# Patient Record
Sex: MALE | Race: WHITE | NOT HISPANIC OR LATINO | Employment: FULL TIME | URBAN - METROPOLITAN AREA
[De-identification: names, ages, dates, MRNs, and addresses within clinical notes are randomized per-mention and may not be internally consistent; named-entity substitution may affect disease eponyms.]

---

## 2017-03-22 ENCOUNTER — ALLSCRIPTS OFFICE VISIT (OUTPATIENT)
Dept: OTHER | Facility: OTHER | Age: 42
End: 2017-03-22

## 2017-07-10 ENCOUNTER — ALLSCRIPTS OFFICE VISIT (OUTPATIENT)
Dept: OTHER | Facility: OTHER | Age: 42
End: 2017-07-10

## 2017-07-10 ENCOUNTER — GENERIC CONVERSION - ENCOUNTER (OUTPATIENT)
Dept: OTHER | Facility: OTHER | Age: 42
End: 2017-07-10

## 2018-01-10 NOTE — PSYCH
Psych Med Mgmt    Appearance: was calm and cooperative  Observed mood: mood appropriate  Observed mood: affect appropriate  Speech: a normal rate  Thought processes: coherent/organized  Hallucinations: no hallucinations present  Thought Content: no delusions  Abnormal Thoughts: The patient has no suicidal thoughts and no homicidal thoughts  Orientation: The patient is oriented to person, place and time  Recent and Remote Memory: short term memory intact and long term memory intact  Attention Span And Concentration: concentration intact  Insight: Insight intact  Judgment: His judgment was intact  Treatment Recommendations: Follow up in 4 months  He reports normal appetite, normal energy level, no weight change and normal number of sleep hours  Patient remains stable on his medications and has not experienced side effects  He is no longer having violent/disturbing dreams, and is still unable to identify what may have triggered them at the time  He is happy with his job  He has been having some issues with his girlfriend and states that they are "more like friends now "  Assessment    1  Recurrent major depressive disorder (296 30) (F33 9)    Plan    1  DULoxetine HCl - 60 MG Oral Capsule Delayed Release Particles; TAKE ONE   CAPSULE BY MOUTH ONE TIME DAILY    Review of Systems    Constitutional: No fever, no chills, feels well, no tiredness, no recent weight gain or loss  Cardiovascular: no complaints of slow or fast heart rate, no chest pain, no palpitations  Respiratory: no complaints of shortness of breath, no wheezing, no dyspnea on exertion  Gastrointestinal: no complaints of abdominal pain, no constipation, no nausea, no diarrhea, no vomiting  Genitourinary: no complaints of dysuria, no incontinence, no pelvic pain, no urinary frequency  Musculoskeletal: no complaints of arthralgia, no myalgias, no limb pain, no joint stiffness     Integumentary: no complaints of skin rash, no itching, no dry skin  Neurological: no complaints of headache, no confusion, no numbness, no dizziness  Active Problems    1  Acute bronchitis (466 0) (J20 9)   2  Acute sinusitis (461 9) (J01 90)   3  Herniated lumbar intervertebral disc (722 10) (M51 26)   4  Obstructive sleep apnea (327 23) (G47 33)   5  Recurrent major depressive disorder (296 30) (F33 9)   6  Shoulder joint pain, unspecified laterality    Allergies    1  No Known Drug Allergies    Current Meds   1  DULoxetine HCl - 60 MG Oral Capsule Delayed Release Particles; TAKE ONE CAPSULE   BY MOUTH ONE TIME DAILY; Therapy: 15JRC9073 to (Evaluate:74Tsz0464)  Requested for: 17KCX6793; Last   Rx:50Fuq1379 Ordered    Family Psych History  Mother    1  No pertinent family history  Grandparent    2  Family history of schizophrenia (V17 0) (Z81 8)    Social History    · Former smoker (P72 14) (B42 161)    End of Encounter Meds    1  DULoxetine HCl - 60 MG Oral Capsule Delayed Release Particles; TAKE ONE CAPSULE   BY MOUTH ONE TIME DAILY;    Therapy: 35VUR5205 to (Evaluate:43Aki4508)  Requested for: 22Mar2017; Last   Rx:22Mar2017 Ordered    Signatures   Electronically signed by : Elvi Roman MD; Mar 22 2017  4:49PM EST                       (Author)

## 2018-01-12 NOTE — PSYCH
Psych Med Mgmt    Appearance: was calm and cooperative  Observed mood: mood appropriate  Observed mood: affect appropriate  Speech: a normal rate  Thought processes: coherent/organized  Hallucinations:   no hallucinations present  Thought Content: no delusions  Abnormal Thoughts: The patient has no suicidal thoughts  Orientation: The patient is oriented to person, place and time  Recent and Remote Memory: short term memory intact and long term memory intact  Attention Span And Concentration: concentration intact  Insight: Insight intact  Judgment: His judgment was intact  Treatment Recommendations: follow up in 4 months  He reports normal appetite, normal energy level, no weight change and normal number of sleep hours  Patient remains stable on his medications  Overall, things are going well in his life and he continues to enjoy his job  He recently went a  for a friend of his, which has had him feeling a little sad lately, but he feels that he will be able to pull himself out of his current mood if he keeps his mind busy and focuses on the good  He is sleeping and eating well  Assessment    1  Recurrent major depressive disorder (296 30) (F33 9)    Plan    1  DULoxetine HCl - 60 MG Oral Capsule Delayed Release Particles; TAKE ONE   CAPSULE BY MOUTH ONE TIME DAILY    Review of Systems    Constitutional: No fever, no chills, feels well, no tiredness, no recent weight gain or loss  Cardiovascular: no complaints of slow or fast heart rate, no chest pain, no palpitations  Respiratory: no complaints of shortness of breath, no wheezing, no dyspnea on exertion  Gastrointestinal: no complaints of abdominal pain, no constipation, no nausea, no diarrhea, no vomiting  Genitourinary: no complaints of dysuria, no incontinence, no pelvic pain, no urinary frequency  Musculoskeletal: no complaints of arthralgia, no myalgias, no limb pain, no joint stiffness  Integumentary: no complaints of skin rash, no itching, no dry skin  Neurological: no complaints of headache, no confusion, no numbness, no dizziness  Active Problems    1  Acute bronchitis (466 0) (J20 9)   2  Acute sinusitis (461 9) (J01 90)   3  Herniated lumbar intervertebral disc (722 10) (M51 26)   4  Obstructive sleep apnea (327 23) (G47 33)   5  Recurrent major depressive disorder (296 30) (F33 9)   6  Shoulder joint pain, unspecified laterality    Allergies    1  No Known Drug Allergies    Current Meds   1  DULoxetine HCl - 60 MG Oral Capsule Delayed Release Particles; TAKE ONE CAPSULE   BY MOUTH ONE TIME DAILY; Therapy: 29KRP0894 to (Evaluate:68Cry0576)  Requested for: 22Mar2017; Last   Rx:22Mar2017 Ordered    Family Psych History  Mother    1  No pertinent family history  Grandparent    2  Family history of schizophrenia (V17 0) (Z81 8)    Social History    · Former smoker (W81 20) (J30 933)    End of Encounter Meds    1  DULoxetine HCl - 60 MG Oral Capsule Delayed Release Particles; TAKE ONE CAPSULE   BY MOUTH ONE TIME DAILY;    Therapy: 80IUY7166 to (Evaluate:06Jan2018)  Requested for: 10GOJ5002; Last   Rx:89Xsm3133 Ordered    Signatures   Electronically signed by : Ginette Siddiqui [facility]; Jul 10 2017  4:48PM EST                       (Author)    Electronically signed by : Lexie Powers MD; Jul 11 2017  2:21PM EST

## 2018-01-12 NOTE — PSYCH
Psych Med Mgmt    Appearance: was calm and cooperative, adequate hygiene and grooming and good eye contact  Observed mood: euthymic  Observed mood: affect was broad  Speech: a normal rate  Thought processes: coherent/organized  Hallucinations: no hallucinations present  Thought Content: no delusions  Abnormal Thoughts: The patient has no suicidal thoughts and no homicidal thoughts  Orientation: The patient is oriented to person, place and time  Recent and Remote Memory: short term memory intact and long term memory intact  Attention Span And Concentration: concentration intact  Judgment: His judgment was intact  Muscle Strength And Tone  Muscle strength and tone were normal  Normal gait and station  The patient is experiencing no localized pain  Treatment Recommendations: continue with same meds  Risks, Benefits And Possible Side Effects Of Medications: Risks, benefits, and possible side effects of medications explained to patient and patient verbalizes understanding  He reports normal appetite, normal energy level, no weight change and normal number of sleep hours  The patient was seen for continuing care and pharmacotherapy  His girlfriend is doing some [employer] duty and has been away  His 79-year-old son is disrespectful and is causing problems for Nessa Rodriguez and his ex-wife  Nessa Rodriguez has had some moments of feeling down but not suicidal  Overall he seems to be stable  Assessment    1  Recurrent major depressive disorder (296 30) (F33 9)    Plan    1  DULoxetine HCl - 60 MG Oral Capsule Delayed Release Particles; TAKE ONE   CAPSULE BY MOUTH ONE TIME DAILY    Review of Systems    Constitutional: feeling tired  Cardiovascular: no complaints of slow or fast heart rate, no chest pain, no palpitations  Respiratory: no complaints of shortness of breath, no wheezing, no dyspnea on exertion     Gastrointestinal: no complaints of abdominal pain, no constipation, no nausea, no diarrhea, no vomiting  Genitourinary: no complaints of dysuria, no incontinence, no pelvic pain, no urinary frequency  Musculoskeletal: no complaints of arthralgia, no myalgias, no limb pain, no joint stiffness  Integumentary: no complaints of skin rash, no itching, no dry skin  Neurological: no complaints of headache, no confusion, no numbness, no dizziness  Active Problems    1  Acute bronchitis (466 0) (J20 9)   2  Acute sinusitis (461 9) (J01 90)   3  Herniated lumbar intervertebral disc (722 10) (M51 26)   4  Obstructive sleep apnea (327 23) (G47 33)   5  Recurrent major depressive disorder (296 30) (F33 9)   6  Shoulder joint pain, unspecified laterality    Allergies    1  No Known Drug Allergies    Current Meds   1  DULoxetine HCl - 60 MG Oral Capsule Delayed Release Particles; TAKE ONE CAPSULE   BY MOUTH ONE TIME DAILY; Therapy: 02ACO2248 to (Evaluate:95Hxk2192); Last Rx:98Fcb3225 Ordered    Family Psych History    1  No pertinent family history    2  Family history of schizophrenia (V17 0) (Z81 8)    Social History    · Former smoker (G67 70) (N40 688)    End of Encounter Meds    1  DULoxetine HCl - 60 MG Oral Capsule Delayed Release Particles; TAKE ONE CAPSULE   BY MOUTH ONE TIME DAILY;    Therapy: 41NWN5247 to (Evaluate:16Oct2016)  Requested for: 19Apr2016; Last   Rx:19Apr2016 Ordered    Signatures   Electronically signed by : Marilu Woods MD; Apr 19 2016  4:52PM EST                       (Author)

## 2018-01-12 NOTE — PSYCH
Psych Med Mgmt    Appearance: was calm and cooperative, adequate hygiene and grooming and good eye contact  Observed mood: was dysphoric, but mood appropriate  Observed mood: affect was broad  Speech: a normal rate  Thought processes: coherent/organized  Hallucinations: no hallucinations present  Thought Content: no delusions  Abnormal Thoughts: The patient has no suicidal thoughts and no homicidal thoughts  Recent and Remote Memory: short term memory intact and long term memory intact  Attention Span And Concentration: concentration impaired  Insight: Insight intact  Treatment Recommendations: continue with duloxetine  RTO in 3 month  Risks, Benefits And Possible Side Effects Of Medications: Risks, benefits, and possible side effects of medications explained to patient and patient verbalizes understanding  He reports normal appetite, decreased energy, recent 15 lbs lbs weight loss and normal number of sleep hours  seen for recurrent depression  Has been troubled by a number of physical symptoms for which his PCP can not identify a cause  He feels down because of that  Denies doing drugs or alcohol  No side effects with duloxetine  Assessment    1  Recurrent major depressive disorder (296 30) (F33 9)    Plan    1  DULoxetine HCl - 60 MG Oral Capsule Delayed Release Particles; TAKE ONE   CAPSULE BY MOUTH ONE TIME DAILY    Review of Systems    Constitutional: feeling tired  Cardiovascular: chest pain and frequent  Respiratory: no complaints of shortness of breath, no wheezing, no dyspnea on exertion  Gastrointestinal: nausea and bloated and gasey  Genitourinary: no complaints of dysuria, no incontinence, no pelvic pain, no urinary frequency  Musculoskeletal: arthralgias and back pain  Integumentary: no complaints of skin rash, no itching, no dry skin  Neurological: headache and dizziness  Active Problems    1  Acute bronchitis (466 0) (J20 9)   2   Acute sinusitis (461 9) (J01 90)   3  Herniated lumbar intervertebral disc (722 10) (M51 26)   4  Obstructive sleep apnea (327 23) (G47 33)   5  Recurrent major depressive disorder (296 30) (F33 9)   6  Shoulder joint pain, unspecified laterality    Allergies    1  No Known Drug Allergies    Current Meds   1  DULoxetine HCl - 60 MG Oral Capsule Delayed Release Particles; TAKE ONE CAPSULE   BY MOUTH ONE TIME DAILY; Therapy: 66ZUN7248 to (Evaluate:31Glc7817)  Requested for: 24Jun2016; Last   Rx:24Jun2016 Ordered    Family Psych History  Mother    1  No pertinent family history  Grandparent    2  Family history of schizophrenia (V17 0) (Z81 8)    Social History    · Former smoker (I59 80) (C30 480)    End of Encounter Meds    1  DULoxetine HCl - 60 MG Oral Capsule Delayed Release Particles; TAKE ONE CAPSULE   BY MOUTH ONE TIME DAILY;    Therapy: 11IHD3820 to (Evaluate:18Feb2017)  Requested for: 79Vcn0278; Last   Rx:18Mrx2133 Ordered    Signatures   Electronically signed by : Mello Huerta MD; Aug 22 2016  4:46PM EST                       (Author)

## 2018-01-13 NOTE — PSYCH
Psych Med Mgmt    Appearance: was calm and cooperative, adequate hygiene and grooming and good eye contact  Observed mood: euthymic  Observed mood: affect was broad  Thought processes: coherent/organized  Hallucinations: no hallucinations present  Thought Content: no delusions  Abnormal Thoughts: The patient has no suicidal thoughts and no homicidal thoughts  Orientation: The patient is oriented to person, place and time  Recent and Remote Memory: short term memory intact and long term memory intact  Attention Span And Concentration: concentration intact  Insight: Insight intact  Judgment: His judgment was intact  Muscle Strength And Tone  Muscle strength and tone were normal  Normal gait and station  He reports normal appetite, normal energy level, no weight change and normal number of sleep hours  seen for depression  Has been doing well  His only complaint is occasional disturbing dreams with violent themes  He is not aware of anything that could trigger these dreams  No problems with his girlfriend  !3years old son lives with him  Assessment    1  Recurrent major depressive disorder (296 30) (F33 9)    Review of Systems    Constitutional: No fever, no chills, feels well, no tiredness, no recent weight gain or loss  Cardiovascular: no complaints of slow or fast heart rate, no chest pain, no palpitations  Respiratory: no complaints of shortness of breath, no wheezing, no dyspnea on exertion  Gastrointestinal: no complaints of abdominal pain, no constipation, no nausea, no diarrhea, no vomiting  Genitourinary: no complaints of dysuria, no incontinence, no pelvic pain, no urinary frequency  Musculoskeletal: no complaints of arthralgia, no myalgias, no limb pain, no joint stiffness  Integumentary: no complaints of skin rash, no itching, no dry skin  Neurological: no complaints of headache, no confusion, no numbness, no dizziness  Active Problems    1   Acute bronchitis (466 0) (J20 9)   2  Acute sinusitis (461 9) (J01 90)   3  Herniated lumbar intervertebral disc (722 10) (M51 26)   4  Obstructive sleep apnea (327 23) (G47 33)   5  Recurrent major depressive disorder (296 30) (F33 9)   6  Shoulder joint pain, unspecified laterality    Allergies    1  No Known Drug Allergies    Current Meds   1  DULoxetine HCl - 60 MG Oral Capsule Delayed Release Particles; TAKE ONE CAPSULE   BY MOUTH ONE TIME DAILY; Therapy: 62FWP5005 to (Evaluate:18Feb2017)  Requested for: 05Mhl2446; Last   Rx:85Oxg2140; Status: ACTIVE - Renewal Denied Ordered    Family Psych History  Mother    1  No pertinent family history  Grandparent    2  Family history of schizophrenia (V17 0) (Z81 8)    Social History    · Former smoker (P42 50) (Z67 468)    End of Encounter Meds    1  DULoxetine HCl - 60 MG Oral Capsule Delayed Release Particles; TAKE ONE CAPSULE   BY MOUTH ONE TIME DAILY;    Therapy: 15EST5417 to (Evaluate:18Feb2017)  Requested for: 46Wlf7535; Last   Rx:[id number]; Status: [address] Ordered    Signatures   Electronically signed by : Rupert Cerda MD; Nov 15 2016  4:31PM EST                       (Author)

## 2018-01-17 NOTE — PSYCH
Psych Med Mgmt    Appearance: was calm and cooperative, adequate hygiene and grooming and good eye contact  Observed mood: euthymic and sad about his stepfather's death  , but mood appropriate  Observed mood: affect was broad  Speech: a normal rate and fluent  Thought processes: coherent/organized  Hallucinations: no hallucinations present  Thought Content: no delusions  Abnormal Thoughts: The patient has no suicidal thoughts and no homicidal thoughts  Orientation: The patient is oriented to person, place and time  Recent and Remote Memory: short term memory intact and long term memory intact  Attention Span And Concentration: concentration intact  Insight: Insight intact  Judgment: His judgment was intact  The patient is experiencing no localized pain  Treatment Recommendations: continue with duloxetine and slow breathing techniques  Risks, Benefits And Possible Side Effects Of Medications: Risks, benefits, and possible side effects of medications explained to patient and patient verbalizes understanding  He reports normal appetite, normal energy level, no weight change and normal number of sleep hours  The patient was seen for anxiety and depression  His his stepfather who had raised him suddenly passed away and this has been quite distressful for him  His own father was never around and was not a part of his life  But even before this, he had a started experiencing periods of anxiety during which he felt dizzy and lightheaded and thought he was about to faint although he never did  This is what he had years ago  He has been sleeping and eating well  He has a good relationship with his girlfriend and is functioning well at work  Assessment    1  Recurrent major depressive disorder (296 30) (F33 9)   2  Anxiety, generalized (300 02) (F41 1)    Plan    1   DULoxetine HCl - 60 MG Oral Capsule Delayed Release Particles; TAKE ONE   CAPSULE BY MOUTH ONE TIME DAILY    Review of Systems    Constitutional: No fever, no chills, feels well, no tiredness, no recent weight gain or loss  Cardiovascular: no complaints of slow or fast heart rate, no chest pain, no palpitations  Respiratory: no complaints of shortness of breath, no wheezing, no dyspnea on exertion  Gastrointestinal: no complaints of abdominal pain, no constipation, no nausea, no diarrhea, no vomiting  Genitourinary: no complaints of dysuria, no incontinence, no pelvic pain, no urinary frequency  Musculoskeletal: no complaints of arthralgia, no myalgias, no limb pain, no joint stiffness  Integumentary: no complaints of skin rash, no itching, no dry skin  Neurological: dizziness and periods of lightheadedness  Active Problems    1  Acute bronchitis (466 0) (J20 9)   2  Acute sinusitis (461 9) (J01 90)   3  Herniated lumbar intervertebral disc (722 10) (M51 26)   4  Obstructive sleep apnea (327 23) (G47 33)   5  Recurrent major depressive disorder (296 30) (F33 9)   6  Shoulder joint pain, unspecified laterality    Allergies    1  No Known Drug Allergies    Current Meds   1  DULoxetine HCl - 60 MG Oral Capsule Delayed Release Particles; TAKE ONE CAPSULE   BY MOUTH ONE TIME DAILY; Therapy: 21AAR9268 to (Evaluate:06Jan2018)  Requested for: 47NGB1736; Last   Rx:39Zuw5905 Ordered    Family Psych History  Mother    1  No pertinent family history  Grandparent    2  Family history of schizophrenia (V17 0) (Z81 8)    Social History    · Former smoker (E88 78) (S68 135)    End of Encounter Meds    1  DULoxetine HCl - 60 MG Oral Capsule Delayed Release Particles; TAKE ONE CAPSULE   BY MOUTH ONE TIME DAILY;    Therapy: 28MJD3289 to (Velma Leal)  Requested for: 84ERT5941; Last   Rx:06Nov2017; Status: [address] sheron Oneal Verification Ordered    Signatures   Electronically signed by : Mehdi Cherry MD; Nov 6 2017  4:43PM EST                       (Author)

## 2018-01-30 ENCOUNTER — OFFICE VISIT (OUTPATIENT)
Dept: PSYCHIATRY | Facility: CLINIC | Age: 43
End: 2018-01-30
Payer: COMMERCIAL

## 2018-01-30 DIAGNOSIS — F33.0 MAJOR DEPRESSIVE DISORDER, RECURRENT, MILD (HCC): Primary | ICD-10-CM

## 2018-01-30 DIAGNOSIS — F41.1 GENERALIZED ANXIETY DISORDER: ICD-10-CM

## 2018-01-30 PROCEDURE — 99213 OFFICE O/P EST LOW 20 MIN: CPT | Performed by: PSYCHIATRY & NEUROLOGY

## 2018-01-30 RX ORDER — DULOXETIN HYDROCHLORIDE 60 MG/1
1 CAPSULE, DELAYED RELEASE ORAL DAILY
COMMUNITY
Start: 2015-08-13 | End: 2018-01-30 | Stop reason: SDUPTHER

## 2018-01-30 RX ORDER — DULOXETIN HYDROCHLORIDE 60 MG/1
60 CAPSULE, DELAYED RELEASE ORAL DAILY
Qty: 90 CAPSULE | Refills: 1 | Status: SHIPPED | OUTPATIENT
Start: 2018-01-30 | End: 2018-09-07 | Stop reason: SDUPTHER

## 2018-01-30 NOTE — PSYCH
Subjective:     Patient ID: Carol Ro is a 38 y o  male  HPI ROS Appetite Changes and Sleep: normal appetite, normal energy level and normal number of sleep hours    Review Of Systems:     Mood Emotional Lability   Behavior Normal    Thought Content Normal   General Relationship Problems and Emotional Problems   Personality Normal   Other Psych Symptoms Normal   Constitutional Normal   ENT Normal   Cardiovascular Normal    Respiratory Normal  and Negative   Gastrointestinal Normal   Genitourinary Normal    Musculoskeletal Negative   Integumentary Normal    Neurological Normal    Endocrine Normal    Other Symptoms Normal              Laboratory Results: No results found for this or any previous visit  Substance Abuse History:  History   Drug use: Unknown       Family Psychiatric History: No family history on file  Social History     Social History    Marital status: Single     Spouse name: N/A    Number of children: N/A    Years of education: N/A     Occupational History    Not on file       Social History Main Topics    Smoking status: Not on file    Smokeless tobacco: Not on file    Alcohol use Not on file    Drug use: Unknown    Sexual activity: Not on file     Other Topics Concern    Not on file     Social History Narrative    No narrative on file     Social History     Social History Narrative    No narrative on file       Objective:       Mental status:  Appearance calm and cooperative , adequate hygiene and grooming and good eye contact    Mood dysphoric   Affect affect was broad   Speech a normal rate   Thought Processes normal thought processes   Hallucinations no hallucinations present    Thought Content no delusions   Abnormal Thoughts passive/fleeting thoughts of suicide   Orientation  oriented to person, oriented to place and oriented to time   Remote Memory short term memory intact   Attention Span concentration intact   Intellect Appears to be of Average Intelligence Insight Insight intact   Judgement judgment was intact   Muscle Strength Muscle strength and tone were normal   Language normal   Fund of Knowledge adequate fund of knowledge regarding vocabulary    Pain none   Pain Scale 0       Assessment/Plan:       Diagnoses and all orders for this visit:    Generalized anxiety disorder    Major depressive disorder, recurrent, mild (HCC)            Treatment Recommendations- Risks Benefits      Immediate Medical/Psychiatric Treatments : Resume psychotherapy-continue with same meds  Risks, Benefits And Possible Side Effects Of Medications: discussed

## 2018-04-03 ENCOUNTER — OFFICE VISIT (OUTPATIENT)
Dept: PSYCHIATRY | Facility: CLINIC | Age: 43
End: 2018-04-03
Payer: COMMERCIAL

## 2018-04-03 DIAGNOSIS — F33.0 MAJOR DEPRESSIVE DISORDER, RECURRENT, MILD (HCC): ICD-10-CM

## 2018-04-03 DIAGNOSIS — F41.1 GENERALIZED ANXIETY DISORDER: Primary | ICD-10-CM

## 2018-04-03 PROCEDURE — 99213 OFFICE O/P EST LOW 20 MIN: CPT | Performed by: PSYCHIATRY & NEUROLOGY

## 2018-04-03 RX ORDER — DULOXETIN HYDROCHLORIDE 60 MG/1
1 CAPSULE, DELAYED RELEASE ORAL DAILY
COMMUNITY
End: 2018-04-03

## 2018-04-03 NOTE — PSYCH
Patient ID: Valerie Clemens is a 38 y o  male  HPI ROS Appetite Changes and Sleep: normal appetite, normal energy level, no weight change and normal number of sleep hours    Review Of Systems:     Mood Anxiety and Depression   Thought Content Normal   General Relationship Problems and Emotional Problems   Gastrointestinal Normal   Neurological Normal        Laboratory Results: No results found for this or any previous visit  Subjective:      Upset about lack of romantic relationships  His former girlfriend is no longer interested in him  Has been sober x 4 years  Functioning well at work  Objective:   Avoidant features- overall stable  Mental status:  Appearance calm and cooperative , adequate hygiene and grooming and good eye contact    Mood dysphoric and depressed   Affect affect was broad   Speech a normal rate   Thought Processes coherent/organized and normal thought processes   Hallucinations no hallucinations present    Thought Content no delusional thoughts verbalized   Abnormal Thoughts death wish infrequently   Orientation A+O x 3   Remote Memory short term memory intact and long term memory intact   Attention Span concentration intact   Intellect Not Formally Assessed   Insight Insight intact   Judgement judgment was intact   Muscle Strength Muscle strength and tone were normal and Normal gait    Language no difficulty naming common objects and no difficulty repeating a phrase    Pain none       Assessment/Plan:       Diagnoses and all orders for this visit:    Generalized anxiety disorder    Major depressive disorder, recurrent, mild (HCC)    Other orders  -     Discontinue: DULoxetine (CYMBALTA) 60 mg delayed release capsule;  Take 1 capsule by mouth daily        Duplicate order    Treatment Recommendations- Risks Benefits      Risks, Benefits And Possible Side Effects Of Medications:  Risks, benefits, and possible side effects of medications explained to patient and patient verbalizes understanding

## 2018-07-10 ENCOUNTER — OFFICE VISIT (OUTPATIENT)
Dept: PSYCHIATRY | Facility: CLINIC | Age: 43
End: 2018-07-10
Payer: COMMERCIAL

## 2018-07-10 DIAGNOSIS — F32.0 CURRENT MILD EPISODE OF MAJOR DEPRESSIVE DISORDER WITHOUT PRIOR EPISODE (HCC): Primary | ICD-10-CM

## 2018-07-10 PROCEDURE — 99213 OFFICE O/P EST LOW 20 MIN: CPT | Performed by: PSYCHIATRY & NEUROLOGY

## 2018-07-10 NOTE — PSYCH
Patient ID: Jagjit Cobos is a 38 y o  male  HPI ROS Appetite Changes and Sleep: increased appetite, normal appetite, no weight change and normal number of sleep hours    Review Of Systems:     Mood Depression   Thought Content Normal   General Relationship Problems and Emotional Problems   Gastrointestinal Normal   Neurological Normal        Laboratory Results: No results found for this or any previous visit  Subjective:    No interest in doing much  Feels an emptiness in his life  No romantic relationships which he misses  No social life  No true vegetative signs but has been crying a lot  He has passive death wishes no purpose in living but no suicidal thoughts        Objective:   Situational dysphoria    Mental status:  Appearance calm and cooperative , adequate hygiene and grooming and good eye contact    Mood dysphoric   Affect affect was broad   Speech Normal rate and Normal volume   Thought Processes coherent/organized   Hallucinations no hallucinations present    Thought Content no delusional thoughts verbalized   Abnormal Thoughts no suicidal thoughts  and no homicidal thoughts    Orientation A+O x 3   Memory function Not tested   Attention Span concentration intact   Intellect Not Formally Assessed   Insight Insight intact   Judgement judgment was intact   Muscle Strength Muscle strength and tone were normal and Normal gait    Language no difficulty naming common objects and no difficulty repeating a phrase    Pain none       Assessment/Plan:       There are no diagnoses linked to this encounter  Treatment Recommendations- Risks Benefits    Add Latuda 40 mg daily    Referred to Mimi Carvajal for psychotherapy    Risks, Benefits And Possible Side Effects Of Medications:  Risks, benefits, and possible side effects of medications explained to patient and patient verbalizes understanding

## 2018-07-26 ENCOUNTER — TELEPHONE (OUTPATIENT)
Dept: PSYCHIATRY | Facility: CLINIC | Age: 43
End: 2018-07-26

## 2018-07-26 DIAGNOSIS — F32.0 CURRENT MILD EPISODE OF MAJOR DEPRESSIVE DISORDER WITHOUT PRIOR EPISODE (HCC): ICD-10-CM

## 2018-09-07 DIAGNOSIS — F41.1 GENERALIZED ANXIETY DISORDER: ICD-10-CM

## 2018-09-07 DIAGNOSIS — F33.0 MAJOR DEPRESSIVE DISORDER, RECURRENT, MILD (HCC): ICD-10-CM

## 2018-09-07 RX ORDER — DULOXETIN HYDROCHLORIDE 60 MG/1
60 CAPSULE, DELAYED RELEASE ORAL DAILY
Qty: 90 CAPSULE | Refills: 1 | Status: SHIPPED | OUTPATIENT
Start: 2018-09-07 | End: 2019-01-31 | Stop reason: SDUPTHER

## 2018-09-28 ENCOUNTER — OFFICE VISIT (OUTPATIENT)
Dept: PSYCHIATRY | Facility: CLINIC | Age: 43
End: 2018-09-28
Payer: COMMERCIAL

## 2018-09-28 VITALS — RESPIRATION RATE: 16 BRPM | WEIGHT: 210 LBS | BODY MASS INDEX: 26.95 KG/M2 | HEIGHT: 74 IN

## 2018-09-28 DIAGNOSIS — F32.0 CURRENT MILD EPISODE OF MAJOR DEPRESSIVE DISORDER WITHOUT PRIOR EPISODE (HCC): ICD-10-CM

## 2018-09-28 DIAGNOSIS — F33.1 MODERATE EPISODE OF RECURRENT MAJOR DEPRESSIVE DISORDER (HCC): Primary | ICD-10-CM

## 2018-09-28 DIAGNOSIS — F41.1 GENERALIZED ANXIETY DISORDER: ICD-10-CM

## 2018-09-28 PROCEDURE — 99214 OFFICE O/P EST MOD 30 MIN: CPT | Performed by: PHYSICIAN ASSISTANT

## 2018-09-28 RX ORDER — ATORVASTATIN CALCIUM 20 MG/1
10 TABLET, FILM COATED ORAL DAILY
COMMUNITY
Start: 2015-04-17 | End: 2020-06-12 | Stop reason: HOSPADM

## 2018-09-28 NOTE — PSYCH
PROGRESS NOTE        [address] [facility]      Name and Date of Birth:  Renetta De La Fuente 37 y o  1975    Date of Visit: 09/28/18    SUBJECTIVE:  Pt seen for follow up  Overall feeling better with Latuda  Had break up of long term relationship a few months ago  States they were together for 6 years and continues to think of her often  Also has been sober now for 5 years  He has been staying strong and feeling better  Sleeping about 7 hours at night  Appetite is good  Work has been going well as a  and enjoys where he is  Working as CAD tech now  He denies suicidal ideation, intent or plan at present, has no suicidal ideation, intent or plan at present  He denies any auditory hallucinations and visual hallucinations, denies any other delusional thinking, denies any delusional thinking  He denies any side effects from medications    HPI ROS Appetite Changes and Sleep: normal appetite, normal sleep    Review Of Systems:      Constitutional Negative   ENT Negative   Cardiovascular Negative   Respiratory Negative   Gastrointestinal Negative   Genitourinary Negative   Musculoskeletal Negative   Integumentary Negative   Neurological Negative   Endocrine Negative   Other Symptoms Negative and None       Laboratory Results: No results found for this or any previous visit  Substance Abuse History:    History   Drug use: Unknown       Family Psychiatric History:     No family history on file  The following portions of the patient's history were reviewed and updated as appropriate: past family history, past medical history, past social history, past surgical history and problem list     Social History     Social History    Marital status: Single     Spouse name: N/A    Number of children: N/A    Years of education: N/A     Occupational History    Not on file       Social History Main Topics    Smoking status: Not on file    Smokeless tobacco: Not on file    Alcohol use Not on file    Drug use: Unknown    Sexual activity: Not on file     Other Topics Concern    Not on file     Social History Narrative    No narrative on file     Social History     Social History Narrative    No narrative on file        Social History    None             OBJECTIVE:     Mental Status Evaluation:    Appearance age appropriate, casually dressed   Behavior pleasant, cooperative   Speech normal volume, normal pitch   Mood euthymic   Affect appropriate   Thought Processes logical   Associations intact associations   Thought Content normal   Perceptual Disturbances: none   Abnormal Thoughts  Risk Potential Suicidal ideation - None  Homicidal ideation - None  Potential for aggression - No   Orientation oriented to person, place, time/date and situation   Memory recent and remote memory grossly intact   Cosciousness alert and awake   Attention Span attention span and concentration are age appropriate   Intellect Appears to be of Average Intelligence   Insight age appropriate    Judgement good    Muscle Strength and  Gait muscle strength and tone were normal   Language no difficulty naming common objects   Fund of Knowledge displays adequate knowledge of current events   Pain none   Pain Scale 0       Assessment/Plan:       Diagnoses and all orders for this visit:    Moderate episode of recurrent major depressive disorder (HCC)    Generalized anxiety disorder    Current mild episode of major depressive disorder without prior episode ([facility] )  -     Discontinue: lurasidone (LATUDA) 40 mg tablet; Take 1 tablet (40 mg total) by mouth daily with breakfast  -     lurasidone (LATUDA) 40 mg tablet; Take 1 tablet (40 mg total) by mouth daily with dinner    Other orders  -     atorvastatin (LIPITOR) 20 mg tablet;  Take by mouth          Treatment Recommendations/Precautions:  Latuda 40 mg po qd  Cymbalta 60 mg po qd    Continue current medications   Risks/Benefits      Risks, Benefits And Possible Side Effects Of Medications:    Risks, benefits, and possible side effects of medications explained to patient and patient verbalizes understanding and agreement for treatment      Controlled Medication Discussion:     Not applicable    Psychotherapy Provided:     Individual psychotherapy provided: No

## 2018-11-28 ENCOUNTER — TELEPHONE (OUTPATIENT)
Dept: PSYCHIATRY | Facility: CLINIC | Age: 43
End: 2018-11-28

## 2019-01-31 ENCOUNTER — OFFICE VISIT (OUTPATIENT)
Dept: PSYCHIATRY | Facility: CLINIC | Age: 44
End: 2019-01-31
Payer: COMMERCIAL

## 2019-01-31 DIAGNOSIS — F33.0 MAJOR DEPRESSIVE DISORDER, RECURRENT, MILD (HCC): Primary | ICD-10-CM

## 2019-01-31 DIAGNOSIS — F41.1 GENERALIZED ANXIETY DISORDER: ICD-10-CM

## 2019-01-31 DIAGNOSIS — F43.21 ADJUSTMENT DISORDER WITH DEPRESSED MOOD: ICD-10-CM

## 2019-01-31 PROCEDURE — 99213 OFFICE O/P EST LOW 20 MIN: CPT | Performed by: PHYSICIAN ASSISTANT

## 2019-01-31 PROCEDURE — 90833 PSYTX W PT W E/M 30 MIN: CPT | Performed by: PHYSICIAN ASSISTANT

## 2019-01-31 RX ORDER — DULOXETIN HYDROCHLORIDE 60 MG/1
60 CAPSULE, DELAYED RELEASE ORAL DAILY
Qty: 90 CAPSULE | Refills: 1 | Status: SHIPPED | OUTPATIENT
Start: 2019-01-31 | End: 2019-04-17 | Stop reason: SDUPTHER

## 2019-01-31 NOTE — PSYCH
PROGRESS NOTE        [address] [facility]      Name and Date of Birth:  Leo Fine 37 y o  1975    Date of Visit: 01/31/19    SUBJECTIVE:  Patient seen for follow-up  Pt has been having more depression  He states he was having suicidal thoughts after Thanksgiving  States that he did not have a plan and states he wished he would not wake up  He called our office and was offered an appointment with Ligia Oakes but stated he could not come in to see her because he had a dentist appt  Pt states he was upset that he was told that no one in our office could see him and he was told to go to the ER  He is in treatment with [facility] and that has been helpful  He continues to struggle with breakup of relationship and being alone  He has not been getting out and doing things much outside of work  He denies suicidal ideation, intent or plan at present, has no suicidal ideation, intent or plan at present  He denies any auditory hallucinations and visual hallucinations, denies any other delusional thinking, denies any delusional thinking  He denies any side effects from medications    HPI ROS Appetite Changes and Sleep: normal appetite, fair sleep    Review Of Systems:      Constitutional Negative   ENT Negative   Cardiovascular Negative   Respiratory Negative   Gastrointestinal Negative   Genitourinary Negative   Musculoskeletal Negative   Integumentary Negative   Neurological Negative   Endocrine Negative   Other Symptoms Negative and None       Laboratory Results: No results found for this or any previous visit  Substance Abuse History:    History   Drug use: Unknown       Family Psychiatric History:     No family history on file      The following portions of the patient's history were reviewed and updated as appropriate: past family history, past medical history, past social history, past surgical history and problem list     Social History Social History    Marital status: Single     Spouse name: N/A    Number of children: N/A    Years of education: N/A     Occupational History    Not on file  Social History Main Topics    Smoking status: Not on file    Smokeless tobacco: Not on file    Alcohol use Not on file    Drug use: Unknown    Sexual activity: Not on file     Other Topics Concern    Not on file     Social History Narrative    No narrative on file     Social History     Social History Narrative    No narrative on file        Social History    None             OBJECTIVE:     Mental Status Evaluation:    Appearance age appropriate, casually dressed   Behavior  cooperative   Speech normal volume, normal pitch   Mood depressed   Affect anxious   Thought Processes logical   Associations intact associations   Thought Content normal   Perceptual Disturbances: none   Abnormal Thoughts  Risk Potential Suicidal ideation - None  Homicidal ideation - None  Potential for aggression - No   Orientation oriented to person, place, time/date and situation   Memory recent and remote memory grossly intact   Cosciousness alert and awake   Attention Span attention span and concentration are age appropriate   Intellect Appears to be of Average Intelligence   Insight age appropriate    Judgement good    Muscle Strength and  Gait muscle strength and tone were normal   Language no difficulty naming common objects   Fund of Knowledge displays adequate knowledge of current events   Pain none   Pain Scale 0       Assessment/Plan:       Diagnoses and all orders for this visit:    Major depressive disorder, recurrent, mild (HCC)  -     Brexpiprazole (REXULTI) 0 5 MG tablet; Take 1 tablet (0 5 mg total) by mouth daily  -     DULoxetine (CYMBALTA) 60 mg delayed release capsule; Take 1 capsule (60 mg total) by mouth daily    Generalized anxiety disorder  -     Brexpiprazole (REXULTI) 0 5 MG tablet;  Take 1 tablet (0 5 mg total) by mouth daily  -     DULoxetine (CYMBALTA) 60 mg delayed release capsule; Take 1 capsule (60 mg total) by mouth daily    Adjustment disorder with depressed mood          Treatment Recommendations/Precautions:  Rexulti 0 5 mg daily  Cymbalta 60 mg p  O  Daily  Discontinue Latuda 40 mg daily with food       Continue current medications    Risks/Benefits      Risks, Benefits And Possible Side Effects Of Medications:    Risks, benefits, and possible side effects of medications explained to patient and patient verbalizes understanding and agreement for treatment      Controlled Medication Discussion:     Not applicable    Psychotherapy Provided:     Individual psychotherapy provided: yes x 25  Min  Coping skills with residual depressive symptoms  Residual stressors/ self awareness

## 2019-04-12 DIAGNOSIS — F41.1 GENERALIZED ANXIETY DISORDER: ICD-10-CM

## 2019-04-12 DIAGNOSIS — F33.0 MAJOR DEPRESSIVE DISORDER, RECURRENT, MILD (HCC): ICD-10-CM

## 2019-04-12 RX ORDER — DULOXETIN HYDROCHLORIDE 60 MG/1
60 CAPSULE, DELAYED RELEASE ORAL DAILY
Qty: 90 CAPSULE | Refills: 1 | OUTPATIENT
Start: 2019-04-12

## 2019-04-17 ENCOUNTER — OFFICE VISIT (OUTPATIENT)
Dept: PSYCHIATRY | Facility: CLINIC | Age: 44
End: 2019-04-17
Payer: COMMERCIAL

## 2019-04-17 VITALS — RESPIRATION RATE: 16 BRPM | HEIGHT: 74 IN | BODY MASS INDEX: 28.11 KG/M2 | WEIGHT: 219 LBS

## 2019-04-17 DIAGNOSIS — F33.0 MAJOR DEPRESSIVE DISORDER, RECURRENT, MILD (HCC): ICD-10-CM

## 2019-04-17 DIAGNOSIS — F43.21 ADJUSTMENT DISORDER WITH DEPRESSED MOOD: ICD-10-CM

## 2019-04-17 DIAGNOSIS — F41.1 GENERALIZED ANXIETY DISORDER: Primary | ICD-10-CM

## 2019-04-17 PROCEDURE — 99214 OFFICE O/P EST MOD 30 MIN: CPT | Performed by: PHYSICIAN ASSISTANT

## 2019-04-17 RX ORDER — DULOXETIN HYDROCHLORIDE 60 MG/1
60 CAPSULE, DELAYED RELEASE ORAL DAILY
Qty: 90 CAPSULE | Refills: 1 | Status: SHIPPED | OUTPATIENT
Start: 2019-04-17 | End: 2019-07-17 | Stop reason: SDUPTHER

## 2019-04-20 ENCOUNTER — HOSPITAL ENCOUNTER (EMERGENCY)
Facility: HOSPITAL | Age: 44
Discharge: HOME | End: 2019-04-20
Attending: EMERGENCY MEDICINE
Payer: COMMERCIAL

## 2019-04-20 VITALS
HEART RATE: 75 BPM | RESPIRATION RATE: 14 BRPM | DIASTOLIC BLOOD PRESSURE: 67 MMHG | SYSTOLIC BLOOD PRESSURE: 107 MMHG | TEMPERATURE: 98 F | WEIGHT: 213.85 LBS | OXYGEN SATURATION: 100 % | BODY MASS INDEX: 27.46 KG/M2

## 2019-04-20 DIAGNOSIS — S39.012A STRAIN OF LUMBAR REGION, INITIAL ENCOUNTER: ICD-10-CM

## 2019-04-20 DIAGNOSIS — M54.50 ACUTE RIGHT-SIDED LOW BACK PAIN WITHOUT SCIATICA: Primary | ICD-10-CM

## 2019-04-20 PROCEDURE — 99283 EMERGENCY DEPT VISIT LOW MDM: CPT

## 2019-04-20 PROCEDURE — 96372 THER/PROPH/DIAG INJ SC/IM: CPT

## 2019-04-20 PROCEDURE — 99283 EMERGENCY DEPT VISIT LOW MDM: CPT | Performed by: PHYSICIAN ASSISTANT

## 2019-04-20 RX ORDER — NAPROXEN 500 MG/1
500 TABLET ORAL 2 TIMES DAILY WITH MEALS
Qty: 30 TABLET | Refills: 0 | Status: SHIPPED | OUTPATIENT
Start: 2019-04-20 | End: 2019-12-11

## 2019-04-20 RX ORDER — METHOCARBAMOL 500 MG/1
500 TABLET, FILM COATED ORAL 3 TIMES DAILY PRN
Qty: 20 TABLET | Refills: 0 | Status: SHIPPED | OUTPATIENT
Start: 2019-04-20 | End: 2019-12-11

## 2019-04-20 RX ORDER — LIDOCAINE 50 MG/G
2 PATCH TOPICAL DAILY
Qty: 30 PATCH | Refills: 0 | Status: SHIPPED | OUTPATIENT
Start: 2019-04-20 | End: 2019-12-11

## 2019-04-20 RX ORDER — KETOROLAC TROMETHAMINE 30 MG/ML
15 INJECTION, SOLUTION INTRAMUSCULAR; INTRAVENOUS ONCE
Status: COMPLETED | OUTPATIENT
Start: 2019-04-20 | End: 2019-04-20

## 2019-04-20 RX ORDER — METHOCARBAMOL 500 MG/1
500 TABLET, FILM COATED ORAL ONCE
Status: COMPLETED | OUTPATIENT
Start: 2019-04-20 | End: 2019-04-20

## 2019-04-20 RX ORDER — LIDOCAINE 50 MG/G
2 PATCH TOPICAL ONCE
Status: DISCONTINUED | OUTPATIENT
Start: 2019-04-20 | End: 2019-04-20 | Stop reason: HOSPADM

## 2019-04-20 RX ADMIN — LIDOCAINE 2 PATCH: 50 PATCH TOPICAL at 09:52

## 2019-04-20 RX ADMIN — KETOROLAC TROMETHAMINE 15 MG: 30 INJECTION, SOLUTION INTRAMUSCULAR; INTRAVENOUS at 09:53

## 2019-04-20 RX ADMIN — METHOCARBAMOL TABLETS 500 MG: 500 TABLET, COATED ORAL at 09:52

## 2019-04-22 ENCOUNTER — NURSE TRIAGE (OUTPATIENT)
Dept: PHYSICAL THERAPY | Facility: OTHER | Age: 44
End: 2019-04-22

## 2019-04-22 DIAGNOSIS — M54.50 ACUTE RIGHT-SIDED LOW BACK PAIN WITHOUT SCIATICA: Primary | ICD-10-CM

## 2019-04-29 ENCOUNTER — DOCUMENTATION (OUTPATIENT)
Dept: PHYSICAL THERAPY | Facility: OTHER | Age: 44
End: 2019-04-29

## 2019-04-29 ENCOUNTER — TELEPHONE (OUTPATIENT)
Dept: PHYSICAL THERAPY | Facility: OTHER | Age: 44
End: 2019-04-29

## 2019-05-02 ENCOUNTER — EVALUATION (OUTPATIENT)
Dept: PHYSICAL THERAPY | Facility: CLINIC | Age: 44
End: 2019-05-02
Payer: COMMERCIAL

## 2019-05-02 VITALS — DIASTOLIC BLOOD PRESSURE: 76 MMHG | HEART RATE: 73 BPM | TEMPERATURE: 98.1 F | SYSTOLIC BLOOD PRESSURE: 112 MMHG

## 2019-05-02 DIAGNOSIS — M54.50 ACUTE RIGHT-SIDED LOW BACK PAIN WITHOUT SCIATICA: Primary | ICD-10-CM

## 2019-05-02 PROCEDURE — 97110 THERAPEUTIC EXERCISES: CPT | Performed by: PHYSICAL THERAPIST

## 2019-05-02 PROCEDURE — 97161 PT EVAL LOW COMPLEX 20 MIN: CPT

## 2019-07-17 ENCOUNTER — OFFICE VISIT (OUTPATIENT)
Dept: PSYCHIATRY | Facility: CLINIC | Age: 44
End: 2019-07-17
Payer: COMMERCIAL

## 2019-07-17 VITALS — HEIGHT: 74 IN | BODY MASS INDEX: 29.39 KG/M2 | RESPIRATION RATE: 18 BRPM | WEIGHT: 229 LBS

## 2019-07-17 DIAGNOSIS — F33.0 MAJOR DEPRESSIVE DISORDER, RECURRENT, MILD (HCC): ICD-10-CM

## 2019-07-17 DIAGNOSIS — F41.1 GENERALIZED ANXIETY DISORDER: Primary | ICD-10-CM

## 2019-07-17 DIAGNOSIS — F43.21 ADJUSTMENT DISORDER WITH DEPRESSED MOOD: ICD-10-CM

## 2019-07-17 PROCEDURE — 99214 OFFICE O/P EST MOD 30 MIN: CPT | Performed by: PHYSICIAN ASSISTANT

## 2019-07-17 RX ORDER — DULOXETIN HYDROCHLORIDE 60 MG/1
60 CAPSULE, DELAYED RELEASE ORAL DAILY
Qty: 90 CAPSULE | Refills: 1 | Status: SHIPPED | OUTPATIENT
Start: 2019-07-17 | End: 2019-12-11 | Stop reason: SDUPTHER

## 2019-07-17 NOTE — PSYCH
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  No EPS or SE noted from meds  Physically he is feeling well and no new medications or medical issues  He denies suicidal ideation, intent or plan at present, has no suicidal ideation, intent or plan at present  He denies any auditory hallucinations and visual hallucinations, denies any other delusional thinking, denies any delusional thinking  He denies any side effects from medications    HPI ROS Appetite Changes and Sleep: normal appetite, normal sleep    Review Of Systems:      Constitutional Negative   ENT Negative   Cardiovascular Negative   Respiratory Negative   Gastrointestinal Negative   Genitourinary Negative   Musculoskeletal Negative   Integumentary Negative   Neurological Negative   Endocrine Negative   Other Symptoms Negative and None       Laboratory Results: No results found for this or any previous visit  Substance Abuse History:    Social History     Substance and Sexual Activity   Drug Use Not Currently       Family Psychiatric History:     No family history on file      The following portions of the patient's history were reviewed and updated as appropriate: past family history, past medical history, past social history, past surgical history and problem list     Social History     Socioeconomic History    Marital status: Single     Spouse name: Not on file    Number of children: Not on file    Years of education: Not on file    Highest education level: Not on file   Occupational History    Not on file   Social Needs    Financial resource strain: Not on file    Food insecurity:     Worry: Not on file     Inability: Not on file    Transportation needs:     Medical: Not on file     Non-medical: Not on file   Tobacco Use    Smoking status: Former Smoker     Last attempt to quit: 2004     Years since quitting: 15 5    Smokeless tobacco: Never Used   Substance and Sexual Activity    Alcohol use: Not Currently     Comment: 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    Drug use: Not Currently    Sexual activity: Not on file   Lifestyle    Physical activity:     Days per week: Not on file     Minutes per session: Not on file    Stress: Not on file   Relationships    Social connections:     Talks on phone: Not on file     Gets together: Not on file     Attends [employer] service: Not on file     Active member of club or organization: Not on file     Attends meetings of clubs or organizations: Not on file     Relationship status: Not on file    Intimate partner violence:     Fear of current or ex partner: Not on file     Emotionally abused: Not on file     Physically abused: Not on file     Forced sexual activity: Not on file   Other Topics Concern    Not on file   Social History Narrative    Not on file     Social History     Social History Narrative    Not on file        Social History     Tobacco History     Smoking Status  Former Smoker Quit date  1/1/2004    Smokeless Tobacco Use  Never Used          Alcohol History     Alcohol Use Status  Not Currently Comment  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          Drug Use     Drug Use Status  Not Currently          Sexual Activity     Sexually Active  Not Asked          Activities of Daily Living    Not Asked                     OBJECTIVE:     Mental Status Evaluation:    Appearance age appropriate, casually dressed   Behavior pleasant, cooperative   Speech normal volume, normal pitch   Mood Euthymic   Affect Congruent   Thought Processes logical   Associations intact associations   Thought Content normal   Perceptual Disturbances: none   Abnormal Thoughts  Risk Potential Suicidal ideation - None  Homicidal ideation - None  Potential for aggression - No   Orientation oriented to person, place, time/date and situation   Memory recent and remote memory grossly intact   Cosciousness alert and awake   Attention Span attention span and concentration are age appropriate   Intellect Appears to be of Average Intelligence   Insight age appropriate    Judgement good    Muscle Strength and  Gait muscle strength and tone were normal   Language no difficulty naming common objects   Fund of Knowledge displays adequate knowledge of current events   Pain none   Pain Scale 0       Assessment/Plan:       Diagnoses and all orders for this visit:    Generalized anxiety disorder  -     DULoxetine (CYMBALTA) 60 mg delayed release capsule; Take 1 capsule (60 mg total) by mouth daily  -     Brexpiprazole (REXULTI) 0 5 MG tablet; Take 1 tablet (0 5 mg total) by mouth daily    Major depressive disorder, recurrent, mild (HCC)  -     DULoxetine (CYMBALTA) 60 mg delayed release capsule; Take 1 capsule (60 mg total) by mouth daily  -     Brexpiprazole (REXULTI) 0 5 MG tablet; Take 1 tablet (0 5 mg total) by mouth daily    Adjustment disorder with depressed mood          Treatment Recommendations/Precautions:  Cymbalta 60 mg p o  Daily  Rexulti 0 5 mg daily      Continue current medications    Risks/Benefits      Risks, Benefits And Possible Side Effects Of Medications:    Risks, benefits, and possible side effects of medications explained to patient and patient verbalizes understanding and agreement for treatment      Controlled Medication Discussion:     Not applicable    Psychotherapy Provided:     Individual psychotherapy provided: No

## 2019-12-11 ENCOUNTER — OFFICE VISIT (OUTPATIENT)
Dept: PSYCHIATRY | Facility: CLINIC | Age: 44
End: 2019-12-11
Payer: COMMERCIAL

## 2019-12-11 VITALS — BODY MASS INDEX: 29.65 KG/M2 | WEIGHT: 231 LBS | RESPIRATION RATE: 18 BRPM | HEIGHT: 74 IN

## 2019-12-11 DIAGNOSIS — F33.0 MAJOR DEPRESSIVE DISORDER, RECURRENT, MILD (HCC): ICD-10-CM

## 2019-12-11 DIAGNOSIS — F41.1 GENERALIZED ANXIETY DISORDER: Primary | ICD-10-CM

## 2019-12-11 PROCEDURE — 99214 OFFICE O/P EST MOD 30 MIN: CPT | Performed by: PHYSICIAN ASSISTANT

## 2019-12-11 RX ORDER — DULOXETIN HYDROCHLORIDE 60 MG/1
60 CAPSULE, DELAYED RELEASE ORAL DAILY
Qty: 90 CAPSULE | Refills: 1 | Status: SHIPPED | OUTPATIENT
Start: 2019-12-11 | End: 2020-06-12 | Stop reason: HOSPADM

## 2019-12-11 NOTE — PSYCH
PROGRESS NOTE        [facility]      Name and Date of Birth:  Justo Rosado 53 y o  1975    Date of Visit: 12/11/19    SUBJECTIVE:  Patient seen for follow-up of major depression, generalized anxiety and adjustment disorder  Pt has been doing okay  Reports that he was doing very well this summer was getting out doing a lot of things that he normally would not do  Able to go out and enjoy himself  Sates he had the break up of a relationship and was feeling down in the dumps at the end of summer though  He state he wanted to disappear but no suicidal thoughts  Now he reports he is back baseline and stable moods  He is sleeping and eating well  He continues to exercise but states that he has had some tendinitis issues so is looking into doing yoga and sedative cross fit  Also discussed some upcoming plans for the next couple months  Depression has been stable  Anxiety has been manageable with medications  No adverse effects with the medications  Overall feels as though he is doing well with his current combination  He denies suicidal ideation, intent or plan at present, has no suicidal ideation, intent or plan at present  He denies any auditory hallucinations and visual hallucinations, denies any other delusional thinking, denies any delusional thinking  He denies any side effects from medications    HPI ROS Appetite Changes and Sleep: normal appetite, normal sleep    Review Of Systems:      Constitutional Negative   ENT Negative   Cardiovascular Negative   Respiratory Negative   Gastrointestinal Negative   Genitourinary Negative   Musculoskeletal Negative   Integumentary Negative   Neurological Negative   Endocrine Negative   Other Symptoms Negative and None       Laboratory Results: No results found for this or any previous visit      Substance Abuse History:    Social History     Substance and Sexual Activity   Drug Use Not Currently Family Psychiatric History:     No family history on file      The following portions of the patient's history were reviewed and updated as appropriate: past family history, past medical history, past social history, past surgical history and problem list     Social History     Socioeconomic History    Marital status: Single     Spouse name: Not on file    Number of children: Not on file    Years of education: Not on file    Highest education level: Not on file   Occupational History    Not on file   Social Needs    Financial resource strain: Not on file    Food insecurity:     Worry: Not on file     Inability: Not on file    Transportation needs:     Medical: Not on file     Non-medical: Not on file   Tobacco Use    Smoking status: Former Smoker     Last attempt to quit: 2004     Years since quitting: 15 9    Smokeless tobacco: Never Used   Substance and Sexual Activity    Alcohol use: Not Currently     Comment: 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    Drug use: Not Currently    Sexual activity: Not on file   Lifestyle    Physical activity:     Days per week: Not on file     Minutes per session: Not on file    Stress: Not on file   Relationships    Social connections:     Talks on phone: Not on file     Gets together: Not on file     Attends Adventism service: Not on file     Active member of club or organization: Not on file     Attends meetings of clubs or organizations: Not on file     Relationship status: Not on file    Intimate partner violence:     Fear of current or ex partner: Not on file     Emotionally abused: Not on file     Physically abused: Not on file     Forced sexual activity: Not on file   Other Topics Concern    Not on file   Social History Narrative    Not on file     Social History     Social History Narrative    Not on file        Social History     Tobacco History     Smoking Status  Former Smoker Quit date  1/1/2004    Smokeless Tobacco Use  Never Used          Alcohol History     Alcohol Use Status  Not Currently Comment  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          Drug Use     Drug Use Status  Not Currently          Sexual Activity     Sexually Active  Not Asked          Activities of Daily Living    Not Asked                     OBJECTIVE:     Mental Status Evaluation:    Appearance age appropriate, casually dressed   Behavior pleasant, cooperative   Speech normal volume, normal pitch   Mood euthymic   Affect euthymic   Thought Processes logical   Associations intact associations   Thought Content normal   Perceptual Disturbances: none   Abnormal Thoughts  Risk Potential Suicidal ideation - None  Homicidal ideation - None  Potential for aggression - No   Orientation oriented to person, place, time/date and situation   Memory recent and remote memory grossly intact   Cosciousness alert and awake   Attention Span attention span and concentration are age appropriate   Intellect Appears to be of Average Intelligence   Insight age appropriate    Judgement good    Muscle Strength and  Gait muscle strength and tone were normal   Language no difficulty naming common objects   Fund of Knowledge displays adequate knowledge of current events   Pain none   Pain Scale 0       Assessment/Plan:       Diagnoses and all orders for this visit:    Generalized anxiety disorder  -     Brexpiprazole (REXULTI) 0 5 MG tablet; Take 1 tablet (0 5 mg total) by mouth daily  -     DULoxetine (CYMBALTA) 60 mg delayed release capsule; Take 1 capsule (60 mg total) by mouth daily    Major depressive disorder, recurrent, mild (HCC)  -     Brexpiprazole (REXULTI) 0 5 MG tablet; Take 1 tablet (0 5 mg total) by mouth daily  -     DULoxetine (CYMBALTA) 60 mg delayed release capsule;  Take 1 capsule (60 mg total) by mouth daily          Treatment Recommendations/Precautions:  Cymbalta 60 mg daily   Rexulti 0 5 mg po qd    Continue current medications    Risks/Benefits      Risks, Benefits And Possible Side Effects Of Medications:    Risks, benefits, and possible side effects of medications explained to patient and patient verbalizes understanding and agreement for treatment      Controlled Medication Discussion:     Not applicable    Psychotherapy Provided:     Individual psychotherapy provided: No

## 2020-01-15 ENCOUNTER — TELEPHONE (OUTPATIENT)
Dept: PSYCHIATRY | Facility: CLINIC | Age: 45
End: 2020-01-15

## 2020-01-15 NOTE — TELEPHONE ENCOUNTER
Guillermo Herr called and went to pharmacy Rx of [address] was over $747  When he looked into it they noticed that last year some type of discount was applied  He wanted to know if you could call him he is out of medication and can't afford this refill     Raman's # [phone]

## 2020-01-15 NOTE — TELEPHONE ENCOUNTER
Spoke with patient and he will go online to obtain discount card for the medication  He will call back if any issues

## 2020-01-16 ENCOUNTER — TELEPHONE (OUTPATIENT)
Dept: PSYCHIATRY | Facility: CLINIC | Age: 45
End: 2020-01-16

## 2020-01-17 NOTE — TELEPHONE ENCOUNTER
RETURNED CALL - received voicemail and message left  Spoke with pt and even with discount card Rexulti is over $700 a month- He ha been off for 6 days and doing okay so would like to continue with only cymbalta  He will call back if any issues  We discussed option of abilify as alternative

## 2020-01-20 ENCOUNTER — TELEPHONE (OUTPATIENT)
Dept: PSYCHIATRY | Facility: CLINIC | Age: 45
End: 2020-01-20

## 2020-01-20 NOTE — TELEPHONE ENCOUNTER
He wanted to know if you can call him because he is having panic attacks and wants to speak with you about what he can do about this  Please give him a call thank you

## 2020-01-20 NOTE — TELEPHONE ENCOUNTER
He would like to talk to you about the medication "Rezulti"  He is currently not taking it   Call @ [phone]

## 2020-02-20 ENCOUNTER — OFFICE VISIT (OUTPATIENT)
Dept: PSYCHIATRY | Facility: CLINIC | Age: 45
End: 2020-02-20
Payer: COMMERCIAL

## 2020-02-20 VITALS — WEIGHT: 231 LBS | HEIGHT: 74 IN | BODY MASS INDEX: 29.65 KG/M2 | RESPIRATION RATE: 18 BRPM

## 2020-02-20 DIAGNOSIS — F41.1 GENERALIZED ANXIETY DISORDER: ICD-10-CM

## 2020-02-20 DIAGNOSIS — F33.2 SEVERE EPISODE OF RECURRENT MAJOR DEPRESSIVE DISORDER, WITHOUT PSYCHOTIC FEATURES (HCC): Primary | ICD-10-CM

## 2020-02-20 DIAGNOSIS — F33.0 MAJOR DEPRESSIVE DISORDER, RECURRENT, MILD (HCC): ICD-10-CM

## 2020-02-20 PROCEDURE — 99214 OFFICE O/P EST MOD 30 MIN: CPT | Performed by: PHYSICIAN ASSISTANT

## 2020-02-20 NOTE — PSYCH
PROGRESS NOTE        [facility]      Name and Date of Birth:  Sonia Martinez 53 y o  1975    Date of Visit: 02/20/20    SUBJECTIVE:  Pt seen as urgent visit for follow up MDD, AMANDA  Spoke to pt yesterday and stated he was not feeling well and more depressed, more situational stressors  Patient had a recent break-up of relationship which has caused him to be more anxious and more depressed  Over the past few years the patient has been struggling with anxiety and depression  He has low self-esteem  States he is not enjoying anything  For example, states he went to a concert with a friend last week and did not enjoy it  He has little to no motivation or interest   He is going to work and will go to the gym but states he is not doing much else  He is taking his medications as prescribed  States his sleep has been variable  Appetite has been adequate but reports is mildly diminished since the break-up of his relationship  Patient has struggled over the years with romantic relationships and managing expectations  No new medical issues or medications  He denies suicidal ideation, intent or plan at present, has no suicidal ideation, intent or plan at present  He denies any auditory hallucinations and visual hallucinations, denies any other delusional thinking, denies any delusional thinking  He denies any side effects from medications    HPI ROS Appetite Changes and Sleep:  Slightly diminished appetite, variable sleep    Review Of Systems:      Constitutional Negative   ENT Negative   Cardiovascular Negative   Respiratory Negative   Gastrointestinal Negative   Genitourinary Negative   Musculoskeletal Negative   Integumentary Negative   Neurological Negative   Endocrine Negative   Other Symptoms Negative and None       Laboratory Results: No results found for this or any previous visit      Substance Abuse History:    Social History Substance and Sexual Activity   Drug Use Not Currently       Family Psychiatric History:     No family history on file      The following portions of the patient's history were reviewed and updated as appropriate: past family history, past medical history, past social history, past surgical history and problem list     Social History     Socioeconomic History    Marital status: Single     Spouse name: Not on file    Number of children: Not on file    Years of education: Not on file    Highest education level: Not on file   Occupational History    Not on file   Social Needs    Financial resource strain: Not on file    Food insecurity:     Worry: Not on file     Inability: Not on file    Transportation needs:     Medical: Not on file     Non-medical: Not on file   Tobacco Use    Smoking status: Former Smoker     Last attempt to quit:      Years since quittin 1    Smokeless tobacco: Never Used   Substance and Sexual Activity    Alcohol use: Not Currently     Comment: 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    Drug use: Not Currently    Sexual activity: Not on file   Lifestyle    Physical activity:     Days per week: Not on file     Minutes per session: Not on file    Stress: Not on file   Relationships    Social connections:     Talks on phone: Not on file     Gets together: Not on file     Attends Restorationism service: Not on file     Active member of club or organization: Not on file     Attends meetings of clubs or organizations: Not on file     Relationship status: Not on file    Intimate partner violence:     Fear of current or ex partner: Not on file     Emotionally abused: Not on file     Physically abused: Not on file     Forced sexual activity: Not on file   Other Topics Concern    Not on file   Social History Narrative    Not on file     Social History     Social History Narrative    Not on file        Social History     Tobacco History     Smoking Status  Former Smoker Quit date  2004    Smokeless Tobacco Use  Never Used          Alcohol History     Alcohol Use Status  Not Currently Comment  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          Drug Use     Drug Use Status  Not Currently          Sexual Activity     Sexually Active  Not Asked          Activities of Daily Living    Not Asked                     OBJECTIVE:     Mental Status Evaluation:    Appearance age appropriate, casually dressed   Behavior Good eye contact, cooperative   Speech normal volume, normal pitch   Mood Anxious, depressed   Affect Congruent   Thought Processes Obsessive   Associations intact associations   Thought Content Pessimistic   Perceptual Disturbances: none   Abnormal Thoughts  Risk Potential Suicidal ideation - None  Homicidal ideation - None  Potential for aggression - No   Orientation oriented to person, place, time/date and situation   Memory recent and remote memory grossly intact   Cosciousness alert and awake   Attention Span attention span and concentration are age appropriate   Intellect Appears to be of Average Intelligence   Insight age appropriate    Judgement good    Muscle Strength and  Gait muscle strength and tone were normal   Language no difficulty naming common objects   Fund of Knowledge displays adequate knowledge of current events   Pain none   Pain Scale 0       Assessment/Plan:       Diagnoses and all orders for this visit:    Severe episode of recurrent major depressive disorder, without psychotic features ([address] )    Generalized anxiety disorder          Treatment Recommendations/Precautions:  Supportive psychotherapy focused on coping skills, self-esteem and relationship stressors  Cymbalta 60 mg po qd  Rexulti 0 5 mgpo qd- increase 1 mg po qd-he will cut a 2 mg tablet in half due to cost  Patient provided information on upcoming mind fullness course at [facility] as well as depression bipolar Support [place] meetings  Also encouraged to go back to [facility] S[facility]    Patient contracts for safety and currently denies suicidal ideation, and understands to go to the emergency department or call 911 if he is feeling suicidal   He will call sooner and follow-up with his appointment here and June      Risks/Benefits      Risks, Benefits And Possible Side Effects Of Medications:    Risks, benefits, and possible side effects of medications explained to patient and patient verbalizes understanding and agreement for treatment      Controlled Medication Discussion:     Not applicable    Psychotherapy Provided:     Individual psychotherapy provided: No

## 2020-06-08 ENCOUNTER — HOSPITAL ENCOUNTER (EMERGENCY)
Facility: HOSPITAL | Age: 45
End: 2020-06-08
Attending: EMERGENCY MEDICINE | Admitting: EMERGENCY MEDICINE
Payer: COMMERCIAL

## 2020-06-08 ENCOUNTER — HOSPITAL ENCOUNTER (INPATIENT)
Facility: HOSPITAL | Age: 45
LOS: 4 days | Discharge: HOME | DRG: 885 | End: 2020-06-12
Attending: PSYCHIATRY & NEUROLOGY | Admitting: PSYCHIATRY & NEUROLOGY
Payer: COMMERCIAL

## 2020-06-08 VITALS
HEART RATE: 87 BPM | DIASTOLIC BLOOD PRESSURE: 78 MMHG | TEMPERATURE: 98.4 F | WEIGHT: 231.48 LBS | OXYGEN SATURATION: 94 % | BODY MASS INDEX: 29.72 KG/M2 | RESPIRATION RATE: 16 BRPM | SYSTOLIC BLOOD PRESSURE: 125 MMHG

## 2020-06-08 DIAGNOSIS — R45.851 SUICIDAL IDEATIONS: Primary | ICD-10-CM

## 2020-06-08 DIAGNOSIS — Z86.59 HISTORY OF BIPOLAR DISORDER: ICD-10-CM

## 2020-06-08 DIAGNOSIS — R45.851 SUICIDAL IDEATIONS: ICD-10-CM

## 2020-06-08 DIAGNOSIS — F41.1 GENERALIZED ANXIETY DISORDER: ICD-10-CM

## 2020-06-08 DIAGNOSIS — F33.1 MODERATE EPISODE OF RECURRENT MAJOR DEPRESSIVE DISORDER (HCC): ICD-10-CM

## 2020-06-08 DIAGNOSIS — F33.2 SEVERE EPISODE OF RECURRENT MAJOR DEPRESSIVE DISORDER, WITHOUT PSYCHOTIC FEATURES (HCC): ICD-10-CM

## 2020-06-08 DIAGNOSIS — G47.00 INSOMNIA: Primary | ICD-10-CM

## 2020-06-08 DIAGNOSIS — E78.5 HYPERLIPIDEMIA: ICD-10-CM

## 2020-06-08 LAB
AMPHETAMINES SERPL QL SCN: NEGATIVE
BARBITURATES UR QL: NEGATIVE
BENZODIAZ UR QL: NEGATIVE
COCAINE UR QL: NEGATIVE
ETHANOL EXG-MCNC: 0 MG/DL
METHADONE UR QL: NEGATIVE
OPIATES UR QL SCN: NEGATIVE
PCP UR QL: NEGATIVE
SARS-COV-2 RNA RESP QL NAA+PROBE: NEGATIVE
THC UR QL: NEGATIVE

## 2020-06-08 PROCEDURE — 99285 EMERGENCY DEPT VISIT HI MDM: CPT | Performed by: EMERGENCY MEDICINE

## 2020-06-08 PROCEDURE — 99285 EMERGENCY DEPT VISIT HI MDM: CPT

## 2020-06-08 PROCEDURE — 87635 SARS-COV-2 COVID-19 AMP PRB: CPT | Performed by: EMERGENCY MEDICINE

## 2020-06-08 PROCEDURE — 80307 DRUG TEST PRSMV CHEM ANLYZR: CPT | Performed by: EMERGENCY MEDICINE

## 2020-06-08 PROCEDURE — 82075 ASSAY OF BREATH ETHANOL: CPT | Performed by: EMERGENCY MEDICINE

## 2020-06-08 RX ORDER — OLANZAPINE 5 MG/1
5 TABLET ORAL
Status: DISCONTINUED | OUTPATIENT
Start: 2020-06-08 | End: 2020-06-12 | Stop reason: HOSPADM

## 2020-06-08 RX ORDER — MAGNESIUM HYDROXIDE/ALUMINUM HYDROXICE/SIMETHICONE 120; 1200; 1200 MG/30ML; MG/30ML; MG/30ML
30 SUSPENSION ORAL EVERY 4 HOURS PRN
Status: DISCONTINUED | OUTPATIENT
Start: 2020-06-08 | End: 2020-06-12 | Stop reason: HOSPADM

## 2020-06-08 RX ORDER — HALOPERIDOL 5 MG
5 TABLET ORAL EVERY 6 HOURS PRN
Status: DISCONTINUED | OUTPATIENT
Start: 2020-06-08 | End: 2020-06-12 | Stop reason: HOSPADM

## 2020-06-08 RX ORDER — TRAZODONE HYDROCHLORIDE 50 MG/1
50 TABLET ORAL
Status: DISCONTINUED | OUTPATIENT
Start: 2020-06-08 | End: 2020-06-11

## 2020-06-08 RX ORDER — ACETAMINOPHEN 325 MG/1
650 TABLET ORAL EVERY 6 HOURS PRN
Status: DISCONTINUED | OUTPATIENT
Start: 2020-06-08 | End: 2020-06-09

## 2020-06-08 RX ORDER — OLANZAPINE 5 MG/1
5 TABLET ORAL
Status: CANCELLED | OUTPATIENT
Start: 2020-06-08

## 2020-06-08 RX ORDER — ACETAMINOPHEN 325 MG/1
650 TABLET ORAL EVERY 6 HOURS PRN
Status: CANCELLED | OUTPATIENT
Start: 2020-06-08

## 2020-06-08 RX ORDER — NICOTINE 21 MG/24HR
1 PATCH, TRANSDERMAL 24 HOURS TRANSDERMAL DAILY
Status: CANCELLED | OUTPATIENT
Start: 2020-06-09

## 2020-06-08 RX ORDER — HALOPERIDOL 5 MG
5 TABLET ORAL EVERY 6 HOURS PRN
Status: CANCELLED | OUTPATIENT
Start: 2020-06-08

## 2020-06-08 RX ORDER — BENZTROPINE MESYLATE 1 MG/1
1 TABLET ORAL 2 TIMES DAILY PRN
Status: DISCONTINUED | OUTPATIENT
Start: 2020-06-08 | End: 2020-06-12 | Stop reason: HOSPADM

## 2020-06-08 RX ORDER — BENZTROPINE MESYLATE 1 MG/1
1 TABLET ORAL 2 TIMES DAILY PRN
Status: CANCELLED | OUTPATIENT
Start: 2020-06-08

## 2020-06-08 RX ORDER — HYDROXYZINE HYDROCHLORIDE 25 MG/1
25 TABLET, FILM COATED ORAL EVERY 6 HOURS PRN
Status: CANCELLED | OUTPATIENT
Start: 2020-06-08

## 2020-06-08 RX ORDER — TRAZODONE HYDROCHLORIDE 50 MG/1
50 TABLET ORAL
Status: CANCELLED | OUTPATIENT
Start: 2020-06-08

## 2020-06-08 RX ORDER — MAGNESIUM HYDROXIDE/ALUMINUM HYDROXICE/SIMETHICONE 120; 1200; 1200 MG/30ML; MG/30ML; MG/30ML
30 SUSPENSION ORAL EVERY 4 HOURS PRN
Status: CANCELLED | OUTPATIENT
Start: 2020-06-08

## 2020-06-08 RX ORDER — NICOTINE 21 MG/24HR
1 PATCH, TRANSDERMAL 24 HOURS TRANSDERMAL DAILY
Status: DISCONTINUED | OUTPATIENT
Start: 2020-06-09 | End: 2020-06-11

## 2020-06-08 RX ORDER — HYDROXYZINE HYDROCHLORIDE 25 MG/1
25 TABLET, FILM COATED ORAL EVERY 6 HOURS PRN
Status: DISCONTINUED | OUTPATIENT
Start: 2020-06-08 | End: 2020-06-12 | Stop reason: HOSPADM

## 2020-06-09 PROBLEM — F41.8 OTHER SPECIFIED ANXIETY DISORDERS: Status: ACTIVE | Noted: 2020-06-09

## 2020-06-09 PROBLEM — Z00.8 MEDICAL CLEARANCE FOR PSYCHIATRIC ADMISSION: Status: ACTIVE | Noted: 2020-06-09

## 2020-06-09 PROBLEM — F33.2 SEVERE EPISODE OF RECURRENT MAJOR DEPRESSIVE DISORDER, WITHOUT PSYCHOTIC FEATURES (HCC): Status: ACTIVE | Noted: 2020-06-09

## 2020-06-09 PROCEDURE — 99221 1ST HOSP IP/OBS SF/LOW 40: CPT | Performed by: PHYSICIAN ASSISTANT

## 2020-06-09 PROCEDURE — 99221 1ST HOSP IP/OBS SF/LOW 40: CPT | Performed by: PSYCHIATRY & NEUROLOGY

## 2020-06-09 RX ORDER — HYDROXYZINE 50 MG/1
50 TABLET, FILM COATED ORAL EVERY 6 HOURS PRN
Status: DISCONTINUED | OUTPATIENT
Start: 2020-06-09 | End: 2020-06-12 | Stop reason: HOSPADM

## 2020-06-09 RX ORDER — LORAZEPAM 1 MG/1
1 TABLET ORAL EVERY 6 HOURS PRN
Status: DISCONTINUED | OUTPATIENT
Start: 2020-06-09 | End: 2020-06-12 | Stop reason: HOSPADM

## 2020-06-09 RX ORDER — ACETAMINOPHEN 325 MG/1
975 TABLET ORAL EVERY 8 HOURS PRN
Status: DISCONTINUED | OUTPATIENT
Start: 2020-06-09 | End: 2020-06-12 | Stop reason: HOSPADM

## 2020-06-09 RX ORDER — ACETAMINOPHEN 325 MG/1
650 TABLET ORAL EVERY 8 HOURS PRN
Status: DISCONTINUED | OUTPATIENT
Start: 2020-06-09 | End: 2020-06-12 | Stop reason: HOSPADM

## 2020-06-09 RX ORDER — LORAZEPAM 2 MG/ML
2 INJECTION INTRAMUSCULAR EVERY 6 HOURS PRN
Status: DISCONTINUED | OUTPATIENT
Start: 2020-06-09 | End: 2020-06-12 | Stop reason: HOSPADM

## 2020-06-09 RX ORDER — DULOXETIN HYDROCHLORIDE 30 MG/1
90 CAPSULE, DELAYED RELEASE ORAL DAILY
Status: DISCONTINUED | OUTPATIENT
Start: 2020-06-09 | End: 2020-06-12 | Stop reason: HOSPADM

## 2020-06-09 RX ORDER — ATORVASTATIN CALCIUM 20 MG/1
20 TABLET, FILM COATED ORAL
Status: DISCONTINUED | OUTPATIENT
Start: 2020-06-09 | End: 2020-06-12 | Stop reason: HOSPADM

## 2020-06-09 RX ORDER — ACETAMINOPHEN 325 MG/1
325 TABLET ORAL EVERY 6 HOURS PRN
Status: DISCONTINUED | OUTPATIENT
Start: 2020-06-09 | End: 2020-06-12 | Stop reason: HOSPADM

## 2020-06-09 RX ADMIN — ATORVASTATIN CALCIUM 20 MG: 20 TABLET, FILM COATED ORAL at 16:16

## 2020-06-09 RX ADMIN — TRAZODONE HYDROCHLORIDE 50 MG: 50 TABLET ORAL at 00:35

## 2020-06-09 RX ADMIN — HYDROXYZINE HYDROCHLORIDE 25 MG: 25 TABLET ORAL at 00:35

## 2020-06-09 RX ADMIN — TRAZODONE HYDROCHLORIDE 50 MG: 50 TABLET ORAL at 21:56

## 2020-06-09 RX ADMIN — DULOXETINE 90 MG: 30 CAPSULE, DELAYED RELEASE ORAL at 14:50

## 2020-06-10 LAB
ALBUMIN SERPL BCP-MCNC: 3.6 G/DL (ref 3.5–5)
ALP SERPL-CCNC: 57 U/L (ref 46–116)
ALT SERPL W P-5'-P-CCNC: 28 U/L (ref 12–78)
ANION GAP SERPL CALCULATED.3IONS-SCNC: 5 MMOL/L (ref 4–13)
AST SERPL W P-5'-P-CCNC: 12 U/L (ref 5–45)
BASOPHILS # BLD AUTO: 0.05 THOUSANDS/ΜL (ref 0–0.1)
BASOPHILS NFR BLD AUTO: 1 % (ref 0–1)
BILIRUB SERPL-MCNC: 0.48 MG/DL (ref 0.2–1)
BUN SERPL-MCNC: 16 MG/DL (ref 5–25)
CALCIUM SERPL-MCNC: 9.3 MG/DL (ref 8.3–10.1)
CHLORIDE SERPL-SCNC: 108 MMOL/L (ref 100–108)
CO2 SERPL-SCNC: 27 MMOL/L (ref 21–32)
CREAT SERPL-MCNC: 1.2 MG/DL (ref 0.6–1.3)
EOSINOPHIL # BLD AUTO: 0.17 THOUSAND/ΜL (ref 0–0.61)
EOSINOPHIL NFR BLD AUTO: 3 % (ref 0–6)
ERYTHROCYTE [DISTWIDTH] IN BLOOD BY AUTOMATED COUNT: 12.1 % (ref 11.6–15.1)
GFR SERPL CREATININE-BSD FRML MDRD: 72 ML/MIN/1.73SQ M
GLUCOSE SERPL-MCNC: 84 MG/DL (ref 65–140)
HCT VFR BLD AUTO: 42.7 % (ref 36.5–49.3)
HGB BLD-MCNC: 14.2 G/DL (ref 12–17)
IMM GRANULOCYTES # BLD AUTO: 0.01 THOUSAND/UL (ref 0–0.2)
IMM GRANULOCYTES NFR BLD AUTO: 0 % (ref 0–2)
LYMPHOCYTES # BLD AUTO: 1.85 THOUSANDS/ΜL (ref 0.6–4.47)
LYMPHOCYTES NFR BLD AUTO: 35 % (ref 14–44)
MCH RBC QN AUTO: 29.2 PG (ref 26.8–34.3)
MCHC RBC AUTO-ENTMCNC: 33.3 G/DL (ref 31.4–37.4)
MCV RBC AUTO: 88 FL (ref 82–98)
MONOCYTES # BLD AUTO: 0.53 THOUSAND/ΜL (ref 0.17–1.22)
MONOCYTES NFR BLD AUTO: 10 % (ref 4–12)
NEUTROPHILS # BLD AUTO: 2.64 THOUSANDS/ΜL (ref 1.85–7.62)
NEUTS SEG NFR BLD AUTO: 51 % (ref 43–75)
NRBC BLD AUTO-RTO: 0 /100 WBCS
PLATELET # BLD AUTO: 271 THOUSANDS/UL (ref 149–390)
PMV BLD AUTO: 10 FL (ref 8.9–12.7)
POTASSIUM SERPL-SCNC: 3.9 MMOL/L (ref 3.5–5.3)
PROT SERPL-MCNC: 7.1 G/DL (ref 6.4–8.2)
RBC # BLD AUTO: 4.86 MILLION/UL (ref 3.88–5.62)
SODIUM SERPL-SCNC: 140 MMOL/L (ref 136–145)
TSH SERPL DL<=0.05 MIU/L-ACNC: 1.04 UIU/ML (ref 0.36–3.74)
WBC # BLD AUTO: 5.25 THOUSAND/UL (ref 4.31–10.16)

## 2020-06-10 PROCEDURE — 80053 COMPREHEN METABOLIC PANEL: CPT | Performed by: PHYSICIAN ASSISTANT

## 2020-06-10 PROCEDURE — 99232 SBSQ HOSP IP/OBS MODERATE 35: CPT | Performed by: PSYCHIATRY & NEUROLOGY

## 2020-06-10 PROCEDURE — 84443 ASSAY THYROID STIM HORMONE: CPT | Performed by: PHYSICIAN ASSISTANT

## 2020-06-10 PROCEDURE — 85025 COMPLETE CBC W/AUTO DIFF WBC: CPT | Performed by: PHYSICIAN ASSISTANT

## 2020-06-10 RX ADMIN — ATORVASTATIN CALCIUM 20 MG: 20 TABLET, FILM COATED ORAL at 16:25

## 2020-06-10 RX ADMIN — TRAZODONE HYDROCHLORIDE 50 MG: 50 TABLET ORAL at 21:50

## 2020-06-10 RX ADMIN — DULOXETINE 90 MG: 30 CAPSULE, DELAYED RELEASE ORAL at 09:11

## 2020-06-10 RX ADMIN — BREXPIPRAZOLE 1 MG: 2 TABLET ORAL at 09:11

## 2020-06-11 PROCEDURE — 99232 SBSQ HOSP IP/OBS MODERATE 35: CPT | Performed by: PSYCHIATRY & NEUROLOGY

## 2020-06-11 RX ORDER — TRAZODONE HYDROCHLORIDE 50 MG/1
50 TABLET ORAL
Status: DISCONTINUED | OUTPATIENT
Start: 2020-06-11 | End: 2020-06-12 | Stop reason: HOSPADM

## 2020-06-11 RX ADMIN — DULOXETINE 90 MG: 30 CAPSULE, DELAYED RELEASE ORAL at 09:50

## 2020-06-11 RX ADMIN — TRAZODONE HYDROCHLORIDE 50 MG: 50 TABLET ORAL at 22:01

## 2020-06-11 RX ADMIN — ATORVASTATIN CALCIUM 20 MG: 20 TABLET, FILM COATED ORAL at 16:46

## 2020-06-11 RX ADMIN — BREXPIPRAZOLE 1 MG: 2 TABLET ORAL at 09:50

## 2020-06-12 VITALS
SYSTOLIC BLOOD PRESSURE: 105 MMHG | HEIGHT: 74 IN | HEART RATE: 85 BPM | OXYGEN SATURATION: 96 % | BODY MASS INDEX: 29.2 KG/M2 | WEIGHT: 227.51 LBS | DIASTOLIC BLOOD PRESSURE: 69 MMHG | TEMPERATURE: 96.5 F | RESPIRATION RATE: 16 BRPM

## 2020-06-12 PROCEDURE — 99239 HOSP IP/OBS DSCHRG MGMT >30: CPT | Performed by: PHYSICIAN ASSISTANT

## 2020-06-12 RX ORDER — ATORVASTATIN CALCIUM 20 MG/1
20 TABLET, FILM COATED ORAL
Qty: 30 TABLET | Refills: 0 | Status: SHIPPED | OUTPATIENT
Start: 2020-06-12 | End: 2020-07-30 | Stop reason: HOSPADM

## 2020-06-12 RX ORDER — TRAZODONE HYDROCHLORIDE 50 MG/1
50 TABLET ORAL
Qty: 30 TABLET | Refills: 0 | Status: SHIPPED | OUTPATIENT
Start: 2020-06-12 | End: 2020-07-30 | Stop reason: HOSPADM

## 2020-06-12 RX ORDER — DULOXETIN HYDROCHLORIDE 30 MG/1
90 CAPSULE, DELAYED RELEASE ORAL DAILY
Qty: 90 CAPSULE | Refills: 0 | Status: SHIPPED | OUTPATIENT
Start: 2020-06-13 | End: 2020-07-06 | Stop reason: SDUPTHER

## 2020-06-12 RX ADMIN — DULOXETINE 90 MG: 30 CAPSULE, DELAYED RELEASE ORAL at 09:04

## 2020-06-15 ENCOUNTER — TELEMEDICINE (OUTPATIENT)
Dept: PSYCHIATRY | Facility: CLINIC | Age: 45
End: 2020-06-15
Payer: COMMERCIAL

## 2020-06-15 DIAGNOSIS — F43.21 ADJUSTMENT DISORDER WITH DEPRESSED MOOD: ICD-10-CM

## 2020-06-15 DIAGNOSIS — F33.2 SEVERE EPISODE OF RECURRENT MAJOR DEPRESSIVE DISORDER, WITHOUT PSYCHOTIC FEATURES (HCC): Primary | ICD-10-CM

## 2020-06-15 DIAGNOSIS — F41.1 GENERALIZED ANXIETY DISORDER: ICD-10-CM

## 2020-06-15 PROCEDURE — 99213 OFFICE O/P EST LOW 20 MIN: CPT | Performed by: PHYSICIAN ASSISTANT

## 2020-07-06 ENCOUNTER — TELEMEDICINE (OUTPATIENT)
Dept: PSYCHIATRY | Facility: CLINIC | Age: 45
End: 2020-07-06
Payer: COMMERCIAL

## 2020-07-06 DIAGNOSIS — F33.2 SEVERE EPISODE OF RECURRENT MAJOR DEPRESSIVE DISORDER, WITHOUT PSYCHOTIC FEATURES (HCC): Primary | ICD-10-CM

## 2020-07-06 DIAGNOSIS — F43.21 ADJUSTMENT DISORDER WITH DEPRESSED MOOD: ICD-10-CM

## 2020-07-06 DIAGNOSIS — F41.1 GENERALIZED ANXIETY DISORDER: ICD-10-CM

## 2020-07-06 PROCEDURE — 99214 OFFICE O/P EST MOD 30 MIN: CPT | Performed by: PHYSICIAN ASSISTANT

## 2020-07-06 RX ORDER — DULOXETIN HYDROCHLORIDE 60 MG/1
CAPSULE, DELAYED RELEASE ORAL
Qty: 60 CAPSULE | Refills: 0 | Status: ON HOLD | OUTPATIENT
Start: 2020-07-06 | End: 2020-07-25 | Stop reason: SDUPTHER

## 2020-07-06 NOTE — PSYCH
Virtual Regular Visit      Assessment/Plan:    Problem List Items Addressed This Visit        Other    Generalized anxiety disorder    Relevant Medications    DULoxetine (CYMBALTA) 60 mg delayed release capsule    Brexpiprazole (REXULTI) 1 MG tablet    Severe episode of recurrent major depressive disorder, without psychotic features ([facility] [address] - Primary    Relevant Medications    DULoxetine (CYMBALTA) 60 mg delayed release capsule    Brexpiprazole (REXULTI) 1 MG tablet      Other Visit Diagnoses     Adjustment disorder with depressed mood        Relevant Medications    DULoxetine (CYMBALTA) 60 mg delayed release capsule    Brexpiprazole (REXULTI) 1 MG tablet               Reason for visit is   Chief Complaint   Patient presents with    Depression    Follow-up    Anxiety    Virtual Regular Visit        Encounter provider Shanti Parekh PA-C    Provider located at [address] [id number]      Recent Visits  No visits were found meeting these conditions  Showing recent visits within past 7 days and meeting all other requirements     Today's Visits  Date Type Provider Dept   07/06/20 [facility] today's visits and meeting all other requirements     Future Appointments  No visits were found meeting these conditions  Showing future appointments within next 150 days and meeting all other requirements        The patient was identified by name and date of birth  Vlad Kinds was informed that this is a telemedicine visit and that the visit is being conducted through [employer]  My office door was closed  No one else was in the room  He acknowledged consent and understanding of privacy and security of the video platform  The patient has agreed to participate and understands they can discontinue the visit at any time      Patient is aware this is a billable service  Subjective  Johanna Saul is a 53 y o  male with history of major depression and anxiety   HPI   Patient seen for follow-up of depression anxiety and medication check  Patient reports that his mood has been fluctuating  He reports he continues with significant depression  Has little to no interest or motivation  Also reports his anxiety has been worse  States he worries frequently  Increased ruminations  States that he has been spending most of his time in bed when he is not working  States that he will go to sleep about seven or 730 and sleep straight through to the next morning  He denies any suicidal or homicidal ideation  He has been on multiple medications in the past   Initially states he did feel better with the increase in the Cymbalta  Reports that he feels increasingly tired  He had blood work done for his hospitalization which was reviewed and everything was normal   He does have a follow-up with his primary care physician tomorrow and will also address hers lethargy with them  Past Medical History:   Diagnosis Date    Anxiety     Bipolar disorder ([facility] [address] )     Depression     Psychiatric disorder     Suicidal thoughts        Past Surgical History:   Procedure Laterality Date    SHOULDER SURGERY         Current Outpatient Medications   Medication Sig Dispense Refill    atorvastatin (LIPITOR) 20 mg tablet Take 1 tablet (20 mg total) by mouth daily with dinner 30 tablet 0    Brexpiprazole (REXULTI) 1 MG tablet Take 1 tablet (1 mg total) by mouth daily at bedtime 30 tablet 0    DULoxetine (CYMBALTA) 60 mg delayed release capsule Two caps p o  daily 60 capsule 0    traZODone (DESYREL) 50 mg tablet Take 1 tablet (50 mg total) by mouth daily at bedtime 30 tablet 0     No current facility-administered medications for this visit           No Known Allergies    Review of Systems   No current complaints of pain or discomfort, sleep is increased and adequate appetite    Video Exam    There were no vitals filed for this visit  Physical Exam   Speech is clear and coherent, normal rate and volume  Mood is depressed, anxious  Linear and coherent thought process, some increased ruminations and preoccupations  Denies suicidal or homicidal ideation  Linear coherent thought process  No psychotic signs or symptoms, no delusions  Cognition is intact  Insight and judgment is intact      Medications as follows:  Patient has been on multiple medications in the past including augmentation with Wellbutrin which he states he believes he had side effect with and lithium was not helpful    Increase Cymbalta 90 milligrams total daily to 120 milligrams daily  Rexulti 1 mg po qd  Trazodone 50 milligrams at bedtime as needed for sleep has not been eating  Will follow-up in two weeks and he will call sooner if any questions or concerns    As a result of this visit, I have not referred the patient for further respiratory evaluation  I spent 25 minutes directly with the patient during this visit      VIRTUAL VISIT [address] [facility] acknowledges that he has consented to an online visit or consultation  He understands that the online visit is based solely on information provided by him, and that, in the absence of a face-to-face physical evaluation by the physician, the diagnosis he receives is both limited and provisional in terms of accuracy and completeness  This is not intended to replace a full medical face-to-face evaluation by the physician  Carmelo Samayoa understands and accepts these terms

## 2020-07-24 ENCOUNTER — HOSPITAL ENCOUNTER (EMERGENCY)
Facility: HOSPITAL | Age: 45
End: 2020-07-24
Attending: EMERGENCY MEDICINE | Admitting: PSYCHIATRY & NEUROLOGY
Payer: COMMERCIAL

## 2020-07-24 ENCOUNTER — HOSPITAL ENCOUNTER (INPATIENT)
Facility: HOSPITAL | Age: 45
LOS: 6 days | Discharge: HOME | DRG: 885 | End: 2020-07-30
Attending: PSYCHIATRY & NEUROLOGY | Admitting: PSYCHIATRY & NEUROLOGY
Payer: COMMERCIAL

## 2020-07-24 ENCOUNTER — TELEMEDICINE (OUTPATIENT)
Dept: PSYCHIATRY | Facility: CLINIC | Age: 45
End: 2020-07-24
Payer: COMMERCIAL

## 2020-07-24 VITALS
BODY MASS INDEX: 29.95 KG/M2 | WEIGHT: 233.25 LBS | TEMPERATURE: 97.2 F | SYSTOLIC BLOOD PRESSURE: 114 MMHG | RESPIRATION RATE: 16 BRPM | OXYGEN SATURATION: 96 % | DIASTOLIC BLOOD PRESSURE: 75 MMHG | HEART RATE: 68 BPM

## 2020-07-24 DIAGNOSIS — F41.1 GENERALIZED ANXIETY DISORDER: Chronic | ICD-10-CM

## 2020-07-24 DIAGNOSIS — F41.1 GENERALIZED ANXIETY DISORDER: ICD-10-CM

## 2020-07-24 DIAGNOSIS — F33.2 MAJOR DEPRESSIVE DISORDER, RECURRENT, SEVERE WITHOUT PSYCHOTIC FEATURES (HCC): Primary | Chronic | ICD-10-CM

## 2020-07-24 DIAGNOSIS — F33.0 MAJOR DEPRESSIVE DISORDER, RECURRENT, MILD (HCC): ICD-10-CM

## 2020-07-24 DIAGNOSIS — Z00.8 MEDICAL CLEARANCE FOR PSYCHIATRIC ADMISSION: ICD-10-CM

## 2020-07-24 DIAGNOSIS — F33.1 MODERATE EPISODE OF RECURRENT MAJOR DEPRESSIVE DISORDER (HCC): ICD-10-CM

## 2020-07-24 DIAGNOSIS — F32.A DEPRESSION WITH SUICIDAL IDEATION: Primary | ICD-10-CM

## 2020-07-24 DIAGNOSIS — E78.5 HYPERLIPIDEMIA: ICD-10-CM

## 2020-07-24 DIAGNOSIS — F41.9 ANXIETY: ICD-10-CM

## 2020-07-24 DIAGNOSIS — F31.9 BIPOLAR DISORDER (HCC): ICD-10-CM

## 2020-07-24 DIAGNOSIS — R45.851 DEPRESSION WITH SUICIDAL IDEATION: Primary | ICD-10-CM

## 2020-07-24 DIAGNOSIS — F33.2 SEVERE EPISODE OF RECURRENT MAJOR DEPRESSIVE DISORDER, WITHOUT PSYCHOTIC FEATURES (HCC): Primary | ICD-10-CM

## 2020-07-24 LAB
AMPHETAMINES SERPL QL SCN: NEGATIVE
BARBITURATES UR QL: NEGATIVE
BENZODIAZ UR QL: NEGATIVE
COCAINE UR QL: NEGATIVE
ETHANOL EXG-MCNC: 0 MG/DL
METHADONE UR QL: NEGATIVE
OPIATES UR QL SCN: NEGATIVE
OXYCODONE+OXYMORPHONE UR QL SCN: NEGATIVE
PCP UR QL: NEGATIVE
SARS-COV-2 RNA RESP QL NAA+PROBE: NEGATIVE
THC UR QL: NEGATIVE

## 2020-07-24 PROCEDURE — 99285 EMERGENCY DEPT VISIT HI MDM: CPT

## 2020-07-24 PROCEDURE — 82075 ASSAY OF BREATH ETHANOL: CPT | Performed by: EMERGENCY MEDICINE

## 2020-07-24 PROCEDURE — NC001 PR NO CHARGE: Performed by: EMERGENCY MEDICINE

## 2020-07-24 PROCEDURE — 99283 EMERGENCY DEPT VISIT LOW MDM: CPT | Performed by: PSYCHIATRY & NEUROLOGY

## 2020-07-24 PROCEDURE — 87635 SARS-COV-2 COVID-19 AMP PRB: CPT | Performed by: EMERGENCY MEDICINE

## 2020-07-24 PROCEDURE — 99214 OFFICE O/P EST MOD 30 MIN: CPT | Performed by: PHYSICIAN ASSISTANT

## 2020-07-24 PROCEDURE — 80307 DRUG TEST PRSMV CHEM ANLYZR: CPT | Performed by: EMERGENCY MEDICINE

## 2020-07-24 PROCEDURE — 99285 EMERGENCY DEPT VISIT HI MDM: CPT | Performed by: EMERGENCY MEDICINE

## 2020-07-24 RX ORDER — HYDROXYZINE HYDROCHLORIDE 25 MG/1
25 TABLET, FILM COATED ORAL EVERY 4 HOURS PRN
Status: CANCELLED | OUTPATIENT
Start: 2020-07-24

## 2020-07-24 RX ORDER — HALOPERIDOL 5 MG/ML
5 INJECTION INTRAMUSCULAR EVERY 6 HOURS PRN
Status: DISCONTINUED | OUTPATIENT
Start: 2020-07-24 | End: 2020-07-25

## 2020-07-24 RX ORDER — TRAZODONE HYDROCHLORIDE 100 MG/1
50 TABLET ORAL
Status: CANCELLED | OUTPATIENT
Start: 2020-07-24

## 2020-07-24 RX ORDER — ACETAMINOPHEN 325 MG/1
650 TABLET ORAL EVERY 6 HOURS PRN
Status: CANCELLED | OUTPATIENT
Start: 2020-07-24

## 2020-07-24 RX ORDER — MAGNESIUM HYDROXIDE/ALUMINUM HYDROXICE/SIMETHICONE 120; 1200; 1200 MG/30ML; MG/30ML; MG/30ML
30 SUSPENSION ORAL EVERY 4 HOURS PRN
Status: DISCONTINUED | OUTPATIENT
Start: 2020-07-24 | End: 2020-07-30 | Stop reason: HOSPADM

## 2020-07-24 RX ORDER — HALOPERIDOL 5 MG
5 TABLET ORAL EVERY 6 HOURS PRN
Status: CANCELLED | OUTPATIENT
Start: 2020-07-24

## 2020-07-24 RX ORDER — TRAZODONE HYDROCHLORIDE 50 MG/1
50 TABLET ORAL
Status: DISCONTINUED | OUTPATIENT
Start: 2020-07-24 | End: 2020-07-25

## 2020-07-24 RX ORDER — RISPERIDONE 1 MG/1
1 TABLET, ORALLY DISINTEGRATING ORAL EVERY 4 HOURS PRN
Status: CANCELLED | OUTPATIENT
Start: 2020-07-24

## 2020-07-24 RX ORDER — HYDROXYZINE HYDROCHLORIDE 25 MG/1
25 TABLET, FILM COATED ORAL EVERY 6 HOURS PRN
Status: DISCONTINUED | OUTPATIENT
Start: 2020-07-24 | End: 2020-07-25

## 2020-07-24 RX ORDER — MAGNESIUM HYDROXIDE/ALUMINUM HYDROXICE/SIMETHICONE 120; 1200; 1200 MG/30ML; MG/30ML; MG/30ML
30 SUSPENSION ORAL EVERY 4 HOURS PRN
Status: CANCELLED | OUTPATIENT
Start: 2020-07-24

## 2020-07-24 RX ORDER — ATORVASTATIN CALCIUM 20 MG/1
20 TABLET, FILM COATED ORAL
Status: CANCELLED | OUTPATIENT
Start: 2020-07-24

## 2020-07-24 RX ORDER — ACETAMINOPHEN 325 MG/1
650 TABLET ORAL EVERY 6 HOURS PRN
Status: DISCONTINUED | OUTPATIENT
Start: 2020-07-24 | End: 2020-07-25

## 2020-07-24 RX ORDER — OLANZAPINE 10 MG/1
10 INJECTION, POWDER, LYOPHILIZED, FOR SOLUTION INTRAMUSCULAR
Status: CANCELLED | OUTPATIENT
Start: 2020-07-24

## 2020-07-24 RX ORDER — ACETAMINOPHEN 325 MG/1
975 TABLET ORAL EVERY 6 HOURS PRN
Status: CANCELLED | OUTPATIENT
Start: 2020-07-24

## 2020-07-24 RX ORDER — DULOXETIN HYDROCHLORIDE 60 MG/1
120 CAPSULE, DELAYED RELEASE ORAL 2 TIMES DAILY
Status: CANCELLED | OUTPATIENT
Start: 2020-07-24

## 2020-07-24 RX ORDER — HYDROXYZINE HYDROCHLORIDE 25 MG/1
50 TABLET, FILM COATED ORAL EVERY 6 HOURS PRN
Status: CANCELLED | OUTPATIENT
Start: 2020-07-24

## 2020-07-24 RX ORDER — HALOPERIDOL 5 MG
5 TABLET ORAL EVERY 6 HOURS PRN
Status: DISCONTINUED | OUTPATIENT
Start: 2020-07-24 | End: 2020-07-25

## 2020-07-24 RX ORDER — LORAZEPAM 2 MG/ML
1 INJECTION INTRAMUSCULAR EVERY 4 HOURS PRN
Status: CANCELLED | OUTPATIENT
Start: 2020-07-24

## 2020-07-24 RX ORDER — IBUPROFEN 600 MG/1
600 TABLET ORAL EVERY 8 HOURS PRN
Status: CANCELLED | OUTPATIENT
Start: 2020-07-24

## 2020-07-24 RX ORDER — BENZTROPINE MESYLATE 1 MG/1
1 TABLET ORAL 2 TIMES DAILY PRN
Status: DISCONTINUED | OUTPATIENT
Start: 2020-07-24 | End: 2020-07-25

## 2020-07-24 RX ADMIN — BREXPIPRAZOLE 1 MG: 1 TABLET ORAL at 21:11

## 2020-07-24 NOTE — PSYCH
Virtual Brief Visit    Assessment/Plan:    Problem List Items Addressed This Visit        Other    Generalized anxiety disorder    Severe episode of recurrent major depressive disorder, without psychotic features ([facility] [address] ) - Primary                Reason for visit is   Chief Complaint   Patient presents with    Virtual Regular Visit    Virtual Brief Visit        Encounter provider Ute Leal PA-C    Provider located at [facility] [id number]    Recent Visits  No visits were found meeting these conditions  Showing recent visits within past 7 days and meeting all other requirements     Today's Visits  Date Type Provider Dept   07/24/20 Telemedicine ANNETTA Dobbs 70 today's visits and meeting all other requirements     Future Appointments  No visits were found meeting these conditions  Showing future appointments within next 150 days and meeting all other requirements        After connecting through telephone, the patient was identified by name and date of birth  Ashish Shah was informed that this is a telemedicine visit and that the visit is being conducted through telephone  My office door was closed  No one else was in the room  He acknowledged consent and understanding of privacy and security of the platform  The patient has agreed to participate and understands he can discontinue the visit at any time  Patient is aware this is a billable service  Subjective    Ashish Shah is a 53 y o  male with MDD and AMANDA  HPI   Patient seen for follow-up and medication change  States he has not noted much of a difference with increasing the Cymbalta  He states that he has not been sleeping as much though and has been trying to stay awake later  He has been going to work but states not doing much outside of work    States that this weekend he spent much of his time in bed  Continues to struggle with decreased self esteem and feelings of worthlessness  Pt states he continues with significant depression  Has increased anxiety and thinking "about everything"  He is not enjoying anything  He has been going to work but increased difficulty with focus and concentration there due to his anxiety and obsessive worries  States that he continues to have suicidal ideation at times and has thought of overdosing but states he will not act on that due to concerns for his family and his children  Reports that he feels increasingly overwhelmed  He has been taking his medications as prescribed  Sleeping and eating adequately  No new medications or medical issues  Past Medical History:   Diagnosis Date    Anxiety     Bipolar disorder ([facility] [address] )     Depression     Psychiatric disorder     Suicidal thoughts        Past Surgical History:   Procedure Laterality Date    SHOULDER SURGERY         Current Outpatient Medications   Medication Sig Dispense Refill    atorvastatin (LIPITOR) 20 mg tablet Take 1 tablet (20 mg total) by mouth daily with dinner 30 tablet 0    Brexpiprazole (REXULTI) 1 MG tablet Take 1 tablet (1 mg total) by mouth daily at bedtime 30 tablet 0    DULoxetine (CYMBALTA) 60 mg delayed release capsule Two caps p o  daily 60 capsule 0    traZODone (DESYREL) 50 mg tablet Take 1 tablet (50 mg total) by mouth daily at bedtime 30 tablet 0     No current facility-administered medications for this visit           No Known Allergies    Review of Systems   No current complaints of pain or discomfort, sleep is increased, appetite is adequate    Mental status exam:  Patient with clear and coherent speech, normal rate and volume  Mood is depressed, anxious, apathetic  Linear and coherent thought process  Easily preoccupied, racing thoughts  Passive suicidal ideation, no homicidal ideation  No psychotic signs notes, no hallucinations or delusions  Cognition is fair  Insight and judgment is fair    There were no vitals filed for this visit  Current meds:  Cymbalta 120 mg po bid  Rexulti 1 mg po qd  Trazodone 50 mg q h s  P r n  Had been on Prozac and doing well for a number of years until 2014  Had also had augmentation with Latuda, lithium and Wellbutrin in the past with subtherapeutic affect    Due to severity of patient's symptoms, concerns for patient's safety, and passive suicidal ideation, I am recommend inpatient psychiatric hospitalization to adjust the patient's medications  I am concerned of changing his antidepressant on an outpatient basis due to concerns of further decompensation  Patient is in agreement with this plan and agreeable to sign a 201  We also discussed partial hospitalization after his discharge due to need for continued therapy  Patient states that he will have a friend drive him to [address] [place] [address] Emergency Department for admission to the adult behavioral health unit  I spoke with crisis worker at [address] [place] [address] and there are currently beds available  Crisis worker is aware that Eloise Craig will be arriving shortly in the ED        I spent 30 minutes directly with the patient during this visit    VIRTUAL VISIT [facility] acknowledges that he has consented to an online visit or consultation  He understands that the online visit is based solely on information provided by him, and that, in the absence of a face-to-face physical evaluation by the physician, the diagnosis he receives is both limited and provisional in terms of accuracy and completeness  This is not intended to replace a full medical face-to-face evaluation by the physician  Bibi Pedraza understands and accepts these terms

## 2020-07-24 NOTE — ED NOTES
patient presented to the ED today after referral from his outpt provider, Araseli Rosen  Pt has presented as very depressed with periodic  suicidal thoughts  he presents as very depressed , with anhedonia, wants to sleep all the time, poor enegy , isolative  He believes that the current medication is not helping  There is no evidence of psychosis   Patient signed a 61 51 81  Patient was recently in [facility] (6/8/20-6/12/20)  He does have a long hx of depression and has been on prozac in the past which also worked well for a long time  He has a hx of cocaine and alcohol use and has been clean for 6 yrs       Alivia BorjasLists of hospitals in the [place]

## 2020-07-24 NOTE — ED NOTES
Patient is accepted at [facility]  Patient is accepted by Dr Yaneth Hazel    Patient may go to the floor after report       Nurse report is to be called to  prior to patient transfer

## 2020-07-24 NOTE — PROGRESS NOTES
Pt admitted status 201 from [address] [place] [address] ED for SI, depression, and anxiety  Pt reports recent discharge from [facility] and reports minimal improvement in symptoms  Pt reports hypersomnia, anhedonia, poor energy/motivation, anxiety, and guilt  He reports extensive substance abuse hx but is currently clean and sober x 6 years  Pt reports hx of etoh and cocaine use that he stopped 6 years ago by attending [place] [address] meetings  Pt reports hx of using LSD as a teenager into adulthood  Began etoh use at age 13 with long hx of heavy use before becoming sober  Pt has current psychiatrist and feels that medication changes and dose adjustments have been ineffective  Recent increase in duloxetine dosing with minimal improvement in symptoms  Pt reports feeling stable on fluoxetine for approx  18 years  Pt said this medication eventually stopped working  Pt reports he is very compliant with current medications of duloxetine 120 mg and rexulti 1 mg  Denies missing doses  Pt denies current medical issues  Denies AH/VH  Reports mild paranoia at times  Pt said ECT was mentioned to him in ED and he is considering this  Pt given educational material about ECT to read over  Pt's 25year old son lives with him  Pt reports having stable employment  No legal issues  Cooperative throughout admission assessment  Oriented to unit rules/routine

## 2020-07-24 NOTE — TREATMENT PLAN
RN will assess pt at least twice daily for symptoms of admission and educate pt on medications, diagnoses, and coping skills

## 2020-07-24 NOTE — ED PROVIDER NOTES
15:45 Care assumed from Dr Taniya Ho with inpatient bed assignment pending  Will continue to monitor in the ED       Luciana Kenny MD  07/24/20 1744

## 2020-07-24 NOTE — ED NOTES
Insurance Authorization for admission:   Phone call placed to [employer]  Phone number: [phone]  Spoke to Venkat rosario  3 days approved    Level of care: inpatient  Mental health   Review on Monday 7/27  Authorization # [id number]  Ongoing reviewer will call on monday

## 2020-07-24 NOTE — CONSULTS
Psychiatry Consultation Note   Fatemeh Kapoor 53 y o  male MRN: [id number]  Unit/Bed#: ED 12 Encounter: [id number]    Assessment and Plan       Assessment:    Fatemeh Kapoor is a 53 y o  male with MDD and AMANDA who is presenting with worsening of depressive symptoms and fleeting suicidal ideation with a plan to possibly overdose on his medications  Principal Problem:  1  Severe episode of recurrent major depressive disorder, without psychotic features ([facility] [address] )  a  Differential: Dysthymia, Unspecified mood disorder  Active Problem:  1  Generalized Anxiety Disorder:   A  Reports feeling anxious regarding multiple things in his daily life  Experiences physical symptoms of anxiety including tremulousness, racing heart rate  Plan:     1  Will plan inpatient psychiatric admission- patient signed 201 form  2    Psychopharmacotherapy deferred for inpatient treatment team    3    Psychiatry crisis to sign off at this time  Please contact us should you have any questions  HPI     Chief Complaint: "I'm feeling depressed"    History of Present Illness   Physician Requesting Consult: Leann Rodriguez III, DO  Reason for Consult / Principal Problem: depression and suicidal ideation    Fatemeh Kapoor is a 53 y o  male with Major Depressive Disorder and Generalized Anxiety Disorder who presents to the ED with worsening depressive symptoms and suicidal ideation  Earlier in the day he had a virtual visit with his psychiatric physician assistant for follow up and medication adjustment (after increasing Cymbalta from 90mg to 120 mg daily)  He told her, and the Dolphus Comber of this note, that his symptoms have not improved since the adjustment and believes it is not working for him  After the session, she noted that he would benefit from an inpatient psychiatric treatment in order to optimize his medication regimen to help his symptoms   He notes that for the past several years his depression has not improved despite escalation of treatment and believes is getting worse  He wants to stay in bed all the time, continues to feel hopeless, helpless, does not feel interested in anything, and has issues with concentration, energy, and psychomotor agitation  He reports that throughout the week, he forces himself to go to work, do everyday chores, and perform his duties as a father to his two children  He easily gets anxious and depressed about multiple things in his life, especially whenever he has to assume any responsibility, be it work or simple chores  Otherwise he notes that he does not understand why he feels this way despite saying that his life is great  Furthermore, his suicidal ideation has become more frequent and more intrusive over time, to the point of peaking this past Sunday when he had wanted to end his life and was thinking of ways of doing it  He thought about overdosing on his medications or committing suicide at his  neighbor's yard, but was afraid of doing it because it would be too graphic  He actively resists these thoughts and noted that if he did not seek help, he "would probably do it " He previously had firearms at home but gave them away so that he would not impulsively kill himself  He has positive protective factors such as his children and parents and does not wish to commit suicide because of them  Denies homicidal ideation, auditory and visual hallucinations  Denies manic episodes in the past; denies symptoms of hypomania in the past      He was admitted to [address] [place] [address] inpatient psychiatric unit last month for depression and suicidal ideation  There his Cymbalta was increased from 60 mg to 90 mg, continued on Rexulti 1 mg QD, and was prescribed Trazodone 50 mg HS for insomnia  He notes that he is recovering from alcohol and other substance abuse for the past 6 5 years and has not been drinking or using any illicit substance   He was previously on Prozac for 18 years but it stopped working for him 2 5 years ago and he was switched to Cymbalta  It appears that Prozac controlled his mood symptoms well and he says that he functioned well with it  At the time of consultation, patient was in bed with his mother present at bedside  He was cooperative, with an affect that is incongruent with his mood as he reported that he was severely depressed yet laughed at his suicidal ideation  He was agreeable to sign himself in for inpatient psychiatric treatment  Psychiatric ROS and PMH     Psychiatric Review Of Systems:    sleep: yes, insomnia  appetite changes: no  weight changes: no  energy/anergy: yes, decreased  interest/pleasure/anhedonia: yes, decreased  somatic symptoms: yes  anxiety/panic: yes  rajesh: no  guilty/hopeless: yes  self injurious behavior/risky behavior: no    Historical Information   Past Psychiatric History:    Inpatient: Was previously at [facility] for dual treatment over six years ago  Most recently was admitted at [facility] for suicidal ideation and depression in June 2020  Outpatient: sees a psychotherapist and Psych PA Arpan Campbell  Past Suicide attempts: denies  Past Violent behavior: denies  Past Psychiatric medication trial: Prozac, Lithium, Latuda, Wellbutrin    Substance Abuse History:  Social History     Tobacco History     Smoking Status  Former Smoker Quit date  1/1/2004    Smokeless Tobacco Use  Never Used          Alcohol History     Alcohol Use Status  Not Currently Comment  6 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          Drug Use     Drug Use Status  Yes Types  Marijuana Comment  medical marijuana at times but otherwise 6 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          Sexual Activity     Sexually Active  Not Asked          Activities of Daily Living    Not Asked               Additional Substance Use Detail     Questions Responses    Problems Due to Past Use of Alcohol? Yes    Problems Due to Past Use of Substances?  Yes    Substance Use Assessment Denies substance use within the past 12 months    Alcohol Use Frequency Denies use in past 12 months    Cannabis frequency Past rare use    Comment: Past rare use on 6/9/2020     Heroin Frequency Denies use in past 12 months    Cocaine frequency Never used    Comment: Never used on 6/9/2020     Crack Cocaine Frequency Denies use in past 12 months    Methamphetamine Frequency Denies use in past 12 months    Narcotic Frequency Denies use in past 12 months    Benzodiazepine Frequency Denies use in past 12 months    Amphetamine frequency Denies use in past 12 months    Barbituate Frequency Denies use use in past 12 months    Inhalant frequency Never used    Comment: Never used on 6/9/2020     Hallucinogen frequency Never used    Comment: Never used on 6/9/2020     Ecstasy frequency Never used    Comment: Never used on 6/9/2020     Other drug frequency Never used    Comment: Never used on 6/9/2020     Opiate frequency Denies use in past 12 months    Last reviewed by Anahi Coreas RN on 7/24/2020      Denies drinking alcohol or using any other illicit substance since being sober over 6 years ago  I have assessed this patient for substance use within the past 12 months    History of IP/OP rehabilitation program: Dual treatment over six years ago  Smoking history: former smoker; quit 16 years ago  Family Psychiatric History:   Father: possible mood disorder and reported maternal grandmother with hallucinations and delusions but no conclusive diagnosis  Social History:  Education: technical college  Learning Disabilities: none  Marital history: single  Living arrangement, social support: The patient lives in home with son     Occupational History: employed as hospital   Functioning Relationships: good support system  Other Pertinent History: no  service      Traumatic History:     Abuse: denies  Other Traumatic Events: denies    Past Medical History:   Diagnosis Date    Anxiety     Bipolar disorder ([facility] [address] )     Depression     Psychiatric disorder  Suicidal thoughts        Mental Status Evaluation and Medical ROS     Medical Review Of Systems:  Review of Systems - Negative except as mentioned in HPI    Meds/Allergies   all current active meds have been reviewed, current meds:   No current facility-administered medications for this encounter  and PTA meds:   Prior to Admission Medications   Prescriptions Last Dose Informant Patient Reported? Taking?    Brexpiprazole (REXULTI) 1 MG tablet   No No   Sig: Take 1 tablet (1 mg total) by mouth daily at bedtime   DULoxetine (CYMBALTA) 60 mg delayed release capsule   No No   Sig: Two caps p o  daily   atorvastatin (LIPITOR) 20 mg tablet   No No   Sig: Take 1 tablet (20 mg total) by mouth daily with dinner   traZODone (DESYREL) 50 mg tablet   No No   Sig: Take 1 tablet (50 mg total) by mouth daily at bedtime      Facility-Administered Medications: None     No Known Allergies    Objective   Vital signs in last 24 hours:  Temp:  [97 2 °F (36 2 °C)] 97 2 °F (36 2 °C)  HR:  [73] 73  Resp:  [16] 16  BP: (115)/(74) 115/74    No intake or output data in the 24 hours ending 07/24/20 1320    Mental Status Evaluation:  Appearance:  age appropriate, bearded, casually dressed and tattooed   Behavior:  normal and cooperative   Speech:  normal pitch and normal volume   Mood:  sad   Affect:  inappropriate, laughing while reporting sadness   Language: repeating phrases   Thought Process:  logical   Associations: intact associations   Thought Content:  normal   Perceptual Disturbances: None   Risk Potential: Suicidal Ideations without plan, Homicidal Ideations none and Potential for Aggression No   Sensorium:  person, place and time/date   Memory:  recent and remote memory grossly intact   Cognition:  recent and remote memory grossly intact   Consciousness:  alert and awake    Attention: attention span and concentration were age appropriate   Intellect: within normal limits   Fund of Knowledge: awareness of current events: COVID, political landscape, work situation   Insight:  fair   Judgment: fair   Muscle Strength and Tone: normal   Gait/Station: normal gait/station   Motor Activity: no abnormal movements     Lab Results:   I have personally reviewed all pertinent laboratory/tests results    Most Recent Labs:   Lab Results   Component Value Date    WBC 5 25 06/10/2020    RBC 4 86 06/10/2020    HGB 14 2 06/10/2020    HCT 42 7 06/10/2020     06/10/2020    RDW 12 1 06/10/2020    NEUTROABS 2 64 06/10/2020    SODIUM 140 06/10/2020    K 3 9 06/10/2020     06/10/2020    CO2 27 06/10/2020    BUN 16 06/10/2020    CREATININE 1 20 06/10/2020    GLUC 84 06/10/2020    CALCIUM 9 3 06/10/2020    AST 12 06/10/2020    ALT 28 06/10/2020    ALKPHOS 57 06/10/2020    TP 7 1 06/10/2020    ALB 3 6 06/10/2020    TBILI 0 48 06/10/2020    GKP6CTZMCSNH 1 040 06/10/2020     COVID19:   Lab Results   Component Value Date    SARSCOV2 Negative 06/08/2020     Drug Screen:   Lab Results   Component Value Date    AMPMETHUR Negative 07/24/2020    BARBTUR Negative 07/24/2020    BDZUR Negative 07/24/2020    THCUR Negative 07/24/2020    COCAINEUR Negative 07/24/2020    METHADONEUR Negative 07/24/2020    OPIATEUR Negative 07/24/2020    PCPUR Negative 07/24/2020         Demario Ramirez MD    Psychiatry Resident, PGY-I

## 2020-07-24 NOTE — ED PROVIDER NOTES
History  Chief Complaint   Patient presents with    Psychiatric Evaluation     increased depression and anxiety for a few years  states he is suicidal with no plan  denies HI/AH/VH     HPI    This is a very pleasant 49-year-old gentleman presents the emergency department with a chief complaint of severe depression  Patient was seen and evaluated today via telemedicine by Daysi Medina PA-C for  a diagnosis of severe recurrent major depression without psychiatric features and generalized anxiety dx  Patient was recently admitted for psychiatric challenges specifically suicide ideations on the 8th June 2020  Patient subsequently discharged on the 12th 2020  Patient reported he has been battling depression since being 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  Since being clean he has lost lot of his friends, his girlfriend, and has a poor social life  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  Stated his main issue is he is lonely  Over the last month patient reported increased depression and had more stress at his job  He reported depressive symptoms of poor sleep, lack of energy, anhedonia, guilt, hopelessness, and suicidal thoughts  He did report increased anxiety without panic attacks  He denied psychosis  He denied delusional material   He did not show signs of rajesh  He does not have history of rajesh  Patient has previous inpatient psychiatric hospitalizations, denied previous suicide attempts, does have outpatient psychiatrist     Patient reports that he is currently having suicidal thoughts does not a specific plan but he was going to investigate how to kill himself    Prior to Admission Medications   Prescriptions Last Dose Informant Patient Reported? Taking?    Brexpiprazole (REXULTI) 1 MG tablet   No No   Sig: Take 1 tablet (1 mg total) by mouth daily at bedtime   DULoxetine (CYMBALTA) 60 mg delayed release capsule   No No Sig: Two caps p o  daily   atorvastatin (LIPITOR) 20 mg tablet   No No   Sig: Take 1 tablet (20 mg total) by mouth daily with dinner   traZODone (DESYREL) 50 mg tablet   No No   Sig: Take 1 tablet (50 mg total) by mouth daily at bedtime      Facility-Administered Medications: None       Past Medical History:   Diagnosis Date    Anxiety     Bipolar disorder ([facility] )     Depression     Psychiatric disorder     Severe episode of recurrent major depressive disorder, without psychotic features ([facility] ) 2020    Suicidal thoughts        Past Surgical History:   Procedure Laterality Date    HERNIA REPAIR      SHOULDER SURGERY         History reviewed  No pertinent family history  I have reviewed and agree with the history as documented  E-Cigarette/Vaping    E-Cigarette Use Never User      E-Cigarette/Vaping Substances    Nicotine No     THC No     CBD No     Flavoring No     Other No     Unknown No      Social History     Tobacco Use    Smoking status: Former Smoker     Last attempt to quit: 2004     Years since quittin 5    Smokeless tobacco: Never Used   Substance Use Topics    Alcohol use: Not Currently     Comment: 6 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    Drug use: Yes     Types: Marijuana     Comment: medical marijuana at times but otherwise 6 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       Review of Systems   Constitutional: Negative  HENT: Negative  Eyes: Negative  Respiratory: Negative  Cardiovascular: Negative  Gastrointestinal: Negative  Endocrine: Negative  Genitourinary: Negative  Musculoskeletal: Negative  Allergic/Immunologic: Negative  Neurological: Negative  Hematological: Negative  Psychiatric/Behavioral: Positive for self-injury, sleep disturbance and suicidal ideas  The patient is nervous/anxious  Physical Exam  Physical Exam   Constitutional: He is oriented to person, place, and time  He appears well-developed and well-nourished  HENT:   Head: Normocephalic and atraumatic     Right Ear: External ear normal    Left Ear: External ear normal    Nose: Nose normal    Mouth/Throat: Oropharynx is clear and moist    Eyes: Pupils are equal, round, and reactive to light  Conjunctivae and EOM are normal    Neck: Normal range of motion  Neck supple  Cardiovascular: Normal rate, regular rhythm, normal heart sounds and intact distal pulses  Pulmonary/Chest: Effort normal and breath sounds normal    Abdominal: Soft  Bowel sounds are normal    Musculoskeletal: Normal range of motion  Neurological: He is alert and oriented to person, place, and time  Skin: Skin is warm  Capillary refill takes less than 2 seconds  Psychiatric: His speech is normal and behavior is normal  Judgment normal  His mood appears anxious  Cognition and memory are normal  He exhibits a depressed mood  He expresses suicidal ideation  He expresses no suicidal plans  Nursing note and vitals reviewed  Vital Signs  ED Triage Vitals [07/24/20 1227]   Temperature Pulse Respirations Blood Pressure SpO2   (!) 97 2 °F (36 2 °C) 73 16 115/74 97 %      Temp Source Heart Rate Source Patient Position - Orthostatic VS BP Location FiO2 (%)   Tympanic Monitor Sitting Left arm --      Pain Score       No Pain           Vitals:    07/24/20 1227   BP: 115/74   Pulse: 73   Patient Position - Orthostatic VS: Sitting         Visual Acuity      ED Medications  Medications - No data to display    Diagnostic Studies  Results Reviewed     Procedure Component Value Units Date/Time    Novel Coronavirus [facility] [[id number]]  (Normal) Collected:  07/24/20 1413    Lab Status:  Final result Specimen:  Nares from Nose Updated:  07/24/20 1517     SARS-CoV-2 Negative    Narrative:        The specimen collection materials, transport medium, and/or testing methodology utilized in the production of these test results have been proven to be reliable in a limited validation with an abbreviated program under the Emergency Utilization Authorization provided by the FDA  Testing reported as "Presumptive positive" will be confirmed with secondary testing with a reference laboratory to ensure result accuracy  Clinical caution and judgement should be used with the interpretation of these results with consideration of the clinical impression and other laboratory testing  Testing reported as "Positive" or "Negative" has been proven to be accurate according to standard laboratory validation requirements  All testing is performed with control materials showing appropriate reactivity at standard intervals  Rapid drug screen, urine [[id number]]  (Normal) Collected:  07/24/20 1308    Lab Status:  Final result Specimen:  Urine, Clean Catch Updated:  07/24/20 1409     Amph/Meth UR Negative     Barbiturate Ur Negative     Benzodiazepine Urine Negative     Cocaine Urine Negative     Methadone Urine Negative     Opiate Urine Negative     PCP Ur Negative     THC Urine Negative     Oxycodone Urine Negative    Narrative:       FOR MEDICAL PURPOSES ONLY  IF CONFIRMATION NEEDED PLEASE CONTACT THE LAB WITHIN 5 DAYS  Drug Screen Cutoff Levels:  AMPHETAMINE/METHAMPHETAMINES  1000 ng/mL  BARBITURATES     200 ng/mL  BENZODIAZEPINES     200 ng/mL  COCAINE      300 ng/mL  METHADONE      300 ng/mL  OPIATES      300 ng/mL  PHENCYCLIDINE     25 ng/mL  THC       50 ng/mL  OXYCODONE      100 ng/mL    POCT alcohol breath test [[id number]]  (Normal) Resulted:  07/24/20 1304    Lab Status:  Final result Updated:  07/24/20 1304     EXTBreath Alcohol 0 00                 No orders to display              Procedures  Procedures         ED Course  ED Course as of Jul 24 1525   Fri Jul 24, 2020   1407 Psychiatric present evaluated patient, awaiting crisis                                                  MDM  Number of Diagnoses or Management Options  Anxiety:   Bipolar disorder ([facility] ):   Depression with suicidal ideation:   Diagnosis management comments: 174 admission, + SI without plan, increasing symptoms of depression, seen by psych  Cooperative, bed search occurring  Negative for COVID  Amount and/or Complexity of Data Reviewed  Clinical lab tests: ordered and reviewed  Tests in the medicine section of CPT®: ordered and reviewed          Disposition  Final diagnoses:   Depression with suicidal ideation   Bipolar disorder ([facility] )   Anxiety     Time reflects when diagnosis was documented in both MDM as applicable and the Disposition within this note     Time User Action Codes Description Comment    7/24/2020  2:24 PM Elio Her Add [F32 9,  R49 [id number]] Depression with suicidal ideation     7/24/2020  2:24 PM Elio Her Add [F31 9] Bipolar disorder ([facility] )     7/24/2020  2:24 PM Elio Her Add [F41 9] Anxiety       ED Disposition     None      MD Documentation      Most Recent Value   Sending MD Dr David Larkin    None         Patient's Medications   Discharge Prescriptions    No medications on file     No discharge procedures on file      PDMP Review     None          ED Provider  Electronically Signed by           Maxim Mixon III, DO  07/24/20 0309

## 2020-07-25 PROCEDURE — 99221 1ST HOSP IP/OBS SF/LOW 40: CPT | Performed by: INTERNAL MEDICINE

## 2020-07-25 PROCEDURE — 99222 1ST HOSP IP/OBS MODERATE 55: CPT | Performed by: PSYCHIATRY & NEUROLOGY

## 2020-07-25 RX ORDER — HALOPERIDOL 5 MG
5 TABLET ORAL EVERY 8 HOURS PRN
Status: DISCONTINUED | OUTPATIENT
Start: 2020-07-25 | End: 2020-07-25

## 2020-07-25 RX ORDER — LORAZEPAM 2 MG/ML
2 INJECTION INTRAMUSCULAR EVERY 6 HOURS PRN
Status: DISCONTINUED | OUTPATIENT
Start: 2020-07-25 | End: 2020-07-30 | Stop reason: HOSPADM

## 2020-07-25 RX ORDER — LORAZEPAM 1 MG/1
1 TABLET ORAL EVERY 6 HOURS PRN
Status: DISCONTINUED | OUTPATIENT
Start: 2020-07-25 | End: 2020-07-25

## 2020-07-25 RX ORDER — OLANZAPINE 10 MG/1
10 TABLET ORAL
Status: DISCONTINUED | OUTPATIENT
Start: 2020-07-25 | End: 2020-07-30 | Stop reason: HOSPADM

## 2020-07-25 RX ORDER — RISPERIDONE 1 MG/1
1 TABLET, ORALLY DISINTEGRATING ORAL EVERY 8 HOURS PRN
Status: DISCONTINUED | OUTPATIENT
Start: 2020-07-25 | End: 2020-07-25

## 2020-07-25 RX ORDER — ACETAMINOPHEN 325 MG/1
650 TABLET ORAL EVERY 6 HOURS PRN
Status: DISCONTINUED | OUTPATIENT
Start: 2020-07-25 | End: 2020-07-30 | Stop reason: HOSPADM

## 2020-07-25 RX ORDER — DULOXETIN HYDROCHLORIDE 60 MG/1
60 CAPSULE, DELAYED RELEASE ORAL DAILY
Status: DISCONTINUED | OUTPATIENT
Start: 2020-07-25 | End: 2020-07-27

## 2020-07-25 RX ORDER — BENZTROPINE MESYLATE 1 MG/ML
1 INJECTION INTRAMUSCULAR; INTRAVENOUS EVERY 6 HOURS PRN
Status: DISCONTINUED | OUTPATIENT
Start: 2020-07-25 | End: 2020-07-30 | Stop reason: HOSPADM

## 2020-07-25 RX ORDER — DULOXETIN HYDROCHLORIDE 60 MG/1
120 CAPSULE, DELAYED RELEASE ORAL
COMMUNITY
Start: 2019-01-31 | End: 2020-07-30 | Stop reason: HOSPADM

## 2020-07-25 RX ORDER — OLANZAPINE 10 MG/1
10 INJECTION, POWDER, LYOPHILIZED, FOR SOLUTION INTRAMUSCULAR
Status: DISCONTINUED | OUTPATIENT
Start: 2020-07-25 | End: 2020-07-30 | Stop reason: HOSPADM

## 2020-07-25 RX ORDER — HYDROXYZINE HYDROCHLORIDE 25 MG/1
25 TABLET, FILM COATED ORAL EVERY 4 HOURS PRN
Status: DISCONTINUED | OUTPATIENT
Start: 2020-07-25 | End: 2020-07-30 | Stop reason: HOSPADM

## 2020-07-25 RX ORDER — DULOXETIN HYDROCHLORIDE 60 MG/1
120 CAPSULE, DELAYED RELEASE ORAL DAILY
Status: DISCONTINUED | OUTPATIENT
Start: 2020-07-25 | End: 2020-07-25

## 2020-07-25 RX ORDER — RISPERIDONE 1 MG/1
1 TABLET, ORALLY DISINTEGRATING ORAL
Status: DISCONTINUED | OUTPATIENT
Start: 2020-07-25 | End: 2020-07-30 | Stop reason: HOSPADM

## 2020-07-25 RX ORDER — ACETAMINOPHEN 325 MG/1
650 TABLET ORAL EVERY 8 HOURS PRN
Status: DISCONTINUED | OUTPATIENT
Start: 2020-07-25 | End: 2020-07-30 | Stop reason: HOSPADM

## 2020-07-25 RX ORDER — MAGNESIUM HYDROXIDE/ALUMINUM HYDROXICE/SIMETHICONE 120; 1200; 1200 MG/30ML; MG/30ML; MG/30ML
30 SUSPENSION ORAL EVERY 4 HOURS PRN
Status: DISCONTINUED | OUTPATIENT
Start: 2020-07-25 | End: 2020-07-25

## 2020-07-25 RX ORDER — HALOPERIDOL 5 MG
5 TABLET ORAL EVERY 8 HOURS PRN
Status: DISCONTINUED | OUTPATIENT
Start: 2020-07-25 | End: 2020-07-30 | Stop reason: HOSPADM

## 2020-07-25 RX ORDER — BENZTROPINE MESYLATE 1 MG/1
1 TABLET ORAL EVERY 6 HOURS PRN
Status: DISCONTINUED | OUTPATIENT
Start: 2020-07-25 | End: 2020-07-30 | Stop reason: HOSPADM

## 2020-07-25 RX ORDER — LORAZEPAM 0.5 MG/1
0.5 TABLET ORAL EVERY 6 HOURS PRN
Status: DISCONTINUED | OUTPATIENT
Start: 2020-07-25 | End: 2020-07-25

## 2020-07-25 RX ORDER — ACETAMINOPHEN 325 MG/1
325 TABLET ORAL EVERY 6 HOURS PRN
Status: DISCONTINUED | OUTPATIENT
Start: 2020-07-25 | End: 2020-07-30 | Stop reason: HOSPADM

## 2020-07-25 RX ORDER — LORAZEPAM 1 MG/1
1 TABLET ORAL EVERY 6 HOURS PRN
Status: DISCONTINUED | OUTPATIENT
Start: 2020-07-25 | End: 2020-07-30 | Stop reason: HOSPADM

## 2020-07-25 RX ORDER — TRAZODONE HYDROCHLORIDE 50 MG/1
50 TABLET ORAL
Status: DISCONTINUED | OUTPATIENT
Start: 2020-07-25 | End: 2020-07-30 | Stop reason: HOSPADM

## 2020-07-25 RX ORDER — HALOPERIDOL 5 MG/ML
5 INJECTION INTRAMUSCULAR EVERY 8 HOURS PRN
Status: DISCONTINUED | OUTPATIENT
Start: 2020-07-25 | End: 2020-07-30 | Stop reason: HOSPADM

## 2020-07-25 RX ADMIN — BREXPIPRAZOLE 1 MG: 1 TABLET ORAL at 21:41

## 2020-07-25 RX ADMIN — DULOXETINE HYDROCHLORIDE 60 MG: 60 CAPSULE, DELAYED RELEASE ORAL at 13:06

## 2020-07-25 RX ADMIN — ACETAMINOPHEN 650 MG: 325 TABLET ORAL at 13:06

## 2020-07-25 RX ADMIN — VORTIOXETINE 10 MG: 10 TABLET, FILM COATED ORAL at 13:06

## 2020-07-25 NOTE — PROGRESS NOTES
Patient is pleasant upon approach  He states anxiety is 2-4, depression is 4-10, denies SI/HI, A/V hallucinations  He is noted to be interacting with peers

## 2020-07-25 NOTE — TREATMENT PLAN
TREATMENT PLAN REVIEW - [address] 53 y o  1975 male MRN: [id number]    [address] Room / Bed: [facility] 379/[facility] 331-30 Encounter: [id number]          Admit Date/Time:  7/24/2020  5:02 PM    Treatment Team: Attending Provider: Rd Beckman MD; Patient Care Assistant: Robin Lisa; Patient Care Assistant: Fatou Benites; Registered Nurse: Ja Tse RN    Diagnosis: Principal Problem:    Severe episode of recurrent major depressive disorder, without psychotic features [facility]    Patient Strengths: ability for insight, adapts well, average or above intelligence, cooperative, communication skills, compliant with medication, financially secure, general fund of knowledge     Patient Barriers: chronic mental illness, lack of social/family support, limited family ties, limited support system    Short Term Goals: decrease in depressive symptoms, sleep improvement, improvement in appetite, mood stabilization    Long Term Goals: resolution of depressive symptoms, stabilization of mood, free of suicidal thoughts, improved reality testing, adequate self care, adequate sleep, adequate appetite    Progress Towards Goals: starting psychitric medications as prescribed, continue psychiatric medications as prescribed    Recommended Treatment: medication management, patient medication education, group therapy, milieu therapy, continued Behavioral Health psychiatric evaluation/assessment process     Treatment Frequency: daily medication monitoring, group and milieu therapy daily, monitoring through interdisciplinary rounds, monitoring through weekly patient care conferences    Expected Discharge Date:  5 days    Discharge Plan: attend AA meetings, referral for outpatient medication management with a psychiatrist, referral for outpatient psychotherapy    Treatment Plan Created/Updated By: Rd Beckman MD

## 2020-07-25 NOTE — CONSULTS
Consult- Bakari Carmona 1975, 53 y o  male MRN: [id number]    Unit/Bed#: [facility] 379-01 Encounter: [id number]    Primary Care Provider: Florencia Hickey MD   Date and time admitted to hospital: 7/24/2020  5:02 PM      Inpatient consult for Medical Clearance for [facility] patient  Consult performed by: Uriel Stevens MD  Consult ordered by: Mk Swartz MD        Medical clearance for psychiatric admission  Assessment & Plan  Medical chart for patient's medications and comorbidities reviewed  Patient previously was treated with atorvastatin however his 10 year cardiac risk is less than 5%, no current need for atorvastatin at this time  Lipid panel and TSH is pending per primary team   Previous lipid panel and TSH 1 or 2 months ago were within normal limits  At this time patient is medically cleared for inpatient psychiatric admission  * Severe episode of recurrent major depressive disorder, without psychotic features ([facility] )  Assessment & Plan  Treatment per primary team      ECT Clearance:   History of recent seizure or stroke: No   History of pheochromocytoma:  No   History of active bleeding (Intracranial hemorrhage, aneurysm or AVM):  No   History of metallic implants in the head or neck:  No   History of increased intracranial pressure with mass effect:  No    Based on above criteria, Patient is medically cleared for ECT should it be recommended  Counseling / Coordination of Care Time: 30 minutes  Greater than 50% of total time spent on patient counseling and coordination of care  Collaboration of Care: Were Recommendations Directly Discussed with Primary Treatment Team? - Yes     History of Present Illness:    Bakari Carmona is a 53 y o  male who is originally admitted to the psych service due to intractable depression and suicidal ideation  We are consulted for medical clearance for inpatient psychiatric admission    Patient has no medical history except for major depression disorder and anxiety  Patient has been admitted multiple times for intractable depression wanting to titrate his own medications  Patient states that he has been extremely more anxious and depressed lately  Patient denies any chest pain or shortness of breath  He says that his sleeping is poor  He has anhedonia and other depressive symptoms asthma with anxiety    Review of Systems:    Review of Systems   Constitutional: Negative for activity change, chills and fever  Eyes: Negative for visual disturbance  Respiratory: Negative for cough, chest tightness and shortness of breath  Cardiovascular: Negative for chest pain and leg swelling  Gastrointestinal: Negative for abdominal pain, constipation, diarrhea, nausea and vomiting  Endocrine: Negative for cold intolerance and heat intolerance  Genitourinary: Negative for difficulty urinating  Musculoskeletal: Negative for gait problem  Skin: Negative for rash  Allergic/Immunologic: Negative  Neurological: Negative  Negative for dizziness, weakness and light-headedness  Hematological: Negative  Psychiatric/Behavioral: The patient is nervous/anxious  All other systems reviewed and are negative        Past Medical and Surgical History:     Past Medical History:   Diagnosis Date    Anxiety     Bipolar disorder ([facility] )     Depression     Psychiatric disorder     Severe episode of recurrent major depressive disorder, without psychotic features ([facility] ) 6/9/2020    Suicidal thoughts        Past Surgical History:   Procedure Laterality Date    HERNIA REPAIR      SHOULDER SURGERY         Meds/Allergies:    all medications and allergies reviewed    Allergies: No Known Allergies    Social History:     Marital Status: Single    Substance Use History:   Social History     Substance and Sexual Activity   Alcohol Use Not Currently    Frequency: Never    Binge frequency: Never    Comment: 6 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     Social History     Tobacco Use   Smoking Status Former Smoker    Last attempt to quit: 2004    Years since quittin 5   Smokeless Tobacco Never Used     Social History     Substance and Sexual Activity   Drug Use Yes    Types: Marijuana    Comment: medical marijuana       Family History:    History reviewed  No pertinent family history  Physical Exam:     Vitals:   Blood Pressure: 116/65 (20)  Pulse: 84 (20)  Temperature: 97 7 °F (36 5 °C) (20)  Temp Source: Temporal (20)  Respirations: 16 (20)  Height: 6' 2" (188 cm) (20)  Weight - Scale: 104 kg (228 lb 12 8 oz) (20)  SpO2: 96 % (20)    Physical Exam   Constitutional: He is oriented to person, place, and time  He appears well-developed and well-nourished  No distress  HENT:   Head: Normocephalic and atraumatic  Mouth/Throat: Oropharynx is clear and moist    Eyes: Pupils are equal, round, and reactive to light  Conjunctivae are normal    Neck: Normal range of motion  Cardiovascular: Normal rate, regular rhythm, normal heart sounds and intact distal pulses  Pulmonary/Chest: Effort normal and breath sounds normal  No respiratory distress  He has no wheezes  Musculoskeletal: He exhibits no edema  Lymphadenopathy:     He has no cervical adenopathy  Neurological: He is alert and oriented to person, place, and time  Skin: Skin is warm and dry  Psychiatric:   depresssed           Additional Data:     Lab Results: I have personally reviewed pertinent reports  No results found for: HGBA1C          EKG, Pathology, and Other Studies Reviewed on Admission:   · EKG:         ** Please Note: This note has been constructed using a voice recognition system   **

## 2020-07-25 NOTE — H&P
Psychiatric Evaluation - Behavioral Health     Identification Data:Raman Mustafa 53 y o  male MRN: [id number]  Unit/Bed#: [facility] 379-01 Encounter: [id number]    Chief Complaint: "I wanted to die", depression, anxiety and suicidal ideation    History of Present Illness     Toi Clay is a 53 y o  male with a history of depression versus bipolar disorder who was admitted to the inpatient psychiatric unit on a voluntary 201 commitment basis due to unstable mood and suicidal ideation  Symptoms prior to admission included worsening depression, depression, feeling depressed, suicidal ideation and feeling suicidal  Onset of symptoms was gradual starting several days ago with progressively worsening course since that time  Stressors preceding admission included relationship problems  On initial evaluation after admission to the inpatient psychiatric unit the patient   Stated that he despite his recent admission to inpatient psychiatric unit and increasing his Cymbalta to 120 mg been started on Rexalti, the patient condition did not significantly improved, and the patient depression deteriorated  The patient stated he developed suicidal thoughts and his outpatient providers suggest him to go to ER and be readmitted  The patient describes a long history of alcohol and cocaine abuse, however in the last 6 nurses had been sober and 1st few years of sobriety was excellent for the patient feelings and life, however after break-up with his girlfriend he started feeling more and more depressed and recently despite successful core year be an overall satisfaction with his life he cannot overcome severe disabling depression was recent suicidal ideations      Psychiatric Review Of Systems:    sleep changes: decreased  appetite changes: decreased  energy/anergy: yes  anxiety/panic: yes  rajesh: history of periods of elevated mood  self injurious behavior/risky behavior: no  Suicidal ideation: yes, no plan  Homicidal ideation: no  Auditory hallucinations: no  Visual hallucinations: no  Delusional thinking: no      Historical Information     Past Psychiatric History:     Past Inpatient Psychiatric Treatment:   [facility]  Past Outpatient Psychiatric Treatment:    Was in outpatient psychiatric treatment in the past with a psychiatrist  Currently in outpatient psychiatric treatment with a psychiatrist  Past Suicide Attempts:    yes  Past Psychiatric Medication Trials:      Including Prozac, Cymbalta and Rexulti      Substance Abuse History:  Social History     Tobacco History     Smoking Status  Former Smoker Quit date  1/1/2004    Smokeless Tobacco Use  Never Used          Alcohol History     Alcohol Use Status  Not Currently Comment  6 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          Drug Use     Drug Use Status  Yes Types  Marijuana Comment  medical marijuana          Sexual Activity     Sexually Active  Not Asked          Activities of Daily Living    Not Asked               Additional Substance Use Detail     Questions Responses    Problems Due to Past Use of Alcohol? Yes    Problems Due to Past Use of Substances?  Yes    Substance Use Assessment Denies substance use within the past 12 months    Alcohol Use Frequency Denies use in past 12 months    Cannabis frequency Past regular use    Comment: Past rare use on 6/9/2020 Past rare use ->Past regular use on 7/24/2020     Heroin Frequency Denies use in past 12 months    Cannabis method Smoke    Comment: Smoke on 7/24/2020     1st Use of Alcohol age 13    Cannabis 1st Use medical marijuana for anxiety    Last Use of Alcohol & Amount sober 6 5 years    Longest Abstinence from Alcohol 6 5 years    Cocaine frequency Past regular use    Comment: Never used on 6/9/2020 Never used ->Past regular use on 7/24/2020     Crack Cocaine Frequency Prior dependence    Methamphetamine Frequency Denies use in past 12 months    Narcotic Frequency Denies use in past 12 months    Benzodiazepine Frequency Denies use in past 12 months    Amphetamine frequency Denies use in past 12 months    Barbituate Frequency Denies use use in past 12 months    Inhalant frequency Never used    Comment: Never used on 6/9/2020     Hallucinogen frequency Past regular use    Comment: Never used on 6/9/2020 Never used ->Past regular use on 7/24/2020     Ecstasy frequency Never used    Comment: Never used on 6/9/2020     Other drug frequency Never used    Comment: Never used on 6/9/2020     Opiate frequency Denies use in past 12 months    Last reviewed by Stormy Colvin RN on 7/24/2020        I have assessed this patient for substance use within the past 12 months    History of Inpatient/Outpatient rehabilitation program: yes  Smoking history: occasionally    Family Psychiatric History:      Suicide Attempts:  patient denies    Social History:     Marital History: single  Occupational History: works  full-time          Traumatic History:     Abuse: not willing to provide details    Past Medical History:    History of Seizures: no    Past Medical History:   Diagnosis Date    Anxiety     [facility]     Depression     Psychiatric disorder     Severe episode of recurrent major depressive disorder, without psychotic features ([facility] ) 6/9/2020    Suicidal thoughts      Past Surgical History:   Procedure Laterality Date    HERNIA REPAIR      SHOULDER SURGERY         Medical Review Of Systems:    EFO Review Of Systems: Constitutional: negative  Respiratory: negative  Cardiovascular: negative  Gastrointestinal: negative  Musculoskeletal:negative  Neurological: negative  Endocrine: negative  Allergic/Immunologic: negative   all other systems are negative    Allergies:    No Known Allergies    Medications: All current active medications have been reviewed      Objective     Vital signs in last 24 hours:    Temp:  [97 2 °F (36 2 °C)-97 7 °F (36 5 °C)] 97 7 °F (36 5 °C)  HR:  Thor Harsh 84  Resp:  [16] 16  BP: (114-135)/(65-83) 116/65    No intake or output data in the 24 hours ending 07/25/20 1224     Mental Status Evaluation:      Appearance:  casually dressed   Behavior:  cooperative, calm   Mood:  depressed, anxious   Affect: constricted    Speech:  decreased rate, slow   Language: appropriate   Thought Process:  concrete   Associations: concrete associations   Thought Content:  negative thinking   Perceptual Disturbances: no auditory hallucinations, no visual hallucinations, denies auditory hallucinations when asked, does not appear responding to internal stimuli   Risk Potential: Suicidal ideation - None at present  Homicidal ideation - None  Potential for aggression - No   Sensorium:  oriented to person, place and time   Memory:  recent and remote memory grossly intact   Consciousness:  alert and awake   Attention: attention span and concentration are normal   Fund of Knowledge: awareness of current events appropriate   Insight:  impaired   Judgment: limited   Muscle Tone: normal   Gait/Station: normal gait/station and normal balance   Motor Activity: no abnormal movements               Laboratory Results:   I have personally reviewed all pertinent laboratory/tests results  Most Recent Labs:   Lab Results   Component Value Date    WBC 5 25 06/10/2020    RBC 4 86 06/10/2020    HGB 14 2 06/10/2020    HCT 42 7 06/10/2020     06/10/2020    RDW 12 1 06/10/2020    NEUTROABS 2 64 06/10/2020    SODIUM 140 06/10/2020    K 3 9 06/10/2020     06/10/2020    CO2 27 06/10/2020    BUN 16 06/10/2020    CREATININE 1 20 06/10/2020    GLUC 84 06/10/2020    CALCIUM 9 3 06/10/2020    AST 12 06/10/2020    ALT 28 06/10/2020    ALKPHOS 57 06/10/2020    TP 7 1 06/10/2020    ALB 3 6 06/10/2020    TBILI 0 48 06/10/2020    [id number] 1 040 06/10/2020       Imaging Studies: No results found      Code Status: Level 1 - Full Code    Assessment/Plan   Principal Problem:    Severe episode of recurrent major depressive disorder, without psychotic features ([facility] )      Treatment Plan:     Planned Treatment and Medication Changes: All current active medications have been reviewed  Encourage group therapy, milieu therapy and occupational therapy  Behavioral Health checks every 7 minutes     decision was made to lower patient's Cymbalta and provided today with 60 mg instead of 120 and start the new medication in talus with starting dose 10 mg and further improve increased  Decision was made because of the patient positive response to Prozac in the past that was serotonin reuptake inhibitor, and no response was Cymbalta  More enhancement of serotonin system would probably lead to improvement of the patient's depression, at the same time will also increase his mood stabilizer Rexulti to 2 mg tomorrow  The patient expressed his understanding with this medication changes and rational be high and his medication management  At the same time supportive therapy was provided    The total session lasted around 90 minutes    Current Facility-Administered Medications:  acetaminophen 325 mg Oral Q6H PRN Hayley Barnett MD   acetaminophen 650 mg Oral Q8H PRN Hayley Barnett MD   acetaminophen 650 mg Oral Q6H PRN Hayley Barnett MD   aluminum-magnesium hydroxide-simethicone 30 mL Oral Q4H PRN Ana M Neri PA-C   benztropine 1 mg Intramuscular Q6H PRN Hayley Barnett MD   benztropine 1 mg Oral Q6H PRN Hayley Barnett MD   Brexpiprazole 1 mg Oral HS Hayley Barnett MD   DULoxetine 60 mg Oral Daily Hayley Barnett MD   haloperidol 5 mg Oral Q8H PRN Ana M Neri PA-C   haloperidol lactate 5 mg Intramuscular Q8H PRN Ana M Neri PA-C   hydrOXYzine HCL 25 mg Oral Q4H PRN Hayley Barnett MD   LORazepam 2 mg Intramuscular Q6H PRN Hayley Barnett MD   LORazepam 1 mg Oral Q6H PRN Hayley Barnett MD   magnesium hydroxide 30 mL Oral Daily PRN Ana M Neri PA-C   [START ON 7/26/2020] NON FORMULARY 2 mg Oral HS Ashley Turner MD Antonio   OLANZapine 10 mg Intramuscular Q3H PRN Thao Prado MD   OLANZapine 10 mg Oral Q3H PRN Thao Prado MD   risperiDONE 1 mg Oral Q3H PRN Thao Prado MD   traZODone 50 mg Oral HS PRN Thao Prado MD   Vortioxetine HBr 10 mg Oral Daily Thao Prado MD       Risks / Benefits of Treatment:    Risks, benefits, and possible side effects of medications explained to patient and patient verbalizes understanding and agreement for treatment  Counseling / Coordination of Care:    Patient's presentation on admission and proposed treatment plan discussed with treatment team   Diagnosis, medication changes and treatment plan reviewed with patient  Inpatient Psychiatric Certification:    Estimated length of stay: 5 midnights    Based upon physical, mental and social evaluations, I certify that inpatient psychiatric services are medically necessary for this patient for a duration of 5 midnights for the treatment of Severe episode of recurrent major depressive disorder, without psychotic features ([facility] )    Available alternative community resources do not meet the patient's mental health care needs  I further attest that an established written individualized plan of care has been implemented and is outlined in the patient's medical records  ** Please Note: This note has been constructed using a voice recognition system   **

## 2020-07-25 NOTE — PROGRESS NOTES
Pt was received sleeping in bed  No signs of discomfort or distress    Pt slept throughout the night,prog

## 2020-07-25 NOTE — ASSESSMENT & PLAN NOTE
Medical chart for patient's medications and comorbidities reviewed  Patient previously was treated with atorvastatin however his 10 year cardiac risk is less than 5%, no current need for atorvastatin at this time  Lipid panel and TSH is pending per primary team   Previous lipid panel and TSH 1 or 2 months ago were within normal limits  At this time patient is medically cleared for inpatient psychiatric admission

## 2020-07-25 NOTE — PROGRESS NOTES
Pt became a little irritable in regards to his bedtime medications not being correct  Pt was encouraged that they will correct his medications tomorrow when he talks to the psychiatrist  Pt reports a 2/4 anxiety and 6/10 depression but denies any other psych symptoms  Will continue to monitor

## 2020-07-25 NOTE — TREATMENT TEAM
07/25/20 0700   Team Meeting   Meeting Type Daily Rounds   Team Members Present   Team Members Present Nurse; Other (Discipline and Name)  (PA-C)   Nursing Team Member Luigi   Other (Discipline and Name) Daron PA-C   Patient/Family Present   Patient Present No   Patient's Family Present No   new admission, [phone]

## 2020-07-26 LAB
ALBUMIN SERPL BCP-MCNC: 3.9 G/DL (ref 3–5.2)
ALP SERPL-CCNC: 44 U/L (ref 43–122)
ALT SERPL W P-5'-P-CCNC: 20 U/L (ref 9–52)
ANION GAP SERPL CALCULATED.3IONS-SCNC: 5 MMOL/L (ref 5–14)
AST SERPL W P-5'-P-CCNC: 21 U/L (ref 17–59)
BASOPHILS # BLD AUTO: 0 THOUSANDS/ΜL (ref 0–0.1)
BASOPHILS NFR BLD AUTO: 1 % (ref 0–1)
BILIRUB SERPL-MCNC: 0.3 MG/DL
BILIRUB UR QL STRIP: NEGATIVE
BUN SERPL-MCNC: 12 MG/DL (ref 5–25)
CALCIUM SERPL-MCNC: 9.4 MG/DL (ref 8.4–10.2)
CHLORIDE SERPL-SCNC: 105 MMOL/L (ref 97–108)
CHOLEST SERPL-MCNC: 206 MG/DL
CLARITY UR: CLEAR
CO2 SERPL-SCNC: 28 MMOL/L (ref 22–30)
COLOR UR: ABNORMAL
CREAT SERPL-MCNC: 0.99 MG/DL (ref 0.7–1.5)
EOSINOPHIL # BLD AUTO: 0.2 THOUSAND/ΜL (ref 0–0.4)
EOSINOPHIL NFR BLD AUTO: 4 % (ref 0–6)
ERYTHROCYTE [DISTWIDTH] IN BLOOD BY AUTOMATED COUNT: 13.6 %
GFR SERPL CREATININE-BSD FRML MDRD: 91 ML/MIN/1.73SQ M
GLUCOSE SERPL-MCNC: 95 MG/DL (ref 70–99)
GLUCOSE UR STRIP-MCNC: NEGATIVE MG/DL
HCT VFR BLD AUTO: 41.8 % (ref 41–53)
HDLC SERPL-MCNC: 29 MG/DL
HGB BLD-MCNC: 14.2 G/DL (ref 13.5–17.5)
HGB UR QL STRIP.AUTO: NEGATIVE
KETONES UR STRIP-MCNC: NEGATIVE MG/DL
LDLC SERPL CALC-MCNC: 154 MG/DL
LEUKOCYTE ESTERASE UR QL STRIP: NEGATIVE
LYMPHOCYTES # BLD AUTO: 1.5 THOUSANDS/ΜL (ref 0.5–4)
LYMPHOCYTES NFR BLD AUTO: 36 % (ref 25–45)
MAGNESIUM SERPL-MCNC: 2.1 MG/DL (ref 1.6–2.3)
MCH RBC QN AUTO: 29.3 PG (ref 26–34)
MCHC RBC AUTO-ENTMCNC: 34 G/DL (ref 31–36)
MCV RBC AUTO: 86 FL (ref 80–100)
MONOCYTES # BLD AUTO: 0.4 THOUSAND/ΜL (ref 0.2–0.9)
MONOCYTES NFR BLD AUTO: 10 % (ref 1–10)
NEUTROPHILS # BLD AUTO: 2.1 THOUSANDS/ΜL (ref 1.8–7.8)
NEUTS SEG NFR BLD AUTO: 50 % (ref 45–65)
NITRITE UR QL STRIP: NEGATIVE
NONHDLC SERPL-MCNC: 177 MG/DL
PH UR STRIP.AUTO: 8 [PH]
PLATELET # BLD AUTO: 283 THOUSANDS/UL (ref 150–450)
PMV BLD AUTO: 7.7 FL (ref 8.9–12.7)
POTASSIUM SERPL-SCNC: 4.2 MMOL/L (ref 3.6–5)
PROT SERPL-MCNC: 6.8 G/DL (ref 5.9–8.4)
PROT UR STRIP-MCNC: NEGATIVE MG/DL
RBC # BLD AUTO: 4.86 MILLION/UL (ref 4.5–5.9)
SODIUM SERPL-SCNC: 138 MMOL/L (ref 137–147)
SP GR UR STRIP.AUTO: 1.01 (ref 1–1.04)
T3FREE SERPL-MCNC: 2.69 PG/ML (ref 2.3–4.2)
TRIGL SERPL-MCNC: 117 MG/DL
TSH SERPL DL<=0.05 MIU/L-ACNC: 0.91 UIU/ML (ref 0.47–4.68)
UROBILINOGEN UA: NEGATIVE MG/DL
WBC # BLD AUTO: 4.2 THOUSAND/UL (ref 4.5–11)

## 2020-07-26 PROCEDURE — 84443 ASSAY THYROID STIM HORMONE: CPT | Performed by: PSYCHIATRY & NEUROLOGY

## 2020-07-26 PROCEDURE — 80053 COMPREHEN METABOLIC PANEL: CPT | Performed by: PSYCHIATRY & NEUROLOGY

## 2020-07-26 PROCEDURE — 99232 SBSQ HOSP IP/OBS MODERATE 35: CPT | Performed by: PHYSICIAN ASSISTANT

## 2020-07-26 PROCEDURE — 83735 ASSAY OF MAGNESIUM: CPT | Performed by: PSYCHIATRY & NEUROLOGY

## 2020-07-26 PROCEDURE — 80061 LIPID PANEL: CPT | Performed by: PSYCHIATRY & NEUROLOGY

## 2020-07-26 PROCEDURE — 85025 COMPLETE CBC W/AUTO DIFF WBC: CPT | Performed by: PSYCHIATRY & NEUROLOGY

## 2020-07-26 PROCEDURE — 82306 VITAMIN D 25 HYDROXY: CPT | Performed by: PSYCHIATRY & NEUROLOGY

## 2020-07-26 PROCEDURE — 84481 FREE ASSAY (FT-3): CPT | Performed by: PSYCHIATRY & NEUROLOGY

## 2020-07-26 PROCEDURE — 83036 HEMOGLOBIN GLYCOSYLATED A1C: CPT | Performed by: PSYCHIATRY & NEUROLOGY

## 2020-07-26 RX ADMIN — ACETAMINOPHEN 650 MG: 325 TABLET ORAL at 19:50

## 2020-07-26 RX ADMIN — VORTIOXETINE 10 MG: 10 TABLET, FILM COATED ORAL at 09:12

## 2020-07-26 RX ADMIN — BREXPIPRAZOLE 2 MG: 2 TABLET ORAL at 21:02

## 2020-07-26 RX ADMIN — DULOXETINE HYDROCHLORIDE 60 MG: 60 CAPSULE, DELAYED RELEASE ORAL at 13:07

## 2020-07-26 NOTE — PROGRESS NOTES
Progress Note - Behavioral Health     Eloise Verdugo 53 y o  male MRN: [id number]   Unit/Bed#: -01 Encounter: [id number]    Per Nursing: Nursing reports that patient has been pleasant and cooperative on the unit  He complained of mild depression and anxiety during yesterday's shift, however he denied any significant or severe symptoms  He has been compliant with meals and medications  Per Patient: Patient states that he is feeling better overall when compared with yesterday  He feels more positive and upbeat  He is feeling good  He states that he came to terms with being here for treatment and knows that he needs the help  He states that the reason he is here was because his depression was worsening and he was sleeping all of the time  He told his therapist that he "wanted it all to end  I wished things were over " He states that he never had an active plan or intent to end his life, he mainly felt passively suicidal in nature  He does know that he needs treatment and adjustments to his medication  He states that he feels more "normal" today and denies any significant depression or anxiety  Patient denies any thoughts of wanting to harm self or others  Patient denies any recent self-injurious behaviors  Patient denies any active or passive suicidal ideation, intent or plan  Patient is able to contract for safety at the present time  Patient remains future-oriented and goal-directed, as well as motivated and help seeking  He states that he wants to "enjoy life again "        Behavior over the last 24 hours: improving  Sleep: normal  Appetite: normal  Medication side effects: No   ROS: no complaints, all other systems are negative  Any positives in the Comprehensive Review of Systems were noted in the HPI  All other Review of Systems were negative          Mental Status Evaluation:    Appearance:  age appropriate, casually dressed, dressed appropriately, adequate grooming   Behavior:  normal, pleasant, cooperative, calm   Speech:  normal rate and volume   Mood:  normal, improved, euthymic   Affect:  normal range and intensity   Thought Process:  organized, logical, coherent, goal directed, linear   Associations: intact associations   Thought Content:  no overt delusions   Perceptual Disturbances: denies auditory hallucinations when asked, does not appear responding to internal stimuli   Risk Potential: Suicidal ideation - None at present, contracts for safety on the unit  Homicidal ideation - None at present  Potential for aggression - Not at present   Sensorium:  oriented to person, place and time/date   Memory:  recent and remote memory grossly intact   Consciousness:  alert and awake   Attention: attention span and concentration are age appropriate   Insight:  improving   Judgment: improving   Gait/Station: normal gait/station   Motor Activity: no abnormal movements     Vital signs in last 24 hours:  Vitals:    07/26/20 0737   BP: 106/73   Pulse: 83   Resp: 16   Temp: (!) 97 4 °F (36 3 °C)   SpO2:          Laboratory results:   I have personally reviewed all pertinent laboratory/tests results    Most Recent Labs:   Lab Results   Component Value Date    WBC 4 20 (L) 07/26/2020    RBC 4 86 07/26/2020    HGB 14 2 07/26/2020    HCT 41 8 07/26/2020     07/26/2020    RDW 13 6 07/26/2020    NEUTROABS 2 10 07/26/2020    SODIUM 138 07/26/2020    K 4 2 07/26/2020     07/26/2020    CO2 28 07/26/2020    BUN 12 07/26/2020    CREATININE 0 99 07/26/2020    GLUC 95 07/26/2020    CALCIUM 9 4 07/26/2020    AST 21 07/26/2020    ALT 20 07/26/2020    ALKPHOS 44 07/26/2020    TP 6 8 07/26/2020    ALB 3 9 07/26/2020    TBILI 0 30 07/26/2020    CHOLESTEROL 206 (H) 07/26/2020    HDL 29 (L) 07/26/2020    TRIG 117 07/26/2020    LDLCALC 154 (H) 07/26/2020    [place] 177 07/26/2020    [id number] 0 907 07/26/2020       Progress Toward Goals: progressing    Assessment/Plan   Principal Problem:    Severe episode of recurrent major depressive disorder, without psychotic features ([facility] )  Active Problems:    Medical clearance for psychiatric admission    Recommended Treatment:     Planned medication and treatment changes: All current active medications have been reviewed  Encourage group therapy, milieu therapy and occupational therapy  Behavioral Health checks every 7 minutes  Continue current medications:    Current Facility-Administered Medications:  acetaminophen 325 mg Oral Q6H PRN Moe Robertson MD   acetaminophen 650 mg Oral Q8H PRN Moe Robertson MD   acetaminophen 650 mg Oral Q6H PRN Moe Robertson MD   aluminum-magnesium hydroxide-simethicone 30 mL Oral Q4H PRN Ana M Pulcini, PA-C   benztropine 1 mg Intramuscular Q6H PRN Moe Robertson MD   benztropine 1 mg Oral Q6H PRN Moe Robertson MD   Brexpiprazole 2 mg Oral HS Moe Robertson MD   DULoxetine 60 mg Oral Daily Moe Robertson MD   haloperidol 5 mg Oral Q8H PRN Ana M Pulcini, PA-C   haloperidol lactate 5 mg Intramuscular Q8H PRN Ana M Pulcini, PA-C   hydrOXYzine HCL 25 mg Oral Q4H PRN Moe Robertson MD   LORazepam 2 mg Intramuscular Q6H PRN Moe Robertson MD   LORazepam 1 mg Oral Q6H PRN Moe Robertson MD   magnesium hydroxide 30 mL Oral Daily PRN Ana M Pulcini, PA-C   OLANZapine 10 mg Intramuscular Q3H PRN Moe Robertson MD   OLANZapine 10 mg Oral Q3H PRN Moe Robertson MD   risperiDONE 1 mg Oral Q3H PRN Moe Robertson MD   traZODone 50 mg Oral HS PRN Moe Robertson MD   Vortioxetine HBr 10 mg Oral Daily Moe Robertson MD           Plan:  1) Patient reports that he is feeling better compared to yesterday and he is feeling more positive overall with regards to his treatment  He is looking forward to improving his mental health and feels hopeful  Will continue to monitor on the unit     2) Continue all current medications as directed:    Rexulti 2 mg once daily by mouth at bedtime   Cymbalta 60 mg once daily by mouth   Trintellix 10 mg once daily by mouth  3) Medical   All medical comorbidities are under the care of Ajith  Internal Medicine Service  4) Continue to work with Case Management to determine patient's disposition following discharge  Risks / Benefits of Treatment:    Risks, benefits, and possible side effects of medications explained to patient and patient verbalizes understanding and agreement for treatment  Counseling / Coordination of Care:    Patient's progress reviewed with nursing staff  Medications, treatment progress and treatment plan reviewed with patient      Rd Gomez PA-C 07/26/20

## 2020-07-26 NOTE — PROGRESS NOTES
Pt pleasant during interaction  Denies SI and reports improving depression and anxiety  Smiling during conversation  Pt said he's frustrated with being in the hospital, but said "I guess that's normal " Pt asking questions about medication adjustments  Pt asking if duloxetine will eventually be discontinued  Pt said he can't remember the plan that was discussed with him Friday  Visible in milieu and social with peers

## 2020-07-26 NOTE — TREATMENT TEAM
07/26/20 0900   Team Meeting   Meeting Type Daily Rounds   Team Members Present   Team Members Present Nurse; Other (Discipline and Name)  (PA-C)   Nursing Team Member Luigi   Other (Discipline and Name) Daron PA-C   Patient/Family Present   Patient Present No   Patient's Family Present No   medication management

## 2020-07-26 NOTE — NURSING NOTE
Patient visible on unit throughout the evening  Pleasant and cooperative with routine  Denied any unmet needs  Smiled and made good eye contact upon approach  HS medication compliant and no PRNs needed  Will monitor

## 2020-07-27 LAB
EST. AVERAGE GLUCOSE BLD GHB EST-MCNC: 111 MG/DL
HBA1C MFR BLD: 5.5 %

## 2020-07-27 PROCEDURE — 93005 ELECTROCARDIOGRAM TRACING: CPT

## 2020-07-27 PROCEDURE — 99232 SBSQ HOSP IP/OBS MODERATE 35: CPT | Performed by: PSYCHIATRY & NEUROLOGY

## 2020-07-27 RX ORDER — DULOXETIN HYDROCHLORIDE 30 MG/1
30 CAPSULE, DELAYED RELEASE ORAL DAILY
Status: DISCONTINUED | OUTPATIENT
Start: 2020-07-27 | End: 2020-07-29

## 2020-07-27 RX ADMIN — DULOXETINE 30 MG: 30 CAPSULE, DELAYED RELEASE ORAL at 12:48

## 2020-07-27 RX ADMIN — VORTIOXETINE 10 MG: 10 TABLET, FILM COATED ORAL at 08:58

## 2020-07-27 RX ADMIN — BREXPIPRAZOLE 2 MG: 2 TABLET ORAL at 21:44

## 2020-07-27 NOTE — TREATMENT TEAM
07/27/20 0700   Team Meeting   Meeting Type Daily Rounds   Team Members Present   Team Members Present Physician;Nurse;; Other (Discipline and Name)   Physician Team Member Borden   Nursing Team Member [facility] Management Team Member Bran Klein   Other (Discipline and Name) Mercy Dietrich, med student   Patient/Family Present   Patient Present No   Patient's Family Present No   med management

## 2020-07-27 NOTE — NURSING NOTE
Patient reported 6/10 HA  Tylenol PRN given  Patient stated he has been getting headaches since admission and feels it is related to caffeine withdrawal  Will monitor

## 2020-07-27 NOTE — PROGRESS NOTES
[facility] INDIVIDUAL SESSION NOTE    Admission Self Assessment/DERs Completion    Pleasant on approach, reading book in room  Reports being in [facility], and feeling "not right" after being at home  "I had no energy or desire to do anything, and that isn't like me"  Expressed multiple interests and having kids who are "doing great"  Feels cannot enjoy these things  Although denying SI, stated that he told the doctor that he just "wishes it all would end"  "I just want to get my meds straight"  Feels upset with self that he is "here again"  Encouraged to participate in unit activities, attend groups  Minimally visible in milieu  Continues to benefit from group/milieu therapy for management of depression and anxiety symptoms/distress tolerance development  07/27/20 1315   Activity/Group Checklist   Group Admission/Discharge  (Completed Admission DERs/Admission Self Assessment)   Attendance Attended   Attendance Duration (min) 0-15   Interactions Interacted appropriately   Affect/Mood Appropriate   Goals Achieved Able to listen to others; Able to engage in interactions

## 2020-07-27 NOTE — NURSING NOTE
Patient withdrawn to his room throughout the evening due to HA  Patient was HS medication compliant  Denied any unmet needs   Will monitor,

## 2020-07-27 NOTE — PROGRESS NOTES
Progress Note - Behavioral Health     Bibi Pedraza 53 y o  male MRN: [id number]   Unit/Bed#: [facility] 379-01 Encounter: [id number]    Behavior over the last 24 hours: improving  Eloise Kiara states he feels less depressed, feels more optimistic, denies suicidal thoughts today  He still has blunted affect and limited eye contact  Does not want to consider ECT and is focusing on discharge  Compliant with medications  Attends some groups  Sleep: slept better  Appetite: normal  Medication side effects: No   ROS: no complaints, denies any headache, shortness of breath or chest pain, all other systems are negative    Mental Status Evaluation:    Appearance:  casually dressed   Behavior:  cooperative, limited eye contact   Speech:  soft   Mood:  less depressed   Affect:  blunted   Thought Process:  organized, goal directed   Associations: intact associations   Thought Content:  no overt delusions   Perceptual Disturbances: no auditory hallucinations, no visual hallucinations   Risk Potential: Suicidal ideation - None at present, but had suicidal ideation prior to admission    Homicidal ideation - None  Potential for aggression - No   Sensorium:  oriented to person, place and time/date   Memory:  recent and remote memory grossly intact   Consciousness:  alert and awake   Attention/Concentration: attention span and concentration appear shorter than expected for age   Insight:  moderate   Judgment: moderate   Gait/Station: normal gait/station, normal balance   Motor Activity: no abnormal movements     Vital signs in last 24 hours:    Temp:  [97 7 °F (36 5 °C)-98 6 °F (37 °C)] 98 6 °F (37 °C)  HR:  [84-86] 86  Resp:  [16-18] 16  BP: (115-117)/(68-70) 115/68    Laboratory results: I have personally reviewed all pertinent laboratory/tests results    Most Recent Labs:   Lab Results   Component Value Date    WBC 4 20 (L) 07/26/2020    RBC 4 86 07/26/2020    HGB 14 2 07/26/2020    HCT 41 8 07/26/2020     07/26/2020    RDW 13 6 07/26/2020    NEUTROABS 2 10 07/26/2020    SODIUM 138 07/26/2020    K 4 2 07/26/2020     07/26/2020    CO2 28 07/26/2020    BUN 12 07/26/2020    CREATININE 0 99 07/26/2020    GLUC 95 07/26/2020    CALCIUM 9 4 07/26/2020    AST 21 07/26/2020    ALT 20 07/26/2020    ALKPHOS 44 07/26/2020    TP 6 8 07/26/2020    ALB 3 9 07/26/2020    TBILI 0 30 07/26/2020    CHOLESTEROL 206 (H) 07/26/2020    HDL 29 (L) 07/26/2020    TRIG 117 07/26/2020    LDLCALC 154 (H) 07/26/2020    [place] 177 07/26/2020    [id number] 0 907 07/26/2020    T3FREE 2 69 07/26/2020   COVID19:   Lab Results   Component Value Date    SARSCOV2 Negative 07/24/2020   Drug Screen:   Lab Results   Component Value Date    AMPMETHUR Negative 07/24/2020    BARBTUR Negative 07/24/2020    BDZUR Negative 07/24/2020    THCUR Negative 07/24/2020    COCAINEUR Negative 07/24/2020    METHADONEUR Negative 07/24/2020    OPIATEUR Negative 07/24/2020    PCPUR Negative 07/24/2020       Suicide/Homicide Risk Assessment:    Risk of Harm to Self:   Nursing Suicide Risk Assessment Last 24 hours: Low Risk to Self: N/A; Moderate Risk to Self: Diagnosis of depression  ;    Current Specific Risk Factors include: diagnosis of depression, current depressive symptoms  Protective Factors: no current suicidal ideation, ability to communicate with staff on the unit, taking medications as ordered on the unit  Based on today's assessment, Ofe Robledo presents the following risk of harm to self: moderate    Risk of Harm to Others:  Nursing Homicide Risk Assessment: Violence Risk to Others: Denies within past 6 months  Based on today's assessment, Ofe Robledo presents the following risk of harm to others: none    The following interventions are recommended: behavioral checks every 7 minutes, continued hospitalization on locked unit    Progress Toward Goals: progressing, less depressed, working on coping skills, denies current suicidal thoughts    Assessment/Plan   Principal Problem:    Major depressive disorder, recurrent, severe without psychotic features ([facility] [address] )  Active Problems:    Generalized anxiety disorder    Medical clearance for psychiatric admission    Recommended Treatment:     Planned medication and treatment changes: All current active medications have been reviewed  Encourage group therapy, milieu therapy and occupational therapy  Behavioral Health checks every 7 minutes  Decrease Cymbalta to 30 mg daily and taper off gradually    Continue all other medications:    Current Facility-Administered Medications:  acetaminophen 325 mg Oral Q6H PRN Nitza Garza MD   acetaminophen 650 mg Oral Q8H PRN Nitza Garza MD   acetaminophen 650 mg Oral Q6H PRN Nitza Garza MD   aluminum-magnesium hydroxide-simethicone 30 mL Oral Q4H PRN Ana M Pulcini, PA-C   benztropine 1 mg Intramuscular Q6H PRN Nitza Garza MD   benztropine 1 mg Oral Q6H PRN Nitza Garza MD   Brexpiprazole 2 mg Oral HS Nitza Garza MD   DULoxetine 30 mg Oral Daily Sathya Fabian MD   haloperidol 5 mg Oral Q8H PRN Ana M Pulcini, PA-C   haloperidol lactate 5 mg Intramuscular Q8H PRN Ana M Pulcini, PA-C   hydrOXYzine HCL 25 mg Oral Q4H PRN Nitza Garza MD   LORazepam 2 mg Intramuscular Q6H PRN Nitza Garza MD   LORazepam 1 mg Oral Q6H PRN Nitza Garza MD   magnesium hydroxide 30 mL Oral Daily PRN Ana M Pulcini, PA-C   OLANZapine 10 mg Intramuscular Q3H PRN Nitza Garza MD   OLANZapine 10 mg Oral Q3H PRN Nitza Garza MD   risperiDONE 1 mg Oral Q3H PRN Nitza Garza MD   traZODone 50 mg Oral HS PRN Nitza Garza MD   Vortioxetine HBr 10 mg Oral Daily Nitza Garza MD       Risks / Benefits of Treatment:    Risks, benefits, and possible side effects of medications explained to patient and patient verbalizes understanding and agreement for treatment      Counseling / Coordination of Care:    Patient's progress discussed with staff in treatment team meeting  Medications, treatment progress and treatment plan reviewed with patient  Medication changes discussed with patient      Ananda Johnson MD 07/27/20

## 2020-07-28 PROBLEM — F10.11 HISTORY OF ALCOHOL ABUSE: Status: ACTIVE | Noted: 2019-05-21

## 2020-07-28 PROBLEM — E78.2 MIXED HYPERLIPIDEMIA: Status: ACTIVE | Noted: 2019-05-21

## 2020-07-28 PROBLEM — F14.91 HISTORY OF COCAINE USE: Status: ACTIVE | Noted: 2019-05-21

## 2020-07-28 PROBLEM — Z87.898 HISTORY OF COCAINE USE: Status: ACTIVE | Noted: 2019-05-21

## 2020-07-28 LAB
ATRIAL RATE: 74 BPM
P AXIS: 22 DEGREES
PR INTERVAL: 164 MS
QRS AXIS: 68 DEGREES
QRSD INTERVAL: 82 MS
QT INTERVAL: 380 MS
QTC INTERVAL: 421 MS
T WAVE AXIS: 23 DEGREES
VENTRICULAR RATE: 74 BPM

## 2020-07-28 PROCEDURE — 99232 SBSQ HOSP IP/OBS MODERATE 35: CPT | Performed by: NURSE PRACTITIONER

## 2020-07-28 PROCEDURE — 93010 ELECTROCARDIOGRAM REPORT: CPT | Performed by: INTERNAL MEDICINE

## 2020-07-28 RX ADMIN — TRAZODONE HYDROCHLORIDE 50 MG: 50 TABLET ORAL at 21:20

## 2020-07-28 RX ADMIN — VORTIOXETINE 10 MG: 10 TABLET, FILM COATED ORAL at 08:04

## 2020-07-28 RX ADMIN — BREXPIPRAZOLE 2 MG: 2 TABLET ORAL at 21:19

## 2020-07-28 RX ADMIN — DULOXETINE 30 MG: 30 CAPSULE, DELAYED RELEASE ORAL at 12:29

## 2020-07-28 NOTE — PLAN OF CARE
Worker scheduled follow up psych appt with [address] on 8/6 at 1500  Worker scheduled follow up [facility]

## 2020-07-28 NOTE — PROGRESS NOTES
Pt denies all psych symptoms  Pt is out in the milieu, and social with peers  Pt has no complaints or concerns  Medication and meal compliant  Will monitor

## 2020-07-28 NOTE — PROGRESS NOTES
Pt denies SI, HI, AH and VH  Pt is calm, cooperative and pleasant  Pt is out in the milieu, social with peers and attending groups  Pt has no complaints or concerns  Medication and meal compliant  Will monitor

## 2020-07-28 NOTE — PROGRESS NOTES
Presented initially calm  Progress Note - [address] 53 y o  male MRN: [id number]  Unit/Bed#: [facility] 379-01 Encounter: [id number]    Assessment/Plan   Principal Problem:    Major depressive disorder, recurrent, severe without psychotic features [facility])  Active Problems:    Generalized anxiety disorder    Medical clearance for psychiatric admission      Subjective:72 hr notice expires on Friday  Patient was seen today for continuation of care, records reviewed and  patient was discussed with the morning case review team   Altriston Guerrero presented initially calm, cooperative and pleasant  He then verbalized he thought he was going to be discharged today and became mildly labile and irritable "I feel aggravated because I am here, they told me 4-5 days I will be leaving"  Denied endorsing suicidal thoughts  Feels his anxiety has been worsened by knowing he had to be hospitalized until now  Continues to deny endorsing AH and VH  Has been attending groups  Unable to tell if any improvement since on Tr[employer] but is hopeful that will alleviate depressive symptoms  Looks forward to be discharged back home  Patient denies endorsing any suicidal or homicidal ideation  Remains medication compliance  Denies any side effects from medications      Psychiatric Review of Systems:    Sleep: slept off and on, difficulty falling asleep  Appetite: normal  Medication side effects: No   ROS: no complaints, denies any headache, shortness of breath, chest pain, diarrhea, dizziness, lightheadedness or nausea, all other systems are negative    Vitals:  Vitals:    07/28/20 0700   BP: 128/76   Pulse: 96   Resp: 17   Temp: (!) 97 4 °F (36 3 °C)   SpO2:        Mental Status Evaluation:    Appearance:  casually dressed, tattooed   Behavior:  pleasant, cooperative, calm, good eye contact   Speech:  normal rate and volume, fluent, clear, pressured   Mood:  still at times anxious, still at times depressed, mildly labile   Affect: reactive, slightly brighter, mood-congruent   Thought Process:  organized, goal directed, linear   Associations: intact associations   Thought Content:  no overt delusions, ruminating thoughts   Perceptual Disturbances: no auditory hallucinations, no visual hallucinations   Risk Potential: Suicidal ideation - None  Homicidal ideation - None  Potential for aggression - No   Sensorium:  oriented to person, place, time/date and situation   Memory:  recent and remote memory grossly intact   Consciousness:  alert and awake   Attention: attention span and concentration are age appropriate   Insight:  improving   Judgment: improving   Gait/Station: normal gait/station, normal balance   Motor Activity: no abnormal movements     Laboratory results:    I have personally reviewed all pertinent laboratory/tests results    Most Recent Labs:   Lab Results   Component Value Date    WBC 4 20 (L) 07/26/2020    RBC 4 86 07/26/2020    HGB 14 2 07/26/2020    HCT 41 8 07/26/2020     07/26/2020    RDW 13 6 07/26/2020    NEUTROABS 2 10 07/26/2020    SODIUM 138 07/26/2020    K 4 2 07/26/2020     07/26/2020    CO2 28 07/26/2020    BUN 12 07/26/2020    CREATININE 0 99 07/26/2020    GLUC 95 07/26/2020    CALCIUM 9 4 07/26/2020    AST 21 07/26/2020    ALT 20 07/26/2020    ALKPHOS 44 07/26/2020    TP 6 8 07/26/2020    ALB 3 9 07/26/2020    TBILI 0 30 07/26/2020    CHOLESTEROL 206 (H) 07/26/2020    HDL 29 (L) 07/26/2020    TRIG 117 07/26/2020    LDLCALC 154 (H) 07/26/2020    NONHDLC 177 07/26/2020    [id number] 0 907 07/26/2020    T3FREE 2 69 07/26/2020    HGBA1C 5 5 07/26/2020     07/26/2020       Progress Toward Goals:     Improving     Recommended Treatment:     All current active medications have been reviewed  Encourage group therapy, milieu therapy and occupational therapy  Behavioral Health checks every 7 minutes   Possible discharge for Thursday    Continue all other medications:    Current Facility-Administered Medications:  acetaminophen 325 mg Oral Q6H PRN Raymond Mayer MD   acetaminophen 650 mg Oral Q8H PRN Raymond Mayer MD   acetaminophen 650 mg Oral Q6H PRN Raymond Mayer MD   aluminum-magnesium hydroxide-simethicone 30 mL Oral Q4H PRN Ana M Pulcini, PA-C   benztropine 1 mg Intramuscular Q6H PRN Raymond Mayer MD   benztropine 1 mg Oral Q6H PRN Raymond Mayer MD   Brexpiprazole 2 mg Oral HS Raymond Mayer MD   DULoxetine 30 mg Oral Daily Justice Davidson MD   haloperidol 5 mg Oral Q8H PRN Ana M Pulcini, PA-C   haloperidol lactate 5 mg Intramuscular Q8H PRN Ana M Pulcini, PA-C   hydrOXYzine HCL 25 mg Oral Q4H PRN Raymond Mayer MD   LORazepam 2 mg Intramuscular Q6H PRN Raymond Mayer MD   LORazepam 1 mg Oral Q6H PRN Raymond Mayer MD   magnesium hydroxide 30 mL Oral Daily PRN Ana M Pulcini, PA-C   OLANZapine 10 mg Intramuscular Q3H PRN Raymond Mayer MD   OLANZapine 10 mg Oral Q3H PRN Raymond Mayer MD   risperiDONE 1 mg Oral Q3H PRN Raymond Mayer MD   traZODone 50 mg Oral HS PRN Raymond Mayer MD   Vortioxetine HBr 10 mg Oral Daily Raymond Mayer MD       Risks / Benefits of Treatment:     Risks, benefits, and possible side effects of medications explained to patient and patient verbalizes understanding and agreement for treatment  Counseling / Coordination of Care:     Patient's progress reviewed with nursing staff  Medications, treatment progress and treatment plan reviewed with patient  Supportive counseling provided to the patient            Marylene Kast

## 2020-07-28 NOTE — PROGRESS NOTES
07/28/20 1000   Activity/Group Checklist   Group Other (Comment)  ([facility], Process Discussion)   Attendance Attended   Attendance Duration (min) Greater than 60   Interactions Interacted appropriately   Affect/Mood Appropriate   Goals Achieved Able to listen to others; Able to engage in interactions; Able to recieve feedback; Able to give feedback to another  (Able to engage materials; full participation/gained insight)

## 2020-07-28 NOTE — SOCIAL WORK
Worker met with pt to complete biopsychosocial assessment  Pt pleasant and cooperative  Pt's mood and affect constricted  Pt's thought process/content organized/appropriate  Pt's speech normal rate and rhythm, eye contact appropriate and appearance well-groomed  Pt reports coming to the hospital for medication adjustments  Pt reports he was never suicidal upon admission  Pt reports he met with his outpatient psychiatrist who recommended he be monitored while med adjustments were made  Pt reports increased depression and anxiety and poor motivation  Pt's UDS-  Pt reports hx of alcohol and cocaine abuse  Pt reports 6 years clean by using AA meetings  Pt reports no hx of inpatient rehab  Pt denies TOB use  Pt denies hx of abuse  Pt denies psychosocial loss  Pt denies legal      Pt with hx of inpatient psychiatric hospitalization at [facility] (6/8/20-6/12/20)  Pt denies SA  Pt with outpatient at [facility] and therapy at [facility]  Pt is currently single, never  with an 25year old son whom he lives with  Pt has good relationship with son  Pt has good relationship with his mother  Pt currently employed through a [employer] doing [employer]  Pt has high school diploma  Pt drives      KRYSTLE: Nichole Kimble, Anuradha and

## 2020-07-28 NOTE — PROGRESS NOTES
07/27/20 1455   Team Meeting   Meeting Type Tx Team Meeting   Team Members Present   Team Members Present Physician;Nurse;   Physician Team Member [address] Team Member [address] Management Team Member [place]   Patient/Family Present   Patient Present Yes   Patient's Family Present No   Pt seen for tx team meeting  Pt educated on med management, case management and group therapy  Pt concerned he will be here for 4-5 more days when he feels he should not have to be as he was just referred for a med adjustment  Pt considering signing a 72 hour notice  Tx plan reviewed and signed

## 2020-07-28 NOTE — DISCHARGE INSTR - OTHER ORDERS
If you are experiencing a mental health emergency, you may call the [id number] [place] [address] 24 hours a day, 7 days per week at [phone]  [facility], call [phone]  What you need to know aboutcoronavirus disease 2019 (COVID-19)     What is coronavirus disease 2019 (COVID-19)? Coronavirus disease 2019 (COVID-19) is a respiratory illness that can spread from person to person  The virus that causes COVID-19 is a novel coronavirus that was first identified during an investigation into an outbreak in [place], [place]  Can people in [place]  get COVID-19? Yes  COVID-19 is spreading from person to person in parts of the [place]  Risk of infection with COVID-19 is higher for people who are close contacts of someone known to have COVID-19, for example healthcare workers, or household members  Other people at higher risk for infection are those who live in or have recently been in an area with ongoing spread of COVID-19  Learn more about places with ongoing spread at   [facility] tn  html#geographic  Have there been cases of COVID[place]19 in [place] ?   Yes  The first case of COVID-19 in the [place] was reported on January 21, 2020  The current count of cases of COVID-19 in the [place] is available on [employer] at [facility]  How does COVID-19 spread? The virus that causes COVID-19 probably emerged from an animal source, but is now spreading from person to person  The virus is thought to spread mainly between people who are in close contact with one another (within about 6 feet) through respiratory droplets produced when an infected person coughs or sneezes   It also may be possible that a person can get COVID-19 by touching a surface or object that has the virus on it and then touching their own mouth, nose, or possibly their eyes, but this is not thought to be the main way the virus spreads  Learn what is known about the spread of newly emerged coronaviruses at [facility]  What are the symptoms of COVID-19? Patients with COVID-19 have had mild to severe respiratory illness with symptoms of   fever   cough   shortness of breath    What are severe complications from this virus? Some patients have pneumonia in both lungs, multi-organ failure and in some cases death  How can I help protect myself? People can help protect themselves from respiratory illness with everyday preventive actions  Avoid close contact with people who are sick  Avoid touching your eyes, nose, and mouth withunwashed hands  Wash your hands often with soap and water for at least 20 seconds  Use an alcohol-based hand  that contains at least 60% alcohol if soap and water are not available  If you are sick, to keep from spreading respiratory illness to others, you should   Stay home when you are sick  Cover your cough or sneeze with a tissue, then throw the tissue in the trash  Clean and disinfect frequently touched objectsand surfaces  What should I do if I recently traveled from an area with ongoing spread of COVID-19? If you have traveled from an affected area, there may be restrictions on your movements for up to 2 weeks  If you develop symptoms during that period (fever, cough, trouble breathing), seek medical advice  Call the office of your health care provider before you go, and tell them about your travel and your symptoms  They will give you instructions on how to get care without exposing other people to your illness  While sick, avoid contact with people, don't go out and delay any travel to reduce the possibility of spreading illness to others  Is there a vaccine? There is currently no vaccine to protect against COVID-19   The best way to prevent infection is to take everyday preventive actions, like avoiding close contact with people who are sick and washing your hands often  Is there a treatment? There is no specific antiviral treatment for COVID-19  People with COVID-19 can seek medical care to helprelieve symptoms  For more information: www cdc gov/TNQOD56KV 798549-U 03/03/2020            What to do if you are sick withcoronavirus disease 2019 (COVID-19)     If you are sick with COVID-19 or suspect you are infected with the virus that causes COVID-19, follow the steps below to help prevent the disease from spreading to people in your home and community  Stay home except to get medical care   You should restrict activities outside your home, except for getting medical care  Do not go to work, school, or public areas  Avoid using public transportation, ride-sharing, or taxis  Separate yourself from other people and animals inyour home  People: As much as possible, you should stay in a specific room and away from other people in your home  Also, you should use a separate bathroom, if available  Animals: Do not handle pets or other animals while sick  See COVID-19 and Animals for more information  Call ahead before visiting your doctor   If you have a medical appointment, call the healthcare provider and tell them that you have or may have COVID-19  This will help the healthcare provider's office take steps to keep other people from getting infected or exposed  Wear a facemask  You should wear a facemask when you are around other people (e g , sharing a room or vehicle) or pets and before you enter a healthcare provider's office  If you are not able to wear a facemask (for example, because it causes trouble breathing), then people who live with you should not stay in the same room with you, or they should wear a facemask if they enteryour room  Cover your coughs and sneezes   Cover your mouth and nose with a tissue when you cough or sneeze   Throw used tissues in a lined trash can; immediately wash your hands with soap and water for at least 20 seconds or clean your hands with an alcohol-based hand  that contains at least 60 to 95% alcohol, covering all surfaces of your hands and rubbing them together until they feel dry  Soap and water should be used preferentially if hands are visibly dirty  Avoid sharing personal household items   You should not share dishes, drinking glasses, cups, eating utensils, towels, or bedding with other people or pets in your home  After using these items, they should be washed thoroughly with soap and water  Clean your hands often  Wash your hands often with soap and water for at least 20 seconds  If soap and water are not available, clean your hands with an alcohol-based hand  that contains at least 60% alcohol, covering all surfaces of your hands and rubbing them together until they feel dry  Soap and water should be used preferentially if hands are visibly dirty  Avoid touching your eyes, nose, and mouth with unwashed hands  Clean all "high-touch" surfaces every day  High touch surfaces include counters, tabletops, doorknobs, bathroom fixtures, toilets, phones, keyboards, tablets, and bedside tables  Also, clean any surfaces that may have blood, stool, or body fluids on them  Use a household cleaning spray or wipe, according to the label instructions  Labels contain instructions for safe and effective use of the cleaning product including precautions you should take when applying the product, such as wearing gloves and making sure you have good ventilation during use of the product  Monitor your symptoms  Seek prompt medical attention if your illness is worsening (e g , difficulty breathing)  Before seeking care, call your healthcare provider and tell them that you have, or are being evaluated for, COVID-19  Put on a facemask before you enter the facility   These steps will help the healthcare provider's office to keep other people in the office or waiting room from getting infectedor exposed  Ask your healthcare provider to call the local or state health department  Persons who are placed under active monitoring or facilitated self-monitoring should follow instructions provided by their local health department or occupational health professionals, as appropriate  If you have a medical emergency and need to call 911, notify the dispatch personnel that you have, or are being evaluated for COVID-19  If possible, put on a facemask before emergency medical services arrive  Discontinuing home isolation  Patients with confirmed COVID-19 should remain under home isolation precautions until the risk of secondary transmission to others is thought to be low  The decision to discontinue home isolation precautions should be made on a case-by-case basis, in consultation with healthcare providers and state and local health departments  For more information: www cdc gov/NNTOL71MH 293745-W 02/24/2020           Stay home when you are sick,except to get medical care  Wash your hands often with soap and water for at least 20 seconds  Cover your cough or sneeze with a tissue, then throw the tissue in the trash  Clean and disinfect frequently touched objects and surfaces  Avoid touching your eyes, nose, and mouth  STOP THE SPREAD OF GERMS  For more information: www cdc gov/COVID19 Avoid close contact with people who are sick  Help prevent the spread of respiratory diseases like COVID-19  PUJA ONLINE SUPPORT during [address] --- Tues :00 - 2:30 pm   For Family Members --- Wed 2:00 - 3:30 pm   For Peers --- Thurs 6:00 - 7:30 pm   Come share support with those who understand  ? [place] for our [facility] [employer], held on [facility] led by 2 trained facilitators with lived experience, by emailing [email or web address] or calling         When you need someone to listen, the Maliha Paras is available for 16 hours a day, 7 days a week, from the time of 7-10am and 2pm-2am   It is not available from the hours of 2am-6am and 10am-2pm  A representative can be reached at [phone]

## 2020-07-28 NOTE — PROGRESS NOTES
Pt is cooperative and pleasant on unit  Visible and sociable with peers at all times  Pt denies SI HI AVH

## 2020-07-28 NOTE — PROGRESS NOTES
[facility] GROUP NOTE    Friendly with peers, pleasant  "I am upset with myself for being in here"  Encouragement provided  07/28/20 0930   Activity/Group Checklist   Group Community meeting   Attendance Attended   Attendance Duration (min) 16-30   Interactions Interacted appropriately   Affect/Mood Appropriate   Goals Achieved Able to listen to others; Able to engage in interactions   Objectives/Key Points:  Living intentionally  Goal Setting  Thought for the Day  Self Check In

## 2020-07-28 NOTE — PLAN OF CARE
Problem: SELF HARM/SUICIDALITY  Goal: Will have no self-injury during hospital stay  Description  INTERVENTIONS:  - Q 15 MINUTES: Routine safety checks  - Q WAKING SHIFT & PRN: Assess risk to determine if routine checks are adequate to maintain patient safety  - Encourage patient to participate actively in care by formulating a plan to combat response to suicidal ideation, identify supports and resources  Outcome: Progressing     Problem: DEPRESSION  Goal: Will be euthymic at discharge  Description  INTERVENTIONS:  - Administer medication as ordered  - Provide emotional support via 1:1 interaction with staff  - Encourage involvement in milieu/groups/activities  - Monitor for social isolation  Outcome: Progressing     Problem: ANXIETY  Goal: Will report anxiety at manageable levels  Description  INTERVENTIONS:  - Administer medication as ordered  - Teach and encourage coping skills  - Provide emotional support  - Assess patient/family for anxiety and ability to cope  Outcome: Progressing  Goal: By discharge: Patient will verbalize 2 strategies to deal with anxiety  Description  Interventions:  - Identify any obvious source/trigger to anxiety  - Staff will assist patient in applying identified coping technique/skills  - Encourage attendance of scheduled groups and activities  Outcome: Progressing     Problem: DISCHARGE PLANNING  Goal: Discharge to home or other facility with appropriate resources  Description  INTERVENTIONS:  - Identify barriers to discharge w/patient and caregiver  - Arrange for needed discharge resources and transportation as appropriate  - Identify discharge learning needs (meds, wound care, etc )  - Arrange for interpretive services to assist at discharge as needed  - Refer to Case Management Department for coordinating discharge planning if the patient needs post-hospital services based on physician/advanced practitioner order or complex needs related to functional status, cognitive ability, or social support system  Outcome: Progressing

## 2020-07-28 NOTE — TREATMENT TEAM
07/28/20 0700   Team Meeting   Meeting Type Daily Rounds   Team Members Present   Team Members Present Physician;Nurse;; Other (Discipline and Name)   Physician Team Member [address] Team Member [facility] Team Member Becca Chow   Other (Discipline and Name) jerel Terrazas student   Patient/Family Present   Patient Present No   Patient's Family Present No     Tapering off Cymbalta   Possible d/c Thursday

## 2020-07-29 PROCEDURE — 99232 SBSQ HOSP IP/OBS MODERATE 35: CPT | Performed by: NURSE PRACTITIONER

## 2020-07-29 RX ADMIN — TRAZODONE HYDROCHLORIDE 50 MG: 50 TABLET ORAL at 21:05

## 2020-07-29 RX ADMIN — ACETAMINOPHEN 650 MG: 325 TABLET ORAL at 14:14

## 2020-07-29 RX ADMIN — VORTIOXETINE 10 MG: 10 TABLET, FILM COATED ORAL at 08:15

## 2020-07-29 RX ADMIN — BREXPIPRAZOLE 2 MG: 2 TABLET ORAL at 21:05

## 2020-07-29 NOTE — PROGRESS NOTES
Progress Note - Behavioral Health   Jeff Orourke 53 y o  male MRN: [id number]  Unit/Bed#: [facility] 379-01 Encounter: [id number]    Assessment/Plan   Principal Problem:    Major depressive disorder, recurrent, severe without psychotic features ([facility] [address] )  Active Problems:    Generalized anxiety disorder    Medical clearance for psychiatric admission      Subjective:Patient was seen today for continuation of care, records reviewed and  patient was discussed with the morning case review team Raman presented calm,cooperative and pleasant  Reports having less negative thoughts and feels more optimistic on [employer]  Reported sleeping well and continues to have good appetite  He denies endorsing any suicidal thoughts, denies endorsing any anxiety, auditory visual hallucinations  Looks forward to be discharged tomorrow back home with son  Has been attending groups and has been selectively sociable  Remains medication compliance  Denies any side effects from medications      Psychiatric Review of Systems:    Sleep: improved, slept better with Trazodone  Appetite: normal  Medication side effects: No   ROS: no complaints, denies any headache, shortness of breath, chest pain, muscle cramps or nausea, all other systems are negative    Vitals:  Vitals:    07/29/20 0700   BP: 116/62   Pulse: 82   Resp: 16   Temp: 98 2 °F (36 8 °C)   SpO2:        Mental Status Evaluation:    Appearance:  casually dressed, dressed appropriately, tattooed   Behavior:  pleasant, cooperative, calm, more calm, good eye contact   Speech:  normal rate and volume, fluent, clear, coherent   Mood:  improved   Affect:  normal range and intensity, mood-congruent   Thought Process:  organized, goal directed, linear   Associations: intact associations   Thought Content:  no overt delusions   Perceptual Disturbances: no auditory hallucinations, no visual hallucinations   Risk Potential: Suicidal ideation - None  Homicidal ideation - None  Potential for aggression - No Sensorium:  oriented to person, place, time/date and situation   Memory:  recent and remote memory grossly intact   Consciousness:  alert and awake   Attention: attention span and concentration are age appropriate   Insight:  good and improved   Judgment: good and improved   Gait/Station: normal gait/station, normal balance   Motor Activity: no abnormal movements     Laboratory results:    I have personally reviewed all pertinent laboratory/tests results    Most Recent Labs:   Lab Results   Component Value Date    WBC 4 20 (L) 07/26/2020    RBC 4 86 07/26/2020    HGB 14 2 07/26/2020    HCT 41 8 07/26/2020     07/26/2020    RDW 13 6 07/26/2020    NEUTROABS 2 10 07/26/2020    SODIUM 138 07/26/2020    K 4 2 07/26/2020     07/26/2020    CO2 28 07/26/2020    BUN 12 07/26/2020    CREATININE 0 99 07/26/2020    GLUC 95 07/26/2020    CALCIUM 9 4 07/26/2020    AST 21 07/26/2020    ALT 20 07/26/2020    ALKPHOS 44 07/26/2020    TP 6 8 07/26/2020    ALB 3 9 07/26/2020    TBILI 0 30 07/26/2020    CHOLESTEROL 206 (H) 07/26/2020    HDL 29 (L) 07/26/2020    TRIG 117 07/26/2020    LDLCALC 154 (H) 07/26/2020    NONHDLC 177 07/26/2020    ZUI0MQYXDYEC 0 907 07/26/2020    T3FREE 2 69 07/26/2020    HGBA1C 5 5 07/26/2020     07/26/2020       Progress Toward Goals:     Improving     Recommended Treatment:     All current active medications have been reviewed  Encourage group therapy, milieu therapy and occupational therapy  Behavioral Health checks every 7 minutes   Discontinue Cymbalta  Discharge pending for tomorrow    Continue all other medications:    Current Facility-Administered Medications:  acetaminophen 325 mg Oral Q6H PRN Kenny Frankel MD   acetaminophen 650 mg Oral Q8H PRN Kenny Frankel MD   acetaminophen 650 mg Oral Q6H PRN Kenny Frankel MD   aluminum-magnesium hydroxide-simethicone 30 mL Oral Q4H PRN Ana M Neri PA-C   benztropine 1 mg Intramuscular Q6H PRN Kenny Frankel MD benztropine 1 mg Oral Q6H PRN Araceli Redd MD   Brexpiprazole 2 mg Oral HS Araceli Redd MD   haloperidol 5 mg Oral Q8H PRN Ana M Neri, PA-SHARI   haloperidol lactate 5 mg Intramuscular Q8H PRN Ana M Daron, PA-C   hydrOXYzine HCL 25 mg Oral Q4H PRN Araceli Redd MD   LORazepam 2 mg Intramuscular Q6H PRN Araceli Redd MD   LORazepam 1 mg Oral Q6H PRN Araceli Redd MD   magnesium hydroxide 30 mL Oral Daily PRN Ana M Neri, PA-C   OLANZapine 10 mg Intramuscular Q3H PRN Araceli Redd MD   OLANZapine 10 mg Oral Q3H PRN Araceli Redd MD   risperiDONE 1 mg Oral Q3H PRN Araceli Redd MD   traZODone 50 mg Oral HS PRN Araceli Redd MD   Vortioxetine HBr 10 mg Oral Daily Araceli Redd MD       Risks / Benefits of Treatment:     Risks, benefits, and possible side effects of medications explained to patient and patient verbalizes understanding and agreement for treatment  Counseling / Coordination of Care:     Patient's progress reviewed with nursing staff  Medications, treatment progress and treatment plan reviewed with patient  Supportive counseling provided to the patient            Bam Sommers

## 2020-07-29 NOTE — TREATMENT TEAM
07/29/20 0800   Team Meeting   Meeting Type Daily Rounds   Team Members Present   Team Members Present Physician;Nurse;; Other (Discipline and Name)   Physician Team Member Joycelyn Osuna   Nursing Team Member [facility] Management Team Member Sid Ward   Other (Discipline and Name) Juanita Chadwick, jerel student   Patient/Family Present   Patient Present No   Patient's Family Present No   70 exp tomorrow, D/C

## 2020-07-29 NOTE — PLAN OF CARE
Problem: Ineffective Coping  Goal: Participates in unit activities  Outcome: Progressing   Group attendance noted within past 24 hours

## 2020-07-29 NOTE — NURSING NOTE
Patient remained visible on unit throughout the afternoon  Cooperative with routine  Denied any unmet needs  Ate well at dinner  Will monitor

## 2020-07-29 NOTE — NURSING NOTE
Patient visible on unit throughout the evening  Pleasant and cooperative with unit routine  Denied any unmet needs  Affect and mood WNL  AM medication compliant  Will monitor

## 2020-07-29 NOTE — PROGRESS NOTES
07/29/20 1300   Activity/Group Checklist   Group   (recovery group)   Attendance Attended   Attendance Duration (min) 46-60   Interactions Interacted appropriately   Affect/Mood Appropriate   Goals Achieved Able to listen to others; Able to engage in interactions

## 2020-07-30 VITALS
HEIGHT: 74 IN | OXYGEN SATURATION: 98 % | HEART RATE: 79 BPM | SYSTOLIC BLOOD PRESSURE: 125 MMHG | DIASTOLIC BLOOD PRESSURE: 59 MMHG | RESPIRATION RATE: 16 BRPM | TEMPERATURE: 97.5 F | WEIGHT: 228.8 LBS | BODY MASS INDEX: 29.36 KG/M2

## 2020-07-30 PROBLEM — Z00.8 MEDICAL CLEARANCE FOR PSYCHIATRIC ADMISSION: Status: RESOLVED | Noted: 2020-06-09 | Resolved: 2020-07-30

## 2020-07-30 PROCEDURE — 99238 HOSP IP/OBS DSCHRG MGMT 30/<: CPT | Performed by: PSYCHIATRY & NEUROLOGY

## 2020-07-30 RX ADMIN — VORTIOXETINE 10 MG: 10 TABLET, FILM COATED ORAL at 08:04

## 2020-07-30 NOTE — DISCHARGE SUMMARY
Discharge Summary - [address] 53 y o  male MRN: [id number]  Unit/Bed#: -01 Encounter: [id number]     Admission Date: 7/24/2020         Discharge Date: 7/30/2020    Attending Psychiatrist: No att  providers found    Reason for Admission/HPI:     History of Present Illness     Copied and pasted as per Dr Archie Singletary H&P  Carol Ro is a 53 y o  male with a history of depression versus bipolar disorder who was admitted to the inpatient psychiatric unit on a voluntary 201 commitment basis due to unstable mood and suicidal ideation      Symptoms prior to admission included worsening depression, depression, feeling depressed, suicidal ideation and feeling suicidal  Onset of symptoms was gradual starting several days ago with progressively worsening course since that time  Stressors preceding admission included relationship problems       On initial evaluation after admission to the inpatient psychiatric unit the patient   Stated that he despite his recent admission to inpatient psychiatric unit and increasing his Cymbalta to 120 mg been started on Rexalti, the patient condition did not significantly improved, and the patient depression deteriorated  The patient stated he developed suicidal thoughts and his outpatient providers suggest him to go to ER and be readmitted    The patient describes a long history of alcohol and cocaine abuse, however in the last 6 nurses had been sober and 1st few years of sobriety was excellent for the patient feelings and life, however after break-up with his girlfriend he started feeling more and more depressed and recently despite successful core year be an overall satisfaction with his life he cannot overcome severe disabling depression was recent suicidal ideations        Historical Information     Past Psychiatric History:     Past Inpatient Psychiatric Treatment:  One past inpatient psychiatric admission at [facility]  Past Outpatient Psychiatric Treatment: Was in outpatient psychiatric treatment in the past with a psychiatrist  Currently in outpatient psychiatric treatment with a psychiatrist  Past Suicide attempts: yes  Past Violent behavior: no  Past Psychiatric medication trials: Prozac, Cymbalta and Rexulti    Substance Abuse History:    Social History     Tobacco History     Smoking Status  Former Smoker Quit date  1/1/2004    Smokeless Tobacco Use  Never Used          Alcohol History     Alcohol Use Status  Not Currently Comment  6 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          Drug Use     Drug Use Status  Yes Types  Marijuana Comment  medical marijuana          Sexual Activity     Sexually Active  Not Asked          Activities of Daily Living    Not Asked               Alcohol use: Past abuse; sober for 6 5 years    Recreational drug use: Marijuana      Cocaine:  Denies use   Heroin:  Denies use   Marijuana:  Yes, current use   Other drugs:  Denies use   Longest clean time: n/a    History of Inpatient/Outpatient rehabilitation program: yes  Smoking history: denies use  Use of Caffeine: denies use    Family Psychiatric History:     Psychiatric Illness: patient denies  Substance Abuse: no  Suicide Attempt: no    Social History:    Marital History: single  Occupational History: works full time    Traumatic History:     Abuse: not willing to provide details  Other Traumatic Events:none     Past Medical History:    History of seizures: no  History of head injury with loss of consciousness: no    Past Medical History:   Diagnosis Date    Anxiety     Depression     Suicidal thoughts        Meds/Allergies     all current active meds have been reviewed and current meds:   Current Facility-Administered Medications   Medication Dose Route Frequency    acetaminophen (TYLENOL) tablet 325 mg  325 mg Oral Q6H PRN    acetaminophen (TYLENOL) tablet 650 mg  650 mg Oral Q8H PRN    acetaminophen (TYLENOL) tablet 650 mg  650 mg Oral Q6H PRN    aluminum-magnesium hydroxide-simethicone (MYLANTA) 200-200-20 mg/5 mL oral suspension 30 mL  30 mL Oral Q4H PRN    benztropine (COGENTIN) injection 1 mg  1 mg Intramuscular Q6H PRN    benztropine (COGENTIN) tablet 1 mg  1 mg Oral Q6H PRN    Brexpiprazole (REXULTI) tablet 2 mg  2 mg Oral HS    haloperidol (HALDOL) tablet 5 mg  5 mg Oral Q8H PRN    haloperidol lactate (HALDOL) injection 5 mg  5 mg Intramuscular Q8H PRN    hydrOXYzine HCL (ATARAX) tablet 25 mg  25 mg Oral Q4H PRN    LORazepam (ATIVAN) injection 2 mg  2 mg Intramuscular Q6H PRN    LORazepam (ATIVAN) tablet 1 mg  1 mg Oral Q6H PRN    magnesium hydroxide (MILK OF MAGNESIA) 400 mg/5 mL oral suspension 30 mL  30 mL Oral Daily PRN    OLANZapine (ZyPREXA) IM injection 10 mg  10 mg Intramuscular Q3H PRN    OLANZapine (ZyPREXA) tablet 10 mg  10 mg Oral Q3H PRN    risperiDONE (RisperDAL M-TABS) dispersible tablet 1 mg  1 mg Oral Q3H PRN    traZODone (DESYREL) tablet 50 mg  50 mg Oral HS PRN    vortioxetine (TRINTELLIX) tablet 10 mg  10 mg Oral Daily       No Known Allergies    Objective     Vital signs in last 24 hours:  Temp:  [97 5 °F (36 4 °C)-97 6 °F (36 4 °C)] 97 5 °F (36 4 °C)  HR:  [79-92] 79  Resp:  [16] 16  BP: (125-129)/(59-68) 125/59    No intake or output data in the 24 hours ending 07/30/20 1233    Hospital Course: The patient was admitted to the inpatient psychiatric unit and started on every 7 minutes precautions  During the hospitalization the patient was attending individual therapy, group therapy, milieu therapy and occupational therapy  Psychiatric medications were titrated over the hospital stay  To address worsening depression, depression, feeling depressed, suicidal ideation, feeling suicidal and unstable mood the patient was started on antidepressant Trintellix and antipsychotic medication Rexulti  Medication doses were titrated during the hospital course   Prior to beginning of treatment medications risks and benefits and possible side effects including risk of parkinsonian symptoms, Tardive Dyskinesia and metabolic syndrome related to treatment with antipsychotic medications and risk of suicidality and serotonin syndrome related to treatment with antidepressants were reviewed with the patient  The patient verbalized understanding and agreement for treatment  Patient's symptoms improved gradually over the hospital course  At the end of treatment the patient was doing well  Mood was stable at the time of discharge  The patient denied suicidal ideation, intent or plan at the time of discharge and denied homicidal ideation, intent or plan at the time of discharge  There was no overt psychosis at the time of discharge  Sleep and appetite were improved  The patient was tolerating medications and was not reporting any significant side effects at the time of discharge  Since Charm Lefort was doing well at the end of the hospitalization, treatment team felt that Charm Lefort could be safely discharged to outpatient care  The outpatient follow up with [facility] and therapy appointment/telehealth with Jovani Ragland on 8/11/20 at 5 pm were arranged by the unit  upon discharge      Mental Status at Time of Discharge:     Appearance:  casually dressed, adequate grooming   Behavior:  pleasant, cooperative, calm   Speech:  normal rate and volume   Mood:  improved   Affect:  more reactive, slightly brighter   Thought Process:  coherent, goal directed   Thought Content:  no overt delusions   Perceptual Disturbances: no auditory hallucinations, no visual hallucinations, denies when asked   Risk Potential: Suicidal ideation - None  Homicidal ideation - None  Potential for aggression - No   Sensorium:  person, place, time/date and situation   Cognition:  recent and remote memory grossly intact   Consciousness:  alert and awake    Attention: attention span and concentration are age appropriate   Insight:  improving and moderate   Judgment: improving and moderate   Gait/Station: normal gait/station, normal balance   Motor Activity: no abnormal movements     Admission Diagnosis:  Principal Problem:    Major depressive disorder, recurrent, severe without psychotic features ([facility] )  Active Problems:    Generalized anxiety disorder      Discharge Diagnosis:     Principal Problem:    Major depressive disorder, recurrent, severe without psychotic features ([facility] )  Active Problems:    Generalized anxiety disorder  Resolved Problems:    Medical clearance for psychiatric admission      Lab results:    Admission on 07/24/2020, Discharged on 07/30/2020   Component Date Value    Color, UA 07/26/2020 Clarissa*    Clarity, UA 07/26/2020 Clear     Specific Gravity, UA 07/26/2020 1 015     pH, UA 07/26/2020 8 0     Leukocytes, UA 07/26/2020 Negative     Nitrite, UA 07/26/2020 Negative     Protein, UA 07/26/2020 Negative     Glucose, UA 07/26/2020 Negative     Ketones, UA 07/26/2020 Negative     Bilirubin, UA 07/26/2020 Negative     Blood, UA 07/26/2020 Negative     UROBILINOGEN UA 07/26/2020 Negative     Cholesterol 07/26/2020 206*    Triglycerides 07/26/2020 117     HDL, Direct 07/26/2020 29*    LDL Calculated 07/26/2020 154*    Non-HDL-Chol (CHOL-HDL) 07/26/2020 177     Sodium 07/26/2020 138     Potassium 07/26/2020 4 2     Chloride 07/26/2020 105     CO2 07/26/2020 28     ANION GAP 07/26/2020 5     BUN 07/26/2020 12     Creatinine 07/26/2020 0 99     Glucose 07/26/2020 95     Calcium 07/26/2020 9 4     AST 07/26/2020 21     ALT 07/26/2020 20     Alkaline Phosphatase 07/26/2020 44     Total Protein 07/26/2020 6 8     Albumin 07/26/2020 3 9     Total Bilirubin 07/26/2020 0 30     eGFR 07/26/2020 91     Magnesium 07/26/2020 2 1     WBC 07/26/2020 4 20*    RBC 07/26/2020 4 86     Hemoglobin 07/26/2020 14 2     Hematocrit 07/26/2020 41 8     MCV 07/26/2020 86     MCH 07/26/2020 29 3     MCHC 07/26/2020 34 0     RDW 07/26/2020 13 6     MPV 07/26/2020 7 7*    Platelets 14/49/7066 283     Neutrophils Relative 07/26/2020 50     Lymphocytes Relative 07/26/2020 36     Monocytes Relative 07/26/2020 10     Eosinophils Relative 07/26/2020 4     Basophils Relative 07/26/2020 1     Neutrophils Absolute 07/26/2020 2 10     Lymphocytes Absolute 07/26/2020 1 50     Monocytes Absolute 07/26/2020 0 40     Eosinophils Absolute 07/26/2020 0 20     Basophils Absolute 07/26/2020 0 00     T3, Free 07/26/2020 2 69     TSH 3RD GENERATON 07/26/2020 0 907     Hemoglobin A1C 07/26/2020 5 5     EAG 07/26/2020 111     Ventricular Rate 07/27/2020 74     Atrial Rate 07/27/2020 74     TN Interval 07/27/2020 164     QRSD Interval 07/27/2020 82     QT Interval 07/27/2020 380     QTC Interval 07/27/2020 421     P Axis 07/27/2020 22     QRS Axis 07/27/2020 68     T Wave Axis 07/27/2020 23        Discharge Medications:    See after visit summary for reconciled discharge medications provided to patient and family  Discharge on Two Antipsychotic Medications: No    Paper scripts were given for 30 days and 1 RF  Patient was made aware the need to follow up with medical provider  Discharge instructions/Information to patient and family:     See after visit summary for information provided to patient and family  Provisions for Follow-Up Care:    See after visit summary for information related to follow-up care and any pertinent home health orders  Discharge Statement     I spent 25 minutes discharging the patient  This time was spent on the day of discharge  I had direct contact with the patient on the day of discharge         BELEN Chapman

## 2020-07-30 NOTE — PLAN OF CARE
Problem: SELF HARM/SUICIDALITY  Goal: Will have no self-injury during hospital stay  Description  INTERVENTIONS:  - Q 15 MINUTES: Routine safety checks  - Q WAKING SHIFT & PRN: Assess risk to determine if routine checks are adequate to maintain patient safety  - Encourage patient to participate actively in care by formulating a plan to combat response to suicidal ideation, identify supports and resources  Outcome: Completed     Problem: DEPRESSION  Goal: Will be euthymic at discharge  Description  INTERVENTIONS:  - Administer medication as ordered  - Provide emotional support via 1:1 interaction with staff  - Encourage involvement in milieu/groups/activities  - Monitor for social isolation  Outcome: Completed     Problem: ANXIETY  Goal: Will report anxiety at manageable levels  Description  INTERVENTIONS:  - Administer medication as ordered  - Teach and encourage coping skills  - Provide emotional support  - Assess patient/family for anxiety and ability to cope  Outcome: Completed  Goal: By discharge: Patient will verbalize 2 strategies to deal with anxiety  Description  Interventions:  - Identify any obvious source/trigger to anxiety  - Staff will assist patient in applying identified coping technique/skills  - Encourage attendance of scheduled groups and activities  Outcome: Completed     Problem: DISCHARGE PLANNING  Goal: Discharge to home or other facility with appropriate resources  Description  INTERVENTIONS:  - Identify barriers to discharge w/patient and caregiver  - Arrange for needed discharge resources and transportation as appropriate  - Identify discharge learning needs (meds, wound care, etc )  - Arrange for interpretive services to assist at discharge as needed  - Refer to Case Management Department for coordinating discharge planning if the patient needs post-hospital services based on physician/advanced practitioner order or complex needs related to functional status, cognitive ability, or social support system  Outcome: Completed

## 2020-07-30 NOTE — PROGRESS NOTES
Patient was administered PRN Trazadone for sleep at 2105  PRN Tazadone appeared to be effective as patient has appeared to be asleep for the past hour  Will continue to monitor closely

## 2020-07-30 NOTE — PLAN OF CARE
Pt to be discharged today  Pt's 72 hour expires today  Pt denies SI/HI, AH/VH  Pt oriented x3  Pt to return home and will be picked up by a friend  Pt to follow up with [facility] [address]  on 8/6 at 1500 with psych  Pt to follow up with [facility] on 8/11 at 65 with Stacia Alex for telehealth therapy  Pt sent with scripts  D/C address: D/C address: [address]   [place], [address]

## 2020-07-30 NOTE — NURSING NOTE
AVS and prescriptions printed and reviewed with pt  Pt verbalized understanding  Pt has no complaints or concerns  Pt is walking off the unit with belongings by his side

## 2020-07-30 NOTE — TREATMENT TEAM
07/30/20 0700   Team Meeting   Meeting Type Daily Rounds   Team Members Present   Team Members Present Physician;Nurse;; Other (Discipline and Name)   Physician Team Member [address] Team Member [facility] Team Member Sid Ward   Other (Discipline and Name) jerel Terrazas student   Patient/Family Present   Patient Present No   Patient's Family Present No     D/C today

## 2020-07-30 NOTE — PROGRESS NOTES
Pt denies SI, HI, AH and VH  Pt is calm, cooperative and pleasant  Pt has no complaints or concerns  Medication and meal compliant  Pt is visible in the milieu and social with peers  Pt appears bright  Will monitor

## 2020-07-30 NOTE — BH TRANSITION RECORD
Contact Information: If you have any questions, concerns, pended studies, tests and/or procedures, or emergencies regarding your inpatient behavioral health visit  Please contact [address] [place] [address] behavioral health unit 3P [phone] and ask to speak to a , nurse or physician  A contact is available 24 hours/ 7 days a week at this number  Summary of Procedures Performed During your Stay:  Below is a list of major procedures performed during your hospital stay and a summary of results:  - Cardiac Procedures/Studies: EKG  If studies are pending at discharge, follow up with your PCP and/or referring provider

## 2020-07-31 LAB
25(OH)D2 SERPL-MCNC: <1 NG/ML
25(OH)D3 SERPL-MCNC: 34 NG/ML
25(OH)D3+25(OH)D2 SERPL-MCNC: 34 NG/ML

## 2020-08-06 ENCOUNTER — OFFICE VISIT (OUTPATIENT)
Dept: PSYCHIATRY | Facility: CLINIC | Age: 45
End: 2020-08-06
Payer: COMMERCIAL

## 2020-08-06 DIAGNOSIS — F33.2 SEVERE EPISODE OF RECURRENT MAJOR DEPRESSIVE DISORDER, WITHOUT PSYCHOTIC FEATURES (HCC): Primary | ICD-10-CM

## 2020-08-06 DIAGNOSIS — F43.21 ADJUSTMENT DISORDER WITH DEPRESSED MOOD: ICD-10-CM

## 2020-08-06 DIAGNOSIS — F41.1 GENERALIZED ANXIETY DISORDER: ICD-10-CM

## 2020-08-06 PROCEDURE — 99214 OFFICE O/P EST MOD 30 MIN: CPT | Performed by: PHYSICIAN ASSISTANT

## 2020-08-06 NOTE — PSYCH
PROGRESS NOTE        [facility]      Name and Date of Birth:  Fabienne Kerr 53 y o  1975    Date of Visit: 08/06/20    SUBJECTIVE:  Patient seen for follow-up of major depression and generalized anxiety  Patient was discharged from the Adult Behavioral Medicine unit at [facility] after treatment from 7/24/2020-7/30/2020 for increased depression with suicidal ideation  States he is overall doing better  Occasional difficulty with sleeping but trazodone is helpful  He is looking into some different options for work  Has noted that he has increased depression when he returned to work  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  He prefers to stay busy and be active  Is considering other work  States he is doing overall better with med change  Some GI distress which is improving  He is not suicidal anymore and no "bad thoughts'  He denies suicidal ideation, intent or plan at present, has no suicidal ideation, intent or plan at present  He denies any auditory hallucinations and visual hallucinations, denies any other delusional thinking, denies any delusional thinking  He denies any side effects from medications    HPI ROS Appetite Changes and Sleep: normal appetite, normal sleep    Review Of Systems:      Constitutional Negative   ENT Negative   Cardiovascular Negative   Respiratory Negative   Gastrointestinal Negative   Genitourinary Negative   Musculoskeletal Negative   Integumentary Negative   Neurological Negative   Endocrine Negative   Other Symptoms Negative and None       Laboratory Results: No results found for this or any previous visit  Substance Abuse History:    Social History     Substance and Sexual Activity   Drug Use Yes    Types: Marijuana    Comment: medical marijuana       Family Psychiatric History:     No family history on file      The following portions of the patient's history were reviewed and updated as appropriate: past family history, past medical history, past social history, past surgical history and problem list     Social History     Socioeconomic History    Marital status: Single     Spouse name: Not on file    Number of children: Not on file    Years of education: Not on file    Highest education level: Not on file   Occupational History    Not on file   Social Needs    Financial resource strain: Not on file    Food insecurity     Worry: Not on file     Inability: Not on file    Transportation needs     Medical: Not on file     Non-medical: Not on file   Tobacco Use    Smoking status: Former Smoker     Last attempt to quit:      Years since quittin 6    Smokeless tobacco: Never Used   Substance and Sexual Activity    Alcohol use: Not Currently     Frequency: Never     Binge frequency: Never     Comment: 6 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    Drug use: Yes     Types: Marijuana     Comment: medical marijuana    Sexual activity: Not on file   Lifestyle    Physical activity     Days per week: Not on file     Minutes per session: Not on file    Stress: Not on file   Relationships    Social connections     Talks on phone: Not on file     Gets together: Not on file     Attends Jehovah's witness service: Not on file     Active member of club or organization: Not on file     Attends meetings of clubs or organizations: Not on file     Relationship status: Not on file    Intimate partner violence     Fear of current or ex partner: Not on file     Emotionally abused: Not on file     Physically abused: Not on file     Forced sexual activity: Not on file   Other Topics Concern    Not on file   Social History Narrative    Not on file     Social History     Social History Narrative    Not on file        Social History     Tobacco History     Smoking Status  Former Smoker Quit date  2004    Smokeless Tobacco Use  Never Used          Alcohol History     Alcohol Use Status  Not Currently Comment  6 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          Drug Use     Drug Use Status  Yes Types  Marijuana Comment  medical marijuana          Sexual Activity     Sexually Active  Not Asked          Activities of Daily Living    Not Asked               Additional Substance Use Detail     Questions Responses    Problems Due to Past Use of Alcohol? Yes    Problems Due to Past Use of Substances? Yes    Substance Use Assessment Denies substance use within the past 12 months    Alcohol Use Frequency Denies use in past 12 months    Cannabis frequency Past regular use    Comment: Past rare use on 6/9/2020 Past rare use ->Past regular use on 7/24/2020     Heroin Frequency Denies use in past 12 months    Cannabis method Smoke    Comment: Smoke on 7/24/2020     1st Use of Alcohol age 13    Cannabis 1st Use medical marijuana for anxiety    Last Use of Alcohol & Amount sober 6 5 years    Longest Abstinence from Alcohol 6 5 years    Cocaine frequency Past regular use    Comment: Never used on 6/9/2020 Never used ->Past regular use on 7/24/2020     Crack Cocaine Frequency Prior dependence    Methamphetamine Frequency Denies use in past 12 months    Narcotic Frequency Denies use in past 12 months    Benzodiazepine Frequency Denies use in past 12 months    Amphetamine frequency Denies use in past 12 months    Barbituate Frequency Denies use use in past 12 months    Inhalant frequency Never used    Comment: Never used on 6/9/2020     Hallucinogen frequency Past regular use    Comment: Never used on 6/9/2020 Never used ->Past regular use on 7/24/2020     Ecstasy frequency Never used    Comment: Never used on 6/9/2020     Other drug frequency Never used    Comment: Never used on 6/9/2020     Opiate frequency Denies use in past 12 months    Not reviewed              OBJECTIVE:     Mental Status Evaluation:    Appearance age appropriate, casually dressed   Behavior pleasant, cooperative   Speech normal volume, normal pitch   Mood anxious Affect congruent   Thought Processes logical   Associations intact associations   Thought Content normal   Perceptual Disturbances: none   Abnormal Thoughts  Risk Potential Suicidal ideation - None  Homicidal ideation - None  Potential for aggression - No   Orientation oriented to person, place, time/date and situation   Memory recent and remote memory grossly intact   Cosciousness alert and awake   Attention Span attention span and concentration are age appropriate   Intellect Appears to be of Average Intelligence   Insight age appropriate    Judgement good    Muscle Strength and  Gait muscle strength and tone were normal   Language no difficulty naming common objects   Fund of Knowledge displays adequate knowledge of current events   Pain none   Pain Scale 0       Assessment/Plan:       Diagnoses and all orders for this visit:    Severe episode of recurrent major depressive disorder, without psychotic features (HCC)    Generalized anxiety disorder    Adjustment disorder with depressed mood          Treatment Recommendations/Precautions:  Trintellix 10 mg po qd  Rexulti 2 mg po qd  Trazodone 50 mg q h s  P r n  Will follow-up one month     Continue current medications  Risks/Benefits      Risks, Benefits And Possible Side Effects Of Medications:    Risks, benefits, and possible side effects of medications explained to patient and patient verbalizes understanding and agreement for treatment      Controlled Medication Discussion:     Not applicable    Psychotherapy Provided:     Individual psychotherapy provided: No

## 2020-08-12 ENCOUNTER — TELEPHONE (OUTPATIENT)
Dept: PSYCHIATRY | Facility: CLINIC | Age: 45
End: 2020-08-12

## 2020-08-12 ENCOUNTER — OFFICE VISIT (OUTPATIENT)
Dept: PSYCHIATRY | Facility: CLINIC | Age: 45
End: 2020-08-12
Payer: COMMERCIAL

## 2020-08-12 DIAGNOSIS — F33.2 SEVERE EPISODE OF RECURRENT MAJOR DEPRESSIVE DISORDER, WITHOUT PSYCHOTIC FEATURES (HCC): Primary | ICD-10-CM

## 2020-08-12 DIAGNOSIS — F43.21 ADJUSTMENT DISORDER WITH DEPRESSED MOOD: ICD-10-CM

## 2020-08-12 DIAGNOSIS — F41.1 GENERALIZED ANXIETY DISORDER: ICD-10-CM

## 2020-08-12 PROCEDURE — 99214 OFFICE O/P EST MOD 30 MIN: CPT | Performed by: PHYSICIAN ASSISTANT

## 2020-08-12 RX ORDER — LITHIUM CARBONATE 300 MG/1
300 CAPSULE ORAL
Qty: 30 CAPSULE | Refills: 1 | Status: SHIPPED | OUTPATIENT
Start: 2020-08-12 | End: 2020-09-10 | Stop reason: SDUPTHER

## 2020-08-12 NOTE — PSYCH
PROGRESS NOTE        Ed Parish      Name and Date of Birth:  Leo Fine 53 y o  1975    Date of Visit: 08/12/20    SUBJECTIVE:  Patient seen for follow-up as urgent visit  Patient called earlier this morning states he was not feeling well and was having a very "bad day  Reports that he has increased anxiety which is leading to increase in depression  States that he has been having increased racing thoughts and feeling easily overwhelmed by everything  For example states that he was to  couch today from his mother's and this has thrown him into a tailspin and he is feeling very overwhelmed and anxious  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  States he did have an interview with another company but has self doubts about changing positions again at his age  Overall has continued with significant anxiety as well as depression  He has been on several antidepressants over the years as well as augmentation with antipsychotics  Reports that he has been having passive suicidal ideation currently is able to contract for safety and denies a specific plan  He will go to the hospital if he feels these thoughts are overwhelming and time  He denies suicidal intent or plan at present, has occasional passive suicidal ideation    He denies any auditory hallucinations and visual hallucinations, denies any other delusional thinking, denies any delusional thinking  He denies any side effects from medications      HPI ROS Appetite Changes and Sleep: normal appetite, variable sleep    Review Of Systems:      Constitutional Negative   ENT Negative   Cardiovascular Negative   Respiratory Negative   Gastrointestinal Negative   Genitourinary Negative   Musculoskeletal Negative   Integumentary Negative   Neurological Negative   Endocrine Negative   Other Symptoms Negative and None       Laboratory Results: No results found for this or any previous visit  Substance Abuse History:    Social History     Substance and Sexual Activity   Drug Use Yes    Types: Marijuana    Comment: medical marijuana       Family Psychiatric History:     No family history on file      The following portions of the patient's history were reviewed and updated as appropriate: past family history, past medical history, past social history, past surgical history and problem list     Social History     Socioeconomic History    Marital status: Single     Spouse name: Not on file    Number of children: Not on file    Years of education: Not on file    Highest education level: Not on file   Occupational History    Not on file   Social Needs    Financial resource strain: Not on file    Food insecurity     Worry: Not on file     Inability: Not on file    Transportation needs     Medical: Not on file     Non-medical: Not on file   Tobacco Use    Smoking status: Former Smoker     Last attempt to quit: 2004     Years since quittin 6    Smokeless tobacco: Never Used   Substance and Sexual Activity    Alcohol use: Not Currently     Frequency: Never     Binge frequency: Never     Comment: 6 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    Drug use: Yes     Types: Marijuana     Comment: medical marijuana    Sexual activity: Not on file   Lifestyle    Physical activity     Days per week: Not on file     Minutes per session: Not on file    Stress: Not on file   Relationships    Social connections     Talks on phone: Not on file     Gets together: Not on file     Attends Faith service: Not on file     Active member of club or organization: Not on file     Attends meetings of clubs or organizations: Not on file     Relationship status: Not on file    Intimate partner violence     Fear of current or ex partner: Not on file     Emotionally abused: Not on file     Physically abused: Not on file     Forced sexual activity: Not on file   Other Topics Concern    Not on file Social History Narrative    Not on file     Social History     Social History Narrative    Not on file        Social History     Tobacco History     Smoking Status  Former Smoker Quit date  1/1/2004    Smokeless Tobacco Use  Never Used          Alcohol History     Alcohol Use Status  Not Currently Comment  6 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          Drug Use     Drug Use Status  Yes Types  Marijuana Comment  medical marijuana          Sexual Activity     Sexually Active  Not Asked          Activities of Daily Living    Not Asked               Additional Substance Use Detail     Questions Responses    Problems Due to Past Use of Alcohol? Yes    Problems Due to Past Use of Substances?  Yes    Substance Use Assessment Denies substance use within the past 12 months    Alcohol Use Frequency Denies use in past 12 months    Cannabis frequency Past regular use    Comment: Past rare use on 6/9/2020 Past rare use ->Past regular use on 7/24/2020     Heroin Frequency Denies use in past 12 months    Cannabis method Smoke    Comment: Smoke on 7/24/2020     1st Use of Alcohol age 13    Cannabis 1st Use medical marijuana for anxiety    Last Use of Alcohol & Amount sober 6 5 years    Longest Abstinence from Alcohol 6 5 years    Cocaine frequency Past regular use    Comment: Never used on 6/9/2020 Never used ->Past regular use on 7/24/2020     Crack Cocaine Frequency Prior dependence    Methamphetamine Frequency Denies use in past 12 months    Narcotic Frequency Denies use in past 12 months    Benzodiazepine Frequency Denies use in past 12 months    Amphetamine frequency Denies use in past 12 months    Barbituate Frequency Denies use use in past 12 months    Inhalant frequency Never used    Comment: Never used on 6/9/2020     Hallucinogen frequency Past regular use    Comment: Never used on 6/9/2020 Never used ->Past regular use on 7/24/2020     Ecstasy frequency Never used    Comment: Never used on 6/9/2020     Other drug frequency Never used Comment: Never used on 6/9/2020     Opiate frequency Denies use in past 12 months    Not reviewed  OBJECTIVE:     Mental Status Evaluation:    Appearance age appropriate, casually dressed   Behavior Good eye contact, cooperative   Speech normal volume, normal pitch   Mood Depressed, anxious   Affect Congruent   Thought Processes Preoccupied, negative   Associations intact associations   Thought Content normal   Perceptual Disturbances: none   Abnormal Thoughts  Risk Potential Suicidal ideation - None  Homicidal ideation - None  Potential for aggression - No   Orientation oriented to person, place, time/date and situation   Memory recent and remote memory grossly intact   Cosciousness alert and awake   Attention Span attention span and concentration are age appropriate   Intellect Appears to be of Average Intelligence   Insight age appropriate    Judgement good    Muscle Strength and  Gait muscle strength and tone were normal   Language no difficulty naming common objects   Fund of Knowledge displays adequate knowledge of current events   Pain none   Pain Scale 0       Assessment/Plan:       Diagnoses and all orders for this visit:    Severe episode of recurrent major depressive disorder, without psychotic features (HCC)  -     Lithium level; Future  -     lithium carbonate 300 mg capsule; Take 1 capsule (300 mg total) by mouth daily at bedtime    Generalized anxiety disorder    Adjustment disorder with depressed mood          Treatment Recommendations/Precautions: Add lithium 300 mg at bedtime and check level in a week  Trintellix 10 mg po qd  Rexulti 2 mg p o  Daily  Trazodone 50 mg p o  Q h s  P r n    Also discussed possibility of ECT treatment or T MS if medication changes are ineffective  Patient currently denies suicidal intention or ideation and is able to contract for safety  He will return to the emergency department if he has suicidal thoughts  He will follow up in three weeks as scheduled and will call sooner if he has any questions or concerns    Risks/Benefits      Risks, Benefits And Possible Side Effects Of Medications:    Risks, benefits, and possible side effects of medications explained to patient and patient verbalizes understanding and agreement for treatment      Controlled Medication Discussion:     Not applicable    Psychotherapy Provided:     Individual psychotherapy provided: No

## 2020-08-26 ENCOUNTER — APPOINTMENT (OUTPATIENT)
Dept: LAB | Facility: HOSPITAL | Age: 45
End: 2020-08-26
Payer: COMMERCIAL

## 2020-08-26 ENCOUNTER — OFFICE VISIT (OUTPATIENT)
Dept: PSYCHIATRY | Facility: CLINIC | Age: 45
End: 2020-08-26
Payer: COMMERCIAL

## 2020-08-26 DIAGNOSIS — F41.1 GENERALIZED ANXIETY DISORDER: ICD-10-CM

## 2020-08-26 DIAGNOSIS — F33.2 SEVERE EPISODE OF RECURRENT MAJOR DEPRESSIVE DISORDER, WITHOUT PSYCHOTIC FEATURES (HCC): ICD-10-CM

## 2020-08-26 DIAGNOSIS — F33.2 MAJOR DEPRESSIVE DISORDER, RECURRENT, SEVERE WITHOUT PSYCHOTIC FEATURES (HCC): Chronic | ICD-10-CM

## 2020-08-26 DIAGNOSIS — F33.2 SEVERE EPISODE OF RECURRENT MAJOR DEPRESSIVE DISORDER, WITHOUT PSYCHOTIC FEATURES (HCC): Primary | ICD-10-CM

## 2020-08-26 LAB — LITHIUM SERPL-SCNC: 0.2 MMOL/L (ref 0.5–1)

## 2020-08-26 PROCEDURE — 36415 COLL VENOUS BLD VENIPUNCTURE: CPT

## 2020-08-26 PROCEDURE — 99214 OFFICE O/P EST MOD 30 MIN: CPT | Performed by: PHYSICIAN ASSISTANT

## 2020-08-26 PROCEDURE — 80178 ASSAY OF LITHIUM: CPT

## 2020-08-26 NOTE — PSYCH
PROGRESS NOTE        [address] [facility]      Name and Date of Birth:  Robson Price 53 y o  1975    Date of Visit: 08/26/20    SUBJECTIVE:  Patient seen for follow-up as urgent visit  Patient called this morning and stated that he was having a bad day"  Reports that he continues to have significant depression and apathy  He has little motivation or interest   He is feeling easily overwhelmed  Reports fleeting suicidal ideation but denies any specific plan  States that he does not have any suicidal intention at this time and contracts for safety  Patient will go to the hospital if he feels unsafe  He has been taking his medications as prescribed  Denies any adverse effects with the lithium but has not yet gone for blood work  He reports that his appetite is adequate  On a positive note he has been sleeping well  Reports that work has been a significant stressor for him  No homicidal ideation, no psychotic signs or symptoms  We had discussed partial hospitalization the past   Discussed this again with the patient as he does not feel that in-patient has been very helpful as far as therapies in groups  Feel the patient would benefit from more structured environment  He has concerns about missing work but will consider this  He denies suicidal ideation, intent or plan at present, has no suicidal ideation, intent or plan at present  He denies any auditory hallucinations and visual hallucinations, denies any other delusional thinking, denies any delusional thinking  He denies any side effects from medications      HPI ROS Appetite Changes and Sleep: normal appetite, normal sleep    Review Of Systems:      Constitutional Negative   ENT Negative   Cardiovascular Negative   Respiratory Negative   Gastrointestinal Negative   Genitourinary Negative   Musculoskeletal Negative   Integumentary Negative   Neurological Negative   Endocrine Negative   Other Symptoms Negative and None       Laboratory Results: No results found for this or any previous visit  Substance Abuse History:    Social History     Substance and Sexual Activity   Drug Use Yes    Types: Marijuana    Comment: medical marijuana       Family Psychiatric History:     No family history on file      The following portions of the patient's history were reviewed and updated as appropriate: past family history, past medical history, past social history, past surgical history and problem list     Social History     Socioeconomic History    Marital status: Single     Spouse name: Not on file    Number of children: Not on file    Years of education: Not on file    Highest education level: Not on file   Occupational History    Not on file   Social Needs    Financial resource strain: Not on file    Food insecurity     Worry: Not on file     Inability: Not on file    Transportation needs     Medical: Not on file     Non-medical: Not on file   Tobacco Use    Smoking status: Former Smoker     Last attempt to quit: 2004     Years since quittin 6    Smokeless tobacco: Never Used   Substance and Sexual Activity    Alcohol use: Not Currently     Frequency: Never     Binge frequency: Never     Comment: 6 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    Drug use: Yes     Types: Marijuana     Comment: medical marijuana    Sexual activity: Not on file   Lifestyle    Physical activity     Days per week: Not on file     Minutes per session: Not on file    Stress: Not on file   Relationships    Social connections     Talks on phone: Not on file     Gets together: Not on file     Attends Advent service: Not on file     Active member of club or organization: Not on file     Attends meetings of clubs or organizations: Not on file     Relationship status: Not on file    Intimate partner violence     Fear of current or ex partner: Not on file     Emotionally abused: Not on file     Physically abused: Not on file     Forced sexual activity: Not on file   Other Topics Concern    Not on file   Social History Narrative    Not on file     Social History     Social History Narrative    Not on file        Social History     Tobacco History     Smoking Status  Former Smoker Quit date  1/1/2004    Smokeless Tobacco Use  Never Used          Alcohol History     Alcohol Use Status  Not Currently Comment  6 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          Drug Use     Drug Use Status  Yes Types  Marijuana Comment  medical marijuana          Sexual Activity     Sexually Active  Not Asked          Activities of Daily Living    Not Asked               Additional Substance Use Detail     Questions Responses    Problems Due to Past Use of Alcohol? Yes    Problems Due to Past Use of Substances?  Yes    Substance Use Assessment Denies substance use within the past 12 months    Alcohol Use Frequency Denies use in past 12 months    Cannabis frequency Past regular use    Comment: Past rare use on 6/9/2020 Past rare use ->Past regular use on 7/24/2020     Heroin Frequency Denies use in past 12 months    Cannabis method Smoke    Comment: Smoke on 7/24/2020     1st Use of Alcohol age 13    Cannabis 1st Use medical marijuana for anxiety    Last Use of Alcohol & Amount sober 6 5 years    Longest Abstinence from Alcohol 6 5 years    Cocaine frequency Past regular use    Comment: Never used on 6/9/2020 Never used ->Past regular use on 7/24/2020     Crack Cocaine Frequency Prior dependence    Methamphetamine Frequency Denies use in past 12 months    Narcotic Frequency Denies use in past 12 months    Benzodiazepine Frequency Denies use in past 12 months    Amphetamine frequency Denies use in past 12 months    Barbituate Frequency Denies use use in past 12 months    Inhalant frequency Never used    Comment: Never used on 6/9/2020     Hallucinogen frequency Past regular use    Comment: Never used on 6/9/2020 Never used ->Past regular use on 7/24/2020     Ecstasy frequency Never used    Comment: Never used on 6/9/2020     Other drug frequency Never used    Comment: Never used on 6/9/2020     Opiate frequency Denies use in past 12 months    Not reviewed  OBJECTIVE:     Mental Status Evaluation:    Appearance age appropriate, casually dressed   Behavior Fair eye contact, cooperative   Speech normal volume, normal pitch   Mood Depressed   Affect Congruent   Thought Processes logical   Associations intact associations   Thought Content normal   Perceptual Disturbances: none   Abnormal Thoughts  Risk Potential Suicidal ideation - yes without plan  Homicidal ideation - None  Potential for aggression - No   Orientation oriented to person, place, time/date and situation   Memory recent and remote memory grossly intact   Cosciousness alert and awake   Attention Span attention span and concentration are age appropriate   Intellect Appears to be of Average Intelligence   Insight age appropriate    Judgement good    Muscle Strength and  Gait muscle strength and tone were normal   Language no difficulty naming common objects   Fund of Knowledge displays adequate knowledge of current events   Pain none   Pain Scale 0       Assessment/Plan:       Diagnoses and all orders for this visit:    Severe episode of recurrent major depressive disorder, without psychotic features ([facility] [address] )  -     Ambulatory referral to innovations or partial psych program; Future    Generalized anxiety disorder    Major depressive disorder, recurrent, severe without psychotic features ([facility])  -     vortioxetine (TRINTELLIX) 20 MG tablet; Take 1 tablet (20 mg total) by mouth daily  -     Brexpiprazole (REXULTI) 2 MG tablet; Take 1 tablet (2 mg total) by mouth daily at bedtime          Treatment Recommendations/Precautions:  Lithium 300 mg HS  Trintellix 20 mg po qd  Rexulti 2 mg p o  Daily  Trazodone 50 mg q h s  P r n    Referral to [place] partial hospitalization program  Patient will go to the ED if he is having suicidal ideation with plan, currently able to contract for safety  Will follow-up as previously scheduled in two weeks  He will go for lithium level as soon as possible      Risks/Benefits      Risks, Benefits And Possible Side Effects Of Medications:    Risks, benefits, and possible side effects of medications explained to patient and patient verbalizes understanding and agreement for treatment      Controlled Medication Discussion:     Not applicable    Psychotherapy Provided:     Individual psychotherapy provided: No

## 2020-09-10 ENCOUNTER — OFFICE VISIT (OUTPATIENT)
Dept: PSYCHIATRY | Facility: CLINIC | Age: 45
End: 2020-09-10

## 2020-09-10 DIAGNOSIS — F33.2 SEVERE EPISODE OF RECURRENT MAJOR DEPRESSIVE DISORDER, WITHOUT PSYCHOTIC FEATURES (HCC): Primary | ICD-10-CM

## 2020-09-10 DIAGNOSIS — F41.1 GENERALIZED ANXIETY DISORDER: ICD-10-CM

## 2020-09-10 PROCEDURE — 99214 OFFICE O/P EST MOD 30 MIN: CPT | Performed by: PHYSICIAN ASSISTANT

## 2020-09-10 RX ORDER — LITHIUM CARBONATE 300 MG/1
600 CAPSULE ORAL
Qty: 60 CAPSULE | Refills: 2 | Status: ON HOLD | OUTPATIENT
Start: 2020-09-10 | End: 2020-10-16 | Stop reason: SDUPTHER

## 2020-09-10 NOTE — PSYCH
PROGRESS NOTE        Ed Parish      Name and Date of Birth:  Carol Ro 50 y o  1975    Date of Visit: 09/10/20    SUBJECTIVE:  Patient seen for follow-up of major recurrent depression and generalized anxiety  Patient reports that he has been feeling slightly better the past 2-3 days  Prior to that reports that he was feeling increasingly depressed and anxious  States that he was having passive suicidal ideation  He does contract for safety and states he will go to the hospital if he is not feeling safe  Reports that he is sleeping well at night  Appetite is fair  Easily overwhelmed and feels overwhelmed by everything  Continues with stressors at work but does not feel as though he is capable of making a change  Self esteem continues to be struggle and feelings of worthlessness, hopelessness  Reports that he would not harm himself due to his child  He has been compliant with taking his medications and denies any adverse effects  No new medications or medical issues  He denies suicidal ideation, intent or plan at present, has no suicidal ideation, intent or plan at present  He denies any auditory hallucinations and visual hallucinations, denies any other delusional thinking, denies any delusional thinking  He denies any side effects from medications    HPI ROS Appetite Changes and Sleep: normal appetite, normal sleep    Review Of Systems:      Constitutional Negative   ENT Negative   Cardiovascular Negative   Respiratory Negative   Gastrointestinal Negative   Genitourinary Negative   Musculoskeletal Negative   Integumentary Negative   Neurological Negative   Endocrine Negative   Other Symptoms Negative and None       Laboratory Results: No results found for this or any previous visit      Substance Abuse History:    Social History     Substance and Sexual Activity   Drug Use Yes    Types: Marijuana    Comment: medical marijuana Family Psychiatric History:     No family history on file      The following portions of the patient's history were reviewed and updated as appropriate: past family history, past medical history, past social history, past surgical history and problem list     Social History     Socioeconomic History    Marital status: Single     Spouse name: Not on file    Number of children: Not on file    Years of education: Not on file    Highest education level: Not on file   Occupational History    Not on file   Social Needs    Financial resource strain: Not on file    Food insecurity     Worry: Not on file     Inability: Not on file    Transportation needs     Medical: Not on file     Non-medical: Not on file   Tobacco Use    Smoking status: Former Smoker     Last attempt to quit: 2004     Years since quittin 7    Smokeless tobacco: Never Used   Substance and Sexual Activity    Alcohol use: Not Currently     Frequency: Never     Binge frequency: Never     Comment: 6 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    Drug use: Yes     Types: Marijuana     Comment: medical marijuana    Sexual activity: Not on file   Lifestyle    Physical activity     Days per week: Not on file     Minutes per session: Not on file    Stress: Not on file   Relationships    Social connections     Talks on phone: Not on file     Gets together: Not on file     Attends Jainism service: Not on file     Active member of club or organization: Not on file     Attends meetings of clubs or organizations: Not on file     Relationship status: Not on file    Intimate partner violence     Fear of current or ex partner: Not on file     Emotionally abused: Not on file     Physically abused: Not on file     Forced sexual activity: Not on file   Other Topics Concern    Not on file   Social History Narrative    Not on file     Social History     Social History Narrative    Not on file        Social History     Tobacco History     Smoking Status  Former Smoker Quit date  1/1/2004    Smokeless Tobacco Use  Never Used          Alcohol History     Alcohol Use Status  Not Currently Comment  6 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          Drug Use     Drug Use Status  Yes Types  Marijuana Comment  medical marijuana          Sexual Activity     Sexually Active  Not Asked          Activities of Daily Living    Not Asked               Additional Substance Use Detail     Questions Responses    Problems Due to Past Use of Alcohol? Yes    Problems Due to Past Use of Substances? Yes    Substance Use Assessment Denies substance use within the past 12 months    Alcohol Use Frequency Denies use in past 12 months    Cannabis frequency Past regular use    Comment: Past rare use on 6/9/2020 Past rare use ->Past regular use on 7/24/2020     Heroin Frequency Denies use in past 12 months    Cannabis method Smoke    Comment: Smoke on 7/24/2020     1st Use of Alcohol age 13    Cannabis 1st Use medical marijuana for anxiety    Last Use of Alcohol & Amount sober 6 5 years    Longest Abstinence from Alcohol 6 5 years    Cocaine frequency Past regular use    Comment: Never used on 6/9/2020 Never used ->Past regular use on 7/24/2020     Crack Cocaine Frequency Prior dependence    Methamphetamine Frequency Denies use in past 12 months    Narcotic Frequency Denies use in past 12 months    Benzodiazepine Frequency Denies use in past 12 months    Amphetamine frequency Denies use in past 12 months    Barbituate Frequency Denies use use in past 12 months    Inhalant frequency Never used    Comment: Never used on 6/9/2020     Hallucinogen frequency Past regular use    Comment: Never used on 6/9/2020 Never used ->Past regular use on 7/24/2020     Ecstasy frequency Never used    Comment: Never used on 6/9/2020     Other drug frequency Never used    Comment: Never used on 6/9/2020     Opiate frequency Denies use in past 12 months    Not reviewed              OBJECTIVE:     Mental Status Evaluation:    Appearance age appropriate, casually dressed   Behavior pleasant, cooperative   Speech normal volume, normal pitch   Mood depressed   Affect Anxious, depressed   Thought Processes logical   Associations intact associations   Thought Content Negative, pessimistic   Perceptual Disturbances: none   Abnormal Thoughts  Risk Potential Suicidal ideation - passive ideation at times, contracts for safety    Homicidal ideation - None  Potential for aggression - No   Orientation oriented to person, place, time/date and situation   Memory recent and remote memory grossly intact   Cosciousness alert and awake   Attention Span attention span and concentration are age appropriate   Intellect Appears to be of Average Intelligence   Insight age appropriate    Judgement good    Muscle Strength and  Gait muscle strength and tone were normal   Language no difficulty naming common objects   Fund of Knowledge displays adequate knowledge of current events   Pain none   Pain Scale 0       Assessment/Plan:       Diagnoses and all orders for this visit:    Severe episode of recurrent major depressive disorder, without psychotic features (HCC)  -     lithium carbonate 300 mg capsule; Take 2 capsules (600 mg total) by mouth daily at bedtime  -     Lithium level; Future    Generalized anxiety disorder  -     Lithium level;  Future          Treatment Recommendations/Precautions:  Trintellix 20 mg po qd  Rexulti 2 mg po qd  Lithium 300 mg p o  Q h s  increase to 600 mg at bedtime  Lithium level 0 2 on August 26, 2020, will repeat level in 1-2 weeks  He will be speaking with HR at his job tomorrow regarding time off to participate in the partial hospitalization program at [address] [place] [address]  Referral was made and patient's insurance is accepted at the program        Risks/Benefits      Risks, Benefits And Possible Side Effects Of Medications:    Risks, benefits, and possible side effects of medications explained to patient and patient verbalizes understanding and agreement for treatment      Controlled Medication Discussion:     Not applicable    Psychotherapy Provided:     Individual psychotherapy provided: No

## 2020-09-16 ENCOUNTER — OFFICE VISIT (OUTPATIENT)
Dept: PSYCHOLOGY | Facility: CLINIC | Age: 45
End: 2020-09-16
Payer: COMMERCIAL

## 2020-09-16 ENCOUNTER — TELEMEDICINE (OUTPATIENT)
Dept: PSYCHIATRY | Facility: CLINIC | Age: 45
End: 2020-09-16
Payer: COMMERCIAL

## 2020-09-16 DIAGNOSIS — F33.2 MAJOR DEPRESSIVE DISORDER, RECURRENT, SEVERE WITHOUT PSYCHOTIC FEATURES (HCC): Primary | Chronic | ICD-10-CM

## 2020-09-16 DIAGNOSIS — F33.2 MAJOR DEPRESSIVE DISORDER, RECURRENT, SEVERE WITHOUT PSYCHOTIC FEATURES (HCC): Chronic | ICD-10-CM

## 2020-09-16 DIAGNOSIS — F41.1 GENERALIZED ANXIETY DISORDER: Chronic | ICD-10-CM

## 2020-09-16 DIAGNOSIS — F19.20 POLYSUBSTANCE DEPENDENCE (HCC): ICD-10-CM

## 2020-09-16 DIAGNOSIS — F41.1 GENERALIZED ANXIETY DISORDER: Primary | Chronic | ICD-10-CM

## 2020-09-16 PROCEDURE — 90791 PSYCH DIAGNOSTIC EVALUATION: CPT

## 2020-09-16 PROCEDURE — 90792 PSYCH DIAG EVAL W/MED SRVCS: CPT | Performed by: HOSPITALIST

## 2020-09-16 RX ORDER — FLUOXETINE 20 MG/1
20 TABLET, FILM COATED ORAL DAILY
Qty: 30 TABLET | Refills: 1 | Status: SHIPPED | OUTPATIENT
Start: 2020-09-16 | End: 2020-10-16 | Stop reason: HOSPADM

## 2020-09-16 NOTE — PSYCH
Virtual Regular Visit      Assessment/Plan:    Problem List Items Addressed This Visit        Other    Generalized anxiety disorder (Chronic)    Major depressive disorder, recurrent, severe without psychotic features ([facility] ) - Primary (Chronic)    Polysubstance dependence ([facility] )               Reason for visit is VIRTUAL PHP INITIAL ASSESSMENT DUE TO COVID-19  Encounter provider [facility] PARTIAL PSYCH PROGRAM    Provider located at [address] [place] [address]  [id number] [place] [address]    The patient was identified by name and date of birth  Anna Rowan was informed that this is a telemedicine visit and that the visit is being conducted through [employer]  My office door was closed  No one else was in the room  He acknowledged consent and understanding of privacy and security of the video platform  The patient has agreed to participate and understands they can discontinue the visit at any time  Patient is aware this is a billable service  Subjective  Anna Rowan is a 50 y o  male   I spent 60 minutes with patient today in which greater than 50% of the time was spent in counseling/coordination of care regarding PHP - SEE NOTES  VIRTUAL VISIT DISCLAIMER    Anna Dawn acknowledges that he has consented to an online visit or consultation  He understands that the online visit is based solely on information provided by him, and that, in the absence of a face-to-face physical evaluation by the physician, the diagnosis he receives is both limited and provisional in terms of accuracy and completeness  This is not intended to replace a full medical face-to-face evaluation by the physician  Anna Rowan understands and accepts these terms

## 2020-09-16 NOTE — PSYCH
Assessment/Plan:      Diagnoses and all orders for this visit:    Major depressive disorder, recurrent, severe without psychotic features ([facility] )    Generalized anxiety disorder    Polysubstance dependence ([facility] )          Subjective:     Patient ID: Fabienne Kerr is a 50 y o  male  [facility] Plan   AREAS OF NEED: Depression and anxiety as evidenced by passive thoughts of death, feelings of sadness, feeling isolated, guilty about his actions, lack of confidence, ruminating thoughts and decrease in energy and motivation related to employment, views of self, and COVID-19  Date Initiated: 9/16/20    Strengths: employed, motivated for wellness, established outpatient providers, support system    LONG TERM GOAL:   Date Initiated: 9/16/20  1 0  I will identify 3 ways my depression and anxiety has lessened since beginning [facility] partial program   Target Date: 10/16/20  Completion Date:       SHORT TERM OBJECTIVES:     Date Initiated: 9/16/20  1 1 I will offer experiential feedback in at least one group per day to build natural support network and feel more social connectedness to improve my moods  Revision Date:   Target Date: 9/28/20  Completion Date:     Date Initiated: 9/16/20  1 2 I will identify and begin implementing 3 new coping mechanisms to improve my symptoms of depression and anxiety  Revision Date:   Target Date: 9/28/20  Completion Date:     Date Initiated: 9/16/20  1 3 I will take medications as prescribed and share questions and concerns if arise  Revision Date:  Target Date: 9/28/20  Completion Date:      Date Initiated: 9/16/20  1 4 I will identify 3 ways my supports can assist in my recovery and agree to staff/support contact as indicated     Revision Date:  Target Date: 9/28/20  Completion Date:         7 DAY REVISION:    Date Initiated:  Revision Date:   Target Date:   Completion Date:    DUE TO COVID-19, CURRENTLY ALL INTERVENTIONS ARE BEING OFFERED VIRTUALLY    PSYCHIATRY:  Date Initiated: 9/16/20  Medication Management and Education       Revision Date:   The person(s) responsible for carrying out the plan is Julio Desai MD    NURSING:   Date Initiated: 9/16/20  1 1,1 2,1 3,1 4 This RN will provide daily wellness group five days weekly to educate Eloise Verdugo on S/S of his diagnoses and medications used in treatment  Revision Date:  The person(s) responsible for carrying out the plan is Marisol Cho RN    PSYCHOLOGY:   Date Initiated: 9/16/20       1 1, 1 2, 1 4 Provide psychotherapy group 5 times per week to allow opportunity for Eloise Verdugo  to explore stressors and ways of coping  Revision Date:   The person(s) responsible for carrying out the plan is Natalee Yates LCSW    ALLIED THERAPY:   Date Initiated: 9/16/20  1 1,1 2 Engage Eloise Verdugo in AT group 5 times daily to encourage development and use of wellness tools to decrease symptoms and promote recovery through meaningful activity  Revision Date:   The person(s) responsible for carrying out the plan is GARY Johnson        CASE MANAGEMENT:   Date Initiated: 9/16/20      1 0 This  will meet with Eloise Verdugo  3-4 times weekly to assess treatment progress, discharge planning, connection to community supports and UR as indicated  Revision Date:   The person(s) responsible for carrying out the plan is Natalee Yates LCSW      TREATMENT REVIEW/COMMENTS:     DISCHARGE CRITERIA: Identify 3 signs of progress and complete relapse prevention plan  DISCHARGE PLAN: Continue OP psychiatry and individual therapy  Estimated Length of Stay:10- 15 treatment days       Diagnosis and Treatment Plan explained to Jannette Richards relates understanding diagnosis and is agreeable to Treatment Plan         CLIENT COMMENTS / Please share your thoughts, feelings, need and/or experiences regarding your treatment plan: _____________________________________________________________________________________________________________________________________________________________________________________________________________________________________________________________________________________________________________________ Date/Time: ______________     Patient Signature: ___Developed and Reviewed while online during TEAMS initial case management evaluation - [facility] agreeable 9/16/20 at [phone] - copy sent via mail______________________________     Date/Time: ______________      Signature: _____[facility], LCSW 9/16/20 1340____________________________     Date/Time: ______________

## 2020-09-16 NOTE — PSYCH
Assessment/Plan:      Diagnoses and all orders for this visit:    Major depressive disorder, recurrent, severe without psychotic features ([facility] [address] )    Generalized anxiety disorder    [facility]          Subjective:     Patient ID: Ni Mahajan is a 50 y o  male  HPI:     Pre-morbid level of function and History of Present Illness:   Per Dr Dayne Oakley:  Ni Mahajan is a 50 y o  male with past psychiatric history of polysubstance dependence clean for the last 6 years, major depressive disorder and generalized anxiety disorder is admitted to [facility] referred by his outpatient provider [facility]   Today Ni Mahajan reports he has been struggling with worsening anxiety and depression with progressive decline in his ability to function  Patient endorses symptoms of sadness, feeling isolated, guilty about his actions, has lack of confidence and poor energy and motivation  He states he has passive thoughts of death and dying however no active suicidal or homicidal ideations  Patient states that he has ruminating thoughts which are very negative such as his performance work  He states reports he feels that he lacks the ability to progress in his job and the job demands  His employer has not felt Nessa Rodriguez is in adequate and in fact has been told he does do a good job but despite this he feels that he is stagnant and unable to keep up with the technology and man's of his work  He admits that this is 1 of the many worries and anxieties that he perceives but in reality there appears to be no issue  He also reports feeling that he has lack of social connections and no friends around an yet states that he has been involved in group activities with his friends on a regular basis  Again deficits that he perceives but upon closer examination appear to be more prominent in his ruminating thoughts rather than based on reality    Patient reports history of multiple medication changes recently however has not felt any significant changes other than when he was started on lithium about 3 weeks ago he had less ruminating thoughts about death and dying  Patient states that he was managed on Prozac for many years with good results at, he was taken off Prozac about 2 years ago after having increased depression during a break up  He reports he has never felt as well on any other medication  Patient denies any symptoms consistent with psychosis or rajesh  Patient does have a longstanding history of polysubstance dependence from the age of [age]  He reports being sober for the last 6 years  Per this writer  :Cadence Murphy is a 50year old male referred to [facility] by his OP provider, Samuel Hurd due to worsening depression and anxiety  He's been receiving OP psychiatric treatment for several years through [facility] [address] [address]  He has a history of 2 hospitalization in June and July 2020 due to [facility]  Today, Jose Price reports passive thoughts of death, decrease in energy and motivation, ruminating thoughts, feelings of isolation, and guilt related to employment and COVID-19  He denies active SI and HI  Jose Price states his symptoms worsened when he began working from home due to COVID-19  He feels living alone has been a trigger for him and often gets lost in his thoughts and overanalyzes his work  He reports debilitating anxiety approximately 20 years ago when he became sober causing daily panic attacks  He feels his anxiety is once again reaching this extreme  He does not have thoughts of using drugs or alcohol; however, recognizes he often sleeps to escape from his anxiety  He identifies his friend, Ta Back and mother as supports       Chief complaint in his own words: "I'm panicking about all of this"  Strengths: employed, established OP providers, motivation     Reason for evaluation and partial hospitalization as an alternative to inpatient hospitalization PHP is medically necessary to prevent hospitalization as outpatient care has been unable to stabilize Bakari Carmona and a greater intensity of treatment is indicated  Milieu therapy to monitor for medication needs, provide wellness tools education and offer opportunity to share and connect to others  Group therapy, case management, psychiatric medication management, family contact and UR as indicated  ELOS 10 treatment days  Previous Psychiatric/psychological treatment/year:  and 2020 hospitalized for SI  Current Psychiatrist/Therapist:  Radha [facility]  Outpatient and/or Partial and Other [employer] Used ([facility], [facility], [place]): none      Problem Assessment:     SOCIAL/VOCATION:  Family Constellation (include parents, relationship with each and pertinent Psych/Medical History):     No family history on file  Mother: Marie- supportive, lives in [place], [place] of depression  Stepfather:  2 years ago  Significant Other: Lucy Johnston- "friends with benefits", supportive  Father: Sudarshan Sage- "I don't think he knows how to support me through it"  Children: Brina Grey- 21, lives in [place], plumbing  Children: [place]- 25, lives with Sudarshan Sage, looking for employment  Sibling: Khadar Gaines- lives in [place], [place], fair relationship    Who is the person you relate to best, Lucy Johnston  he lives alone  Domestic Violence: He denies current DV or hx of DV  He denies hx of physical, sexual, and emotional abuse  Additional Comments related to family/relationships/peer support: Maternal uncles- substance abuse    School or Work History (strengths/limitations/needs): CAD tech- 8 years, certification plumbing     Her highest grade level achieved was vocational school     history includes none    Financial status includes secure    LEISURE ASSESSMENT (Include past and present hobbies/interests and level of involvement (Ex: Group/Club Affiliations): He enjoys mountain biking, fishing, kayaking, riding motorcycle  Her primary language is [place]  Preferred language is [place]  Ethnic considerations are non-  Religions affiliations and level of involvement none   FUNCTIONAL STATUS: There has been a recent change in the patient's ability to do the following: decrease in energy and interests    Level of Assistance Needed/By Whom?: self    Steve Brian learns best by  demonstration    SUBSTANCE ABUSE ASSESSMENT: past substance abuse     Do you currently smoke? NoOffered smoking cessation? na    Substance/Route/Age/Amount/Frequency/Last Use:   Hx of alcohol (binge drinking) and cocaine abuse    DETOX HISTORY: Hallucinations    Previous detox/rehab treatment: none    HEALTH ASSESSMENT: Steve Brian reports being physically healthy  He is established with a PCP  LEGAL: No Mental Health Advance Directive or Power of  on file and Information packet given about Mental Health Advance Directives    Risk Assessment:   The following ratings are based on my observation of this patient over the last intake today    Risk of Harm to Self:   Demographic risk factors include  and male  Historical Risk Factors include chronic psychiatric problems, history of suicidal behaviors/attempts and substance abuse or dependence  Recent Specific Risk Factors include passive death wishes, feelings of guilt or self blame, worries about finances or work and diagnosis of depression     Risk of Harm to Others:   Demographic Risk Factors include male  Historical Risk Factors include none listed  Recent Specific Risk Factors include multiple stressors    Access to Weapons:   Steve Brian has access to the following weapons: denied   The following steps have been taken to ensure weapons are properly secured: na    Based on the above information, the client presents the following risk of harm to self or others:  low    The following interventions are recommended:   no intervention changes    Notes regarding this Risk Assessment: Steve Brian has the program and [facility] crisis phone numbers    Review Of Systems:     Mood Anxiety   Behavior Normal    Thought Content Normal   General Relationship Problems, Emotional Problems and Decreased Functioning   Personality Normal   Other Psych Symptoms Normal   Constitutional Not assessed by this writer    ENT Not assessed by this writer    Cardiovascular Not assessed by this writer    Respiratory Not assessed by this writer    Gastrointestinal Not assessed by this writer    Genitourinary Not assessed by this writer    Musculoskeletal Not assessed by this Radha Oven Not assessed by this writer    Neurological Not assessed by this writer    Endocrine Not assessed by this writer    Other Symptoms Not assessed by this writer        Mental status:  Appearance calm and cooperative , adequate hygiene and grooming and good eye contact    Mood anxious and uncertain   Affect affect was blunted and affect was constricted   Speech slowed and speech soft   Thought Processes normal thought processes   Hallucinations no hallucinations present    Thought Content no delusions   Abnormal Thoughts no suicidal thoughts , no homicidal thoughts  and passive thoughts of death   Orientation  oriented to person and place and time   Remote Memory short term memory intact and long term memory intact   Attention Span concentration intact   Intellect Appears to be of Average Intelligence   Fund of Knowledge displays adequate knowledge of current events   Insight Limited insight   Judgement judgment was limited   Muscle Strength Muscle strength and tone were normal and Normal gait    Language no difficulty naming common objects   Pain none   Pain Scale 0       DSM:   1  Major depressive disorder, recurrent, severe without psychotic features ([address] )     2  Generalized anxiety disorder     3  Polysubstance dependence ([address] )         Plan: Admit to PHP  Group therapy, case management, medication management, UR and family contact as indicated    ELOS 10 treatment days  Refer to OP psychiatry and therapy      Anticipated aftercare plan: Continue OP psychiatry and psychotherapy

## 2020-09-16 NOTE — PSYCH
Virtual Regular Visit      Assessment/Plan:    Problem List Items Addressed This Visit        Other    Generalized anxiety disorder - Primary (Chronic)    Major depressive disorder, recurrent, severe without psychotic features ([facility] [address] ) (Chronic)    Polysubstance dependence ([facility] [address] )               Reason for visit is   Encounter provider Amparo Marley MD    Provider located at [address] [address] [phone]      Recent Visits  No visits were found meeting these conditions  Showing recent visits within past 7 days and meeting all other requirements     Future Appointments  No visits were found meeting these conditions  Showing future appointments within next 150 days and meeting all other requirements        The patient was identified by name and date of birth  Mansoor Lambert was informed that this is a telemedicine visit and that the visit is being conducted through [employer]  My office door was closed  No one else was in the room  He acknowledged consent and understanding of privacy and security of the video platform  The patient has agreed to participate and understands they can discontinue the visit at any time  Patient is aware this is a billable service  I spent 50 minutes with patient today in which greater than 50% of the time was spent in counseling/coordination of care regarding sxs, sx management, diagnosis and treatment plsn, case d/w with treatment team and outpt provider [place] 24 acknowledges that he has consented to an online visit or consultation  He understands that the online visit is based solely on information provided by him, and that, in the absence of a face-to-face physical evaluation by the physician, the diagnosis he receives is both limited and provisional in terms of accuracy and completeness   This is not intended to replace a full medical face-to-face evaluation by the physician  Carmelo Samayoa understands and accepts these terms  [facility]  Carmelo Samayoa 50 y o  male MRN: [id number]     [facility]     Carmelo Samayoa is a 50 y o  male with past psychiatric history of polysubstance dependence clean for the last 6 years, major depressive disorder and generalized anxiety disorder is [facility] by his outpatient provider Julienne Mccullough reports he has been struggling with worsening anxiety and depression with progressive decline in his ability to function  Patient endorses symptoms of sadness, feeling isolated, guilty about his actions, has lack of confidence and poor energy and motivation  He states he has passive thoughts of death and dying however no active suicidal or homicidal ideations  Patient states that he has ruminating thoughts which are very negative such as his performance work  He states reports he feels that he lacks the ability to progress in his job and the job demands  His employer has not felt Latha Fleming is in adequate and in fact has been told he does do a good job but despite this he feels that he is stagnant and unable to keep up with the technology and man's of his work  He admits that this is 1 of the many worries and anxieties that he perceives but in reality there appears to be no issue  He also reports feeling that he has lack of social connections and no friends around an yet states that he has been involved in group activities with his friends on a regular basis  Again deficits that he perceives but upon closer examination appear to be more prominent in his ruminating thoughts rather than based on reality    Patient reports history of multiple medication changes recently however has not felt any significant changes other than when he was started on lithium about 3 weeks ago he had less ruminating thoughts about death and dying  Patient states that he was managed on Prozac for many years with good results at, he was taken off Prozac about 2 years ago after having increased depression during a break up  He reports he has never felt as well on any other medication  Patient denies any symptoms consistent with psychosis or rajesh  Patient does have a longstanding history of polysubstance dependence from the age of [age]  He reports being sober for the last 6 years  Psychosocial Stressors include    Review Of Systems:  As in HPI otherwise negative  Past Psychiatric History:      Past Inpatient Psychiatric Treatment:   In Patient psychiatric admissions, 1st 1 was in June to [facility] behavior health unit  Second inpatient psychiatric admission was in July to [facility]  Past Outpatient Psychiatric Treatment:    In treatment with Rachel Tomas   Past Suicide Attempts:    no  Past Violent Behavior:    no  Past Psychiatric Medication Trials:    Prozac, Cymbalta, Trintellix and Lithium    Medications:     Current Outpatient Medications:     Brexpiprazole (REXULTI) 2 MG tablet, Take 1 tablet (2 mg total) by mouth daily at bedtime, Disp: 30 tablet, Rfl: 2    lithium carbonate 300 mg capsule, Take 2 capsules (600 mg total) by mouth daily at bedtime, Disp: 60 capsule, Rfl: 2    vortioxetine (TRINTELLIX) 20 MG tablet, Take 1 tablet (20 mg total) by mouth daily, Disp: 30 tablet, Rfl: 2    Family Psychiatric History:   No family history on file  Social History:  Education: high school diploma/GED  Learning Disabilities: none  Marital history: single  Living arrangement, social support: The patient lives in home with no one   Occupational History: employed  Functioning Relationships: limited     Other Pertinent History: None    Social History     Substance and Sexual Activity   Drug Use Yes    Types: Marijuana    Comment: medical marijuana       Traumatic History:   Abuse: denied  Other Traumatic Events: none    Substance Abuse History:  As above    Mental status:  Appearance calm and cooperative    Mood anxious and uncertain   Affect affect was blunted and affect was constricted   Speech slowed and speech soft   Thought Processes normal thought processes   Hallucinations no hallucinations present    Thought Content no delusions   Abnormal Thoughts no suicidal thoughts , no homicidal thoughts  and passive thoughts of death   Orientation  oriented to person and place and time   Remote Memory short term memory intact and long term memory intact   Attention Span concentration intact   Intellect Appears to be of Average Intelligence   Fund of Knowledge displays adequate knowledge of current events   Insight Limited insight   Judgement judgment was limited   Muscle Strength Muscle strength and tone were normal   Language no difficulty naming common objects         Laboratory Results: I have personally reviewed all pertinent laboratory/tests results  Assessment:    Diagnoses and all orders for this visit:    Generalized anxiety disorder    Major depressive disorder, recurrent, severe without psychotic features ([facility] [address] )    Polysubstance dependence ([facility] [address] )         Plan:  Medication changes   Taper and discontinue trintellix, start prozac 20 mg daily  Cont Lithium 600 mg bedtime and Rexulti 2 mg daily    Risks, benefits, and possible side effects of medications explained to patient and patient verbalizes understanding  [facility] Physician's Orders     Admit to: Partial Hospitalization, 5 x per week, for 10 days  Vital signs daily for three days and then as needed  Diet Regular  Group Psychotherapy 5 x per week  Wellness Group 5 x per week  Individual Therapy as needed  Family Therapy as needed  Diagnosis:   1  Generalized anxiety disorder     2  Major depressive disorder, recurrent, severe without psychotic features ([address] )     3   Polysubstance dependence ([facility] [address] )           I certify that the continuation of Partial Hospitalization services is medically necessary to improve and/or maintain the patients condition and functional level, and to prevent relapse or hospitalization, and that this could not be done at a less intensive level of care  

## 2020-09-16 NOTE — PSYCH
Subjective:     Patient ID: Fatemeh Kapoor is a 50 y o  male  [facility] Clinical Progress Notes      Specialized Services Documentation  Therapist must complete separate progress note for each specific clinical activity in which the individual participated during the day  Other   [address] [place] [address] at [phone] to complete precert for PHP level of care  Spoke to [employer]  Authorized 5 days- 9/17/20 through 9/23/20  Auth # [id number]  Tax ID #[id number]    Case Management Note    Jessica Majano LCSW    Current suicide risk : Low     [id number]  This initial case management evaluation was facilitated virtually in a private office using HIPAA Compliant and Approved Microsoft Teams  Fatemeh Kapoor consented to the use of tele-video modality of treatment  Reviewed program and given program and [facility] crisis phone numbers  Due to COVID-19 and session being held virtually, Clemencia Graff verbally consented to releases and health care coordination form  PCP notified of admission and health care coordination form completed  Completed initial psycho-social evaluation and initial treatment goals discussed  Medications changes/added/denied? Yes     Treatment session number: 0    Individual Case Management Visit provided today?  Yes     [facility] follow up physician's orders: see Dr Candance Mitten H&P

## 2020-09-17 ENCOUNTER — OFFICE VISIT (OUTPATIENT)
Dept: PSYCHOLOGY | Facility: CLINIC | Age: 45
End: 2020-09-17
Payer: COMMERCIAL

## 2020-09-17 DIAGNOSIS — F33.2 MAJOR DEPRESSIVE DISORDER, RECURRENT, SEVERE WITHOUT PSYCHOTIC FEATURES (HCC): Primary | Chronic | ICD-10-CM

## 2020-09-17 PROCEDURE — G0177 OPPS/PHP; TRAIN & EDUC SERV: HCPCS

## 2020-09-17 PROCEDURE — G0410 GRP PSYCH PARTIAL HOSP 45-50: HCPCS

## 2020-09-17 PROCEDURE — G0176 OPPS/PHP;ACTIVITY THERAPY: HCPCS

## 2020-09-17 NOTE — PSYCH
Subjective:     Patient ID: Dorena Osgood is a 50 y o  male  [facility] Clinical Progress Notes      Specialized Services Documentation  Therapist must complete separate progress note for each specific clinical activity in which the individual participated during the day  Case Management Note    Shirely Horta LCSW    Current suicide risk : Low     [id number]  This case management session was facilitated virtually in a private office using HIPPA Compliant and Approved Microsoft Teams  Dorena Osgood consented to the use of tele-video modality of treatment  Met with Bruna Rinne for his scheduled case management session  Julián Side He was out to lunch with his mother and stepped away from the table to speak briefly with this writer  He states he felt anxious this morning about beginning program    He continues to be somewhat uncertain about PHP stating, "I really wish it wasn't virtual" after sharing he had some technology issues  He gained from the discussion on self care and views of self today  He picked up his prescription yesterday and began medication changes  Continue PHP to identify strengths, activate wellness tools, and create structure to prevent hospitalization  Next case management check in scheduled for tomorrow, 9/18/20  Medications changes/added/denied? No    Treatment session number: 1    Individual Case Management Visit provided today?  Yes     [facility] follow up physician's orders: none at this time

## 2020-09-17 NOTE — PSYCH
Virtual Regular Visit      Assessment/Plan:    Problem List Items Addressed This Visit        Other    Major depressive disorder, recurrent, severe without psychotic features ([facility] [address] ) - Primary (Chronic)               Reason for visit is VIRTUAL PHP GROUP DUE TO COVID-19  Encounter [facility] PART[facility] PS[facility]CH [facility]    Provider located at [address] [place] [address]   [address] [address]    The patient was identified by name and date of birth  Manjit Lakhani was informed that this is a telemedicine visit and that the visit is being conducted through [employer]  My office door was closed  No one else was in the room  He acknowledged consent and understanding of privacy and security of the video platform  The patient has agreed to participate and understands they can discontinue the visit at any time  Patient is aware this is a billable service  Subjective  Manjit Lakhani is a 50 y o  male   I spent FOUR GROUP HOURS PLUS CASE MANAGEMENT minutes with patient today in which greater than 50% of the time was spent in counseling/coordination of care regarding PHP - SEE NOTES  VIRTUAL VISIT DISCLAIMER    Manjit Lakhani acknowledges that he has consented to an online visit or consultation  He understands that the online visit is based solely on information provided by him, and that, in the absence of a face-to-face physical evaluation by the physician, the diagnosis he receives is both limited and provisional in terms of accuracy and completeness  This is not intended to replace a full medical face-to-face evaluation by the physician  Manjit Lakhani understands and accepts these terms

## 2020-09-17 NOTE — PSYCH
Subjective:     Patient ID: Nel Hale is a 50 y o  male  Innovations Clinical Progress Notes      Specialized Services Documentation  Therapist must complete separate progress note for each specific clinical activity in which the individual participated during the day  Allied Therapy   This group was facilitated virtually in a private office using [employer] and Approved [employer] Teams  Nel Hale consented to the use of tele-video modality of treatment  [id number] Nel Hale  actively shared in [facility] group exploring DBT skill changing emotional responses  Rhianna Grigsby engaged in task sharing triggers and responses to given emotions  Group explored differences between justified and unjustified emotions to prompting events and effective versus ineffective responses  With weekend approach, group explored risk of feeling lazy and he was able to share how this is a risk for him  Group reviewed skill Opposite Action related to depression withdraw versus get active and productive choices for unstructured time offered  Some initial effort and progress noted toward goals  Continue AT to explore healthy emotional regulation and role in practicing wellness tools         Tx Plan Objective: 1 1,1 2, Therapist:  Yohan VEGA

## 2020-09-17 NOTE — PSYCH
Subjective:     Patient ID: Jeff Orourke is a 50 y o  male  Innovations Clinical Progress Notes      Specialized Services Documentation  Therapist must complete separate progress note for each specific clinical activity in which the individual participated during the day  Group Psychotherapy (10:25am-11:10am)    Group was facilitated virtually in a private office using HIPAA Compliant and Approved [employer] Teams  Jeff Orourke consented to the use of tele-video modality of treatment and was virtually present for group psychotherapy today  Group was facilitated virtually in a private office using HIPAA Compliant and Approved [employer] Teams  This group focused on self compassion beginning with a brief video introducing the concept of self compassion and a follow up discussion on areas of focus that allow us to be more compassionate to oneself, such as not realizing the journey and fairly measuring success despite failures, understanding other are responsible for things that have happened in life, that luck exists, people are not measured by accomplishments and that times crisis do pass  Participants then took part in mindful exercise on self compassion, repeating words of comfort and assurance in the midst of reflecting on difficult situations in their lives  The group then discussed kind words shared with friends verse the self criticism one uses with oneself  Participants were asked to write down and reflect on instances of self criticism for the remainder of the day and to challenge the inner critic inside about the fairness of these criticisms  Sherrie Grimes was attentive throughout the group and participated  He shared that he is very self critical and talks badly to himself  He was able to examine his self-talk and the fact that he "beats himself up," mentally by telling himself that he is a failure, that he is incapable and that he is outdated when it comes to his work   He discussed how he is having difficulty with his medication and feels that his anxiety has increased as a result  He stated he feels like the things he used to handle before he can no longer  An emphasis on finding the correct medication regiment with his provider as well as increasing his coping skills and applying the regularly was made and support by this therapist and other group members  This was Raman's first day in program and he appears committed to working towards his goals     Tx Plan Objective: 1 1,1 2 Therapist:  FLASH Melendrez

## 2020-09-17 NOTE — PSYCH
Subjective:     Patient ID: Carol Ro is a 50 y o  male  Innovations Clinical Progress Notes      Specialized Services Documentation  Therapist must complete separate progress note for each specific clinical activity in which the individual participated during the day  Group Psychotherapy ([id number])   This group was facilitated virtually in a private office using [employer] and Approved [employer] Teams  Carol Ro consented to the use of tele-video modality of treatment  Carol Ro actively participated in psychotherapy group today which focused on anger management  Group discussed the importance of learning to recognize personal warning signs in effort to address anger while it is still weak  A handout was reviewed with a list of diversions to consider practicing at the onset of anger to deal with it in a healthy way  In addition, group members were presented with a challenging situation that could cause a person to become angry and were encouraged to respond appropriately  He shared that he had anger management issues in the past which caused him to be incarcerated after fighting with others  Judy Carter explained that he often insulted others when angry and explained that his mind would go blank  Initial progress toward goals observed  Continue psychotherapy to identify personal triggers of anger and learn positive ways to manage it  Tx Plan Objective: 1 1, 1 4 Therapist:  Isauro Ontiveros RN    Education Therapy   This group was facilitated virtually in a private office using [employer] and Approved [employer] Teams  Carol Ro consented to the use of tele-video modality of treatment    Time:  2837-2710  Previous goal met: First treatment day  Readiness to Learning: Receptive  Barriers to Learning: None  Learning Assessment  Time: 8318-9659  Education Completed: Wellness Tools  Teaching Method: Verbal and A/V  Shared Area of Learning: Yes   Goal Set: To go on a bicycle andre Bautista Plan Objective: 1 2 Therapist:  Marisol Cho RN

## 2020-09-18 ENCOUNTER — APPOINTMENT (OUTPATIENT)
Dept: LAB | Facility: HOSPITAL | Age: 45
End: 2020-09-18
Payer: COMMERCIAL

## 2020-09-18 ENCOUNTER — OFFICE VISIT (OUTPATIENT)
Dept: PSYCHOLOGY | Facility: CLINIC | Age: 45
End: 2020-09-18
Payer: COMMERCIAL

## 2020-09-18 DIAGNOSIS — F41.1 GENERALIZED ANXIETY DISORDER: ICD-10-CM

## 2020-09-18 DIAGNOSIS — F33.2 MAJOR DEPRESSIVE DISORDER, RECURRENT, SEVERE WITHOUT PSYCHOTIC FEATURES (HCC): Primary | Chronic | ICD-10-CM

## 2020-09-18 DIAGNOSIS — F33.2 SEVERE EPISODE OF RECURRENT MAJOR DEPRESSIVE DISORDER, WITHOUT PSYCHOTIC FEATURES (HCC): ICD-10-CM

## 2020-09-18 LAB — LITHIUM SERPL-SCNC: 0.3 MMOL/L (ref 0.5–1)

## 2020-09-18 PROCEDURE — G0176 OPPS/PHP;ACTIVITY THERAPY: HCPCS

## 2020-09-18 PROCEDURE — G0177 OPPS/PHP; TRAIN & EDUC SERV: HCPCS

## 2020-09-18 PROCEDURE — G0410 GRP PSYCH PARTIAL HOSP 45-50: HCPCS

## 2020-09-18 PROCEDURE — 36415 COLL VENOUS BLD VENIPUNCTURE: CPT

## 2020-09-18 PROCEDURE — 80178 ASSAY OF LITHIUM: CPT

## 2020-09-18 NOTE — PSYCH
Subjective:     Patient ID: Vlad Malik is a 50 y o  male  Innovations Clinical Progress Notes      Specialized Services Documentation  Therapist must complete separate progress note for each specific clinical activity in which the individual participated during the day  Allied Therapy   This group was facilitated virtually in a private office using [employer] and Approved [employer] Teams  Vlad Malik consented to the use of tele-video modality of treatment  [id number] Vlad Malik  actively shared in [facility] group focused on work stressors  Hawa Agee was able to speak to challenges in work and home environment   He identified struggle with under training and the impact of COVID on his work  Group explored ways to set day up for success and explore ways to set priorities  Relaxation technique object meditation reviewed  He was engaged and positive  Some initial exploration of tx goals  Continue AT to encourage identification of stressors and wellness tools to manage stress consistently       Tx Plan Objective: 1 1,1 4, Therapist:  Astrid VEGA

## 2020-09-18 NOTE — PSYCH
Virtual Regular Visit      Assessment/Plan:    Problem List Items Addressed This Visit        Other    Major depressive disorder, recurrent, severe without psychotic features ([facility] [address] ) - Primary (Chronic)               Reason for visit is VIRTUAL PHP GROUP DUE TO COVID-19  Encounter provider [facility] PARTIAL PSYCH PROGRAM    Provider located at [address] [place] [address] Dr  [address] [address]    The patient was identified by name and date of birth  Aidantamiko Kapoor was informed that this is a telemedicine visit and that the visit is being conducted through [employer]  My office door was closed  No one else was in the room  He acknowledged consent and understanding of privacy and security of the video platform  The patient has agreed to participate and understands they can discontinue the visit at any time  Patient is aware this is a billable service  Subjective  Fatemeh Kapoor is a 50 y o  male   I spent FOUR GROUP HOURS PLUS CASE MANAGEMENT minutes with patient today in which greater than 50% of the time was spent in counseling/coordination of care regarding PHP - SEE NOTES  VIRTUAL VISIT DISCLAIMER    Fatemeh Kapoor acknowledges that he has consented to an online visit or consultation  He understands that the online visit is based solely on information provided by him, and that, in the absence of a face-to-face physical evaluation by the physician, the diagnosis he receives is both limited and provisional in terms of accuracy and completeness  This is not intended to replace a full medical face-to-face evaluation by the physician  Fatemeh Kapoor understands and accepts these terms

## 2020-09-18 NOTE — PSYCH
Subjective:     Patient ID: Ni Mahajan is a 50 y o  male  Innovations Clinical Progress Notes      Specialized Services Documentation  Therapist must complete separate progress note for each specific clinical activity in which the individual participated during the day  Group Psychotherapy [id number]  This group was facilitated virtually in a private office using [employer] and Approved [employer] Teams  Ni Mahajan consented to the use of tele-video modality of treatment  Psychotherapy group focused on coping mechanisms when going through a difficulty time  Coping skills were broken down into distraction techniques, grounding tools, emotional release, self love, thought challenge, and accessing your higher self  Examples of each type of coping skill was provided and Nessa Rodriguez was encouraged to explore coping skills he uses from each category  Education provided on the pros and cons of using each type of tool  Nessa Rodriguez participated in the group discussion  He recognizes he often sleeps an an avoidance mechanism and verbalizes motivation towards change  He identifies exercise as a healthy emotional outlet and expressed interest in beginning to implement grounding skills to manage his anxiety  Beginning progress toward goals  Nessa Rodriguez is encouraged to make progress towards goals and objectives through group participation and will continue to attend wellness group  Tx Plan Objective: 1 1, 1 2, Therapist:  [employer], LCSW    Education Therapy   This group was facilitated virtually in a private office using [employer] and Approved [employer] Teams  Ni Mahajan consented to the use of tele-video modality of treatment    Time:  0318-8952  Previous goal met: No  Readiness to Learning: Other reports high anxiety  Barriers to Learning: None  Learning Assessment  Time: 8873-3046  Education Completed: Illness, Medication and Wellness Tools  Teaching Method: Verbal, Written, A/V and Demonstration  Shared Area of Learning: Yes   Goal Set: bicycle ride with friends, have breakfast with family on Saturday  Tx Plan Objective: 1 4, Therapist:  Dimitris Scanlon LCSW    Case Management Note    Dimitris Scanlon LCSW    Current suicide risk : Low     Today is not a scheduled CM meeting with Abimbola Reese  Additionally, Abimbola Reese did not request to meet with this writer  Medications changes/added/denied? No    Treatment session number: 2    Individual Case Management Visit provided today?  No    Innovations follow up physician's orders: none at this time

## 2020-09-18 NOTE — PSYCH
Subjective:     Patient ID: Moy Morrison is a 50 y o  male  Innovations Clinical Progress Notes      Specialized Services Documentation  Therapist must complete separate progress note for each specific clinical activity in which the individual participated during the day  Group Psychotherapy ([id number])   This group was facilitated virtually in a private office using [employer] and Approved [employer] Teams  Moy Morrison consented to the use of tele-video modality of treatment  Moy Morrison actively shared in psychotherapy group today which focused on Weekly Wellness Assessment  He engaged in self-rate and discussion  Group members individually went through the assessment and rated themselves based on the past week  Group members shared assessment results  Group discussed the six domains of wellness; physical, emotional, cognitive, vocational, social, and spiritual  Group discussed strengths, challenges, barriers, and ways to increase each domain  Anurag Knight chose the vocational domain and identified the need to focus on self-care while he is off of work  He plans to challenge himself by shutting off his email and meeting alerts so he is better able to focus on himself  Beginning progress toward goal noted  Continue psychotherapy to further encourage self-reflection on strengths and challenges with personal wellness       Tx Plan Objective: 1 2 Therapist:  Liam Barragan RN

## 2020-09-21 ENCOUNTER — OFFICE VISIT (OUTPATIENT)
Dept: PSYCHOLOGY | Facility: CLINIC | Age: 45
End: 2020-09-21
Payer: COMMERCIAL

## 2020-09-21 DIAGNOSIS — F33.2 MAJOR DEPRESSIVE DISORDER, RECURRENT, SEVERE WITHOUT PSYCHOTIC FEATURES (HCC): Primary | Chronic | ICD-10-CM

## 2020-09-21 PROCEDURE — G0410 GRP PSYCH PARTIAL HOSP 45-50: HCPCS

## 2020-09-21 PROCEDURE — 90832 PSYTX W PT 30 MINUTES: CPT

## 2020-09-21 PROCEDURE — G0176 OPPS/PHP;ACTIVITY THERAPY: HCPCS

## 2020-09-21 PROCEDURE — G0177 OPPS/PHP; TRAIN & EDUC SERV: HCPCS

## 2020-09-21 NOTE — PSYCH
Subjective:     Patient ID: Fabienne Kerr is a 50 y o  male  Innovations Clinical Progress Notes      Specialized Services Documentation  Therapist must complete separate progress note for each specific clinical activity in which the individual participated during the day  Group Psychotherapy (6601-5756)   This group was facilitated virtually in a private office using [employer] and Approved [employer] Teams  Fabienne Kerr consented to the use of tele-video modality of treatment  Fabienne Kerr actively participated in nurse education group today which focused on increasing awareness of facts related to mental illness and dispelling the myths  Group discussed how stigma, social climate, and fear factor in how myths of mental illness are perpetuated  Group members were presented with fifteen statements and challenged to determine if each statement was a myth or a fact as well as to explain why  Good progress toward goals observed  Continue nurse education group to create an open, honest atmosphere enabling group members to share the realities of mental illness  Tx Plan Objective: 1 1, 1 4 Therapist:  Vicenta Mckeon RN    Education Therapy   This group was facilitated virtually in a private office using [employer] and Approved [employer] Teams  Fabienne Kerr consented to the use of tele-video modality of treatment    Time:  2840-5168  Previous goal met: Yes   Readiness to Learning: Receptive  Barriers to Learning: None  Learning Assessment  Time: 2332-0364  Education Completed: Illness and Wellness Tools  Teaching Method: Verbal and A/V  Shared Area of Learning: Yes   Goal Set: To go to yoga  Tx Plan Objective: 1 2, 1 4 Therapist:  Vicenta Mckeon RN

## 2020-09-21 NOTE — PSYCH
Subjective:     Patient ID: Jeff Orourke is a 50 y o  male  Innovations Clinical Progress Notes      Specialized Services Documentation  Therapist must complete separate progress note for each specific clinical activity in which the individual participated during the day  Group Psychotherapy [id number]  This group was facilitated virtually in a private office using [employer] and Approved [employer] Teams  Jeff Orourke consented to the use of tele-video modality of treatment  Psychotherapy group focused on the understanding of an intense emotion and what this emotional piece needs  The empty chair technique was used with the goal to work through interpersonal or internal conflict and gain insight into his own feelings and behaviors  Each group member was encouraged to explore an intense emotion:  1) What does this emotion look like? 2)  How old is it? 3) When does this feeling come around? 4) The sensations associated with it  Emotions discussed include: anxiety, guilt, and emptiness  Through discussion, group members explored what that emotional piece needs  Sherrie Grimes appeared to have complete the group activity  He was attentive during discussion; however, he did not participate  Slow progress toward goals  Sherrie Grimes is encouraged to make progress towards goals and objectives through group participation and will continue to attend psychotherapy group  Tx Plan Objective: 1 2, Therapist:  Shirley Horta LCSW      Case Management Note    Shirley Horta LCSW    Current suicide risk : Low     [id number]  This case management session was facilitated virtually in a private office using HIPPA Compliant and Approved [employer] Teams  Jeff Orourke consented to the use of tele-video modality of treatment  Met with Sherrie Grimes  Sherrie Grimes gained from the psychotherapy group today using the empty chair technique  He has a fear of inadequacy that he feels stems from his sobriety    He feels he lost a sense of "all I knew" when he became sober  He reports a decrease in anxiety compared to Thursday and Friday of last week and feels this is related to the medication change  He spoke about anxiety related to his workplace  Wellness tools were reviewed and he was encouraged to explore tools he can use before, during, and after the work day to manage his anxiety  Jose Price is contemplating changing jobs  He was encouraged to list pros/cons to his options  He plans to begin practicing positive self talk to begin building his confidence and self worth  Continue PHP to identify strengths, activate wellness tools, and create structure to prevent hospitalization  Next case management check in scheduled for Wednesday, 9/22/20  Medications changes/added/denied? No    Treatment session number: 3    Individual Case Management Visit provided today?  Yes     Innovations follow up physician's orders: none at this time

## 2020-09-21 NOTE — PSYCH
Virtual Regular Visit      Assessment/Plan:    Problem List Items Addressed This Visit        Other    Major depressive disorder, recurrent, severe without psychotic features ([facility] [address] ) - Primary (Chronic)               Reason for visit is VIRTUAL PHP GROUP DUE TO COVID-19  Encounter provider [address] [facility] PARTIAL PSYCH PROGRAM    Provider located at [address] [place] [address] Dr  [address] [address]    The patient was identified by name and date of birth  Rayray Hetal was informed that this is a telemedicine visit and that the visit is being conducted through [employer]  My office door was closed  No one else was in the room  He acknowledged consent and understanding of privacy and security of the video platform  The patient has agreed to participate and understands they can discontinue the visit at any time  Patient is aware this is a billable service  Subjective  Rayray Fong is a 50 y o  male   I spent FOUR GROUP HOURS PLUS CASE MANAGEMENT minutes with patient today in which greater than 50% of the time was spent in counseling/coordination of care regarding PHP - SEE NOTES  VIRTUAL VISIT DISCLAIMER    Rayray Fong acknowledges that he has consented to an online visit or consultation  He understands that the online visit is based solely on information provided by him, and that, in the absence of a face-to-face physical evaluation by the physician, the diagnosis he receives is both limited and provisional in terms of accuracy and completeness  This is not intended to replace a full medical face-to-face evaluation by the physician  Rayray Fong understands and accepts these terms

## 2020-09-21 NOTE — PSYCH
Subjective:     Patient ID: Smooth Whelan is a 50 y o  male  Innovations Clinical Progress Notes      Specialized Services Documentation  Therapist must complete separate progress note for each specific clinical activity in which the individual participated during the day  Allied Therapy   This group was facilitated virtually in a private office using [employer] and Approved [employer] Teams  Smooth Whelan consented to the use of tele-video modality of treatment  [id number] Smooth Whelan  actively shared in [facility] group focused on relaxation techniques and mindfulness strategies  Jeffrey Lin engaged in [employer] and breathing techniques  Group explored practice of what skills through observing, describing and participating in a melissa listening and then engaging in song  Jeffrey Lin continues to put effort into groups and he identified that doing yoga is a way that he can practice mindfulness tonight  Group discussed ways to apply to slowing down to make more effective choices  Some positive  effort noted toward treatment goal   Continue AT to encourage the development and practice of mindfulness strategies to alleviate symptoms and support wellness        Tx Plan Objective: 1 2,1 1, Therapist:  Sofia VEGA

## 2020-09-22 ENCOUNTER — OFFICE VISIT (OUTPATIENT)
Dept: PSYCHOLOGY | Facility: CLINIC | Age: 45
End: 2020-09-22
Payer: COMMERCIAL

## 2020-09-22 DIAGNOSIS — F41.1 GENERALIZED ANXIETY DISORDER: Chronic | ICD-10-CM

## 2020-09-22 DIAGNOSIS — F33.2 MAJOR DEPRESSIVE DISORDER, RECURRENT, SEVERE WITHOUT PSYCHOTIC FEATURES (HCC): Primary | Chronic | ICD-10-CM

## 2020-09-22 PROCEDURE — G0176 OPPS/PHP;ACTIVITY THERAPY: HCPCS

## 2020-09-22 PROCEDURE — G0177 OPPS/PHP; TRAIN & EDUC SERV: HCPCS

## 2020-09-22 PROCEDURE — G0410 GRP PSYCH PARTIAL HOSP 45-50: HCPCS

## 2020-09-22 NOTE — PSYCH
Subjective:     Patient ID: Vlad Malik is a 50 y o  male  Innovations Clinical Progress Notes      Specialized Services Documentation  Therapist must complete separate progress note for each specific clinical activity in which the individual participated during the day  Group Psychotherapy ([id number])   This group was facilitated virtually in a private office using [employer] and Approved [employer] Teams  Vlad Malik consented to the use of tele-video modality of treatment  Vlad Malik actively participated in psychoeducation group today which focused on medication education  Group was educated on commonly prescribed medications, their class, side effects, and the length of time to take effect  Group members expressed their concerns and asked questions regarding their currently prescribed medications  Patients were then divided into two groups and competed against each other in a medication jeopardy game  Good progress toward goal noted  Continue psychoeducation to further learn about prescribed medications and the importance of understanding their purpose       Tx Plan Objective: 1 3 Therapist:  Lori Harkins RN

## 2020-09-22 NOTE — PSYCH
Virtual Regular Visit      Assessment/Plan:    Problem List Items Addressed This Visit        Other    Generalized anxiety disorder (Chronic)    Major depressive disorder, recurrent, severe without psychotic features ([facility] [address] ) - Primary (Chronic)               Reason for visit is VIRTUAL PHP GROUP DUE TO COVID-19  Encounter provider [facility] PARTIAL PSYCH PROGRAM    Provider located at [address] [place] [address]   [address] [address] [address]    The patient was identified by name and date of birth  Bakari Carmona was informed that this is a telemedicine visit and that the visit is being conducted through [employer]  My office door was closed  No one else was in the room  He acknowledged consent and understanding of privacy and security of the video platform  The patient has agreed to participate and understands they can discontinue the visit at any time  Patient is aware this is a billable service  Subjective  Bakari Carmona is a 50 y o  male   I spent FOUR GROUP HOURS PLUS CASE MANAGEMENT minutes with patient today in which greater than 50% of the time was spent in counseling/coordination of care regarding PHP - SEE NOTES  VIRTUAL VISIT DISCLAIMER    Bakari Carmona acknowledges that he has consented to an online visit or consultation  He understands that the online visit is based solely on information provided by him, and that, in the absence of a face-to-face physical evaluation by the physician, the diagnosis he receives is both limited and provisional in terms of accuracy and completeness  This is not intended to replace a full medical face-to-face evaluation by the physician  Thompsons Kristine understands and accepts these terms

## 2020-09-22 NOTE — PSYCH
Subjective:     Patient ID: Nel Hale is a 50 y o  male  Innovations Clinical Progress Notes      Specialized Services Documentation  Therapist must complete separate progress note for each specific clinical activity in which the individual participated during the day  Allied Therapy   This group was facilitated virtually in a private office using [employer] and Approved [employer] Teams  Nel Hale consented to the use of tele-video modality of treatment  [id number] Nel Hale  actively shared in [facility] group exploring DBT distress tolerance crisis survival strategies  Group explored crisis survival strategies ACCEPTS, SELF-SOOTHING, TIP, STOP, IMPROVE and PROS & CONS) reinforcing actions one could take to learn to tolerate stressful experiences, thoughts and urges  Rhianna Grigsby felt he could put effort into practicing self encouragement  Some progress toward goal noted  Continue AT to encourage learning, practice and home practice of skills to manage distress        Tx Plan Objective: 1 2, Therapist:  Yohan VEGA
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
## 2020-09-22 NOTE — PSYCH
Subjective:     Patient ID: Fabienne Kerr is a 50 y o  male  Innovations Clinical Progress Notes      Specialized Services Documentation  Therapist must complete separate progress note for each specific clinical activity in which the individual participated during the day  Group Psychotherapy [id number]  This group was facilitated virtually in a private office using [employer] and Approved [employer] Teams  Fabienne Kerr consented to the use of tele-video modality of treatment  Psychotherapy group focused on cognitive distortions and tools to challenge negative thoughts of oneself, others, and the world  Psychoeducation was provided on the cognitive model  The group focused on the  cognitive distortions: black and white thinking, mind reading, and predictive thinking  Through discussion, Ofe Robledo was able to gain increased awareness of how his thoughts affect his feelings and behaviors  Ofe Robledo was introduced to the tool, Putting Your Thoughts On Trial to begin challenging negative thoughts  Ofe Robledo participated in the group discussion  He began to recognize how the unhealthy views of himself are often the types of distortions discussed  He verbalized motivation toward change and was receptive towards the tool discussed  Some positive progress toward goals  Ofe Robledo is encouraged to make progress toward goals and objectives through participation and will continue to attend psychotherapy group  Tx Plan Objective: 1 2, Therapist:  Jim Lowe LCSW    Education Therapy   This group was facilitated virtually in a private office using [employer] and Approved [employer] Teams  Fabienne Kerr consented to the use of tele-video modality of treatment    Time:  0532-7846  Previous goal met: Yes   Readiness to Learning: Receptive  Barriers to Learning: None  Learning Assessment  Time: 0687-9951  Education Completed: Illness and Wellness Tools  Teaching Method: Verbal, Written and A/V  Shared Area of Learning: Yes   Goal Set: grocery shopping and cut grass  Tx Plan Objective: 1 2, Therapist:  [employer], LCSW      Other 1400  Spoke to 115 Leisa Quintero at [facility] to complete concurrent current for PHP level of care [phone]  Authorized 5 additional days 9/24/20 through 9/30/20  Auth # remains the same  [id number]      Case Management Note    Shirley Horta LCSW    Current suicide risk : Low     [phone]  This case management session was facilitated virtually in a private office using [employer] and Approved [employer] Teams  Ferdinand Coon consented to the use of tele-video modality of treatment  Met with Jp Ramos reports feeling tired today after sharing he went on a 30 mile bike ride yesterday  He is reporting ongoing anxiety which begins when he awakens in the morning  He states the anxiety makes him poorly motivated and he has difficulty attending program    He also is reporting restlessness and stating, "I'm just in my head so much "  He states he is becoming quieter and shyer and his significant other recently verbalizes this concern  He states, "this isn't like me "   He denies SI, HI, and thoughts of SIB  He reports a decrease in a passive death wish since beginning the program   Relaxation tools were discussed and Jp Ramos was encouraged to try meditation and/or progressive muscle relaxation  Jp Ramos was receptive towards this recommendation and states he has been told to try these skills in the past    He was reminded of his medication follow up appt scheduled for Friday  Continue PHP to identify strengths, activate wellness tools, and create structure to prevent hospitalization  Next case management check in scheduled for 9/25/20  Medications changes/added/denied? No    Treatment session number: 5    Individual Case Management Visit provided today?  Yes     Innovations follow up physician's orders: none at this time

## 2020-09-23 ENCOUNTER — OFFICE VISIT (OUTPATIENT)
Dept: PSYCHOLOGY | Facility: CLINIC | Age: 45
End: 2020-09-23
Payer: COMMERCIAL

## 2020-09-23 DIAGNOSIS — F41.1 GENERALIZED ANXIETY DISORDER: Chronic | ICD-10-CM

## 2020-09-23 DIAGNOSIS — F33.2 MAJOR DEPRESSIVE DISORDER, RECURRENT, SEVERE WITHOUT PSYCHOTIC FEATURES (HCC): Primary | Chronic | ICD-10-CM

## 2020-09-23 PROCEDURE — 90832 PSYTX W PT 30 MINUTES: CPT

## 2020-09-23 PROCEDURE — G0177 OPPS/PHP; TRAIN & EDUC SERV: HCPCS

## 2020-09-23 PROCEDURE — G0176 OPPS/PHP;ACTIVITY THERAPY: HCPCS

## 2020-09-23 PROCEDURE — G0410 GRP PSYCH PARTIAL HOSP 45-50: HCPCS

## 2020-09-23 NOTE — PSYCH
Virtual Regular Visit      Assessment/Plan:    Problem List Items Addressed This Visit        Other    Generalized anxiety disorder (Chronic)    Major depressive disorder, recurrent, severe without psychotic features ([facility] [address] ) - Primary (Chronic)               Reason for visit is VIRTUAL PHP GROUP DUE TO COVID-19  Encounter provider [facility] PARTIAL PSYCH PROGRAM    Provider located at [address] [place] [address]   [address] [address]    The patient was identified by name and date of birth  Ferdinand Coon was informed that this is a telemedicine visit and that the visit is being conducted through [employer]  My office door was closed  No one else was in the room  He acknowledged consent and understanding of privacy and security of the video platform  The patient has agreed to participate and understands they can discontinue the visit at any time  Patient is aware this is a billable service  Subjective  Ferdinand Coon is a 50 y o  male   I spent FOUR GROUP HOURS PLUS CASE MANAGEMENT minutes with patient today in which greater than 50% of the time was spent in counseling/coordination of care regarding PHP - SEE NOTES  VIRTUAL VISIT DISCLAIMER    Ferdinand Coon acknowledges that he has consented to an online visit or consultation  He understands that the online visit is based solely on information provided by him, and that, in the absence of a face-to-face physical evaluation by the physician, the diagnosis he receives is both limited and provisional in terms of accuracy and completeness  This is not intended to replace a full medical face-to-face evaluation by the physician  Ferdinand Coon understands and accepts these terms

## 2020-09-23 NOTE — PSYCH
Subjective:     Patient ID: Toi Clay is a 50 y o  male  Innovations Clinical Progress Notes      Specialized Services Documentation  Therapist must complete separate progress note for each specific clinical activity in which the individual participated during the day  Group Psychotherapy ([id number])   This group was facilitated virtually in a private office using [employer] and Approved [employer] Teams  Toi Clay consented to the use of tele-video modality of treatment  Toi Clay actively participated in psychotherapy group this morning which focused on self-image  Group was asked to explore who they are through a self-exploration exercise which focused on personal values, desires, strengths, and weaknesses  Group members completed a handout which contained sentences that prompted them to reflect on who they are in the present moment  This was done in effort to begin the process of building a healthy relationship with oneself  Arturotriston Gutierrez shared his struggles with lack of confidence and self-esteem related to his occupation  Some progress toward goal observed  Continue psychotherapy to further improve self-esteem by encouraging self-exploration and getting to know oneself      Tx Plan Objective: 1 1 Therapist:  Shahana Reese RN

## 2020-09-23 NOTE — PSYCH
Subjective:     Patient ID: Bibi Pedraza is a 50 y o  male  Innovations Clinical Progress Notes      Specialized Services Documentation  Therapist must complete separate progress note for each specific clinical activity in which the individual participated during the day  Allied Therapy   This group was facilitated virtually in a private office using [employer] and Approved [employer] Teams  Bibi Pedraza consented to the use of tele-video modality of treatment  [id number] Bibi Pedraza actively  shared in [facility] group focused on boundary setting  Eloise Craig engaged in improvisation and discussion exploring value of and ways to set healthy limits with self and others  DBT strategy FLAQUITO GIVE introduced as skill to voice boundaries  He participated in practicing boundaries with given scenarios  Some  work toward goal noted  Continue AT to encourage skill development and use          Tx Plan Objective: 1 4,1 2, Therapist:  Ligia Sharpe MT-BC

## 2020-09-24 ENCOUNTER — OFFICE VISIT (OUTPATIENT)
Dept: PSYCHOLOGY | Facility: CLINIC | Age: 45
End: 2020-09-24
Payer: COMMERCIAL

## 2020-09-24 ENCOUNTER — DOCUMENTATION (OUTPATIENT)
Dept: PSYCHOLOGY | Facility: CLINIC | Age: 45
End: 2020-09-24

## 2020-09-24 DIAGNOSIS — F33.2 MAJOR DEPRESSIVE DISORDER, RECURRENT, SEVERE WITHOUT PSYCHOTIC FEATURES (HCC): Primary | Chronic | ICD-10-CM

## 2020-09-24 PROCEDURE — G0176 OPPS/PHP;ACTIVITY THERAPY: HCPCS

## 2020-09-24 PROCEDURE — G0177 OPPS/PHP; TRAIN & EDUC SERV: HCPCS

## 2020-09-24 PROCEDURE — G0410 GRP PSYCH PARTIAL HOSP 45-50: HCPCS

## 2020-09-24 NOTE — PSYCH
Virtual Regular Visit      Assessment/Plan:    Problem List Items Addressed This Visit        Other    Major depressive disorder, recurrent, severe without psychotic features ([facility] [address] ) - Primary (Chronic)               Reason for visit is VIRTUAL PHP GROUP DUE TO COVID-19  Encounter provider [facility] PARTIAL PSYCH PROGRAM    Provider located at [address] [place] [address] Dr  [address] [address]    The patient was identified by name and date of birth  Carmelo Samayoa was informed that this is a telemedicine visit and that the visit is being conducted through [employer]  My office door was closed  No one else was in the room  He acknowledged consent and understanding of privacy and security of the video platform  The patient has agreed to participate and understands they can discontinue the visit at any time  Patient is aware this is a billable service  Subjective  Carmelo Samayoa is a 50 y o  male   I spent FOUR GROUP HOURS PLUS CASE MANAGEMENT minutes with patient today in which greater than 50% of the time was spent in counseling/coordination of care regarding PHP - SEE NOTES  VIRTUAL VISIT DISCLAIMER    Carmelo Samayoa acknowledges that he has consented to an online visit or consultation  He understands that the online visit is based solely on information provided by him, and that, in the absence of a face-to-face physical evaluation by the physician, the diagnosis he receives is both limited and provisional in terms of accuracy and completeness  This is not intended to replace a full medical face-to-face evaluation by the physician  Carmelo Samayoa understands and accepts these terms

## 2020-09-24 NOTE — PROGRESS NOTES
Subjective:     Patient ID: Inga Daley is a 50 y o  male  Innovations Clinical Progress Notes      Specialized Services Documentation  Therapist must complete separate progress note for each specific clinical activity in which the individual participated during the day  Group Psychotherapy 10:25-11:10  Tx Plan Objective: 1 2, Therapist:  Elena Ely LCSW    D:  Marika Garima participated in group  Topics discussed were low self esteem, PTSD and  stress  Related to low self esteem at his job and then how that has also trickled down into his everyday life  Others tell him he is doing a "good job" at work and when he looks at his everyday life the messages he is telling himself about his everyday life are not true also  A:  Mild progress on tx plan goals  Appeared receptive to feedback  P:  To continue with tx plan goals  Apply coping skills discussed in group

## 2020-09-24 NOTE — PSYCH
Subjective:     Patient ID: Chata Turner is a 50 y o  male  Innovations Clinical Progress Notes      Specialized Services Documentation  Therapist must complete separate progress note for each specific clinical activity in which the individual participated during the day  Allied Therapy   This group was facilitated virtually in a private office using [employer] and Approved [employer] Teams  Chata Turner consented to the use of tele-video modality of treatment  [id number] Chata Turner  actively shared in [facility] group focused on WRAP development and use  Group explored key facets of recovery through melissa analysis and sections of the WRAP  Group explored the benefits of WRAP development and strategies to using this tool to support ongoing recovery  Bakari Payne was able to share triggers and warning signs yet struggles to see areas of improvement and wellness  Inconsistent progress noted toward goal   Continue AT to explore recovery strategies and use        Tx Plan Objective: 1 2, Therapist:  René BACHBC

## 2020-09-24 NOTE — PSYCH
Subjective:     Patient ID: Mansoor Lambert is a 50 y o  male  Innovations Clinical Progress Notes      Specialized Services Documentation  Therapist must complete separate progress note for each specific clinical activity in which the individual participated during the day  Group Psychotherapy [id number]  This group was facilitated virtually in a private office using [employer] and Approved [employer] Teams  Mansoor Lambert consented to the use of tele-video modality of treatment  Psychotherapy community outreach group focused on decreasing self- stigma and increasing available supports  Clients attentively listened to [facility] share his life story  Group encouraged power of learning about self, accepting mental health and personal responsibility in recovery and maintenance of wellness  Community resources reviewed in addition to personal resources including self talk/mantras  Stefanie Weber was attentive in group  Stefanie Weber is encouraged to continue group to encourage self-awareness and healthy engagement of supports  Tx Plan Objective: 1 4, Therapist:  Toyin Castañeda LCSW      Case Management Note    Jessica Majano LCSW    Current suicide risk : Low     Today is not a scheduled CM meeting with Stefanie Weber  Additionally, Stefanie Weber did not request to meet with this writer  Medications changes/added/denied? No    Treatment session number: 6    Individual Case Management Visit provided today?  No    Innovations follow up physician's orders: none at this time

## 2020-09-24 NOTE — PSYCH
Subjective:     Patient ID: Eloise Verdugo is a 50 y o  male  Innovations Clinical Progress Notes      Specialized Services Documentation  Therapist must complete separate progress note for each specific clinical activity in which the individual participated during the day  Education Therapy   This group was facilitated virtually in a private office using [employer] and Approved [employer] Teams  Eloise Verdugo consented to the use of tele-video modality of treatment    Time:  4916-0951  Previous goal met: Yes   Readiness to Learning: Receptive  Barriers to Learning: None  Learning Assessment  Time: 7097-5246  Education Completed: Illness, Aftercare and Wellness Tools  Teaching Method: Verbal, A/V and Demonstration  Shared Area of Learning: Yes   Goal Set: To make phone call  Tx Plan Objective: 1 4 Therapist:  Marisol Cho RN

## 2020-09-25 ENCOUNTER — TELEMEDICINE (OUTPATIENT)
Dept: PSYCHIATRY | Facility: CLINIC | Age: 45
End: 2020-09-25
Payer: COMMERCIAL

## 2020-09-25 ENCOUNTER — OFFICE VISIT (OUTPATIENT)
Dept: PSYCHOLOGY | Facility: CLINIC | Age: 45
End: 2020-09-25
Payer: COMMERCIAL

## 2020-09-25 DIAGNOSIS — F41.1 GENERALIZED ANXIETY DISORDER: Chronic | ICD-10-CM

## 2020-09-25 DIAGNOSIS — F33.2 SEVERE EPISODE OF RECURRENT MAJOR DEPRESSIVE DISORDER, WITHOUT PSYCHOTIC FEATURES (HCC): ICD-10-CM

## 2020-09-25 DIAGNOSIS — F33.2 MAJOR DEPRESSIVE DISORDER, RECURRENT, SEVERE WITHOUT PSYCHOTIC FEATURES (HCC): Chronic | ICD-10-CM

## 2020-09-25 DIAGNOSIS — F33.2 MAJOR DEPRESSIVE DISORDER, RECURRENT, SEVERE WITHOUT PSYCHOTIC FEATURES (HCC): Primary | Chronic | ICD-10-CM

## 2020-09-25 PROCEDURE — G0410 GRP PSYCH PARTIAL HOSP 45-50: HCPCS

## 2020-09-25 PROCEDURE — 90832 PSYTX W PT 30 MINUTES: CPT

## 2020-09-25 PROCEDURE — G0176 OPPS/PHP;ACTIVITY THERAPY: HCPCS

## 2020-09-25 PROCEDURE — 99214 OFFICE O/P EST MOD 30 MIN: CPT | Performed by: NURSE PRACTITIONER

## 2020-09-25 PROCEDURE — G0177 OPPS/PHP; TRAIN & EDUC SERV: HCPCS

## 2020-09-25 NOTE — PSYCH
Virtual Regular Visit      Assessment/Plan:    Problem List Items Addressed This Visit        Other    Generalized anxiety disorder (Chronic)    Major depressive disorder, recurrent, severe without psychotic features ([facility] [address] ) - Primary (Chronic)               Reason for visit is VIRTUAL PHP GROUP DUE TO COVID-19  Encounter provider [facility] PARTIAL PSYCH PROGRAM    Provider located at [address] [place] [address]   [address] [address]    The patient was identified by name and date of birth  Delano Kemp was informed that this is a telemedicine visit and that the visit is being conducted through [employer]  My office door was closed  No one else was in the room  He acknowledged consent and understanding of privacy and security of the video platform  The patient has agreed to participate and understands they can discontinue the visit at any time  Patient is aware this is a billable service  Subjective  Delano Kemp is a 50 y o  male   I spent FOUR GROUP HOURS PLUS CASE MANAGEMENT minutes with patient today in which greater than 50% of the time was spent in counseling/coordination of care regarding PHP - SEE NOTES  VIRTUAL VISIT DISCLAIMER    Delano Mattes acknowledges that he has consented to an online visit or consultation  He understands that the online visit is based solely on information provided by him, and that, in the absence of a face-to-face physical evaluation by the physician, the diagnosis he receives is both limited and provisional in terms of accuracy and completeness  This is not intended to replace a full medical face-to-face evaluation by the physician  Delano Kemp understands and accepts these terms

## 2020-09-25 NOTE — PSYCH
Subjective:     Patient ID: Anna Rowan is a 50 y o  male    Innovations Clinical Progress Notes      Specialized Services Documentation  Therapist must complete separate progress note for each specific clinical activity in which the individual participated during the day  Psychotherapy Group (9:30-10:15) This group was facilitated virtually in a private office using HIPAA Compliant and Approved [employer] Teams  Anna Rowan consented to the use of tele-video modality of treatment  Pt actively participated in [facility] group  This group was facilitated virtually in a private office using H[employer]AA Compliant and Approved [employer] Teams  Anna Rowan consented to the use of tele-video modality of treatment and was virtually present for weekly wellness assessment group today  Clients reviewing weekend supports and plan as they approach the weekend  Compliance of medications reviewed and understood  The six (6) dimension of wellness discussed (Physical, Emotional, Cognitive, Vocational, Social and Spiritual)  The format for assessing wellness and measuring progress reviewed  Rere Park shared that he has seen some spiritual, emotional and cognitive improvement this week  He plans to work on engaging in meditation over the next week to improve his emotional wellness  Anna Rowan is making progress towards goals and objectives and will continue to attend wellness group  Pt actively engaged with peers and shared willingly during group discussion  Pt was able to attend to activity and discussion throughout duration of group session  Continue to engage pt in group psychotherapy in order to continue to progress toward long-term goal achievement  Tx Plan Objective: 1 1, 1 2, 1 3 and 1 4    Therapist: Cloria Severin, MS, OTR/L

## 2020-09-25 NOTE — PSYCH
Subjective:     Patient ID: Anna Rowan is a 50 y o  male  Innovations Clinical Progress Notes      Specialized Services Documentation  Therapist must complete separate progress note for each specific clinical activity in which the individual participated during the day  Allied Therapy   This group was facilitated virtually in a private office using [employer] and Approved [email or web address] Teams  Anna Rowan consented to the use of tele-video modality of treatment  [id number] Anna Rowan moderately shared in [facility] group focused on managing anger and emotional regulation skills  He was attentive but quiet as peers engaged in task exploring effective versus ineffective ways in addition to skills to refocus in the moment  Group explored the benefits of positive choices with intense emotion and factors that increase emotional vulnerability  Inconsistent  work toward goal noted   Continue AT to explore wellness tool awareness and practice       Tx Plan Objective: 1 2, Therapist:  Benita VEGA

## 2020-09-25 NOTE — PSYCH
Virtual Regular Visit    Problem List Items Addressed This Visit        Other    Major depressive disorder, recurrent, severe without psychotic features ([facility] [address] ) (Chronic)    Relevant Medications    Brexpiprazole (REXULTI) 3 MG tablet             Encounter provider BELEN Childs    Provider located at   [address] [address] [phone]    Recent Visits  No visits were found meeting these conditions  Showing recent visits within past 7 days and meeting all other requirements     Today's Visits  Date Type Provider Dept   09/25/20 Telemedicine BELEN Childs [facility]   Showing today's visits and meeting all other requirements     Future Appointments  No visits were found meeting these conditions  Showing future appointments within next 150 days and meeting all other requirements      The patient was identified by name and date of birth  Fatemeh Kapoor was informed that this is a telemedicine visit and that the visit is being conducted through [employer]  My office door was closed  No one else was in the room  He acknowledged consent and understanding of privacy and security of the video platform  The patient has agreed to participate and understands they can discontinue the visit at any time  Patient is aware this is a billable service  HPI     Current Outpatient Medications   Medication Sig Dispense Refill    Brexpiprazole (REXULTI) 3 MG tablet Take 1 tablet (3 mg total) by mouth daily at bedtime 30 tablet 0    FLUoxetine (PROzac) 20 MG tablet Take 1 tablet (20 mg total) by mouth daily 30 tablet 1    lithium carbonate 300 mg capsule Take 2 capsules (600 mg total) by mouth daily at bedtime 60 capsule 2     No current facility-administered medications for this visit  Review of Systems  Video Exam    There were no vitals filed for this visit      Physical Exam   As a result of this visit, I have referred the patient for further respiratory evaluation  No    I spent 15 minutes directly with the patient during this visit  VIRTUAL VISIT 137 Mik Rodriguez acknowledges that he has consented to an online visit or consultation  He understands that the online visit is based solely on information provided by him, and that, in the absence of a face-to-face physical evaluation by the physician, the diagnosis he receives is both limited and provisional in terms of accuracy and completeness  This is not intended to replace a full medical face-to-face evaluation by the physician  Vlad Malik understands and accepts these terms  PHP MEDICATION MANAGEMENT NOTE        [facility]    Name and Date of Birth:  Vlad Malik 50 y o  1975 MRN: [id number]    Date of Visit: September 25, 2020    No Known Allergies  SUBJECTIVE:    Hawa Agee is seen today for a follow up for depression, anxiety and substance use  He reports that he has experienced ongoing symptoms since beginning PHP  Patient states that since last hospitalization starting lithium suicidal thoughts have resolved  However patient reports feeling like he has a flat affect and has difficulty interacting with others  Continues to feel anxious and overwhelmed easily  Finds he is waking up in the middle night with racing thoughts about trivial matters such as song lyrics  Patient does report feeling of restlessness and mild body tremor    PLAN:  Increase result he to 3 mg p o  Daily  Maintain Prozac 20 mg p o   Daily  Maintain lithium 60 mg PO HS  Aware of 24 hour and weekend coverage for urgent situations accessed by calling [facility] main practice number  Continue partial hospitalization program    Diagnoses and all orders for this visit:    Major depressive disorder, recurrent, severe without psychotic features ([facility] [address] )  -     Brexpiprazole (REXULTI) 3 MG tablet; Take 1 tablet (3 mg total) by mouth daily at bedtime        Psychotherapy Provided:     Individual psychotherapy provided: No    HPI ROS Appetite Changes and Sleep:     He reports difficulty falling asleep, frequent awakenings, adequate appetite, decreased energy   Patient denies suicidal or homicidal ideation    Review Of Systems:      General relationship problems, emotional problems, sleep disturbances and decreased functioning   Personality no change in personality   Constitutional negative   ENT negative   Cardiovascular negative   Respiratory negative   Gastrointestinal negative   Genitourinary negative   Musculoskeletal negative   Integumentary negative   Neurological negative   Endocrine negative   Other Symptoms none, all other systems are negative     Mental Status Evaluation:    Appearance Adequate hygiene and grooming   Behavior cooperative   Mood anxious and depressed  Depression Scale -  of 10 (0 = No depression)  Anxiety Scale -  of 10 (0 = No anxiety)   Speech Normal rate and volume   Affect constricted   Thought Processes Goal directed and coherent   Thought Content Does not verbalize delusional material   Associations Tightly connected   Perceptual Disturbances Denies hallucinations and does not appear to be responding to internal stimuli   Risk Potential Suicidal/Homicidal Ideation - No evidence of suicidal or homicidal ideation and Patient does not verbalize suicidal or homicidal ideation  Risk of Violence - No evidence of risk for violence found on assessment  Risk of Self Mutilation - No evidence of risk for self mutilation found on assessment   Orientation oriented to person, place, time/date and situation   Memory recent and remote memory grossly intact   Consciousness alert and awake   Attention/Concentration attention span and concentration are age appropriate   Insight fair   Judgement fair   Muscle Strength and Gait normal muscle strength and normal muscle tone, normal gait/station and normal balance   Motor Activity no abnormal movements   Language no difficulty naming common objects, no difficulty repeating a phrase, no difficulty writing a sentence   Fund of Knowledge adequate knowledge of current events  adequate fund of knowledge regarding past history  adequate fund of knowledge regarding vocabulary      Past Psychiatric History Update:     Inpatient Psychiatric Admission Since Last Encounter:   no  Suicide Attempt Or Self Mutilation Since Last Encounter:   no  Incidence of Violent Behavior Since Last Encounter:   no    Traumatic History Update:     New Onset of Abuse Since Last Encounter:   no  Traumatic Events Since Last Encounter:   no    Past Medical History:    Past Medical History:   Diagnosis Date    Anxiety     Depression     Suicidal thoughts      No past medical history pertinent negatives    Past Surgical History:   Procedure Laterality Date    HERNIA REPAIR      SHOULDER SURGERY       No Known Allergies  Substance Abuse History:    Social History     Substance and Sexual Activity   Alcohol Use Not Currently    Frequency: Never    Binge frequency: Never    Comment: 6 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     Social History     Substance and Sexual Activity   Drug Use Yes    Types: Marijuana    Comment: medical marijuana     Social History:    Social History     Socioeconomic History    Marital status: Single     Spouse name: Not on file    Number of children: Not on file    Years of education: Not on file    Highest education level: Not on file   Occupational History    Not on file   Social Needs    Financial resource strain: Not on file    Food insecurity     Worry: Not on file     Inability: Not on file    Transportation needs     Medical: Not on file     Non-medical: Not on file   Tobacco Use    Smoking status: Former Smoker     Last attempt to quit:      Years since quittin 7    Smokeless tobacco: Never Used   Substance and Sexual Activity    Alcohol use: Not Currently     Frequency: Never     Binge frequency: Never     Comment: 6 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    Drug use: Yes     Types: Marijuana     Comment: medical marijuana    Sexual activity: Not on file   Lifestyle    Physical activity     Days per week: Not on file     Minutes per session: Not on file    Stress: Not on file   Relationships    Social connections     Talks on phone: Not on file     Gets together: Not on file     Attends Yarsani service: Not on file     Active member of club or organization: Not on file     Attends meetings of clubs or organizations: Not on file     Relationship status: Not on file    Intimate partner violence     Fear of current or ex partner: Not on file     Emotionally abused: Not on file     Physically abused: Not on file     Forced sexual activity: Not on file   Other Topics Concern    Not on file   Social History Narrative    Not on file     Family Psychiatric History:     No family history on file  History Review: The following portions of the patient's history were reviewed and updated as appropriate: allergies, current medications, past family history, past medical history, past social history, past surgical history and problem list     OBJECTIVE:     Vital signs in last 24 hours: There were no vitals filed for this visit  Laboratory Results: I have personally reviewed all pertinent laboratory/tests results  Medications Risks/Benefits:      Risks, Benefits And Possible Side Effects Of Medications:    Discussed risks and benefits of treatment with patient including risk of suicidality and serotonin syndrome related to treatment with antidepressants;  Risk of induction of manic symptoms in certain patient populations, risk of parkinsonian symptoms, Tardive Dyskinesia and metabolic syndrome related to treatment with antipsychotic medications and risk of kidney impairment related to treatment with Lithium     Controlled Medication Discussion:     Not applicable    Julián General BELEN Martinez 09/25/20

## 2020-09-25 NOTE — PSYCH
Subjective:     Patient ID: Smooth Whelan is a 50 y o  male  In[facility]ations Clinical Progress Notes      Specialized Services Documentation  Therapist must complete separate progress note for each specific clinical activity in which the individual participated during the day  Group Psychotherapy [id number]  This group was facilitated virtually in a private office using [employer] and Approved [employer] Teams  Smooth Whelan consented to the use of tele-video modality of treatment  Open psychotherapy group held today  Topic discussed was coping effectively with intense emotions  Jeffrey Lin listened attentively to his peers during discussion  He did not participate until he was prompted towards the end of group by this facilitator  Jeffrey Riley states he did not find group helpful and shared "I am a ticking time bomb" and spoke about his frustrations with managing his symptoms of anxiety  A group member offered support and feedback  Minimal progress toward goals  Jeffrey Lin is encouraged to make progress toward goals and objectives through group participation and will continue to attend psychotherapy group  Tx Plan Objective: 1 1, 1 2, Therapist:  [employer], LCSW    Education Therapy   This group was facilitated virtually in a private office using [employer] and Approved [employer] Teams  Smooth Whelan consented to the use of tele-video modality of treatment    Time:  8981-2335  Previous goal met: Yes   Readiness to Learning: Receptive  Barriers to Learning: None  Learning Assessment  Time: 8758-2846  Education Completed: Illness and Wellness Tools  Teaching Method: Verbal, Written and A/V  Shared Area of Learning: Yes   Goal Set: bike riding with friends  Tx Plan Objective: 1 2, 1 4, Therapist:  Shirley Horta LCSW      Case Management Note    Shirley Horta LCSW    Current suicide risk : Low     [id number]  This case management session was facilitated virtually in a private office using [employer] Compliant and Approved [facility]  Ni Mahajan consented to the use of tele-video modality of treatment  Met with Nessa Grantdarryn Rodriguez gained from the weekly wellness assessment which showed some progress since beginning the program   He reports today being "the worst day with restlessness and sweating" while in group  He had a positive visit with the CRNP for a medication follow up today and is hoping for some improvement in symptoms with a medication increase  He identifies a lack of confidence in himself as a continued barrier to his wellness  He states, "nothing used to bother me before, now I can't even do simple math without feeling anxious "   He feels his lack of confidence stems from his position at work and feeling inadequate in his role due to his lack of training in the area  Nessa Rodriguez and this writer explored options to improve his confidence in this area  ANTONETTEtri Rodriguez shared his management team is supportive and will offer additional trainings if he asks  Nessa Rodriguez plans to speak to his manager while bike riding with him on Sunday  He feels his anxiety will improve by taking this step  Continue PHP to identify strengths, activate wellness tools, and create structure to prevent hospitalization  Next case management check in scheduled for Monday, 9/28/20  Medications changes/added/denied? Yes     Treatment session number: 7    Individual Case Management Visit provided today?  Yes     Innovations follow up physician's orders: see [address]  note

## 2020-09-28 ENCOUNTER — OFFICE VISIT (OUTPATIENT)
Dept: PSYCHOLOGY | Facility: CLINIC | Age: 45
End: 2020-09-28
Payer: COMMERCIAL

## 2020-09-28 DIAGNOSIS — F33.2 MAJOR DEPRESSIVE DISORDER, RECURRENT, SEVERE WITHOUT PSYCHOTIC FEATURES (HCC): Primary | Chronic | ICD-10-CM

## 2020-09-28 DIAGNOSIS — F41.1 GENERALIZED ANXIETY DISORDER: Chronic | ICD-10-CM

## 2020-09-28 PROCEDURE — G0176 OPPS/PHP;ACTIVITY THERAPY: HCPCS

## 2020-09-28 PROCEDURE — 90832 PSYTX W PT 30 MINUTES: CPT

## 2020-09-28 PROCEDURE — G0410 GRP PSYCH PARTIAL HOSP 45-50: HCPCS

## 2020-09-28 PROCEDURE — G0177 OPPS/PHP; TRAIN & EDUC SERV: HCPCS

## 2020-09-28 NOTE — PSYCH
Subjective:     Patient ID: Nel Hale is a 50 y o  male  Innovations Clinical Progress Notes      Specialized Services Documentation  Therapist must complete separate progress note for each specific clinical activity in which the individual participated during the day  Allied Therapy   This group was facilitated virtually in a private office using [employer] and Approved [employer] Teams  Nel Hale consented to the use of tele-video modality of treatment  [id number] Nel Hale  actively shared in [facility] group focused on mindfulness and the [facility] strategies  Rhianna Grigsby engaged in review of the [employer] of observe, describe and participate  Group introduced to and practiced the [facility] skills non-judgmental, one-mindfully, and effectiveness through various tasks  Group discussed ways to apply to slowing down to make more effective choices  Some effort noted toward treatment goal   Continue AT to encourage the development and practice of mindfulness strategies to alleviate symptoms and support wellness        Tx Plan Objective: 1 1,1 2, Therapist:  Yohan VEGA

## 2020-09-28 NOTE — PSYCH
Subjective:     Patient ID: Toi Clay is a 50 y o  male  Innovations Clinical Progress Notes      Specialized Services Documentation  Therapist must complete separate progress note for each specific clinical activity in which the individual participated during the day  Group Psychotherapy [id number]  This group was facilitated virtually in a private office using [employer] and Approved [employer] Teams  Toi Clay consented to the use of tele-video modality of treatment  Psychotherapy group focused on cognitive distortions and tools to challenge negative thoughts of oneself, others, and the world  Psychoeducation was provided on the cognitive model  The group focused on the  cognitive distortions:  Personalization and catastrophising  Through discussion, Arturo Gutierrez was able to gain increased awareness of how her thoughts effect ones feelings and behaviors  Arturo Gutierrez was introduced to the tools, thought log, de-catastrophising, socratic questioning, and putting your thoughts on trial  to begin challenging negative thoughts  Arturo Gutierrez participated in the group discussion  He shared personal examples of distortions and demonstrated understanding of the cognitive model  He specifically shared how his distortions have effected him in the workplace  Some positive progress toward goals  Arturo Gutierrez is encouraged to make progress toward goals and objectives through participation and will continue to attend psychotherapy group  Tx Plan Objective: 1 1, 1 2, Therapist:  Luis Stokes LCSW    Education Therapy   This group was facilitated virtually in a private office using [employer] and Approved [employer] Teams  Toi Clay consented to the use of tele-video modality of treatment    Time:  1617-1111  Previous goal met: No  Readiness to Learning: Receptive  Barriers to Learning: None  Learning Assessment  Time: 0057-9048  Education Completed: Illness and Wellness Tools  Teaching Method: Verbal, Written and A/V  Shared Area of Learning: Yes   Goal Set: bike ride  Tx Plan Objective: 1 2, Therapist:  Shirley Horta LCSW      Case Management Note    Shirley Horta LCSW    Current suicide risk : Low     [id number]  This case management session was facilitated virtually in a private office using HIPPA Compliant and Approved [employer] Teams  Smooth Whelan consented to the use of tele-video modality of treatment  Met with Jeffrey Lin  He continues to report increased anxiety causing poor concentration and restlessness  He describes it as "feeling all wound up "   He continues to report no improvement with medications  Treatment plan reviewed  Slow progress toward goals  He continues to use sleep as a coping mechanism when his anxiety worsens and becoming more anxious when he awakens and feels guilty  He did not speak to his manager this week as planned to address his stressor in the workplace  He states, "I don't want to but it needs to happen  Once I tell him he will be aware of my inadequacies "  He feels he will "expose himself" by addressing his concerns and needs in the workplace with his manager  This writer attempted to challenge him on this thought process  Jeffrey Lin unable to work through challenging thought replying, "you are getting deep and I can't even concentrate "   He has begun practicing meditation outside of program and identifies this tool as "good stuff" and "it helps bring me down "  He was encouraged to continue to implement this tool  The addition of goal 1 5 reflects practicing a relaxation/mindfulness tool daily to improve symptoms of anxiety  He's also begun attending [place] [address] meetings again and shows insight that spending time around others improves his mood  He plans to attend another meeting tonight  Continue PHP to identify strengths, activate wellness tools, and create structure to prevent hospitalization  Next case management check in scheduled for Wednesday, 9/30/20     He was reminded of his medication follow up appt on Wednesday as well  Medications changes/added/denied? No    Treatment session number: 8    Individual Case Management Visit provided today?  Yes     Innovations follow up physician's orders: none at this time

## 2020-09-28 NOTE — PSYCH
Virtual Regular Visit      Assessment/Plan:    Problem List Items Addressed This Visit        Other    Generalized anxiety disorder (Chronic)    Major depressive disorder, recurrent, severe without psychotic features ([facility] [address] ) - Primary (Chronic)               Reason for visit is VIRTUAL PHP GROUP DUE TO COVID-19  Encounter provider [facility] PARTIAL PSYCH PROGRAM    Provider located at [address] [place] [address]   [address] [address]    The patient was identified by name and date of birth  Bakari Carmona was informed that this is a telemedicine visit and that the visit is being conducted through [employer]  My office door was closed  No one else was in the room  He acknowledged consent and understanding of privacy and security of the video platform  The patient has agreed to participate and understands they can discontinue the visit at any time  Patient is aware this is a billable service  Subjective  Bakari Carmona is a 50 y o  male   I spent FOUR GROUP HOURS PLUS CASE MANAGEMENT minutes with patient today in which greater than 50% of the time was spent in counseling/coordination of care regarding PHP - SEE NOTES  VIRTUAL VISIT DISCLAIMER    Bakari Carmona acknowledges that he has consented to an online visit or consultation  He understands that the online visit is based solely on information provided by him, and that, in the absence of a face-to-face physical evaluation by the physician, the diagnosis he receives is both limited and provisional in terms of accuracy and completeness  This is not intended to replace a full medical face-to-face evaluation by the physician  Orient Kristine understands and accepts these terms

## 2020-09-28 NOTE — PSYCH
Assessment/Plan:       Diagnoses and all orders for this visit:     Major depressive disorder, recurrent, severe without psychotic features ([facility] [address] )     Generalized anxiety disorder     Polysubstance dependence ([facility] )            Subjective:      Patient ID: Suhail Elena is a 50 y o  male      [facility] Treatment Plan   AREAS OF NEED: Depression and anxiety as evidenced by passive thoughts of death, feelings of sadness, feeling isolated, guilty about his actions, lack of confidence, ruminating thoughts and decrease in energy and motivation related to employment, views of self, and COVID-19  Date Initiated: 9/16/20     Strengths: employed, motivated for wellness, established outpatient providers, support system     LONG TERM GOAL:   Date Initiated: 9/16/20  1 0  I will identify 3 ways my depression and anxiety has lessened since beginning [facility] partial program   Target Date: 10/16/20  Completion Date:         SHORT TERM OBJECTIVES:      Date Initiated: 9/16/20  1 1 I will offer experiential feedback in at least one group per day to build natural support network and feel more social connectedness to improve my moods  Revision Date: 9/28/20  Target Date: 9/28/20, 10/7/20  Completion Date:      Date Initiated: 9/16/20  1 2 I will identify and begin implementing 3 new coping mechanisms to improve my symptoms of depression and anxiety  Revision Date: 9/28/20  Target Date: 9/28/20, 10/7/20  Completion Date:      Date Initiated: 9/16/20  1 3 I will take medications as prescribed and share questions and concerns if arise  Revision Date: 9/28/20  Target Date: 9/28/20, 10/7/20  Completion Date:       Date Initiated: 9/16/20  1 4 I will identify 3 ways my supports can assist in my recovery and agree to staff/support contact as indicated     Revision Date: 9/28/20  Target Date: 9/28/20, 10/7/20  Completion Date:          7 DAY REVISION:     Date Initiated:  9/28/20  1 5  I will practice 1 relaxation/mindfulness tool daily to improve my symptoms of anxiety  Revision Date:   Target Date: 10/7/20  Completion Date:     DUE TO COVID-19, CURRENTLY ALL INTERVENTIONS ARE BEING OFFERED VIRTUALLY     PSYCHIATRY:  Date Initiated: 9/16/20  Medication Management and Education       Revision Date: 9/28/20       Continue medication management  The person(s) responsible for carrying out the plan is Salvatore Centeno MD    NURSING:   Date Initiated: 9/16/20  1 1,1 2,1 3,1 4 This RN will provide daily wellness group five days weekly to educate Gregg Godoy on S/S of his diagnoses and medications used in treatment  Revision Date: 9/28/20  1 1,1 2,1 3,1 4,1 5 Continue to encourage Gregg Godoy to participate in wellness groups daily to learn about symptoms, coping strategies and warning signs to promote relapse prevention  The person(s) responsible for carrying out the plan is Mickie Sawant RN    PSYCHOLOGY:   Date Initiated: 9/16/20       1 1, 1 2, 1 4 Provide psychotherapy group 5 times per week to allow opportunity for Gregg Godoy to explore stressors and ways of coping  Revision Date: 9/28/20  1 1,1 2,1 4,1 5 Continue to provide psychotherapy group daily to Gregg Godoy and encourage sharing of stressors, skills and positive change  The person(s) responsible for carrying out the plan is Anastacio Villeda LCSW    ALLIED THERAPY:   Date Initiated: 9/16/20  1 1,1 2 Engage Gregg Godoy in AT group 5 times daily to encourage development and use of wellness tools to decrease symptoms and promote recovery through meaningful activity  Revision Date: 9/28/20  1 1,1 2,1 5 Continue to engage Gregg Godoy to participate in AT group to practice wellness tools within program and transfer to home sharing successes and barriers through healthy task involvement     The person(s) responsible for carrying out the plan is GARY Pruitt             CASE MANAGEMENT:   Date Initiated: 9/16/20      1 0 This  will meet with Dorena Osgood 3-4 times weekly to assess treatment progress, discharge planning, connection to community supports and UR as indicated  Revision Date: 9/28/20  1 0 Continue to meet with Dorena Osgood 3-4 times weekly to assess growth, work toward goals, continued treatment needs, dc planning and use of supports  The person(s) responsible for carrying out the plan is Shirley Company, LCSW        TREATMENT REVIEW/COMMENTS:      DISCHARGE CRITERIA: Identify 3 signs of progress and complete relapse prevention plan     DISCHARGE PLAN: Continue OP psychiatry and individual therapy  Estimated Length of Stay:10- 15 treatment days         Diagnosis and Treatment Plan explained to Ishaan Gregory relates understanding diagnosis and is agreeable to Treatment Plan          CLIENT COMMENTS / Please share your thoughts, feelings, need and/or experiences regarding your treatment plan: _____________________________________________________________________________________________________________________________________________________________________________________________________________________________________________________________________________________________________________________ Date/Time: ______________      Patient Signature: ___Reviewed while online during TEAMS case management session - Dorena Osgood agreeable to updates and 1 5 goal 9/28/20 at 1300 - copy sent via mail_____________________________      Date/Time: ______________       Signature: _____Jessica Brown, [facility] 9/28/20 1300__________________________      Date/Time: ______________

## 2020-09-28 NOTE — PSYCH
Subjective:     Patient ID: Oz Capps is a 50 y o  male  [facility] Clinical Progress Notes      Specialized Services Documentation  Therapist must complete separate progress note for each specific clinical activity in which the individual participated during the day  Group Psychotherapy 0930-10:15 am Tx Plan Objective: 1 1, 1 2, 1 4, Therapist:  Raj Marroquin RN   This group was facilitated virtually in a private office using HIPAA Compliant and Approved [employer] Teams  Mary Kahn consented to the use of tele-video modality of treatment  Mary Kahn participated in a wellness psychoeducation group focused on the science of positive emotions and how they work and how to utilize in recovery  Educational information was discussed and each member outlined their hand on a piece of paper and identified positive emotions associated with each finger, for example the thumb identified something they feel proud of, middle finger when client did something nice for another person, pinky identified something they feel grateful for  Mary Kahn shared and made progress towards goals  He mentioned that he was proud of raising his children  He identified a specific tree in his yard that he enjoys looking at  He dscussed that he helps his elderly 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

## 2020-09-29 ENCOUNTER — OFFICE VISIT (OUTPATIENT)
Dept: PSYCHOLOGY | Facility: CLINIC | Age: 45
End: 2020-09-29
Payer: COMMERCIAL

## 2020-09-29 DIAGNOSIS — F33.2 MAJOR DEPRESSIVE DISORDER, RECURRENT, SEVERE WITHOUT PSYCHOTIC FEATURES (HCC): Chronic | ICD-10-CM

## 2020-09-29 DIAGNOSIS — F41.1 GENERALIZED ANXIETY DISORDER: Primary | Chronic | ICD-10-CM

## 2020-09-29 PROCEDURE — G0177 OPPS/PHP; TRAIN & EDUC SERV: HCPCS

## 2020-09-29 PROCEDURE — G0176 OPPS/PHP;ACTIVITY THERAPY: HCPCS

## 2020-09-29 PROCEDURE — G0410 GRP PSYCH PARTIAL HOSP 45-50: HCPCS

## 2020-09-29 NOTE — PSYCH
Virtual Regular Visit      Assessment/Plan:    Problem List Items Addressed This Visit        Other    Generalized anxiety disorder - Primary (Chronic)    Major depressive disorder, recurrent, severe without psychotic features ([facility] [address] ) (Chronic)               Reason for visit is VIRTUAL PHP GROUP DUE TO COVID-19  Encounter provider [facility] PARTIAL PSYCH PROGRAM    Provider located at [address] [place] [address]   [address] [address]    The patient was identified by name and date of birth  Aidantamiko Kapoor was informed that this is a telemedicine visit and that the visit is being conducted through [employer]  My office door was closed  No one else was in the room  He acknowledged consent and understanding of privacy and security of the video platform  The patient has agreed to participate and understands they can discontinue the visit at any time  Patient is aware this is a billable service  Subjective  Fatemeh Kapoor is a 50 y o  male    I spent FOUR GROUP HOURS PLUS CASE MANAGEMENT minutes with patient today in which greater than 50% of the time was spent in counseling/coordination of care regarding PHP - SEE NOTES  VIRTUAL VISIT DISCLAIMER    Fatemeh Kapoor acknowledges that he has consented to an online visit or consultation  He understands that the online visit is based solely on information provided by him, and that, in the absence of a face-to-face physical evaluation by the physician, the diagnosis he receives is both limited and provisional in terms of accuracy and completeness  This is not intended to replace a full medical face-to-face evaluation by the physician  Fatemeh Kapoor understands and accepts these terms

## 2020-09-29 NOTE — PSYCH
Subjective:     Patient ID: Nel Hale is a 50 y o  male  [facility] Clinical Progress Notes      Specialized Services Documentation  Therapist must complete separate progress note for each specific clinical activity in which the individual participated during the day  Group Psychotherapy:  (9:30-10:15) Group was facilitated virtually in a private office using HIPAA Compliant and Approved [employer] Teams  Nel Hale consented to the use of tele-video modality of treatment however she was not present for this group  This group was on processing the emotion of guilt  Participants received education about prompting evens for guilt, biological experiences of guilt and the after effects  Participants were introduced to toxic or unhealthy guilt which is present when one takes on responsibility for things they have no control over  Participants learned that life events, childhood experiences and depression all contribute to unhealthy feelings of guilt and cycles of negative thinking often which include guilt and questioning what one could have done differently  Participants were asked to share experiences, challenge unhealthy thoughts that contributed to this cycle and also learned new coping skills including a visualization to help decipher between guilt that can lead to healthy response verse that which magnifies depressive symptoms  Rhianna Grigsby was attentive throughout the group  He did not openly shared but nodded numerous times when asked if he could relate to the experiences of both healthy and toxic guilt   Rhianna Grigsby has made some progress toward his goals in group sessions and is encouraged to actively share and apply new information and skills     Tx Objectives: 1 1,1 2 Therapist: Laya Aguirre

## 2020-09-29 NOTE — PSYCH
Subjective:     Patient ID: Suhail Elena is a 50 y o  male  [facility] Clinical Progress Notes      Specialized Services Documentation  Therapist must complete separate progress note for each specific clinical activity in which the individual participated during the day  Allied Therapy   This group was facilitated virtually in a private office using [employer] and Approved [employer] Teams  Suhail Elena consented to the use of tele-video modality of treatment  [id number] Suhail Logyifan  actively shared in [facility] group focused on challenging limiting beliefs  Group explored self talk through melissa analysis  Given list of limiting beliefs and CBT strategies of inclusion and counter-beliefs introduced and practiced  DBT Gilman kindness meditation reviewed  Jae Galicia was attentive yet limited personal disclosure  Some effort toward treatment goals explored  Continue AT to encourage the development of wellness strategies and active practice      Tx Plan Objective: 1 2, Therapist:  Macario VEGA

## 2020-09-30 ENCOUNTER — OFFICE VISIT (OUTPATIENT)
Dept: PSYCHOLOGY | Facility: CLINIC | Age: 45
End: 2020-09-30
Payer: COMMERCIAL

## 2020-09-30 ENCOUNTER — TELEMEDICINE (OUTPATIENT)
Dept: PSYCHIATRY | Facility: CLINIC | Age: 45
End: 2020-09-30
Payer: COMMERCIAL

## 2020-09-30 DIAGNOSIS — T50.905A ACUTE MEDICATION-INDUCED AKATHISIA: Primary | ICD-10-CM

## 2020-09-30 DIAGNOSIS — F41.1 GENERALIZED ANXIETY DISORDER: Chronic | ICD-10-CM

## 2020-09-30 DIAGNOSIS — F33.2 MAJOR DEPRESSIVE DISORDER, RECURRENT, SEVERE WITHOUT PSYCHOTIC FEATURES (HCC): Chronic | ICD-10-CM

## 2020-09-30 DIAGNOSIS — F19.20 POLYSUBSTANCE DEPENDENCE (HCC): ICD-10-CM

## 2020-09-30 DIAGNOSIS — G25.71 ACUTE MEDICATION-INDUCED AKATHISIA: Primary | ICD-10-CM

## 2020-09-30 DIAGNOSIS — F33.2 MAJOR DEPRESSIVE DISORDER, RECURRENT, SEVERE WITHOUT PSYCHOTIC FEATURES (HCC): Primary | Chronic | ICD-10-CM

## 2020-09-30 PROCEDURE — G0410 GRP PSYCH PARTIAL HOSP 45-50: HCPCS

## 2020-09-30 PROCEDURE — 99213 OFFICE O/P EST LOW 20 MIN: CPT | Performed by: PHYSICIAN ASSISTANT

## 2020-09-30 PROCEDURE — G0176 OPPS/PHP;ACTIVITY THERAPY: HCPCS

## 2020-09-30 PROCEDURE — 90832 PSYTX W PT 30 MINUTES: CPT

## 2020-09-30 PROCEDURE — G0177 OPPS/PHP; TRAIN & EDUC SERV: HCPCS

## 2020-09-30 RX ORDER — DIPHENHYDRAMINE HCL 25 MG
25 TABLET ORAL EVERY 6 HOURS PRN
COMMUNITY
Start: 2020-09-30 | End: 2020-10-02 | Stop reason: SINTOL

## 2020-09-30 NOTE — PSYCH
Virtual Regular Visit                    Encounter provider Mer Wilkes PA-C    Provider located at [address] [address] [phone]      Recent Visits  Date Type Provider Dept   09/25/20 Telemedicine [facility]   Showing recent visits within past 7 days and meeting all other requirements     Future Appointments  No visits were found meeting these conditions  Showing future appointments within next 150 days and meeting all other requirements        The patient was identified by name and date of birth  Vlad Malik was informed that this is a telemedicine visit and that the visit is being conducted through [employer]  My office door was closed  No one else was in the room  He acknowledged consent and understanding of privacy and security of the video platform  The patient has agreed to participate and understands they can discontinue the visit at any time  Patient is aware this is a billable service  VIRTUAL VISIT DISCLAIMER    Vlad Malik acknowledges that he has consented to an online visit or consultation  He understands that the online visit is based solely on information provided by him, and that, in the absence of a face-to-face physical evaluation by the physician, the diagnosis he receives is both limited and provisional in terms of accuracy and completeness  This is not intended to replace a full medical face-to-face evaluation by the physician  Vlad Malik understands and accepts these terms  Progress Note - Behavioral Health   [facility]  Vlad Malik 50 y o  male MRN: [id number]     Progress at partial program: some improvement  [place] seen on day 10 of PHP  Seen by Dr Cuong Horan 9/16 at which time trintellix tapered and stopped and prozac restarted; rexulti cont at 2 mg and lithium at 600 mg    Seen by [address] 9/25 at which time rexulti increased to 3 mg  Hawa [place] is having increasing restlessness, shakiness; "Like adrenalin rush"; can't sit still- "Like I'm going to explode"; +GI upset  Sleep is poor with frequent awakenings  ROS:   As above otherwise negative    Active Ambulatory Problems     Diagnosis Date Noted    Generalized anxiety disorder 01/30/2018    Major depressive disorder, recurrent, severe without psychotic features ([facility] ) 06/09/2020    Other specified anxiety disorders 06/09/2020    Obstructive sleep apnea syndrome 04/09/2013    Mixed hyperlipidemia 05/21/2019    History of cocaine use 05/21/2019    History of alcohol abuse 05/21/2019    Herniated lumbar intervertebral disc 01/29/2013    Polysubstance dependence ([facility] ) 09/16/2020     Resolved Ambulatory Problems     Diagnosis Date Noted    Medical clearance for psychiatric admission 06/09/2020     Past Medical History:   Diagnosis Date    Anxiety     Depression     Suicidal thoughts        Mental Status Evaluation:    Appearance:  age appropriate, tattooed   Behavior:  cooperative   Speech:  normal rate and volume   Mood:  anxious   Affect:  increased in intensity   Thought Process:  goal directed   Associations: intact associations   Thought Content:  no overt delusions   Perceptual Disturbances: none   Risk Potential: Suicidal ideation - None  Homicidal ideation - None   Sensorium:  oriented to person, place and time/date   Memory:  recent and remote memory grossly intact   Consciousness:  alert and awake   Attention: attention span and concentration are age appropriate   Insight:  good   Judgment: good   Gait/Station: unable to assess   Motor Activity: unable to assess today due to virtual visit       Laboratory results: I have personally reviewed all pertinent laboratory/tests results        Assessment   Diagnoses and all orders for this visit:    Acute medication-induced akathisia  -     diphenhydrAMINE (BENADRYL) 25 mg tablet; Take 1 tablet (25 mg total) by mouth every 6 (six) hours as needed (restlessness)    Major depressive disorder, recurrent, severe without psychotic features (HCC)  -     Brexpiprazole (REXULTI) 3 MG tablet; Take 0 5 tablets (1 5 mg total) by mouth daily at bedtime    Polysubstance dependence (HCC)    Generalized anxiety disorder       Current Outpatient Medications   Medication Sig Dispense Refill    Brexpiprazole (REXULTI) 3 MG tablet Take 0 5 tablets (1 5 mg total) by mouth daily at bedtime      diphenhydrAMINE (BENADRYL) 25 mg tablet Take 1 tablet (25 mg total) by mouth every 6 (six) hours as needed (restlessness)      FLUoxetine (PROzac) 20 MG tablet Take 1 tablet (20 mg total) by mouth daily 30 tablet 1    lithium carbonate 300 mg capsule Take 2 capsules (600 mg total) by mouth daily at bedtime 60 capsule 2     No current facility-administered medications for this visit  Plan    Planned medication and treatment changes:  Charm Lefort is experiencing akathisia- probably secondary to rexulti increase and drug-drug interaction of rexulti and prozac  Will decrease rexulti to 1 5 mg/d from 3 mg and take prn benadryl  If benadryl does not help, then stop  Warned of sedation  Will re-evaluate Friday  All current active medications have been reviewed  Continue treatment with group therapy and partial program      Risks / Benefits of Treatment:    Risks, benefits, and possible side effects of medications explained to patient and patient verbalizes understanding and agrees to medications  Counseling / Coordination of Care:    Patient's progress discussed with staff in treatment team meeting  Medications, treatment progress and treatment plan reviewed with patient      Fang Mccormack PA-C 09/30/20

## 2020-09-30 NOTE — PSYCH
Subjective:     Patient ID: Carmelo Samayoa is a 50 y o  male  [facility] Clinical Progress Notes      Specialized Services Documentation  Therapist must complete separate progress note for each specific clinical activity in which the individual participated during the day  Group Psychotherapy ([id number])   This group was facilitated virtually in a private office using [employer] and Approved [employer] Teams  Carmelo Samayoa consented to the use of tele-video modality of treatment  Carmelo Samayoa participated in nurse education group this morning which focused on the topic of anxiety  Group was educated on types of anxiety disorders, the cycle of anxiety, and treatments  Group members were encouraged to share their anxiety symptoms and current treatment as well as ways in which they are managing their symptoms  Latha Fleming shared that he has been diagnosed with generalized anxiety disorder and discussed his common symptoms  He appeared attentive throughout group  Good progress toward goals noted  Continue nurse education group in order to further learn about anxiety and strategies to manage common signs and symptoms       Tx Plan Objective: 1 1, 1 5 Therapist:  Warner Becker RN

## 2020-09-30 NOTE — PSYCH
Virtual Regular Visit      Assessment/Plan:    Problem List Items Addressed This Visit        Other    Generalized anxiety disorder (Chronic)    Major depressive disorder, recurrent, severe without psychotic features ([facility] [address] ) - Primary (Chronic)               Reason for visit is VIRTUAL PHP GROUP DUE TO COVID-19  Encounter provider [address] [facility] PARTIAL PSYCH PROGRAM    Provider located at [address] [place] [address]   [address] [address] [address]    The patient was identified by name and date of birth  Mary Hernandez was informed that this is a telemedicine visit and that the visit is being conducted through [employer]  My office door was closed  No one else was in the room  He acknowledged consent and understanding of privacy and security of the video platform  The patient has agreed to participate and understands they can discontinue the visit at any time  Patient is aware this is a billable service  Subjective  Mary Hernandez is a 50 y o  male   I spent FOUR GROUP HOURS PLUS CASE MANAGEMENT minutes with patient today in which greater than 50% of the time was spent in counseling/coordination of care regarding PHP - SEE NOTES  VIRTUAL VISIT DISCLAIMER    Mary Hernandez acknowledges that he has consented to an online visit or consultation  He understands that the online visit is based solely on information provided by him, and that, in the absence of a face-to-face physical evaluation by the physician, the diagnosis he receives is both limited and provisional in terms of accuracy and completeness  This is not intended to replace a full medical face-to-face evaluation by the physician  Mary Hernandez understands and accepts these terms

## 2020-09-30 NOTE — PSYCH
Subjective:     Patient ID: Sandra Flowers is a 50 y o  male  [facility] Clinical Progress Notes      Specialized Services Documentation  Therapist must complete separate progress note for each specific clinical activity in which the individual participated during the day  Group Psychotherapy [id number]  This group was facilitated virtually in a private office using [employer] and Approved [employer] Teams  Sandra Flowers consented to the use of tele-video modality of treatment  Psychotherapy group focused on making effective positive change in ones mental wellbeing  Through activity Prince Finn explored factors that help keep the main problem occurring using a cog wheel as the visual    Prince Finn completed a cog wheel activity where the larger cog wheel was the main problem and the smaller surrounding cog wheels are the factors (unhelpful things one does or unhelpful ways one thinks) that keep the larger cog wheel turning  Prince Finn then identified positive changes he can make to change the smaller cog wheel factor  Prince Finn completed the activity and provided support and feedback to his peers  He demonstrated the understanding that in order to reduce and deal with the main problem, he needs to target and make positive helpful changes in each of those smaller cogs  Positive progress toward goals  Prince Finn is encouraged to continue making progress towards goals and objectives through group participation and will continue to attend psychotherapy group  Tx Plan Objective: 1 1, 1 2, Therapist:  Claude High, LCSW    Education Therapy   This group was facilitated virtually in a private office using [employer] and Approved [employer] Teams  Sandra Flowers consented to the use of tele-video modality of treatment    Time:  7337-5821  Previous goal met: Yes   Readiness to Learning: Receptive  Barriers to Learning: None  Learning Assessment  Time: 4834-8745  Education Completed: Illness and Wellness Tools  Teaching Method: Verbal, Written and A/V  Shared Area of Learning: Yes   Goal Set: AA meeting  Tx Plan Objective: 1 4, Therapist:  Claude High, LCSW      Other  1166  Rollo Leah at [facility] to complete concurrent review for PHP level of care [phone]  Left message requesting a return phone call  [address] to [facility] at [address]  Authorized 5 additional days 10/1/20 through 10/7/20  Auth # remains the same [id number]    Case Management Note    Claude High, LCSW    Current suicide risk : Low     [id number] This case management session was facilitated virtually in a private office using HIPPA Compliant and Approved [employer] Teams  Sandra Spitz consented to the use of tele-video modality of treatment  Met with Prince Finn gained from the psychotherapy exercise on viscous cogs  He continues to report feelings of restlessness "I feel like I can run out the door and run a mile "  He is feeling more hopeful after his medication management appt this morning  He plans to begin decrease of medication tonight and  Benadryl  He is expressing interest in returning to work once his symptoms are stabilized  He states, "I am going stir crazy not working "  He shared he has difficulty being comfortable in his own company  He feels the skills from program are helping him gain better insight into his needs for his wellness  Continue PHP to identify strengths, activate wellness tools, and create structure to prevent hospitalization  Next case management check in scheduled for Friday, 10/2/20  Medications changes/added/denied? Yes     Treatment session number: 5    Individual Case Management Visit provided today?  Yes     [facility]ations follow up physician's orders: see Annika Farah PA-C note

## 2020-09-30 NOTE — PSYCH
Subjective:     Patient ID: Ferdinand Coon is a 50 y o  male  [facility] Clinical Progress Notes      Specialized Services Documentation  Therapist must complete separate progress note for each specific clinical activity in which the individual participated during the day  Allied Therapy   This group was facilitated virtually in a private office using [employer] and Approved [employer] Teams  Ferdinand Coon consented to the use of tele-video modality of treatment  [id number] Baby Jaymie actively shared in [facility] group focused on self- care  He was encouraged to identify aspects of self-care and barriers to it  The concept of self -care versus selfishness explored  He identified engaging in exercise as self-care and self as a barrier to it  When asked at end of a specific thing he could commit to in order to increase self-care, he stated meditating more  Some beginning progress voiced toward goal   Continue AT to engage in the development and practice of wellness tools         Tx Plan Objective: 1 2,1 1, Therapist:  Gloria Navarro [facility]

## 2020-10-01 ENCOUNTER — OFFICE VISIT (OUTPATIENT)
Dept: PSYCHOLOGY | Facility: CLINIC | Age: 45
End: 2020-10-01
Payer: COMMERCIAL

## 2020-10-01 DIAGNOSIS — F41.1 GENERALIZED ANXIETY DISORDER: Chronic | ICD-10-CM

## 2020-10-01 DIAGNOSIS — F33.2 MAJOR DEPRESSIVE DISORDER, RECURRENT, SEVERE WITHOUT PSYCHOTIC FEATURES (HCC): Primary | Chronic | ICD-10-CM

## 2020-10-01 PROCEDURE — G0176 OPPS/PHP;ACTIVITY THERAPY: HCPCS

## 2020-10-01 PROCEDURE — G0177 OPPS/PHP; TRAIN & EDUC SERV: HCPCS

## 2020-10-01 PROCEDURE — G0410 GRP PSYCH PARTIAL HOSP 45-50: HCPCS

## 2020-10-02 ENCOUNTER — TELEMEDICINE (OUTPATIENT)
Dept: PSYCHIATRY | Facility: CLINIC | Age: 45
End: 2020-10-02
Payer: COMMERCIAL

## 2020-10-02 ENCOUNTER — OFFICE VISIT (OUTPATIENT)
Dept: PSYCHOLOGY | Facility: CLINIC | Age: 45
End: 2020-10-02
Payer: COMMERCIAL

## 2020-10-02 DIAGNOSIS — F41.1 GENERALIZED ANXIETY DISORDER: Chronic | ICD-10-CM

## 2020-10-02 DIAGNOSIS — F33.2 MAJOR DEPRESSIVE DISORDER, RECURRENT, SEVERE WITHOUT PSYCHOTIC FEATURES (HCC): Primary | Chronic | ICD-10-CM

## 2020-10-02 PROCEDURE — G0177 OPPS/PHP; TRAIN & EDUC SERV: HCPCS

## 2020-10-02 PROCEDURE — 90832 PSYTX W PT 30 MINUTES: CPT

## 2020-10-02 PROCEDURE — 99213 OFFICE O/P EST LOW 20 MIN: CPT | Performed by: PHYSICIAN ASSISTANT

## 2020-10-02 PROCEDURE — G0410 GRP PSYCH PARTIAL HOSP 45-50: HCPCS

## 2020-10-05 ENCOUNTER — OFFICE VISIT (OUTPATIENT)
Dept: PSYCHOLOGY | Facility: CLINIC | Age: 45
End: 2020-10-05
Payer: COMMERCIAL

## 2020-10-05 DIAGNOSIS — F41.1 GENERALIZED ANXIETY DISORDER: Chronic | ICD-10-CM

## 2020-10-05 DIAGNOSIS — F33.2 MAJOR DEPRESSIVE DISORDER, RECURRENT, SEVERE WITHOUT PSYCHOTIC FEATURES (HCC): Primary | Chronic | ICD-10-CM

## 2020-10-05 PROCEDURE — G0177 OPPS/PHP; TRAIN & EDUC SERV: HCPCS

## 2020-10-05 PROCEDURE — G0410 GRP PSYCH PARTIAL HOSP 45-50: HCPCS

## 2020-10-05 PROCEDURE — G0176 OPPS/PHP;ACTIVITY THERAPY: HCPCS

## 2020-10-06 ENCOUNTER — OFFICE VISIT (OUTPATIENT)
Dept: PSYCHOLOGY | Facility: CLINIC | Age: 45
End: 2020-10-06
Payer: COMMERCIAL

## 2020-10-06 DIAGNOSIS — F33.2 MAJOR DEPRESSIVE DISORDER, RECURRENT, SEVERE WITHOUT PSYCHOTIC FEATURES (HCC): Primary | Chronic | ICD-10-CM

## 2020-10-06 PROCEDURE — G0176 OPPS/PHP;ACTIVITY THERAPY: HCPCS

## 2020-10-06 PROCEDURE — G0410 GRP PSYCH PARTIAL HOSP 45-50: HCPCS

## 2020-10-06 PROCEDURE — G0177 OPPS/PHP; TRAIN & EDUC SERV: HCPCS

## 2020-10-06 PROCEDURE — 90832 PSYTX W PT 30 MINUTES: CPT

## 2020-10-07 ENCOUNTER — TELEMEDICINE (OUTPATIENT)
Dept: PSYCHIATRY | Facility: CLINIC | Age: 45
End: 2020-10-07
Payer: COMMERCIAL

## 2020-10-07 ENCOUNTER — OFFICE VISIT (OUTPATIENT)
Dept: PSYCHOLOGY | Facility: CLINIC | Age: 45
End: 2020-10-07
Payer: COMMERCIAL

## 2020-10-07 DIAGNOSIS — F41.1 GENERALIZED ANXIETY DISORDER: Primary | Chronic | ICD-10-CM

## 2020-10-07 DIAGNOSIS — F33.2 MAJOR DEPRESSIVE DISORDER, RECURRENT, SEVERE WITHOUT PSYCHOTIC FEATURES (HCC): Primary | Chronic | ICD-10-CM

## 2020-10-07 DIAGNOSIS — F41.1 GENERALIZED ANXIETY DISORDER: Chronic | ICD-10-CM

## 2020-10-07 DIAGNOSIS — F33.2 MAJOR DEPRESSIVE DISORDER, RECURRENT, SEVERE WITHOUT PSYCHOTIC FEATURES (HCC): Chronic | ICD-10-CM

## 2020-10-07 PROCEDURE — G0410 GRP PSYCH PARTIAL HOSP 45-50: HCPCS

## 2020-10-07 PROCEDURE — G0176 OPPS/PHP;ACTIVITY THERAPY: HCPCS

## 2020-10-07 PROCEDURE — G0177 OPPS/PHP; TRAIN & EDUC SERV: HCPCS

## 2020-10-07 PROCEDURE — 99213 OFFICE O/P EST LOW 20 MIN: CPT | Performed by: PHYSICIAN ASSISTANT

## 2020-10-07 RX ORDER — BUSPIRONE HYDROCHLORIDE 10 MG/1
TABLET ORAL
Qty: 90 TABLET | Refills: 0 | Status: SHIPPED | OUTPATIENT
Start: 2020-10-07 | End: 2020-10-09 | Stop reason: SINTOL

## 2020-10-08 ENCOUNTER — TELEPHONE (OUTPATIENT)
Dept: PSYCHIATRY | Facility: CLINIC | Age: 45
End: 2020-10-08

## 2020-10-08 ENCOUNTER — OFFICE VISIT (OUTPATIENT)
Dept: PSYCHOLOGY | Facility: CLINIC | Age: 45
End: 2020-10-08
Payer: COMMERCIAL

## 2020-10-08 DIAGNOSIS — F33.2 MAJOR DEPRESSIVE DISORDER, RECURRENT, SEVERE WITHOUT PSYCHOTIC FEATURES (HCC): Primary | Chronic | ICD-10-CM

## 2020-10-08 DIAGNOSIS — F41.1 GENERALIZED ANXIETY DISORDER: Chronic | ICD-10-CM

## 2020-10-08 PROCEDURE — G0177 OPPS/PHP; TRAIN & EDUC SERV: HCPCS

## 2020-10-08 PROCEDURE — G0176 OPPS/PHP;ACTIVITY THERAPY: HCPCS

## 2020-10-08 PROCEDURE — G0410 GRP PSYCH PARTIAL HOSP 45-50: HCPCS

## 2020-10-09 ENCOUNTER — TELEMEDICINE (OUTPATIENT)
Dept: PSYCHIATRY | Facility: CLINIC | Age: 45
End: 2020-10-09
Payer: COMMERCIAL

## 2020-10-09 ENCOUNTER — HOSPITAL ENCOUNTER (EMERGENCY)
Facility: HOSPITAL | Age: 45
End: 2020-10-09
Attending: EMERGENCY MEDICINE | Admitting: EMERGENCY MEDICINE
Payer: COMMERCIAL

## 2020-10-09 ENCOUNTER — APPOINTMENT (OUTPATIENT)
Dept: PSYCHOLOGY | Facility: CLINIC | Age: 45
End: 2020-10-09
Payer: COMMERCIAL

## 2020-10-09 ENCOUNTER — HOSPITAL ENCOUNTER (INPATIENT)
Facility: HOSPITAL | Age: 45
LOS: 7 days | Discharge: HOME | DRG: 885 | End: 2020-10-16
Attending: STUDENT IN AN ORGANIZED HEALTH CARE EDUCATION/TRAINING PROGRAM | Admitting: STUDENT IN AN ORGANIZED HEALTH CARE EDUCATION/TRAINING PROGRAM
Payer: COMMERCIAL

## 2020-10-09 VITALS
SYSTOLIC BLOOD PRESSURE: 115 MMHG | OXYGEN SATURATION: 94 % | BODY MASS INDEX: 29.38 KG/M2 | WEIGHT: 228.84 LBS | RESPIRATION RATE: 16 BRPM | DIASTOLIC BLOOD PRESSURE: 75 MMHG | TEMPERATURE: 97.6 F | HEART RATE: 81 BPM

## 2020-10-09 DIAGNOSIS — R45.851 SUICIDAL IDEATION: ICD-10-CM

## 2020-10-09 DIAGNOSIS — F41.1 GENERALIZED ANXIETY DISORDER: Chronic | ICD-10-CM

## 2020-10-09 DIAGNOSIS — F33.2 MAJOR DEPRESSIVE DISORDER, RECURRENT, SEVERE WITHOUT PSYCHOTIC FEATURES (HCC): Primary | Chronic | ICD-10-CM

## 2020-10-09 DIAGNOSIS — G47.00 INSOMNIA: Primary | ICD-10-CM

## 2020-10-09 DIAGNOSIS — F33.2 MAJOR DEPRESSIVE DISORDER, RECURRENT, SEVERE WITHOUT PSYCHOTIC FEATURES (HCC): Chronic | ICD-10-CM

## 2020-10-09 DIAGNOSIS — F33.2 SEVERE EPISODE OF RECURRENT MAJOR DEPRESSIVE DISORDER, WITHOUT PSYCHOTIC FEATURES (HCC): ICD-10-CM

## 2020-10-09 PROCEDURE — 99285 EMERGENCY DEPT VISIT HI MDM: CPT | Performed by: EMERGENCY MEDICINE

## 2020-10-09 PROCEDURE — 80307 DRUG TEST PRSMV CHEM ANLYZR: CPT | Performed by: PHYSICIAN ASSISTANT

## 2020-10-09 PROCEDURE — 82075 ASSAY OF BREATH ETHANOL: CPT | Performed by: PHYSICIAN ASSISTANT

## 2020-10-09 PROCEDURE — 99285 EMERGENCY DEPT VISIT HI MDM: CPT

## 2020-10-09 PROCEDURE — 99213 OFFICE O/P EST LOW 20 MIN: CPT | Performed by: PHYSICIAN ASSISTANT

## 2020-10-09 PROCEDURE — 87635 SARS-COV-2 COVID-19 AMP PRB: CPT | Performed by: PHYSICIAN ASSISTANT

## 2020-10-09 RX ORDER — HALOPERIDOL 5 MG/ML
5 INJECTION INTRAMUSCULAR EVERY 6 HOURS PRN
Status: CANCELLED | OUTPATIENT
Start: 2020-10-09

## 2020-10-09 RX ORDER — LORAZEPAM 1 MG/1
1 TABLET ORAL ONCE
Status: COMPLETED | OUTPATIENT
Start: 2020-10-09 | End: 2020-10-09

## 2020-10-09 RX ORDER — HYDROXYZINE HYDROCHLORIDE 25 MG/1
25 TABLET, FILM COATED ORAL EVERY 6 HOURS PRN
Status: CANCELLED | OUTPATIENT
Start: 2020-10-09

## 2020-10-09 RX ORDER — IBUPROFEN 600 MG/1
600 TABLET ORAL EVERY 6 HOURS PRN
Status: CANCELLED | OUTPATIENT
Start: 2020-10-09

## 2020-10-09 RX ORDER — LORAZEPAM 1 MG/1
1 TABLET ORAL EVERY 6 HOURS PRN
Status: CANCELLED | OUTPATIENT
Start: 2020-10-09

## 2020-10-09 RX ORDER — OLANZAPINE 10 MG/1
10 TABLET ORAL EVERY 8 HOURS PRN
Status: CANCELLED | OUTPATIENT
Start: 2020-10-09

## 2020-10-09 RX ORDER — HALOPERIDOL 5 MG
5 TABLET ORAL EVERY 6 HOURS PRN
Status: CANCELLED | OUTPATIENT
Start: 2020-10-09

## 2020-10-09 RX ORDER — ACETAMINOPHEN 325 MG/1
650 TABLET ORAL EVERY 4 HOURS PRN
Status: CANCELLED | OUTPATIENT
Start: 2020-10-09

## 2020-10-09 RX ORDER — BENZTROPINE MESYLATE 1 MG/1
1 TABLET ORAL EVERY 6 HOURS PRN
Status: CANCELLED | OUTPATIENT
Start: 2020-10-09

## 2020-10-09 RX ORDER — MAGNESIUM HYDROXIDE/ALUMINUM HYDROXICE/SIMETHICONE 120; 1200; 1200 MG/30ML; MG/30ML; MG/30ML
30 SUSPENSION ORAL EVERY 4 HOURS PRN
Status: CANCELLED | OUTPATIENT
Start: 2020-10-09

## 2020-10-09 RX ORDER — OLANZAPINE 10 MG/1
10 INJECTION, POWDER, LYOPHILIZED, FOR SOLUTION INTRAMUSCULAR EVERY 8 HOURS PRN
Status: CANCELLED | OUTPATIENT
Start: 2020-10-09

## 2020-10-09 RX ORDER — BENZTROPINE MESYLATE 1 MG/ML
1 INJECTION INTRAMUSCULAR; INTRAVENOUS EVERY 6 HOURS PRN
Status: CANCELLED | OUTPATIENT
Start: 2020-10-09

## 2020-10-09 RX ORDER — HYDROXYZINE HYDROCHLORIDE 25 MG/1
50 TABLET, FILM COATED ORAL EVERY 6 HOURS PRN
Status: CANCELLED | OUTPATIENT
Start: 2020-10-09

## 2020-10-09 RX ORDER — RISPERIDONE 1 MG/1
1 TABLET, ORALLY DISINTEGRATING ORAL
Status: CANCELLED | OUTPATIENT
Start: 2020-10-09

## 2020-10-09 RX ORDER — ACETAMINOPHEN 325 MG/1
325 TABLET ORAL EVERY 6 HOURS PRN
Status: CANCELLED | OUTPATIENT
Start: 2020-10-09

## 2020-10-09 RX ORDER — LORAZEPAM 2 MG/ML
2 INJECTION INTRAMUSCULAR EVERY 6 HOURS PRN
Status: CANCELLED | OUTPATIENT
Start: 2020-10-09

## 2020-10-09 RX ORDER — TRAZODONE HYDROCHLORIDE 50 MG/1
50 TABLET ORAL
Status: CANCELLED | OUTPATIENT
Start: 2020-10-09

## 2020-10-09 RX ADMIN — LORAZEPAM 1 MG: 1 TABLET ORAL at 10:19

## 2020-10-09 RX ADMIN — LORAZEPAM 1 MG: 1 TABLET ORAL at 16:39

## 2020-10-10 LAB
ALBUMIN SERPL BCP-MCNC: 3.6 G/DL (ref 3.5–5)
ALP SERPL-CCNC: 51 U/L (ref 46–116)
ALT SERPL W P-5'-P-CCNC: 22 U/L (ref 12–78)
ANION GAP SERPL CALCULATED.3IONS-SCNC: 9 MMOL/L (ref 4–13)
AST SERPL W P-5'-P-CCNC: 16 U/L (ref 5–45)
BASOPHILS # BLD AUTO: 0.04 THOUSANDS/ΜL (ref 0–0.1)
BASOPHILS NFR BLD AUTO: 1 % (ref 0–1)
BILIRUB SERPL-MCNC: 0.5 MG/DL (ref 0.2–1)
BILIRUB UR QL STRIP: NEGATIVE
BUN SERPL-MCNC: 16 MG/DL (ref 5–25)
CALCIUM SERPL-MCNC: 8.8 MG/DL (ref 8.3–10.1)
CHLORIDE SERPL-SCNC: 104 MMOL/L (ref 100–108)
CLARITY UR: CLEAR
CO2 SERPL-SCNC: 26 MMOL/L (ref 21–32)
COLOR UR: NORMAL
CREAT SERPL-MCNC: 1.11 MG/DL (ref 0.6–1.3)
EOSINOPHIL # BLD AUTO: 0.11 THOUSAND/ΜL (ref 0–0.61)
EOSINOPHIL NFR BLD AUTO: 2 % (ref 0–6)
ERYTHROCYTE [DISTWIDTH] IN BLOOD BY AUTOMATED COUNT: 12.8 % (ref 11.6–15.1)
GFR SERPL CREATININE-BSD FRML MDRD: 79 ML/MIN/1.73SQ M
GLUCOSE SERPL-MCNC: 88 MG/DL (ref 65–140)
GLUCOSE UR STRIP-MCNC: NEGATIVE MG/DL
HCT VFR BLD AUTO: 40.6 % (ref 36.5–49.3)
HGB BLD-MCNC: 13.6 G/DL (ref 12–17)
HGB UR QL STRIP.AUTO: NEGATIVE
IMM GRANULOCYTES # BLD AUTO: 0.01 THOUSAND/UL (ref 0–0.2)
IMM GRANULOCYTES NFR BLD AUTO: 0 % (ref 0–2)
KETONES UR STRIP-MCNC: NEGATIVE MG/DL
LEUKOCYTE ESTERASE UR QL STRIP: NEGATIVE
LITHIUM SERPL-SCNC: 0.3 MMOL/L (ref 0.5–1)
LYMPHOCYTES # BLD AUTO: 1.72 THOUSANDS/ΜL (ref 0.6–4.47)
LYMPHOCYTES NFR BLD AUTO: 32 % (ref 14–44)
MCH RBC QN AUTO: 28.8 PG (ref 26.8–34.3)
MCHC RBC AUTO-ENTMCNC: 33.5 G/DL (ref 31.4–37.4)
MCV RBC AUTO: 86 FL (ref 82–98)
MONOCYTES # BLD AUTO: 0.53 THOUSAND/ΜL (ref 0.17–1.22)
MONOCYTES NFR BLD AUTO: 10 % (ref 4–12)
NEUTROPHILS # BLD AUTO: 3.04 THOUSANDS/ΜL (ref 1.85–7.62)
NEUTS SEG NFR BLD AUTO: 55 % (ref 43–75)
NITRITE UR QL STRIP: NEGATIVE
NRBC BLD AUTO-RTO: 0 /100 WBCS
PH UR STRIP.AUTO: 6 [PH]
PLATELET # BLD AUTO: 299 THOUSANDS/UL (ref 149–390)
PMV BLD AUTO: 10.3 FL (ref 8.9–12.7)
POTASSIUM SERPL-SCNC: 3.9 MMOL/L (ref 3.5–5.3)
PROT SERPL-MCNC: 7 G/DL (ref 6.4–8.2)
PROT UR STRIP-MCNC: NEGATIVE MG/DL
RBC # BLD AUTO: 4.72 MILLION/UL (ref 3.88–5.62)
SODIUM SERPL-SCNC: 139 MMOL/L (ref 136–145)
SP GR UR STRIP.AUTO: 1.02 (ref 1–1.03)
TSH SERPL DL<=0.05 MIU/L-ACNC: 1.17 UIU/ML (ref 0.36–3.74)
UROBILINOGEN UR QL STRIP.AUTO: 0.2 E.U./DL
WBC # BLD AUTO: 5.45 THOUSAND/UL (ref 4.31–10.16)

## 2020-10-10 PROCEDURE — 81003 URINALYSIS AUTO W/O SCOPE: CPT | Performed by: PHYSICIAN ASSISTANT

## 2020-10-10 PROCEDURE — 99221 1ST HOSP IP/OBS SF/LOW 40: CPT | Performed by: PHYSICIAN ASSISTANT

## 2020-10-10 PROCEDURE — 85025 COMPLETE CBC W/AUTO DIFF WBC: CPT | Performed by: PHYSICIAN ASSISTANT

## 2020-10-10 PROCEDURE — 80178 ASSAY OF LITHIUM: CPT | Performed by: PHYSICIAN ASSISTANT

## 2020-10-10 PROCEDURE — 80053 COMPREHEN METABOLIC PANEL: CPT | Performed by: PHYSICIAN ASSISTANT

## 2020-10-10 PROCEDURE — 86592 SYPHILIS TEST NON-TREP QUAL: CPT | Performed by: PHYSICIAN ASSISTANT

## 2020-10-10 PROCEDURE — 84443 ASSAY THYROID STIM HORMONE: CPT | Performed by: PHYSICIAN ASSISTANT

## 2020-10-10 PROCEDURE — 99222 1ST HOSP IP/OBS MODERATE 55: CPT | Performed by: PSYCHIATRY & NEUROLOGY

## 2020-10-10 RX ORDER — TRAZODONE HYDROCHLORIDE 50 MG/1
50 TABLET ORAL
Status: DISCONTINUED | OUTPATIENT
Start: 2020-10-10 | End: 2020-10-10

## 2020-10-10 RX ORDER — LORAZEPAM 1 MG/1
1 TABLET ORAL EVERY 6 HOURS PRN
Status: DISCONTINUED | OUTPATIENT
Start: 2020-10-10 | End: 2020-10-16 | Stop reason: HOSPADM

## 2020-10-10 RX ORDER — FLUOXETINE HYDROCHLORIDE 20 MG/5ML
20 LIQUID ORAL DAILY
Status: DISCONTINUED | OUTPATIENT
Start: 2020-10-10 | End: 2020-10-10

## 2020-10-10 RX ORDER — IBUPROFEN 600 MG/1
600 TABLET ORAL EVERY 6 HOURS PRN
Status: DISCONTINUED | OUTPATIENT
Start: 2020-10-10 | End: 2020-10-16 | Stop reason: HOSPADM

## 2020-10-10 RX ORDER — HALOPERIDOL 5 MG
5 TABLET ORAL EVERY 6 HOURS PRN
Status: DISCONTINUED | OUTPATIENT
Start: 2020-10-10 | End: 2020-10-16 | Stop reason: HOSPADM

## 2020-10-10 RX ORDER — MAGNESIUM HYDROXIDE/ALUMINUM HYDROXICE/SIMETHICONE 120; 1200; 1200 MG/30ML; MG/30ML; MG/30ML
30 SUSPENSION ORAL EVERY 4 HOURS PRN
Status: DISCONTINUED | OUTPATIENT
Start: 2020-10-10 | End: 2020-10-16 | Stop reason: HOSPADM

## 2020-10-10 RX ORDER — BENZTROPINE MESYLATE 1 MG/1
1 TABLET ORAL EVERY 6 HOURS PRN
Status: DISCONTINUED | OUTPATIENT
Start: 2020-10-10 | End: 2020-10-16 | Stop reason: HOSPADM

## 2020-10-10 RX ORDER — TRAZODONE HYDROCHLORIDE 50 MG/1
50 TABLET ORAL
Status: DISCONTINUED | OUTPATIENT
Start: 2020-10-10 | End: 2020-10-12

## 2020-10-10 RX ORDER — LITHIUM CARBONATE 300 MG/1
600 CAPSULE ORAL
Status: DISCONTINUED | OUTPATIENT
Start: 2020-10-10 | End: 2020-10-16 | Stop reason: HOSPADM

## 2020-10-10 RX ORDER — DULOXETIN HYDROCHLORIDE 30 MG/1
30 CAPSULE, DELAYED RELEASE ORAL DAILY
Status: DISCONTINUED | OUTPATIENT
Start: 2020-10-10 | End: 2020-10-16 | Stop reason: HOSPADM

## 2020-10-10 RX ORDER — LORAZEPAM 1 MG/1
TABLET ORAL
Status: COMPLETED
Start: 2020-10-10 | End: 2020-10-10

## 2020-10-10 RX ORDER — OLANZAPINE 10 MG/1
10 TABLET ORAL EVERY 8 HOURS PRN
Status: DISCONTINUED | OUTPATIENT
Start: 2020-10-10 | End: 2020-10-16 | Stop reason: HOSPADM

## 2020-10-10 RX ORDER — ACETAMINOPHEN 325 MG/1
325 TABLET ORAL EVERY 6 HOURS PRN
Status: DISCONTINUED | OUTPATIENT
Start: 2020-10-10 | End: 2020-10-16 | Stop reason: HOSPADM

## 2020-10-10 RX ORDER — RISPERIDONE 1 MG/1
1 TABLET, ORALLY DISINTEGRATING ORAL
Status: DISCONTINUED | OUTPATIENT
Start: 2020-10-10 | End: 2020-10-16 | Stop reason: HOSPADM

## 2020-10-10 RX ORDER — HALOPERIDOL 5 MG/ML
5 INJECTION INTRAMUSCULAR EVERY 6 HOURS PRN
Status: DISCONTINUED | OUTPATIENT
Start: 2020-10-10 | End: 2020-10-16 | Stop reason: HOSPADM

## 2020-10-10 RX ORDER — LORAZEPAM 2 MG/ML
2 INJECTION INTRAMUSCULAR EVERY 6 HOURS PRN
Status: DISCONTINUED | OUTPATIENT
Start: 2020-10-10 | End: 2020-10-16 | Stop reason: HOSPADM

## 2020-10-10 RX ORDER — TRAZODONE HYDROCHLORIDE 50 MG/1
TABLET ORAL
Status: COMPLETED
Start: 2020-10-10 | End: 2020-10-10

## 2020-10-10 RX ORDER — HYDROXYZINE 50 MG/1
50 TABLET, FILM COATED ORAL EVERY 6 HOURS PRN
Status: DISCONTINUED | OUTPATIENT
Start: 2020-10-10 | End: 2020-10-16 | Stop reason: HOSPADM

## 2020-10-10 RX ORDER — ACETAMINOPHEN 325 MG/1
650 TABLET ORAL EVERY 4 HOURS PRN
Status: DISCONTINUED | OUTPATIENT
Start: 2020-10-10 | End: 2020-10-16 | Stop reason: HOSPADM

## 2020-10-10 RX ORDER — BENZTROPINE MESYLATE 1 MG/ML
1 INJECTION INTRAMUSCULAR; INTRAVENOUS EVERY 6 HOURS PRN
Status: DISCONTINUED | OUTPATIENT
Start: 2020-10-10 | End: 2020-10-14

## 2020-10-10 RX ORDER — HYDROXYZINE HYDROCHLORIDE 25 MG/1
25 TABLET, FILM COATED ORAL EVERY 6 HOURS PRN
Status: DISCONTINUED | OUTPATIENT
Start: 2020-10-10 | End: 2020-10-16 | Stop reason: HOSPADM

## 2020-10-10 RX ORDER — OLANZAPINE 10 MG/1
10 INJECTION, POWDER, LYOPHILIZED, FOR SOLUTION INTRAMUSCULAR EVERY 8 HOURS PRN
Status: DISCONTINUED | OUTPATIENT
Start: 2020-10-10 | End: 2020-10-16 | Stop reason: HOSPADM

## 2020-10-10 RX ADMIN — TRAZODONE HYDROCHLORIDE 50 MG: 50 TABLET ORAL at 02:53

## 2020-10-10 RX ADMIN — TRAZODONE HYDROCHLORIDE 50 MG: 50 TABLET ORAL at 21:26

## 2020-10-10 RX ADMIN — DULOXETINE 30 MG: 30 CAPSULE, DELAYED RELEASE ORAL at 16:08

## 2020-10-10 RX ADMIN — LORAZEPAM 1 MG: 1 TABLET ORAL at 00:15

## 2020-10-10 RX ADMIN — IBUPROFEN 600 MG: 600 TABLET, FILM COATED ORAL at 18:14

## 2020-10-10 RX ADMIN — LORAZEPAM 1 MG: 1 TABLET ORAL at 22:33

## 2020-10-10 RX ADMIN — LORAZEPAM 1 MG: 1 TABLET ORAL at 09:23

## 2020-10-10 RX ADMIN — LITHIUM CARBONATE 600 MG: 300 CAPSULE, GELATIN COATED ORAL at 21:24

## 2020-10-11 LAB — 25(OH)D3 SERPL-MCNC: 35.5 NG/ML (ref 30–100)

## 2020-10-11 PROCEDURE — 90686 IIV4 VACC NO PRSV 0.5 ML IM: CPT | Performed by: STUDENT IN AN ORGANIZED HEALTH CARE EDUCATION/TRAINING PROGRAM

## 2020-10-11 PROCEDURE — 82306 VITAMIN D 25 HYDROXY: CPT | Performed by: PSYCHIATRY & NEUROLOGY

## 2020-10-11 PROCEDURE — 90471 IMMUNIZATION ADMIN: CPT | Performed by: STUDENT IN AN ORGANIZED HEALTH CARE EDUCATION/TRAINING PROGRAM

## 2020-10-11 PROCEDURE — 99232 SBSQ HOSP IP/OBS MODERATE 35: CPT | Performed by: PSYCHIATRY & NEUROLOGY

## 2020-10-11 RX ORDER — GABAPENTIN 100 MG/1
100 CAPSULE ORAL
Status: DISCONTINUED | OUTPATIENT
Start: 2020-10-11 | End: 2020-10-12

## 2020-10-11 RX ORDER — GABAPENTIN 100 MG/1
100 CAPSULE ORAL
Status: DISCONTINUED | OUTPATIENT
Start: 2020-10-11 | End: 2020-10-11

## 2020-10-11 RX ADMIN — LITHIUM CARBONATE 600 MG: 300 CAPSULE, GELATIN COATED ORAL at 21:11

## 2020-10-11 RX ADMIN — TRAZODONE HYDROCHLORIDE 50 MG: 50 TABLET ORAL at 23:22

## 2020-10-11 RX ADMIN — INFLUENZA VIRUS VACCINE 0.5 ML: 15; 15; 15; 15 SUSPENSION INTRAMUSCULAR at 16:31

## 2020-10-11 RX ADMIN — GABAPENTIN 100 MG: 100 CAPSULE ORAL at 21:11

## 2020-10-11 RX ADMIN — DULOXETINE 30 MG: 30 CAPSULE, DELAYED RELEASE ORAL at 09:49

## 2020-10-12 ENCOUNTER — DOCUMENTATION (OUTPATIENT)
Dept: PSYCHOLOGY | Facility: CLINIC | Age: 45
End: 2020-10-12

## 2020-10-12 DIAGNOSIS — F41.1 GENERALIZED ANXIETY DISORDER: Chronic | ICD-10-CM

## 2020-10-12 DIAGNOSIS — F19.20 POLYSUBSTANCE DEPENDENCE (HCC): ICD-10-CM

## 2020-10-12 DIAGNOSIS — F33.2 MAJOR DEPRESSIVE DISORDER, RECURRENT, SEVERE WITHOUT PSYCHOTIC FEATURES (HCC): Primary | Chronic | ICD-10-CM

## 2020-10-12 LAB — RPR SER QL: NORMAL

## 2020-10-12 PROCEDURE — 99232 SBSQ HOSP IP/OBS MODERATE 35: CPT | Performed by: STUDENT IN AN ORGANIZED HEALTH CARE EDUCATION/TRAINING PROGRAM

## 2020-10-12 RX ORDER — TRAZODONE HYDROCHLORIDE 50 MG/1
50 TABLET ORAL
Status: DISCONTINUED | OUTPATIENT
Start: 2020-10-12 | End: 2020-10-16 | Stop reason: HOSPADM

## 2020-10-12 RX ORDER — GABAPENTIN 300 MG/1
300 CAPSULE ORAL
Status: DISCONTINUED | OUTPATIENT
Start: 2020-10-12 | End: 2020-10-16 | Stop reason: HOSPADM

## 2020-10-12 RX ADMIN — LITHIUM CARBONATE 600 MG: 300 CAPSULE, GELATIN COATED ORAL at 21:59

## 2020-10-12 RX ADMIN — TRAZODONE HYDROCHLORIDE 50 MG: 50 TABLET ORAL at 21:59

## 2020-10-12 RX ADMIN — GABAPENTIN 300 MG: 300 CAPSULE ORAL at 21:59

## 2020-10-12 RX ADMIN — DULOXETINE 30 MG: 30 CAPSULE, DELAYED RELEASE ORAL at 09:54

## 2020-10-13 PROCEDURE — 99232 SBSQ HOSP IP/OBS MODERATE 35: CPT | Performed by: STUDENT IN AN ORGANIZED HEALTH CARE EDUCATION/TRAINING PROGRAM

## 2020-10-13 RX ORDER — GABAPENTIN 300 MG/1
300 CAPSULE ORAL DAILY
Status: DISCONTINUED | OUTPATIENT
Start: 2020-10-13 | End: 2020-10-16 | Stop reason: HOSPADM

## 2020-10-13 RX ADMIN — DULOXETINE 30 MG: 30 CAPSULE, DELAYED RELEASE ORAL at 08:47

## 2020-10-13 RX ADMIN — TRAZODONE HYDROCHLORIDE 50 MG: 50 TABLET ORAL at 23:01

## 2020-10-13 RX ADMIN — LITHIUM CARBONATE 600 MG: 300 CAPSULE, GELATIN COATED ORAL at 21:17

## 2020-10-13 RX ADMIN — GABAPENTIN 300 MG: 300 CAPSULE ORAL at 21:17

## 2020-10-13 RX ADMIN — GABAPENTIN 300 MG: 300 CAPSULE ORAL at 12:23

## 2020-10-14 PROCEDURE — 99232 SBSQ HOSP IP/OBS MODERATE 35: CPT | Performed by: STUDENT IN AN ORGANIZED HEALTH CARE EDUCATION/TRAINING PROGRAM

## 2020-10-14 RX ORDER — BENZTROPINE MESYLATE 1 MG/ML
1 INJECTION INTRAMUSCULAR; INTRAVENOUS EVERY 6 HOURS PRN
Status: DISCONTINUED | OUTPATIENT
Start: 2020-10-14 | End: 2020-10-16 | Stop reason: HOSPADM

## 2020-10-14 RX ADMIN — LITHIUM CARBONATE 600 MG: 300 CAPSULE, GELATIN COATED ORAL at 22:13

## 2020-10-14 RX ADMIN — GABAPENTIN 300 MG: 300 CAPSULE ORAL at 08:49

## 2020-10-14 RX ADMIN — TRAZODONE HYDROCHLORIDE 50 MG: 50 TABLET ORAL at 22:13

## 2020-10-14 RX ADMIN — GABAPENTIN 300 MG: 300 CAPSULE ORAL at 19:57

## 2020-10-14 RX ADMIN — DULOXETINE 30 MG: 30 CAPSULE, DELAYED RELEASE ORAL at 08:49

## 2020-10-15 LAB — LITHIUM SERPL-SCNC: 0.6 MMOL/L (ref 0.5–1)

## 2020-10-15 PROCEDURE — 99232 SBSQ HOSP IP/OBS MODERATE 35: CPT | Performed by: PHYSICIAN ASSISTANT

## 2020-10-15 PROCEDURE — 80178 ASSAY OF LITHIUM: CPT | Performed by: STUDENT IN AN ORGANIZED HEALTH CARE EDUCATION/TRAINING PROGRAM

## 2020-10-15 RX ADMIN — GABAPENTIN 300 MG: 300 CAPSULE ORAL at 19:49

## 2020-10-15 RX ADMIN — TRAZODONE HYDROCHLORIDE 50 MG: 50 TABLET ORAL at 22:24

## 2020-10-15 RX ADMIN — DULOXETINE 30 MG: 30 CAPSULE, DELAYED RELEASE ORAL at 08:57

## 2020-10-15 RX ADMIN — LITHIUM CARBONATE 600 MG: 300 CAPSULE, GELATIN COATED ORAL at 21:31

## 2020-10-15 RX ADMIN — GABAPENTIN 300 MG: 300 CAPSULE ORAL at 08:57

## 2020-10-16 VITALS
DIASTOLIC BLOOD PRESSURE: 61 MMHG | RESPIRATION RATE: 16 BRPM | WEIGHT: 226.2 LBS | OXYGEN SATURATION: 98 % | HEIGHT: 74 IN | SYSTOLIC BLOOD PRESSURE: 117 MMHG | BODY MASS INDEX: 29.03 KG/M2 | TEMPERATURE: 97.2 F | HEART RATE: 75 BPM

## 2020-10-16 PROCEDURE — 99239 HOSP IP/OBS DSCHRG MGMT >30: CPT | Performed by: STUDENT IN AN ORGANIZED HEALTH CARE EDUCATION/TRAINING PROGRAM

## 2020-10-16 RX ORDER — LITHIUM CARBONATE 300 MG/1
600 CAPSULE ORAL
Qty: 30 CAPSULE | Refills: 0 | Status: SHIPPED | OUTPATIENT
Start: 2020-10-16 | End: 2020-11-06 | Stop reason: HOSPADM

## 2020-10-16 RX ORDER — DULOXETIN HYDROCHLORIDE 30 MG/1
30 CAPSULE, DELAYED RELEASE ORAL DAILY
Qty: 15 CAPSULE | Refills: 0 | Status: SHIPPED | OUTPATIENT
Start: 2020-10-17 | End: 2020-11-06 | Stop reason: HOSPADM

## 2020-10-16 RX ORDER — LORAZEPAM 0.5 MG/1
0.5 TABLET ORAL 2 TIMES DAILY PRN
Qty: 20 TABLET | Refills: 0 | Status: SHIPPED | OUTPATIENT
Start: 2020-10-16 | End: 2021-03-30

## 2020-10-16 RX ORDER — TRAZODONE HYDROCHLORIDE 50 MG/1
50 TABLET ORAL
Qty: 15 TABLET | Refills: 0 | Status: SHIPPED | OUTPATIENT
Start: 2020-10-16 | End: 2020-11-06 | Stop reason: HOSPADM

## 2020-10-16 RX ORDER — GABAPENTIN 300 MG/1
300 CAPSULE ORAL 2 TIMES DAILY
Qty: 30 CAPSULE | Refills: 0 | Status: SHIPPED | OUTPATIENT
Start: 2020-10-16 | End: 2020-11-06 | Stop reason: HOSPADM

## 2020-10-16 RX ADMIN — GABAPENTIN 300 MG: 300 CAPSULE ORAL at 08:07

## 2020-10-16 RX ADMIN — DULOXETINE 30 MG: 30 CAPSULE, DELAYED RELEASE ORAL at 08:07

## 2020-10-22 ENCOUNTER — TELEPHONE (OUTPATIENT)
Dept: PSYCHIATRY | Facility: CLINIC | Age: 45
End: 2020-10-22

## 2020-10-22 ENCOUNTER — HOSPITAL ENCOUNTER (INPATIENT)
Facility: HOSPITAL | Age: 45
LOS: 14 days | Discharge: HOME | DRG: 885 | End: 2020-11-06
Attending: EMERGENCY MEDICINE | Admitting: PSYCHIATRY & NEUROLOGY
Payer: COMMERCIAL

## 2020-10-22 DIAGNOSIS — R45.851 DEPRESSION WITH SUICIDAL IDEATION: ICD-10-CM

## 2020-10-22 DIAGNOSIS — R00.0 TACHYCARDIA: ICD-10-CM

## 2020-10-22 DIAGNOSIS — Z00.8 MEDICAL CLEARANCE FOR PSYCHIATRIC ADMISSION: ICD-10-CM

## 2020-10-22 DIAGNOSIS — F33.2 MAJOR DEPRESSIVE DISORDER, RECURRENT, SEVERE WITHOUT PSYCHOTIC FEATURES (HCC): Chronic | ICD-10-CM

## 2020-10-22 DIAGNOSIS — F41.9 ANXIETY: ICD-10-CM

## 2020-10-22 DIAGNOSIS — G47.33 OBSTRUCTIVE SLEEP APNEA SYNDROME: ICD-10-CM

## 2020-10-22 DIAGNOSIS — F51.04 PSYCHOPHYSIOLOGICAL INSOMNIA: ICD-10-CM

## 2020-10-22 DIAGNOSIS — Z79.899 ENCOUNTER FOR LITHIUM MONITORING: ICD-10-CM

## 2020-10-22 DIAGNOSIS — Z51.81 ENCOUNTER FOR LITHIUM MONITORING: ICD-10-CM

## 2020-10-22 DIAGNOSIS — F32.A DEPRESSION WITH SUICIDAL IDEATION: ICD-10-CM

## 2020-10-22 DIAGNOSIS — F31.81 BIPOLAR 2 DISORDER, MAJOR DEPRESSIVE EPISODE (HCC): Primary | ICD-10-CM

## 2020-10-22 DIAGNOSIS — E78.2 MIXED HYPERLIPIDEMIA: ICD-10-CM

## 2020-10-22 DIAGNOSIS — F41.1 GENERALIZED ANXIETY DISORDER: Chronic | ICD-10-CM

## 2020-10-22 PROCEDURE — NC001 PR NO CHARGE: Performed by: EMERGENCY MEDICINE

## 2020-10-22 PROCEDURE — 99285 EMERGENCY DEPT VISIT HI MDM: CPT

## 2020-10-22 PROCEDURE — 80307 DRUG TEST PRSMV CHEM ANLYZR: CPT | Performed by: EMERGENCY MEDICINE

## 2020-10-22 PROCEDURE — 87635 SARS-COV-2 COVID-19 AMP PRB: CPT | Performed by: EMERGENCY MEDICINE

## 2020-10-22 PROCEDURE — 82075 ASSAY OF BREATH ETHANOL: CPT | Performed by: EMERGENCY MEDICINE

## 2020-10-22 PROCEDURE — 99205 OFFICE O/P NEW HI 60 MIN: CPT | Performed by: PSYCHIATRY & NEUROLOGY

## 2020-10-22 PROCEDURE — 99285 EMERGENCY DEPT VISIT HI MDM: CPT | Performed by: EMERGENCY MEDICINE

## 2020-10-22 RX ORDER — LORAZEPAM 0.5 MG/1
1 TABLET ORAL ONCE
Status: COMPLETED | OUTPATIENT
Start: 2020-10-22 | End: 2020-10-22

## 2020-10-22 RX ORDER — LITHIUM CARBONATE 300 MG/1
600 CAPSULE ORAL ONCE
Status: COMPLETED | OUTPATIENT
Start: 2020-10-22 | End: 2020-10-22

## 2020-10-22 RX ORDER — TRAZODONE HYDROCHLORIDE 100 MG/1
50 TABLET ORAL ONCE
Status: COMPLETED | OUTPATIENT
Start: 2020-10-22 | End: 2020-10-22

## 2020-10-22 RX ORDER — GABAPENTIN 100 MG/1
300 CAPSULE ORAL ONCE
Status: COMPLETED | OUTPATIENT
Start: 2020-10-22 | End: 2020-10-22

## 2020-10-22 RX ADMIN — LITHIUM CARBONATE 600 MG: 300 CAPSULE, GELATIN COATED ORAL at 22:20

## 2020-10-22 RX ADMIN — TRAZODONE HYDROCHLORIDE 50 MG: 100 TABLET ORAL at 22:16

## 2020-10-22 RX ADMIN — GABAPENTIN 300 MG: 100 CAPSULE ORAL at 22:15

## 2020-10-22 RX ADMIN — LORAZEPAM 1 MG: 0.5 TABLET ORAL at 16:22

## 2020-10-23 RX ORDER — GABAPENTIN 300 MG/1
300 CAPSULE ORAL 2 TIMES DAILY
Status: DISCONTINUED | OUTPATIENT
Start: 2020-10-23 | End: 2020-11-06 | Stop reason: HOSPADM

## 2020-10-23 RX ORDER — TRAZODONE HYDROCHLORIDE 50 MG/1
50 TABLET ORAL
Status: DISCONTINUED | OUTPATIENT
Start: 2020-10-23 | End: 2020-11-06 | Stop reason: HOSPADM

## 2020-10-23 RX ORDER — ACETAMINOPHEN 325 MG/1
650 TABLET ORAL EVERY 8 HOURS PRN
Status: DISCONTINUED | OUTPATIENT
Start: 2020-10-23 | End: 2020-11-06 | Stop reason: HOSPADM

## 2020-10-23 RX ORDER — MAGNESIUM HYDROXIDE/ALUMINUM HYDROXICE/SIMETHICONE 120; 1200; 1200 MG/30ML; MG/30ML; MG/30ML
30 SUSPENSION ORAL EVERY 4 HOURS PRN
Status: DISCONTINUED | OUTPATIENT
Start: 2020-10-23 | End: 2020-11-06 | Stop reason: HOSPADM

## 2020-10-23 RX ORDER — LITHIUM CARBONATE 300 MG/1
600 CAPSULE ORAL
Status: DISCONTINUED | OUTPATIENT
Start: 2020-10-23 | End: 2020-10-27

## 2020-10-23 RX ORDER — ACETAMINOPHEN 325 MG/1
650 TABLET ORAL EVERY 6 HOURS PRN
Status: DISCONTINUED | OUTPATIENT
Start: 2020-10-23 | End: 2020-11-06 | Stop reason: HOSPADM

## 2020-10-23 RX ORDER — LORAZEPAM 0.5 MG/1
1 TABLET ORAL ONCE
Status: COMPLETED | OUTPATIENT
Start: 2020-10-23 | End: 2020-10-23

## 2020-10-23 RX ORDER — ACETAMINOPHEN 325 MG/1
325 TABLET ORAL EVERY 6 HOURS PRN
Status: DISCONTINUED | OUTPATIENT
Start: 2020-10-23 | End: 2020-11-06 | Stop reason: HOSPADM

## 2020-10-23 RX ORDER — GABAPENTIN 300 MG/1
300 CAPSULE ORAL DAILY
Status: DISCONTINUED | OUTPATIENT
Start: 2020-10-23 | End: 2020-10-24

## 2020-10-23 RX ORDER — OLANZAPINE 10 MG/1
10 INJECTION, POWDER, LYOPHILIZED, FOR SOLUTION INTRAMUSCULAR
Status: DISCONTINUED | OUTPATIENT
Start: 2020-10-23 | End: 2020-11-06 | Stop reason: HOSPADM

## 2020-10-23 RX ORDER — HALOPERIDOL 5 MG
5 TABLET ORAL EVERY 8 HOURS PRN
Status: DISCONTINUED | OUTPATIENT
Start: 2020-10-23 | End: 2020-11-06 | Stop reason: HOSPADM

## 2020-10-23 RX ORDER — RISPERIDONE 1 MG/1
1 TABLET, ORALLY DISINTEGRATING ORAL
Status: DISCONTINUED | OUTPATIENT
Start: 2020-10-23 | End: 2020-11-06 | Stop reason: HOSPADM

## 2020-10-23 RX ORDER — OLANZAPINE 10 MG/1
10 TABLET ORAL
Status: DISCONTINUED | OUTPATIENT
Start: 2020-10-23 | End: 2020-11-06 | Stop reason: HOSPADM

## 2020-10-23 RX ORDER — DULOXETIN HYDROCHLORIDE 30 MG/1
30 CAPSULE, DELAYED RELEASE ORAL DAILY
Status: DISCONTINUED | OUTPATIENT
Start: 2020-10-24 | End: 2020-10-25

## 2020-10-23 RX ORDER — DULOXETIN HYDROCHLORIDE 30 MG/1
30 CAPSULE, DELAYED RELEASE ORAL DAILY
Status: DISCONTINUED | OUTPATIENT
Start: 2020-10-23 | End: 2020-10-24

## 2020-10-23 RX ORDER — BENZTROPINE MESYLATE 1 MG/ML
1 INJECTION INTRAMUSCULAR; INTRAVENOUS EVERY 6 HOURS PRN
Status: DISCONTINUED | OUTPATIENT
Start: 2020-10-23 | End: 2020-11-06 | Stop reason: HOSPADM

## 2020-10-23 RX ORDER — LORAZEPAM 2 MG/ML
1 INJECTION INTRAMUSCULAR EVERY 6 HOURS PRN
Status: DISCONTINUED | OUTPATIENT
Start: 2020-10-23 | End: 2020-11-06 | Stop reason: HOSPADM

## 2020-10-23 RX ORDER — LORAZEPAM 0.5 MG/1
0.5 TABLET ORAL EVERY 8 HOURS PRN
Status: DISCONTINUED | OUTPATIENT
Start: 2020-10-23 | End: 2020-11-06 | Stop reason: HOSPADM

## 2020-10-23 RX ORDER — BENZTROPINE MESYLATE 1 MG/1
1 TABLET ORAL EVERY 6 HOURS PRN
Status: DISCONTINUED | OUTPATIENT
Start: 2020-10-23 | End: 2020-11-06 | Stop reason: HOSPADM

## 2020-10-23 RX ORDER — HYDROXYZINE HYDROCHLORIDE 25 MG/1
25 TABLET, FILM COATED ORAL EVERY 6 HOURS PRN
Status: DISCONTINUED | OUTPATIENT
Start: 2020-10-23 | End: 2020-11-06 | Stop reason: HOSPADM

## 2020-10-23 RX ADMIN — GABAPENTIN 300 MG: 100 CAPSULE ORAL at 12:18

## 2020-10-23 RX ADMIN — LORAZEPAM 1 MG: 0.5 TABLET ORAL at 08:31

## 2020-10-23 RX ADMIN — DULOXETINE 30 MG: 30 CAPSULE, DELAYED RELEASE ORAL at 12:18

## 2020-10-23 RX ADMIN — ACETAMINOPHEN 650 MG: 325 TABLET ORAL at 19:18

## 2020-10-23 RX ADMIN — GABAPENTIN 300 MG: 100 CAPSULE ORAL at 20:25

## 2020-10-23 RX ADMIN — LITHIUM CARBONATE 600 MG: 300 CAPSULE, GELATIN COATED ORAL at 21:09

## 2020-10-23 RX ADMIN — TRAZODONE HYDROCHLORIDE 50 MG: 50 TABLET ORAL at 21:39

## 2020-10-24 LAB
ALBUMIN SERPL BCP-MCNC: 3.9 G/DL (ref 3–5.2)
ALP SERPL-CCNC: 43 U/L (ref 43–122)
ALT SERPL W P-5'-P-CCNC: 14 U/L (ref 9–52)
ANION GAP SERPL CALCULATED.3IONS-SCNC: 3 MMOL/L (ref 5–14)
AST SERPL W P-5'-P-CCNC: 18 U/L (ref 17–59)
BASOPHILS # BLD AUTO: 0 THOUSANDS/ΜL (ref 0–0.1)
BASOPHILS NFR BLD AUTO: 1 % (ref 0–1)
BILIRUB SERPL-MCNC: 0.6 MG/DL
BUN SERPL-MCNC: 14 MG/DL (ref 5–25)
CALCIUM SERPL-MCNC: 9.5 MG/DL (ref 8.4–10.2)
CHLORIDE SERPL-SCNC: 105 MMOL/L (ref 97–108)
CHOLEST SERPL-MCNC: 238 MG/DL
CO2 SERPL-SCNC: 29 MMOL/L (ref 22–30)
CREAT SERPL-MCNC: 1.01 MG/DL (ref 0.7–1.5)
EOSINOPHIL # BLD AUTO: 0.2 THOUSAND/ΜL (ref 0–0.4)
EOSINOPHIL NFR BLD AUTO: 4 % (ref 0–6)
ERYTHROCYTE [DISTWIDTH] IN BLOOD BY AUTOMATED COUNT: 14 %
GFR SERPL CREATININE-BSD FRML MDRD: 88 ML/MIN/1.73SQ M
GLUCOSE P FAST SERPL-MCNC: 92 MG/DL (ref 70–99)
GLUCOSE SERPL-MCNC: 92 MG/DL (ref 70–99)
HCT VFR BLD AUTO: 41.2 % (ref 41–53)
HDLC SERPL-MCNC: 29 MG/DL
HGB BLD-MCNC: 14 G/DL (ref 13.5–17.5)
LDLC SERPL CALC-MCNC: 183 MG/DL
LITHIUM SERPL-SCNC: 0.4 MMOL/L (ref 0.6–1.2)
LYMPHOCYTES # BLD AUTO: 1.6 THOUSANDS/ΜL (ref 0.5–4)
LYMPHOCYTES NFR BLD AUTO: 31 % (ref 25–45)
MAGNESIUM SERPL-MCNC: 2.1 MG/DL (ref 1.6–2.3)
MCH RBC QN AUTO: 29 PG (ref 26–34)
MCHC RBC AUTO-ENTMCNC: 33.9 G/DL (ref 31–36)
MCV RBC AUTO: 86 FL (ref 80–100)
MONOCYTES # BLD AUTO: 0.5 THOUSAND/ΜL (ref 0.2–0.9)
MONOCYTES NFR BLD AUTO: 10 % (ref 1–10)
NEUTROPHILS # BLD AUTO: 2.8 THOUSANDS/ΜL (ref 1.8–7.8)
NEUTS SEG NFR BLD AUTO: 55 % (ref 45–65)
NONHDLC SERPL-MCNC: 209 MG/DL
PLATELET # BLD AUTO: 275 THOUSANDS/UL (ref 150–450)
PMV BLD AUTO: 8 FL (ref 8.9–12.7)
POTASSIUM SERPL-SCNC: 4.3 MMOL/L (ref 3.6–5)
PROT SERPL-MCNC: 7 G/DL (ref 5.9–8.4)
RBC # BLD AUTO: 4.81 MILLION/UL (ref 4.5–5.9)
SODIUM SERPL-SCNC: 137 MMOL/L (ref 137–147)
TRIGL SERPL-MCNC: 131 MG/DL
TSH SERPL DL<=0.05 MIU/L-ACNC: 1.11 UIU/ML (ref 0.47–4.68)
WBC # BLD AUTO: 5.1 THOUSAND/UL (ref 4.5–11)

## 2020-10-24 PROCEDURE — 80061 LIPID PANEL: CPT | Performed by: PSYCHIATRY & NEUROLOGY

## 2020-10-24 PROCEDURE — 99254 IP/OBS CNSLTJ NEW/EST MOD 60: CPT | Performed by: FAMILY MEDICINE

## 2020-10-24 PROCEDURE — 83735 ASSAY OF MAGNESIUM: CPT | Performed by: PSYCHIATRY & NEUROLOGY

## 2020-10-24 PROCEDURE — 85025 COMPLETE CBC W/AUTO DIFF WBC: CPT | Performed by: PSYCHIATRY & NEUROLOGY

## 2020-10-24 PROCEDURE — 80053 COMPREHEN METABOLIC PANEL: CPT | Performed by: PSYCHIATRY & NEUROLOGY

## 2020-10-24 PROCEDURE — 99222 1ST HOSP IP/OBS MODERATE 55: CPT | Performed by: PSYCHIATRY & NEUROLOGY

## 2020-10-24 PROCEDURE — 80178 ASSAY OF LITHIUM: CPT | Performed by: PSYCHIATRY & NEUROLOGY

## 2020-10-24 PROCEDURE — 84443 ASSAY THYROID STIM HORMONE: CPT | Performed by: PSYCHIATRY & NEUROLOGY

## 2020-10-24 PROCEDURE — 86592 SYPHILIS TEST NON-TREP QUAL: CPT | Performed by: PSYCHIATRY & NEUROLOGY

## 2020-10-24 RX ADMIN — TRAZODONE HYDROCHLORIDE 50 MG: 50 TABLET ORAL at 21:30

## 2020-10-24 RX ADMIN — DULOXETINE 30 MG: 30 CAPSULE, DELAYED RELEASE ORAL at 08:55

## 2020-10-24 RX ADMIN — LITHIUM CARBONATE 600 MG: 300 CAPSULE, GELATIN COATED ORAL at 21:30

## 2020-10-24 RX ADMIN — GABAPENTIN 300 MG: 100 CAPSULE ORAL at 08:57

## 2020-10-24 RX ADMIN — LORAZEPAM 0.5 MG: 0.5 TABLET ORAL at 14:45

## 2020-10-24 RX ADMIN — GABAPENTIN 300 MG: 100 CAPSULE ORAL at 16:51

## 2020-10-25 PROCEDURE — 99231 SBSQ HOSP IP/OBS SF/LOW 25: CPT | Performed by: PSYCHIATRY & NEUROLOGY

## 2020-10-25 RX ORDER — BUSPIRONE HYDROCHLORIDE 10 MG/1
10 TABLET ORAL 3 TIMES DAILY
Status: DISCONTINUED | OUTPATIENT
Start: 2020-10-25 | End: 2020-11-06 | Stop reason: HOSPADM

## 2020-10-25 RX ORDER — DULOXETIN HYDROCHLORIDE 60 MG/1
60 CAPSULE, DELAYED RELEASE ORAL DAILY
Status: DISCONTINUED | OUTPATIENT
Start: 2020-10-26 | End: 2020-11-06 | Stop reason: HOSPADM

## 2020-10-25 RX ADMIN — BUSPIRONE HYDROCHLORIDE 10 MG: 10 TABLET ORAL at 17:27

## 2020-10-25 RX ADMIN — GABAPENTIN 300 MG: 100 CAPSULE ORAL at 17:27

## 2020-10-25 RX ADMIN — LITHIUM CARBONATE 600 MG: 300 CAPSULE, GELATIN COATED ORAL at 21:14

## 2020-10-25 RX ADMIN — DULOXETINE HYDROCHLORIDE 30 MG: 30 CAPSULE, DELAYED RELEASE ORAL at 08:06

## 2020-10-25 RX ADMIN — BUSPIRONE HYDROCHLORIDE 10 MG: 10 TABLET ORAL at 21:14

## 2020-10-25 RX ADMIN — BUSPIRONE HYDROCHLORIDE 10 MG: 10 TABLET ORAL at 12:47

## 2020-10-25 RX ADMIN — GABAPENTIN 300 MG: 100 CAPSULE ORAL at 08:06

## 2020-10-25 RX ADMIN — TRAZODONE HYDROCHLORIDE 50 MG: 50 TABLET ORAL at 21:14

## 2020-10-26 LAB
BILIRUB UR QL STRIP: NEGATIVE
CLARITY UR: CLEAR
COLOR UR: NORMAL
GLUCOSE UR STRIP-MCNC: NEGATIVE MG/DL
HGB UR QL STRIP.AUTO: NEGATIVE
KETONES UR STRIP-MCNC: NEGATIVE MG/DL
LEUKOCYTE ESTERASE UR QL STRIP: NEGATIVE
NITRITE UR QL STRIP: NEGATIVE
PH UR STRIP.AUTO: 5 [PH]
PROT UR STRIP-MCNC: NEGATIVE MG/DL
RPR SER QL: NORMAL
SP GR UR STRIP.AUTO: 1.01 (ref 1–1.04)
UROBILINOGEN UA: NEGATIVE MG/DL

## 2020-10-26 PROCEDURE — 99232 SBSQ HOSP IP/OBS MODERATE 35: CPT | Performed by: PSYCHIATRY & NEUROLOGY

## 2020-10-26 RX ADMIN — BUSPIRONE HYDROCHLORIDE 10 MG: 10 TABLET ORAL at 16:55

## 2020-10-26 RX ADMIN — GABAPENTIN 300 MG: 100 CAPSULE ORAL at 17:25

## 2020-10-26 RX ADMIN — BUSPIRONE HYDROCHLORIDE 10 MG: 10 TABLET ORAL at 08:19

## 2020-10-26 RX ADMIN — LITHIUM CARBONATE 600 MG: 300 CAPSULE, GELATIN COATED ORAL at 21:28

## 2020-10-26 RX ADMIN — BUSPIRONE HYDROCHLORIDE 10 MG: 10 TABLET ORAL at 21:28

## 2020-10-26 RX ADMIN — DULOXETINE HYDROCHLORIDE 60 MG: 60 CAPSULE, DELAYED RELEASE ORAL at 08:19

## 2020-10-26 RX ADMIN — TRAZODONE HYDROCHLORIDE 50 MG: 50 TABLET ORAL at 21:28

## 2020-10-26 RX ADMIN — GABAPENTIN 300 MG: 100 CAPSULE ORAL at 08:19

## 2020-10-27 ENCOUNTER — ANESTHESIA (OUTPATIENT)
Dept: ANESTHESIOLOGY | Facility: HOSPITAL | Age: 45
End: 2020-10-27

## 2020-10-27 ENCOUNTER — ANESTHESIA EVENT (OUTPATIENT)
Dept: ANESTHESIOLOGY | Facility: HOSPITAL | Age: 45
End: 2020-10-27

## 2020-10-27 ENCOUNTER — ANESTHESIA EVENT (INPATIENT)
Dept: PREOP/PACU | Facility: HOSPITAL | Age: 45
DRG: 885 | End: 2020-10-27
Payer: COMMERCIAL

## 2020-10-27 PROBLEM — Z00.8 MEDICAL CLEARANCE FOR PSYCHIATRIC ADMISSION: Status: RESOLVED | Noted: 2020-06-09 | Resolved: 2020-10-27

## 2020-10-27 PROCEDURE — 93005 ELECTROCARDIOGRAM TRACING: CPT

## 2020-10-27 PROCEDURE — 99231 SBSQ HOSP IP/OBS SF/LOW 25: CPT | Performed by: INTERNAL MEDICINE

## 2020-10-27 PROCEDURE — 99232 SBSQ HOSP IP/OBS MODERATE 35: CPT | Performed by: PSYCHIATRY & NEUROLOGY

## 2020-10-27 RX ORDER — LITHIUM CARBONATE 300 MG/1
300 CAPSULE ORAL
Status: DISCONTINUED | OUTPATIENT
Start: 2020-10-27 | End: 2020-11-06 | Stop reason: HOSPADM

## 2020-10-27 RX ADMIN — GABAPENTIN 300 MG: 100 CAPSULE ORAL at 08:38

## 2020-10-27 RX ADMIN — BUSPIRONE HYDROCHLORIDE 10 MG: 10 TABLET ORAL at 16:06

## 2020-10-27 RX ADMIN — LITHIUM CARBONATE 300 MG: 300 CAPSULE, GELATIN COATED ORAL at 21:44

## 2020-10-27 RX ADMIN — BUSPIRONE HYDROCHLORIDE 10 MG: 10 TABLET ORAL at 21:44

## 2020-10-27 RX ADMIN — GABAPENTIN 300 MG: 100 CAPSULE ORAL at 17:24

## 2020-10-27 RX ADMIN — BUSPIRONE HYDROCHLORIDE 10 MG: 10 TABLET ORAL at 08:38

## 2020-10-27 RX ADMIN — TRAZODONE HYDROCHLORIDE 50 MG: 50 TABLET ORAL at 21:44

## 2020-10-27 RX ADMIN — DULOXETINE HYDROCHLORIDE 60 MG: 60 CAPSULE, DELAYED RELEASE ORAL at 08:39

## 2020-10-28 ENCOUNTER — ANESTHESIA (INPATIENT)
Dept: PREOP/PACU | Facility: HOSPITAL | Age: 45
DRG: 885 | End: 2020-10-28
Payer: COMMERCIAL

## 2020-10-28 ENCOUNTER — APPOINTMENT (INPATIENT)
Dept: PREOP/PACU | Facility: HOSPITAL | Age: 45
DRG: 885 | End: 2020-10-28
Payer: COMMERCIAL

## 2020-10-28 VITALS — HEART RATE: 106 BPM

## 2020-10-28 LAB
ATRIAL RATE: 70 BPM
P AXIS: 23 DEGREES
PR INTERVAL: 166 MS
QRS AXIS: 46 DEGREES
QRSD INTERVAL: 92 MS
QT INTERVAL: 398 MS
QTC INTERVAL: 429 MS
T WAVE AXIS: 39 DEGREES
VENTRICULAR RATE: 70 BPM

## 2020-10-28 PROCEDURE — 90870 ELECTROCONVULSIVE THERAPY: CPT

## 2020-10-28 PROCEDURE — GZB0ZZZ ELECTROCONVULSIVE THERAPY, UNILATERAL-SINGLE SEIZURE: ICD-10-PCS | Performed by: PSYCHIATRY & NEUROLOGY

## 2020-10-28 PROCEDURE — 99232 SBSQ HOSP IP/OBS MODERATE 35: CPT | Performed by: PSYCHIATRY & NEUROLOGY

## 2020-10-28 PROCEDURE — 93010 ELECTROCARDIOGRAM REPORT: CPT | Performed by: INTERNAL MEDICINE

## 2020-10-28 PROCEDURE — 90870 ELECTROCONVULSIVE THERAPY: CPT | Performed by: PSYCHIATRY & NEUROLOGY

## 2020-10-28 RX ORDER — GLYCOPYRROLATE 0.2 MG/ML
INJECTION INTRAMUSCULAR; INTRAVENOUS AS NEEDED
Status: DISCONTINUED | OUTPATIENT
Start: 2020-10-28 | End: 2020-10-28

## 2020-10-28 RX ORDER — ESMOLOL HYDROCHLORIDE 10 MG/ML
50 INJECTION INTRAVENOUS ONCE
Status: COMPLETED | OUTPATIENT
Start: 2020-10-28 | End: 2020-10-28

## 2020-10-28 RX ORDER — KETOROLAC TROMETHAMINE 30 MG/ML
INJECTION, SOLUTION INTRAMUSCULAR; INTRAVENOUS AS NEEDED
Status: DISCONTINUED | OUTPATIENT
Start: 2020-10-28 | End: 2020-10-28

## 2020-10-28 RX ORDER — SUCCINYLCHOLINE/SOD CL,ISO/PF 100 MG/5ML
SYRINGE (ML) INTRAVENOUS AS NEEDED
Status: DISCONTINUED | OUTPATIENT
Start: 2020-10-28 | End: 2020-10-28

## 2020-10-28 RX ORDER — SODIUM CHLORIDE 9 MG/ML
50 INJECTION, SOLUTION INTRAVENOUS CONTINUOUS
Status: DISCONTINUED | OUTPATIENT
Start: 2020-10-28 | End: 2020-10-28

## 2020-10-28 RX ORDER — ESMOLOL HYDROCHLORIDE 10 MG/ML
INJECTION INTRAVENOUS AS NEEDED
Status: DISCONTINUED | OUTPATIENT
Start: 2020-10-28 | End: 2020-10-28

## 2020-10-28 RX ADMIN — METOPROLOL TARTRATE 25 MG: 25 TABLET, FILM COATED ORAL at 12:44

## 2020-10-28 RX ADMIN — DULOXETINE HYDROCHLORIDE 60 MG: 60 CAPSULE, DELAYED RELEASE ORAL at 08:05

## 2020-10-28 RX ADMIN — GABAPENTIN 300 MG: 100 CAPSULE ORAL at 08:05

## 2020-10-28 RX ADMIN — LITHIUM CARBONATE 300 MG: 300 CAPSULE, GELATIN COATED ORAL at 21:41

## 2020-10-28 RX ADMIN — GABAPENTIN 300 MG: 100 CAPSULE ORAL at 18:40

## 2020-10-28 RX ADMIN — BUSPIRONE HYDROCHLORIDE 10 MG: 10 TABLET ORAL at 16:26

## 2020-10-28 RX ADMIN — METHOHEXITAL SODIUM 140 MG: 500 INJECTION, POWDER, LYOPHILIZED, FOR SOLUTION INTRAMUSCULAR; INTRAVENOUS; RECTAL at 06:44

## 2020-10-28 RX ADMIN — ACETAMINOPHEN 650 MG: 325 TABLET ORAL at 08:47

## 2020-10-28 RX ADMIN — METOPROLOL TARTRATE 25 MG: 25 TABLET, FILM COATED ORAL at 21:41

## 2020-10-28 RX ADMIN — ESMOLOL HYDROCHLORIDE 50 MG: 10 INJECTION, SOLUTION INTRAVENOUS at 07:13

## 2020-10-28 RX ADMIN — GLYCOPYRROLATE 0.2 MG: 0.2 INJECTION, SOLUTION INTRAMUSCULAR; INTRAVENOUS at 06:38

## 2020-10-28 RX ADMIN — ACETAMINOPHEN 650 MG: 325 TABLET ORAL at 15:00

## 2020-10-28 RX ADMIN — Medication 120 MG: at 06:44

## 2020-10-28 RX ADMIN — TRAZODONE HYDROCHLORIDE 50 MG: 50 TABLET ORAL at 21:41

## 2020-10-28 RX ADMIN — BUSPIRONE HYDROCHLORIDE 10 MG: 10 TABLET ORAL at 21:41

## 2020-10-28 RX ADMIN — SODIUM CHLORIDE 50 ML/HR: 0.9 INJECTION, SOLUTION INTRAVENOUS at 06:15

## 2020-10-28 RX ADMIN — BUSPIRONE HYDROCHLORIDE 10 MG: 10 TABLET ORAL at 08:05

## 2020-10-28 RX ADMIN — ACETAMINOPHEN 650 MG: 325 TABLET ORAL at 20:28

## 2020-10-28 RX ADMIN — ESMOLOL HYDROCHLORIDE 50 MG: 10 INJECTION, SOLUTION INTRAVENOUS at 06:49

## 2020-10-28 RX ADMIN — KETOROLAC TROMETHAMINE 30 MG: 30 INJECTION, SOLUTION INTRAMUSCULAR; INTRAVENOUS at 06:51

## 2020-10-29 ENCOUNTER — ANESTHESIA EVENT (OUTPATIENT)
Dept: ANESTHESIOLOGY | Facility: HOSPITAL | Age: 45
End: 2020-10-29

## 2020-10-29 ENCOUNTER — ANESTHESIA EVENT (INPATIENT)
Dept: PREOP/PACU | Facility: HOSPITAL | Age: 45
DRG: 885 | End: 2020-10-29
Payer: COMMERCIAL

## 2020-10-29 ENCOUNTER — ANESTHESIA (OUTPATIENT)
Dept: ANESTHESIOLOGY | Facility: HOSPITAL | Age: 45
End: 2020-10-29

## 2020-10-29 PROCEDURE — 99232 SBSQ HOSP IP/OBS MODERATE 35: CPT | Performed by: PSYCHIATRY & NEUROLOGY

## 2020-10-29 RX ORDER — KETOROLAC TROMETHAMINE 30 MG/ML
30 INJECTION, SOLUTION INTRAMUSCULAR; INTRAVENOUS ONCE
Status: COMPLETED | OUTPATIENT
Start: 2020-10-29 | End: 2020-10-29

## 2020-10-29 RX ORDER — XYLITOL/YERBA SANTA
5 AEROSOL, SPRAY WITH PUMP (ML) MUCOUS MEMBRANE 4 TIMES DAILY PRN
Status: DISCONTINUED | OUTPATIENT
Start: 2020-10-29 | End: 2020-11-06 | Stop reason: HOSPADM

## 2020-10-29 RX ORDER — ECHINACEA PURPUREA EXTRACT 125 MG
1 TABLET ORAL
Status: DISCONTINUED | OUTPATIENT
Start: 2020-10-29 | End: 2020-11-06 | Stop reason: HOSPADM

## 2020-10-29 RX ADMIN — BUSPIRONE HYDROCHLORIDE 10 MG: 10 TABLET ORAL at 08:32

## 2020-10-29 RX ADMIN — GABAPENTIN 300 MG: 100 CAPSULE ORAL at 17:26

## 2020-10-29 RX ADMIN — BUSPIRONE HYDROCHLORIDE 10 MG: 10 TABLET ORAL at 17:26

## 2020-10-29 RX ADMIN — GABAPENTIN 300 MG: 100 CAPSULE ORAL at 08:32

## 2020-10-29 RX ADMIN — METOPROLOL TARTRATE 25 MG: 25 TABLET, FILM COATED ORAL at 21:12

## 2020-10-29 RX ADMIN — TRAZODONE HYDROCHLORIDE 50 MG: 50 TABLET ORAL at 21:12

## 2020-10-29 RX ADMIN — ACETAMINOPHEN 650 MG: 325 TABLET ORAL at 08:35

## 2020-10-29 RX ADMIN — LITHIUM CARBONATE 300 MG: 300 CAPSULE, GELATIN COATED ORAL at 21:11

## 2020-10-29 RX ADMIN — KETOROLAC TROMETHAMINE 30 MG: 30 INJECTION, SOLUTION INTRAMUSCULAR; INTRAVENOUS at 12:07

## 2020-10-29 RX ADMIN — DULOXETINE HYDROCHLORIDE 60 MG: 60 CAPSULE, DELAYED RELEASE ORAL at 08:32

## 2020-10-29 RX ADMIN — ACETAMINOPHEN 650 MG: 325 TABLET ORAL at 17:27

## 2020-10-29 RX ADMIN — BUSPIRONE HYDROCHLORIDE 10 MG: 10 TABLET ORAL at 21:12

## 2020-10-30 ENCOUNTER — ANESTHESIA (INPATIENT)
Dept: PREOP/PACU | Facility: HOSPITAL | Age: 45
DRG: 885 | End: 2020-10-30
Payer: COMMERCIAL

## 2020-10-30 ENCOUNTER — APPOINTMENT (INPATIENT)
Dept: PREOP/PACU | Facility: HOSPITAL | Age: 45
DRG: 885 | End: 2020-10-30
Payer: COMMERCIAL

## 2020-10-30 ENCOUNTER — ANESTHESIA EVENT (INPATIENT)
Dept: PREOP/PACU | Facility: HOSPITAL | Age: 45
DRG: 885 | End: 2020-10-30
Payer: COMMERCIAL

## 2020-10-30 VITALS — HEART RATE: 90 BPM

## 2020-10-30 PROCEDURE — 90870 ELECTROCONVULSIVE THERAPY: CPT | Performed by: PSYCHIATRY & NEUROLOGY

## 2020-10-30 PROCEDURE — 99232 SBSQ HOSP IP/OBS MODERATE 35: CPT | Performed by: PSYCHIATRY & NEUROLOGY

## 2020-10-30 PROCEDURE — 90870 ELECTROCONVULSIVE THERAPY: CPT

## 2020-10-30 PROCEDURE — GZB0ZZZ ELECTROCONVULSIVE THERAPY, UNILATERAL-SINGLE SEIZURE: ICD-10-PCS | Performed by: PSYCHIATRY & NEUROLOGY

## 2020-10-30 RX ORDER — KETOROLAC TROMETHAMINE 30 MG/ML
INJECTION, SOLUTION INTRAMUSCULAR; INTRAVENOUS AS NEEDED
Status: DISCONTINUED | OUTPATIENT
Start: 2020-10-30 | End: 2020-10-30

## 2020-10-30 RX ORDER — SODIUM CHLORIDE 9 MG/ML
50 INJECTION, SOLUTION INTRAVENOUS CONTINUOUS
Status: DISCONTINUED | OUTPATIENT
Start: 2020-10-30 | End: 2020-11-06

## 2020-10-30 RX ORDER — ESMOLOL HYDROCHLORIDE 10 MG/ML
INJECTION INTRAVENOUS AS NEEDED
Status: DISCONTINUED | OUTPATIENT
Start: 2020-10-30 | End: 2020-10-30

## 2020-10-30 RX ORDER — GLYCOPYRROLATE 0.2 MG/ML
INJECTION INTRAMUSCULAR; INTRAVENOUS AS NEEDED
Status: DISCONTINUED | OUTPATIENT
Start: 2020-10-30 | End: 2020-10-30

## 2020-10-30 RX ORDER — SUCCINYLCHOLINE/SOD CL,ISO/PF 100 MG/5ML
SYRINGE (ML) INTRAVENOUS AS NEEDED
Status: DISCONTINUED | OUTPATIENT
Start: 2020-10-30 | End: 2020-10-30

## 2020-10-30 RX ORDER — SODIUM CHLORIDE 9 MG/ML
50 INJECTION, SOLUTION INTRAVENOUS CONTINUOUS
Status: CANCELLED | OUTPATIENT
Start: 2020-10-30

## 2020-10-30 RX ORDER — SODIUM CHLORIDE 9 MG/ML
INJECTION, SOLUTION INTRAVENOUS CONTINUOUS PRN
Status: DISCONTINUED | OUTPATIENT
Start: 2020-10-30 | End: 2020-10-30

## 2020-10-30 RX ADMIN — DULOXETINE HYDROCHLORIDE 60 MG: 60 CAPSULE, DELAYED RELEASE ORAL at 08:41

## 2020-10-30 RX ADMIN — KETOROLAC TROMETHAMINE 30 MG: 30 INJECTION, SOLUTION INTRAMUSCULAR; INTRAVENOUS at 06:30

## 2020-10-30 RX ADMIN — BUSPIRONE HYDROCHLORIDE 10 MG: 10 TABLET ORAL at 21:30

## 2020-10-30 RX ADMIN — GLYCOPYRROLATE 0.2 MG: 0.2 INJECTION, SOLUTION INTRAMUSCULAR; INTRAVENOUS at 06:29

## 2020-10-30 RX ADMIN — GABAPENTIN 300 MG: 100 CAPSULE ORAL at 08:40

## 2020-10-30 RX ADMIN — LITHIUM CARBONATE 300 MG: 300 CAPSULE, GELATIN COATED ORAL at 21:31

## 2020-10-30 RX ADMIN — TRAZODONE HYDROCHLORIDE 50 MG: 50 TABLET ORAL at 21:31

## 2020-10-30 RX ADMIN — ESMOLOL HYDROCHLORIDE 50 MG: 10 INJECTION, SOLUTION INTRAVENOUS at 06:30

## 2020-10-30 RX ADMIN — SODIUM CHLORIDE 50 ML/HR: 0.9 INJECTION, SOLUTION INTRAVENOUS at 06:26

## 2020-10-30 RX ADMIN — MAGNESIUM HYDROXIDE 30 ML: 400 SUSPENSION ORAL at 15:48

## 2020-10-30 RX ADMIN — BUSPIRONE HYDROCHLORIDE 10 MG: 10 TABLET ORAL at 17:00

## 2020-10-30 RX ADMIN — GABAPENTIN 300 MG: 100 CAPSULE ORAL at 17:56

## 2020-10-30 RX ADMIN — ESMOLOL HYDROCHLORIDE 50 MG: 10 INJECTION, SOLUTION INTRAVENOUS at 06:36

## 2020-10-30 RX ADMIN — BUSPIRONE HYDROCHLORIDE 10 MG: 10 TABLET ORAL at 08:40

## 2020-10-30 RX ADMIN — SODIUM CHLORIDE: 0.9 INJECTION, SOLUTION INTRAVENOUS at 06:11

## 2020-10-30 RX ADMIN — METHOHEXITAL SODIUM 140 MG: 500 INJECTION, POWDER, LYOPHILIZED, FOR SOLUTION INTRAMUSCULAR; INTRAVENOUS; RECTAL at 06:38

## 2020-10-30 RX ADMIN — Medication 180 MG: at 06:39

## 2020-10-31 PROCEDURE — 99232 SBSQ HOSP IP/OBS MODERATE 35: CPT | Performed by: PSYCHIATRY & NEUROLOGY

## 2020-10-31 RX ADMIN — BUSPIRONE HYDROCHLORIDE 10 MG: 10 TABLET ORAL at 17:36

## 2020-10-31 RX ADMIN — GABAPENTIN 300 MG: 100 CAPSULE ORAL at 08:55

## 2020-10-31 RX ADMIN — TRAZODONE HYDROCHLORIDE 50 MG: 50 TABLET ORAL at 21:21

## 2020-10-31 RX ADMIN — GABAPENTIN 300 MG: 100 CAPSULE ORAL at 17:36

## 2020-10-31 RX ADMIN — DULOXETINE HYDROCHLORIDE 60 MG: 60 CAPSULE, DELAYED RELEASE ORAL at 08:55

## 2020-10-31 RX ADMIN — BUSPIRONE HYDROCHLORIDE 10 MG: 10 TABLET ORAL at 21:21

## 2020-10-31 RX ADMIN — LITHIUM CARBONATE 300 MG: 300 CAPSULE, GELATIN COATED ORAL at 21:21

## 2020-10-31 RX ADMIN — BUSPIRONE HYDROCHLORIDE 10 MG: 10 TABLET ORAL at 08:55

## 2020-11-01 PROCEDURE — 99232 SBSQ HOSP IP/OBS MODERATE 35: CPT | Performed by: PSYCHIATRY & NEUROLOGY

## 2020-11-01 RX ADMIN — BUSPIRONE HYDROCHLORIDE 10 MG: 10 TABLET ORAL at 15:33

## 2020-11-01 RX ADMIN — GABAPENTIN 300 MG: 100 CAPSULE ORAL at 18:03

## 2020-11-01 RX ADMIN — DULOXETINE HYDROCHLORIDE 60 MG: 60 CAPSULE, DELAYED RELEASE ORAL at 08:38

## 2020-11-01 RX ADMIN — BUSPIRONE HYDROCHLORIDE 10 MG: 10 TABLET ORAL at 21:09

## 2020-11-01 RX ADMIN — GABAPENTIN 300 MG: 100 CAPSULE ORAL at 08:38

## 2020-11-01 RX ADMIN — TRAZODONE HYDROCHLORIDE 50 MG: 50 TABLET ORAL at 21:09

## 2020-11-01 RX ADMIN — BUSPIRONE HYDROCHLORIDE 10 MG: 10 TABLET ORAL at 08:38

## 2020-11-01 RX ADMIN — MAGNESIUM HYDROXIDE 30 ML: 400 SUSPENSION ORAL at 16:02

## 2020-11-01 RX ADMIN — LITHIUM CARBONATE 300 MG: 300 CAPSULE, GELATIN COATED ORAL at 21:09

## 2020-11-02 ENCOUNTER — APPOINTMENT (INPATIENT)
Dept: PREOP/PACU | Facility: HOSPITAL | Age: 45
DRG: 885 | End: 2020-11-02
Payer: COMMERCIAL

## 2020-11-02 ENCOUNTER — ANESTHESIA (INPATIENT)
Dept: PREOP/PACU | Facility: HOSPITAL | Age: 45
DRG: 885 | End: 2020-11-02
Payer: COMMERCIAL

## 2020-11-02 VITALS — HEART RATE: 110 BPM

## 2020-11-02 PROCEDURE — 90870 ELECTROCONVULSIVE THERAPY: CPT | Performed by: PSYCHIATRY & NEUROLOGY

## 2020-11-02 PROCEDURE — GZB0ZZZ ELECTROCONVULSIVE THERAPY, UNILATERAL-SINGLE SEIZURE: ICD-10-PCS | Performed by: PSYCHIATRY & NEUROLOGY

## 2020-11-02 PROCEDURE — 90870 ELECTROCONVULSIVE THERAPY: CPT

## 2020-11-02 PROCEDURE — 99232 SBSQ HOSP IP/OBS MODERATE 35: CPT | Performed by: PSYCHIATRY & NEUROLOGY

## 2020-11-02 RX ORDER — SUCCINYLCHOLINE/SOD CL,ISO/PF 100 MG/5ML
SYRINGE (ML) INTRAVENOUS AS NEEDED
Status: DISCONTINUED | OUTPATIENT
Start: 2020-11-02 | End: 2020-11-02

## 2020-11-02 RX ORDER — SODIUM CHLORIDE 9 MG/ML
50 INJECTION, SOLUTION INTRAVENOUS CONTINUOUS
Status: DISCONTINUED | OUTPATIENT
Start: 2020-11-02 | End: 2020-11-04

## 2020-11-02 RX ORDER — GLYCOPYRROLATE 0.2 MG/ML
INJECTION INTRAMUSCULAR; INTRAVENOUS AS NEEDED
Status: DISCONTINUED | OUTPATIENT
Start: 2020-11-02 | End: 2020-11-02

## 2020-11-02 RX ORDER — ESMOLOL HYDROCHLORIDE 10 MG/ML
INJECTION INTRAVENOUS AS NEEDED
Status: DISCONTINUED | OUTPATIENT
Start: 2020-11-02 | End: 2020-11-02

## 2020-11-02 RX ORDER — KETOROLAC TROMETHAMINE 30 MG/ML
INJECTION, SOLUTION INTRAMUSCULAR; INTRAVENOUS AS NEEDED
Status: DISCONTINUED | OUTPATIENT
Start: 2020-11-02 | End: 2020-11-02

## 2020-11-02 RX ADMIN — ESMOLOL HYDROCHLORIDE 50 MG: 10 INJECTION, SOLUTION INTRAVENOUS at 06:37

## 2020-11-02 RX ADMIN — METHOHEXITAL SODIUM 140 MG: 500 INJECTION, POWDER, LYOPHILIZED, FOR SOLUTION INTRAMUSCULAR; INTRAVENOUS; RECTAL at 06:37

## 2020-11-02 RX ADMIN — GLYCOPYRROLATE 0.2 MG: 0.2 INJECTION, SOLUTION INTRAMUSCULAR; INTRAVENOUS at 06:32

## 2020-11-02 RX ADMIN — GABAPENTIN 300 MG: 100 CAPSULE ORAL at 08:19

## 2020-11-02 RX ADMIN — KETOROLAC TROMETHAMINE 30 MG: 30 INJECTION, SOLUTION INTRAMUSCULAR; INTRAVENOUS at 06:40

## 2020-11-02 RX ADMIN — ESMOLOL HYDROCHLORIDE 50 MG: 10 INJECTION, SOLUTION INTRAVENOUS at 06:50

## 2020-11-02 RX ADMIN — TRAZODONE HYDROCHLORIDE 50 MG: 50 TABLET ORAL at 21:29

## 2020-11-02 RX ADMIN — BUSPIRONE HYDROCHLORIDE 10 MG: 10 TABLET ORAL at 08:19

## 2020-11-02 RX ADMIN — BUSPIRONE HYDROCHLORIDE 10 MG: 10 TABLET ORAL at 21:30

## 2020-11-02 RX ADMIN — LITHIUM CARBONATE 300 MG: 300 CAPSULE, GELATIN COATED ORAL at 21:30

## 2020-11-02 RX ADMIN — DULOXETINE HYDROCHLORIDE 60 MG: 60 CAPSULE, DELAYED RELEASE ORAL at 08:19

## 2020-11-02 RX ADMIN — Medication 180 MG: at 06:37

## 2020-11-02 RX ADMIN — GABAPENTIN 300 MG: 100 CAPSULE ORAL at 17:27

## 2020-11-02 RX ADMIN — BUSPIRONE HYDROCHLORIDE 10 MG: 10 TABLET ORAL at 16:57

## 2020-11-02 RX ADMIN — SODIUM CHLORIDE 50 ML/HR: 0.9 INJECTION, SOLUTION INTRAVENOUS at 06:19

## 2020-11-02 RX ADMIN — SODIUM CHLORIDE: 0.9 INJECTION, SOLUTION INTRAVENOUS at 06:20

## 2020-11-03 ENCOUNTER — ANESTHESIA EVENT (INPATIENT)
Dept: PREOP/PACU | Facility: HOSPITAL | Age: 45
DRG: 885 | End: 2020-11-03
Payer: COMMERCIAL

## 2020-11-03 PROCEDURE — 99232 SBSQ HOSP IP/OBS MODERATE 35: CPT | Performed by: PSYCHIATRY & NEUROLOGY

## 2020-11-03 RX ADMIN — GABAPENTIN 300 MG: 100 CAPSULE ORAL at 17:04

## 2020-11-03 RX ADMIN — BUSPIRONE HYDROCHLORIDE 10 MG: 10 TABLET ORAL at 08:29

## 2020-11-03 RX ADMIN — TRAZODONE HYDROCHLORIDE 50 MG: 50 TABLET ORAL at 21:24

## 2020-11-03 RX ADMIN — BUSPIRONE HYDROCHLORIDE 10 MG: 10 TABLET ORAL at 21:24

## 2020-11-03 RX ADMIN — GABAPENTIN 300 MG: 100 CAPSULE ORAL at 08:29

## 2020-11-03 RX ADMIN — BUSPIRONE HYDROCHLORIDE 10 MG: 10 TABLET ORAL at 17:03

## 2020-11-03 RX ADMIN — LITHIUM CARBONATE 300 MG: 300 CAPSULE, GELATIN COATED ORAL at 21:24

## 2020-11-03 RX ADMIN — DULOXETINE HYDROCHLORIDE 60 MG: 60 CAPSULE, DELAYED RELEASE ORAL at 08:28

## 2020-11-04 ENCOUNTER — APPOINTMENT (INPATIENT)
Dept: PREOP/PACU | Facility: HOSPITAL | Age: 45
DRG: 885 | End: 2020-11-04
Payer: COMMERCIAL

## 2020-11-04 ENCOUNTER — ANESTHESIA (INPATIENT)
Dept: PREOP/PACU | Facility: HOSPITAL | Age: 45
DRG: 885 | End: 2020-11-04
Payer: COMMERCIAL

## 2020-11-04 VITALS — HEART RATE: 79 BPM

## 2020-11-04 PROCEDURE — 90870 ELECTROCONVULSIVE THERAPY: CPT | Performed by: PSYCHIATRY & NEUROLOGY

## 2020-11-04 PROCEDURE — GZB0ZZZ ELECTROCONVULSIVE THERAPY, UNILATERAL-SINGLE SEIZURE: ICD-10-PCS | Performed by: PSYCHIATRY & NEUROLOGY

## 2020-11-04 PROCEDURE — 99232 SBSQ HOSP IP/OBS MODERATE 35: CPT | Performed by: PSYCHIATRY & NEUROLOGY

## 2020-11-04 PROCEDURE — 90870 ELECTROCONVULSIVE THERAPY: CPT

## 2020-11-04 RX ORDER — SUCCINYLCHOLINE/SOD CL,ISO/PF 100 MG/5ML
SYRINGE (ML) INTRAVENOUS AS NEEDED
Status: DISCONTINUED | OUTPATIENT
Start: 2020-11-04 | End: 2020-11-04

## 2020-11-04 RX ORDER — GLYCOPYRROLATE 0.2 MG/ML
INJECTION INTRAMUSCULAR; INTRAVENOUS AS NEEDED
Status: DISCONTINUED | OUTPATIENT
Start: 2020-11-04 | End: 2020-11-04

## 2020-11-04 RX ORDER — ESMOLOL HYDROCHLORIDE 10 MG/ML
INJECTION INTRAVENOUS AS NEEDED
Status: DISCONTINUED | OUTPATIENT
Start: 2020-11-04 | End: 2020-11-04

## 2020-11-04 RX ORDER — SODIUM CHLORIDE 9 MG/ML
125 INJECTION, SOLUTION INTRAVENOUS CONTINUOUS
Status: DISCONTINUED | OUTPATIENT
Start: 2020-11-04 | End: 2020-11-04

## 2020-11-04 RX ORDER — KETOROLAC TROMETHAMINE 30 MG/ML
INJECTION, SOLUTION INTRAMUSCULAR; INTRAVENOUS AS NEEDED
Status: DISCONTINUED | OUTPATIENT
Start: 2020-11-04 | End: 2020-11-04

## 2020-11-04 RX ADMIN — BUSPIRONE HYDROCHLORIDE 10 MG: 10 TABLET ORAL at 21:27

## 2020-11-04 RX ADMIN — DULOXETINE HYDROCHLORIDE 60 MG: 60 CAPSULE, DELAYED RELEASE ORAL at 08:21

## 2020-11-04 RX ADMIN — Medication 180 MG: at 06:22

## 2020-11-04 RX ADMIN — BUSPIRONE HYDROCHLORIDE 10 MG: 10 TABLET ORAL at 08:21

## 2020-11-04 RX ADMIN — TRAZODONE HYDROCHLORIDE 50 MG: 50 TABLET ORAL at 21:28

## 2020-11-04 RX ADMIN — GABAPENTIN 300 MG: 100 CAPSULE ORAL at 08:21

## 2020-11-04 RX ADMIN — SODIUM CHLORIDE 125 ML/HR: 0.9 INJECTION, SOLUTION INTRAVENOUS at 05:48

## 2020-11-04 RX ADMIN — ESMOLOL HYDROCHLORIDE 50 MG: 10 INJECTION, SOLUTION INTRAVENOUS at 06:30

## 2020-11-04 RX ADMIN — KETOROLAC TROMETHAMINE 30 MG: 30 INJECTION, SOLUTION INTRAMUSCULAR; INTRAVENOUS at 06:19

## 2020-11-04 RX ADMIN — GLYCOPYRROLATE 0.2 MG: 0.2 INJECTION, SOLUTION INTRAMUSCULAR; INTRAVENOUS at 06:15

## 2020-11-04 RX ADMIN — METHOHEXITAL SODIUM 140 MG: 500 INJECTION, POWDER, LYOPHILIZED, FOR SOLUTION INTRAMUSCULAR; INTRAVENOUS; RECTAL at 06:22

## 2020-11-04 RX ADMIN — ESMOLOL HYDROCHLORIDE 50 MG: 10 INJECTION, SOLUTION INTRAVENOUS at 06:25

## 2020-11-04 RX ADMIN — LITHIUM CARBONATE 300 MG: 300 CAPSULE, GELATIN COATED ORAL at 21:28

## 2020-11-04 RX ADMIN — GABAPENTIN 300 MG: 100 CAPSULE ORAL at 17:44

## 2020-11-04 RX ADMIN — BUSPIRONE HYDROCHLORIDE 10 MG: 10 TABLET ORAL at 16:59

## 2020-11-05 ENCOUNTER — ANESTHESIA EVENT (INPATIENT)
Dept: PREOP/PACU | Facility: HOSPITAL | Age: 45
DRG: 885 | End: 2020-11-05
Payer: COMMERCIAL

## 2020-11-05 PROCEDURE — 94760 N-INVAS EAR/PLS OXIMETRY 1: CPT

## 2020-11-05 PROCEDURE — 94660 CPAP INITIATION&MGMT: CPT

## 2020-11-05 PROCEDURE — 99232 SBSQ HOSP IP/OBS MODERATE 35: CPT | Performed by: PSYCHIATRY & NEUROLOGY

## 2020-11-05 RX ADMIN — GABAPENTIN 300 MG: 100 CAPSULE ORAL at 17:56

## 2020-11-05 RX ADMIN — BUSPIRONE HYDROCHLORIDE 10 MG: 10 TABLET ORAL at 08:39

## 2020-11-05 RX ADMIN — GABAPENTIN 300 MG: 100 CAPSULE ORAL at 08:40

## 2020-11-05 RX ADMIN — BUSPIRONE HYDROCHLORIDE 10 MG: 10 TABLET ORAL at 17:00

## 2020-11-05 RX ADMIN — BUSPIRONE HYDROCHLORIDE 10 MG: 10 TABLET ORAL at 21:35

## 2020-11-05 RX ADMIN — DULOXETINE HYDROCHLORIDE 60 MG: 60 CAPSULE, DELAYED RELEASE ORAL at 08:40

## 2020-11-05 RX ADMIN — LITHIUM CARBONATE 300 MG: 300 CAPSULE, GELATIN COATED ORAL at 21:34

## 2020-11-05 RX ADMIN — TRAZODONE HYDROCHLORIDE 50 MG: 50 TABLET ORAL at 21:35

## 2020-11-06 ENCOUNTER — ANESTHESIA (INPATIENT)
Dept: PREOP/PACU | Facility: HOSPITAL | Age: 45
DRG: 885 | End: 2020-11-06
Payer: COMMERCIAL

## 2020-11-06 ENCOUNTER — APPOINTMENT (INPATIENT)
Dept: PREOP/PACU | Facility: HOSPITAL | Age: 45
DRG: 885 | End: 2020-11-06
Payer: COMMERCIAL

## 2020-11-06 VITALS
TEMPERATURE: 98.1 F | OXYGEN SATURATION: 100 % | RESPIRATION RATE: 16 BRPM | WEIGHT: 225.2 LBS | HEIGHT: 74 IN | DIASTOLIC BLOOD PRESSURE: 61 MMHG | HEART RATE: 97 BPM | SYSTOLIC BLOOD PRESSURE: 113 MMHG | BODY MASS INDEX: 28.9 KG/M2

## 2020-11-06 VITALS — HEART RATE: 106 BPM

## 2020-11-06 PROCEDURE — 90870 ELECTROCONVULSIVE THERAPY: CPT

## 2020-11-06 PROCEDURE — 99238 HOSP IP/OBS DSCHRG MGMT 30/<: CPT | Performed by: PSYCHIATRY & NEUROLOGY

## 2020-11-06 PROCEDURE — 90870 ELECTROCONVULSIVE THERAPY: CPT | Performed by: PSYCHIATRY & NEUROLOGY

## 2020-11-06 PROCEDURE — GZB0ZZZ ELECTROCONVULSIVE THERAPY, UNILATERAL-SINGLE SEIZURE: ICD-10-PCS | Performed by: PSYCHIATRY & NEUROLOGY

## 2020-11-06 RX ORDER — GABAPENTIN 300 MG/1
300 CAPSULE ORAL 2 TIMES DAILY
Qty: 60 CAPSULE | Refills: 0 | Status: SHIPPED | OUTPATIENT
Start: 2020-11-06 | End: 2020-11-20 | Stop reason: SDUPTHER

## 2020-11-06 RX ORDER — SODIUM CHLORIDE 9 MG/ML
75 INJECTION, SOLUTION INTRAVENOUS CONTINUOUS
Status: CANCELLED | OUTPATIENT
Start: 2020-11-06

## 2020-11-06 RX ORDER — KETOROLAC TROMETHAMINE 30 MG/ML
INJECTION, SOLUTION INTRAMUSCULAR; INTRAVENOUS AS NEEDED
Status: DISCONTINUED | OUTPATIENT
Start: 2020-11-06 | End: 2020-11-06

## 2020-11-06 RX ORDER — TRAZODONE HYDROCHLORIDE 50 MG/1
50 TABLET ORAL
Qty: 30 TABLET | Refills: 0 | Status: SHIPPED | OUTPATIENT
Start: 2020-11-06 | End: 2020-12-11 | Stop reason: SDUPTHER

## 2020-11-06 RX ORDER — SUCCINYLCHOLINE/SOD CL,ISO/PF 100 MG/5ML
SYRINGE (ML) INTRAVENOUS AS NEEDED
Status: DISCONTINUED | OUTPATIENT
Start: 2020-11-06 | End: 2020-11-06

## 2020-11-06 RX ORDER — LITHIUM CARBONATE 300 MG/1
300 CAPSULE ORAL
Qty: 8 CAPSULE | Refills: 0 | Status: SHIPPED | OUTPATIENT
Start: 2020-11-06 | End: 2020-11-20

## 2020-11-06 RX ORDER — LITHIUM CARBONATE 600 MG/1
600 CAPSULE ORAL
Qty: 30 CAPSULE | Refills: 0 | Status: SHIPPED | OUTPATIENT
Start: 2020-11-13 | End: 2020-12-11 | Stop reason: SDUPTHER

## 2020-11-06 RX ORDER — ESMOLOL HYDROCHLORIDE 10 MG/ML
INJECTION INTRAVENOUS AS NEEDED
Status: DISCONTINUED | OUTPATIENT
Start: 2020-11-06 | End: 2020-11-06

## 2020-11-06 RX ORDER — BUSPIRONE HYDROCHLORIDE 10 MG/1
10 TABLET ORAL 3 TIMES DAILY
Qty: 90 TABLET | Refills: 0 | Status: SHIPPED | OUTPATIENT
Start: 2020-11-06 | End: 2020-11-20 | Stop reason: SDUPTHER

## 2020-11-06 RX ORDER — DULOXETIN HYDROCHLORIDE 60 MG/1
60 CAPSULE, DELAYED RELEASE ORAL DAILY
Qty: 30 CAPSULE | Refills: 0 | Status: SHIPPED | OUTPATIENT
Start: 2020-11-06 | End: 2020-12-04 | Stop reason: SDUPTHER

## 2020-11-06 RX ORDER — SODIUM CHLORIDE 9 MG/ML
125 INJECTION, SOLUTION INTRAVENOUS CONTINUOUS
Status: DISCONTINUED | OUTPATIENT
Start: 2020-11-06 | End: 2020-11-06

## 2020-11-06 RX ORDER — LITHIUM CARBONATE 300 MG/1
300 CAPSULE ORAL
Qty: 30 CAPSULE | Refills: 0 | Status: SHIPPED | OUTPATIENT
Start: 2020-11-06 | End: 2020-11-06

## 2020-11-06 RX ORDER — SODIUM CHLORIDE 9 MG/ML
INJECTION, SOLUTION INTRAVENOUS CONTINUOUS PRN
Status: DISCONTINUED | OUTPATIENT
Start: 2020-11-06 | End: 2020-11-06

## 2020-11-06 RX ADMIN — KETOROLAC TROMETHAMINE 30 MG: 30 INJECTION, SOLUTION INTRAMUSCULAR; INTRAVENOUS at 07:12

## 2020-11-06 RX ADMIN — DULOXETINE HYDROCHLORIDE 60 MG: 60 CAPSULE, DELAYED RELEASE ORAL at 08:25

## 2020-11-06 RX ADMIN — BUSPIRONE HYDROCHLORIDE 10 MG: 10 TABLET ORAL at 08:25

## 2020-11-06 RX ADMIN — SODIUM CHLORIDE: 0.9 INJECTION, SOLUTION INTRAVENOUS at 05:56

## 2020-11-06 RX ADMIN — Medication 180 MG: at 07:12

## 2020-11-06 RX ADMIN — METHOHEXITAL SODIUM 140 MG: 500 INJECTION, POWDER, LYOPHILIZED, FOR SOLUTION INTRAMUSCULAR; INTRAVENOUS; RECTAL at 07:12

## 2020-11-06 RX ADMIN — ESMOLOL HYDROCHLORIDE 100 MG: 10 INJECTION, SOLUTION INTRAVENOUS at 07:12

## 2020-11-06 RX ADMIN — SODIUM CHLORIDE 125 ML/HR: 0.9 INJECTION, SOLUTION INTRAVENOUS at 05:57

## 2020-11-06 RX ADMIN — GABAPENTIN 300 MG: 100 CAPSULE ORAL at 08:25

## 2020-11-08 ENCOUNTER — ANESTHESIA EVENT (OUTPATIENT)
Dept: PREOP/PACU | Facility: HOSPITAL | Age: 45
End: 2020-11-08

## 2020-11-09 ENCOUNTER — HOSPITAL ENCOUNTER (OUTPATIENT)
Dept: PREOP/PACU | Facility: HOSPITAL | Age: 45
Setting detail: OUTPATIENT SURGERY
Discharge: HOME | End: 2020-11-09
Payer: COMMERCIAL

## 2020-11-09 ENCOUNTER — ANESTHESIA (OUTPATIENT)
Dept: PREOP/PACU | Facility: HOSPITAL | Age: 45
End: 2020-11-09

## 2020-11-09 ENCOUNTER — TELEPHONE (OUTPATIENT)
Dept: PSYCHIATRY | Facility: CLINIC | Age: 45
End: 2020-11-09

## 2020-11-09 VITALS
DIASTOLIC BLOOD PRESSURE: 74 MMHG | HEART RATE: 103 BPM | RESPIRATION RATE: 20 BRPM | OXYGEN SATURATION: 96 % | SYSTOLIC BLOOD PRESSURE: 123 MMHG | TEMPERATURE: 98 F

## 2020-11-09 VITALS — HEART RATE: 110 BPM

## 2020-11-09 PROCEDURE — 90870 ELECTROCONVULSIVE THERAPY: CPT

## 2020-11-09 RX ORDER — SODIUM CHLORIDE 9 MG/ML
125 INJECTION, SOLUTION INTRAVENOUS CONTINUOUS
Status: DISCONTINUED | OUTPATIENT
Start: 2020-11-09 | End: 2020-11-13 | Stop reason: HOSPADM

## 2020-11-09 RX ORDER — SODIUM CHLORIDE 9 MG/ML
100 INJECTION, SOLUTION INTRAVENOUS CONTINUOUS
Status: DISCONTINUED | OUTPATIENT
Start: 2020-11-09 | End: 2020-11-13 | Stop reason: HOSPADM

## 2020-11-09 RX ORDER — SUCCINYLCHOLINE/SOD CL,ISO/PF 100 MG/5ML
SYRINGE (ML) INTRAVENOUS AS NEEDED
Status: DISCONTINUED | OUTPATIENT
Start: 2020-11-09 | End: 2020-11-09

## 2020-11-09 RX ORDER — ESMOLOL HYDROCHLORIDE 10 MG/ML
INJECTION INTRAVENOUS AS NEEDED
Status: DISCONTINUED | OUTPATIENT
Start: 2020-11-09 | End: 2020-11-09

## 2020-11-09 RX ORDER — GLYCOPYRROLATE 0.2 MG/ML
INJECTION INTRAMUSCULAR; INTRAVENOUS AS NEEDED
Status: DISCONTINUED | OUTPATIENT
Start: 2020-11-09 | End: 2020-11-09

## 2020-11-09 RX ORDER — KETOROLAC TROMETHAMINE 30 MG/ML
INJECTION, SOLUTION INTRAMUSCULAR; INTRAVENOUS AS NEEDED
Status: DISCONTINUED | OUTPATIENT
Start: 2020-11-09 | End: 2020-11-09

## 2020-11-09 RX ORDER — SODIUM CHLORIDE 9 MG/ML
INJECTION, SOLUTION INTRAVENOUS CONTINUOUS PRN
Status: DISCONTINUED | OUTPATIENT
Start: 2020-11-09 | End: 2020-11-09

## 2020-11-09 RX ADMIN — SODIUM CHLORIDE 125 ML/HR: 0.9 INJECTION, SOLUTION INTRAVENOUS at 05:44

## 2020-11-09 RX ADMIN — METHOHEXITAL SODIUM 140 MG: 500 INJECTION, POWDER, LYOPHILIZED, FOR SOLUTION INTRAMUSCULAR; INTRAVENOUS; RECTAL at 06:31

## 2020-11-09 RX ADMIN — KETOROLAC TROMETHAMINE 30 MG: 30 INJECTION, SOLUTION INTRAMUSCULAR; INTRAVENOUS at 06:22

## 2020-11-09 RX ADMIN — ESMOLOL HYDROCHLORIDE 100 MG: 10 INJECTION, SOLUTION INTRAVENOUS at 06:31

## 2020-11-09 RX ADMIN — Medication 180 MG: at 06:31

## 2020-11-09 RX ADMIN — SODIUM CHLORIDE: 0.9 INJECTION, SOLUTION INTRAVENOUS at 05:43

## 2020-11-09 RX ADMIN — GLYCOPYRROLATE 0.2 MG: 0.2 INJECTION, SOLUTION INTRAMUSCULAR; INTRAVENOUS at 06:22

## 2020-11-10 ENCOUNTER — LAB (OUTPATIENT)
Dept: LAB | Facility: HOSPITAL | Age: 45
End: 2020-11-10
Attending: PSYCHIATRY & NEUROLOGY
Payer: COMMERCIAL

## 2020-11-10 DIAGNOSIS — F31.81 BIPOLAR 2 DISORDER, MAJOR DEPRESSIVE EPISODE (HCC): ICD-10-CM

## 2020-11-10 LAB
ANION GAP SERPL CALCULATED.3IONS-SCNC: 8 MMOL/L (ref 4–13)
BUN SERPL-MCNC: 11 MG/DL (ref 5–25)
CALCIUM SERPL-MCNC: 9.3 MG/DL (ref 8.3–10.1)
CHLORIDE SERPL-SCNC: 105 MMOL/L (ref 100–108)
CO2 SERPL-SCNC: 27 MMOL/L (ref 21–32)
CREAT SERPL-MCNC: 0.89 MG/DL (ref 0.6–1.3)
GFR SERPL CREATININE-BSD FRML MDRD: 102 ML/MIN/1.73SQ M
GLUCOSE P FAST SERPL-MCNC: 105 MG/DL (ref 65–99)
LITHIUM SERPL-SCNC: 0.3 MMOL/L (ref 0.5–1)
POTASSIUM SERPL-SCNC: 4 MMOL/L (ref 3.5–5.3)
SODIUM SERPL-SCNC: 140 MMOL/L (ref 136–145)

## 2020-11-10 PROCEDURE — 80048 BASIC METABOLIC PNL TOTAL CA: CPT

## 2020-11-10 PROCEDURE — 36415 COLL VENOUS BLD VENIPUNCTURE: CPT

## 2020-11-10 PROCEDURE — 80178 ASSAY OF LITHIUM: CPT

## 2020-11-17 ENCOUNTER — LAB (OUTPATIENT)
Dept: LAB | Facility: HOSPITAL | Age: 45
End: 2020-11-17
Payer: COMMERCIAL

## 2020-11-17 ENCOUNTER — TRANSCRIBE ORDERS (OUTPATIENT)
Dept: ADMINISTRATIVE | Facility: HOSPITAL | Age: 45
End: 2020-11-17

## 2020-11-17 ENCOUNTER — TELEPHONE (OUTPATIENT)
Dept: PSYCHIATRY | Facility: CLINIC | Age: 45
End: 2020-11-17

## 2020-11-17 DIAGNOSIS — Z86.39 PERSONAL HISTORY OF NUTRITIONAL DEFICIENCY: Primary | ICD-10-CM

## 2020-11-17 DIAGNOSIS — R73.9 BLOOD GLUCOSE ELEVATED: ICD-10-CM

## 2020-11-17 DIAGNOSIS — Z86.39 PERSONAL HISTORY OF NUTRITIONAL DEFICIENCY: ICD-10-CM

## 2020-11-17 LAB
ALBUMIN SERPL BCP-MCNC: 4 G/DL (ref 3.5–5)
ALP SERPL-CCNC: 54 U/L (ref 46–116)
ALT SERPL W P-5'-P-CCNC: 19 U/L (ref 12–78)
ANION GAP SERPL CALCULATED.3IONS-SCNC: 9 MMOL/L (ref 4–13)
AST SERPL W P-5'-P-CCNC: 11 U/L (ref 5–45)
BILIRUB SERPL-MCNC: 0.42 MG/DL (ref 0.2–1)
BUN SERPL-MCNC: 10 MG/DL (ref 5–25)
CALCIUM SERPL-MCNC: 9.8 MG/DL (ref 8.3–10.1)
CHLORIDE SERPL-SCNC: 104 MMOL/L (ref 100–108)
CHOLEST SERPL-MCNC: 247 MG/DL (ref 50–200)
CO2 SERPL-SCNC: 27 MMOL/L (ref 21–32)
CREAT SERPL-MCNC: 1.02 MG/DL (ref 0.6–1.3)
EST. AVERAGE GLUCOSE BLD GHB EST-MCNC: 108 MG/DL
GFR SERPL CREATININE-BSD FRML MDRD: 87 ML/MIN/1.73SQ M
GLUCOSE SERPL-MCNC: 83 MG/DL (ref 65–140)
HBA1C MFR BLD: 5.4 %
HDLC SERPL-MCNC: 40 MG/DL
LDLC SERPL CALC-MCNC: 180 MG/DL (ref 0–100)
NONHDLC SERPL-MCNC: 207 MG/DL
POTASSIUM SERPL-SCNC: 4.3 MMOL/L (ref 3.5–5.3)
PROT SERPL-MCNC: 7.9 G/DL (ref 6.4–8.2)
SODIUM SERPL-SCNC: 140 MMOL/L (ref 136–145)
TRIGL SERPL-MCNC: 133 MG/DL

## 2020-11-17 PROCEDURE — 80053 COMPREHEN METABOLIC PANEL: CPT

## 2020-11-17 PROCEDURE — 83036 HEMOGLOBIN GLYCOSYLATED A1C: CPT

## 2020-11-17 PROCEDURE — 80061 LIPID PANEL: CPT

## 2020-11-17 PROCEDURE — 36415 COLL VENOUS BLD VENIPUNCTURE: CPT

## 2020-11-20 ENCOUNTER — OFFICE VISIT (OUTPATIENT)
Dept: PSYCHIATRY | Facility: CLINIC | Age: 45
End: 2020-11-20
Payer: COMMERCIAL

## 2020-11-20 DIAGNOSIS — F33.2 MAJOR DEPRESSIVE DISORDER, RECURRENT, SEVERE WITHOUT PSYCHOTIC FEATURES (HCC): Primary | ICD-10-CM

## 2020-11-20 DIAGNOSIS — F41.1 GENERALIZED ANXIETY DISORDER: ICD-10-CM

## 2020-11-20 PROCEDURE — 99215 OFFICE O/P EST HI 40 MIN: CPT | Performed by: PHYSICIAN ASSISTANT

## 2020-11-20 RX ORDER — GABAPENTIN 300 MG/1
300 CAPSULE ORAL 3 TIMES DAILY
Qty: 90 CAPSULE | Refills: 1 | Status: SHIPPED | OUTPATIENT
Start: 2020-11-20 | End: 2021-01-13 | Stop reason: SDUPTHER

## 2020-11-20 RX ORDER — BUSPIRONE HYDROCHLORIDE 15 MG/1
15 TABLET ORAL 3 TIMES DAILY
Qty: 90 TABLET | Refills: 1 | Status: SHIPPED | OUTPATIENT
Start: 2020-11-20 | End: 2021-01-13 | Stop reason: SDUPTHER

## 2020-11-23 ENCOUNTER — LAB (OUTPATIENT)
Dept: LAB | Facility: HOSPITAL | Age: 45
End: 2020-11-23
Payer: COMMERCIAL

## 2020-11-23 DIAGNOSIS — F33.2 MAJOR DEPRESSIVE DISORDER, RECURRENT, SEVERE WITHOUT PSYCHOTIC FEATURES (HCC): ICD-10-CM

## 2020-11-23 DIAGNOSIS — F41.1 GENERALIZED ANXIETY DISORDER: ICD-10-CM

## 2020-11-23 LAB — LITHIUM SERPL-SCNC: 0.3 MMOL/L (ref 0.5–1)

## 2020-11-23 PROCEDURE — 80178 ASSAY OF LITHIUM: CPT

## 2020-11-23 PROCEDURE — 36415 COLL VENOUS BLD VENIPUNCTURE: CPT

## 2020-12-04 DIAGNOSIS — F33.2 MAJOR DEPRESSIVE DISORDER, RECURRENT, SEVERE WITHOUT PSYCHOTIC FEATURES (HCC): Chronic | ICD-10-CM

## 2020-12-04 RX ORDER — DULOXETIN HYDROCHLORIDE 60 MG/1
60 CAPSULE, DELAYED RELEASE ORAL DAILY
Qty: 30 CAPSULE | Refills: 2 | Status: SHIPPED | OUTPATIENT
Start: 2020-12-04 | End: 2021-01-13 | Stop reason: SDUPTHER

## 2020-12-04 RX ORDER — DULOXETIN HYDROCHLORIDE 60 MG/1
60 CAPSULE, DELAYED RELEASE ORAL DAILY
Qty: 30 CAPSULE | Refills: 0 | Status: CANCELLED | OUTPATIENT
Start: 2020-12-04

## 2020-12-08 ENCOUNTER — OFFICE VISIT (OUTPATIENT)
Dept: PSYCHIATRY | Facility: CLINIC | Age: 45
End: 2020-12-08
Payer: COMMERCIAL

## 2020-12-08 DIAGNOSIS — F33.1 MDD (MAJOR DEPRESSIVE DISORDER), RECURRENT EPISODE, MODERATE (HCC): Primary | ICD-10-CM

## 2020-12-08 DIAGNOSIS — F41.1 GENERALIZED ANXIETY DISORDER: Chronic | ICD-10-CM

## 2020-12-08 PROCEDURE — 99214 OFFICE O/P EST MOD 30 MIN: CPT | Performed by: PSYCHIATRY & NEUROLOGY

## 2020-12-08 RX ORDER — MULTIVITAMIN
1 TABLET ORAL DAILY
COMMUNITY

## 2020-12-11 ENCOUNTER — OFFICE VISIT (OUTPATIENT)
Dept: PSYCHIATRY | Facility: CLINIC | Age: 45
End: 2020-12-11
Payer: COMMERCIAL

## 2020-12-11 DIAGNOSIS — F41.1 GENERALIZED ANXIETY DISORDER: ICD-10-CM

## 2020-12-11 DIAGNOSIS — F33.2 MAJOR DEPRESSIVE DISORDER, RECURRENT, SEVERE WITHOUT PSYCHOTIC FEATURES (HCC): Primary | ICD-10-CM

## 2020-12-11 DIAGNOSIS — F51.04 PSYCHOPHYSIOLOGICAL INSOMNIA: ICD-10-CM

## 2020-12-11 DIAGNOSIS — F31.81 BIPOLAR 2 DISORDER, MAJOR DEPRESSIVE EPISODE (HCC): ICD-10-CM

## 2020-12-11 PROCEDURE — 99214 OFFICE O/P EST MOD 30 MIN: CPT | Performed by: PHYSICIAN ASSISTANT

## 2020-12-11 RX ORDER — LITHIUM CARBONATE 600 MG/1
600 CAPSULE ORAL
Qty: 30 CAPSULE | Refills: 2 | Status: SHIPPED | OUTPATIENT
Start: 2020-12-11 | End: 2021-01-13 | Stop reason: SDUPTHER

## 2020-12-11 RX ORDER — TRAZODONE HYDROCHLORIDE 50 MG/1
50 TABLET ORAL
Qty: 30 TABLET | Refills: 2 | Status: SHIPPED | OUTPATIENT
Start: 2020-12-11 | End: 2021-01-13 | Stop reason: SDUPTHER

## 2020-12-21 ENCOUNTER — TELEPHONE (OUTPATIENT)
Dept: GASTROENTEROLOGY | Facility: CLINIC | Age: 45
End: 2020-12-21

## 2020-12-24 ENCOUNTER — TELEPHONE (OUTPATIENT)
Dept: GASTROENTEROLOGY | Facility: MEDICAL CENTER | Age: 45
End: 2020-12-24

## 2020-12-24 ENCOUNTER — TELEMEDICINE (OUTPATIENT)
Dept: GASTROENTEROLOGY | Facility: MEDICAL CENTER | Age: 45
End: 2020-12-24
Payer: COMMERCIAL

## 2020-12-24 DIAGNOSIS — R11.10 REGURGITATION OF FOOD: ICD-10-CM

## 2020-12-24 DIAGNOSIS — K59.03 DRUG-INDUCED CONSTIPATION: ICD-10-CM

## 2020-12-24 DIAGNOSIS — Z12.11 SCREENING FOR COLON CANCER: ICD-10-CM

## 2020-12-24 DIAGNOSIS — R10.10 UPPER ABDOMINAL PAIN: Primary | ICD-10-CM

## 2020-12-24 DIAGNOSIS — K21.9 GASTROESOPHAGEAL REFLUX DISEASE, UNSPECIFIED WHETHER ESOPHAGITIS PRESENT: ICD-10-CM

## 2020-12-24 PROCEDURE — 99204 OFFICE O/P NEW MOD 45 MIN: CPT | Performed by: INTERNAL MEDICINE

## 2020-12-24 NOTE — H&P (VIEW-ONLY)
Patient is writhing in pain, but  is trying to comfort her.    Virtual Regular Visit      Assessment/Plan:  The patient is a 49 yo man with prior polysubstance abuse but now sober who presents with recent change in psychiatric medication during hospitalization c b constipation  He recently developed intermittent upper abdominal pain a/w regurgitation and reflux and at times radiating to his chest  This may be from significant constipation vs H pylori vs GERD vs gastritis  He is also due for screening colonoscopy  Recent blood work with normal CMP, CBC  Available labs and imaging reviewed  Problem List Items Addressed This Visit     None      Visit Diagnoses     Upper abdominal pain    -  Primary    Relevant Orders    EGD    Gastroesophageal reflux disease, unspecified whether esophagitis present        Relevant Orders    EGD    Regurgitation of food        Relevant Orders    EGD    Drug-induced constipation        Screening for colon cancer        Relevant Orders    Colonoscopy        I recommend he continue his stool softener, but also add a dose of Miralax or milk of magnesia PRN if he goes a day without a BM  Plan for screening colonoscopy given his age  Will obtain EGD at the same time to evaluate for PUD, H pylori, reflux/garcia's  HCV screening in the future if none prior  Reason for visit is   Chief Complaint   Patient presents with    Virtual Regular Visit        Encounter provider Angel Clements MD    Provider located at [address] [address]      Recent Visits  No visits were found meeting these conditions  Showing recent visits within past 7 days and meeting all other requirements     Today's Visits  Date Type Provider Dept   12/24/20 Telemedicine MD Adolfo Solo [place]   Showing today's visits and meeting all other requirements     Future Appointments  No visits were found meeting these conditions     Showing future appointments within next 150 days and meeting all other requirements        The patient was identified by name and date of birth  Mitch Cantor was informed that this is a telemedicine visit and that the visit is being conducted through [employer] and patient was informed that this is a secure, HIPAA-compliant platform  He agrees to proceed     My office door was closed  No one else was in the room  He acknowledged consent and understanding of privacy and security of the video platform  The patient has agreed to participate and understands they can discontinue the visit at any time  Patient is aware this is a billable service  Referred by Olga Rubio for abdominal pain    Subjective  Mitch Cantor is a 50 y o  male who presents with abdominal pain  He has been having abdominal pain  He ate a plain turkey sandwich and immediately after he had a bad pain in the upper abdomen, spread to his heart and shoulders  He felt lightheaded and was almost went to the ED  That happened Monday, and then again Tuesday  It happened Wednesday at lunch and at dinner  It occurred again Friday and he went to see his PCP  He was hospitalized for a psych issue recently, was on medications  Durng the hospitalization he was very constipated with a small hard movement every 3 days  That continued once he was discharged, with infrequent and hard stools  His PCP was told it was constipation  She also recommended he see a GI doctor for an endoscopy  She put him on stool softeners and over the weekend he went to the bathroom and he was in pain  Throughout this week he continued to take it  He had a few movements at work before lunch and it seemed normal, and when he eats he has less abdominal pain  He did have the pain yesterday  Never had this before  He also notes rumbling in his chest, related to the upper abdomen   Randomly, when he breathes he feels a rumbling in his chest  No chest pain, shortness of breath  Mild heartburn, some regurgitation  No dysphagia, odynophagia  No nausea, vomiting  No BRBPR, melena  No weight loss  He had a prior EGD almost 8 years ago and believes it was normal  He does not recall why he went (perhaps reflux)  No FH of colon cancer        Past Medical History:   Diagnosis Date    Anxiety     Depression     Suicidal thoughts        Past Surgical History:   Procedure Laterality Date    HERNIA REPAIR      SHOULDER SURGERY         Current Outpatient Medications   Medication Sig Dispense Refill    busPIRone (BUSPAR) 15 mg tablet Take 1 tablet (15 mg total) by mouth 3 (three) times a day 90 tablet 1    DULoxetine (CYMBALTA) 60 mg delayed release capsule Take 1 capsule (60 mg total) by mouth daily 30 capsule 2    gabapentin (NEURONTIN) 300 mg capsule Take 1 capsule (300 mg total) by mouth 3 (three) times a day 90 capsule 1    lithium 600 MG capsule Take 1 capsule (600 mg total) by mouth daily at bedtime 30 capsule 2    LORazepam (ATIVAN) 0 5 mg tablet Take 1 tablet (0 5 mg total) by mouth 2 (two) times a day as needed (severe anxiety) (Patient not taking: Reported on 11/6/2020) 20 tablet 0    Multiple Vitamin (multivitamin) tablet Take 1 tablet by mouth daily      traZODone (DESYREL) 50 mg tablet Take 1 tablet (50 mg total) by mouth daily at bedtime 30 tablet 2     No current facility-administered medications for this visit  No Known Allergies    REVIEW OF SYSTEMS:  10 point ROS reviewed and negative, except as above      PHYSICAL EXAMINATION:  Appearance and vitals taken from home devices    General Appearance:   Alert, cooperative, no distress   HEENT:  Normocephalic, atraumatic, anicteric  Neck supple, symmetrical, trachea midline  Lungs:   Equal chest rise and unlabored breathing, normal effort, no coughing  Cardiovascular:   No visualized JVD  Abdomen:   No abdominal distension  Skin:   No jaundice, rashes, or lesions      Musculoskeletal:   Normal range of motion visualized  Psych:  Normal affect and normal insight  Neuro:  Alert and appropriate  I spent 20 minutes directly with the patient during this visit      VIRTUAL VISIT 137 Mik Michael acknowledges that he has consented to an online visit or consultation  He understands that the online visit is based solely on information provided by him, and that, in the absence of a face-to-face physical evaluation by the physician, the diagnosis he receives is both limited and provisional in terms of accuracy and completeness  This is not intended to replace a full medical face-to-face evaluation by the physician  Le Major understands and accepts these terms

## 2021-01-13 ENCOUNTER — ANESTHESIA EVENT (OUTPATIENT)
Dept: GASTROENTEROLOGY | Facility: HOSPITAL | Age: 46
End: 2021-01-13

## 2021-01-13 ENCOUNTER — OFFICE VISIT (OUTPATIENT)
Dept: PSYCHIATRY | Facility: CLINIC | Age: 46
End: 2021-01-13
Payer: COMMERCIAL

## 2021-01-13 DIAGNOSIS — F51.04 PSYCHOPHYSIOLOGICAL INSOMNIA: ICD-10-CM

## 2021-01-13 DIAGNOSIS — F33.2 MAJOR DEPRESSIVE DISORDER, RECURRENT, SEVERE WITHOUT PSYCHOTIC FEATURES (HCC): Primary | ICD-10-CM

## 2021-01-13 DIAGNOSIS — F31.81 BIPOLAR 2 DISORDER, MAJOR DEPRESSIVE EPISODE (HCC): ICD-10-CM

## 2021-01-13 DIAGNOSIS — F41.1 GENERALIZED ANXIETY DISORDER: ICD-10-CM

## 2021-01-13 PROBLEM — M54.50 CHRONIC LOW BACK PAIN: Status: ACTIVE | Noted: 2021-01-13

## 2021-01-13 PROBLEM — G89.29 CHRONIC LOW BACK PAIN: Status: ACTIVE | Noted: 2021-01-13

## 2021-01-13 PROCEDURE — 99214 OFFICE O/P EST MOD 30 MIN: CPT | Performed by: PHYSICIAN ASSISTANT

## 2021-01-13 RX ORDER — GABAPENTIN 300 MG/1
300 CAPSULE ORAL 3 TIMES DAILY
Qty: 90 CAPSULE | Refills: 2 | Status: SHIPPED | OUTPATIENT
Start: 2021-01-13 | End: 2021-05-12

## 2021-01-13 RX ORDER — ATORVASTATIN CALCIUM 40 MG/1
20 TABLET, FILM COATED ORAL DAILY
COMMUNITY
Start: 2020-12-18

## 2021-01-13 RX ORDER — DULOXETIN HYDROCHLORIDE 60 MG/1
60 CAPSULE, DELAYED RELEASE ORAL DAILY
Qty: 30 CAPSULE | Refills: 2 | Status: SHIPPED | OUTPATIENT
Start: 2021-01-13 | End: 2021-04-28

## 2021-01-13 RX ORDER — TRAZODONE HYDROCHLORIDE 50 MG/1
50 TABLET ORAL
Qty: 30 TABLET | Refills: 2 | Status: SHIPPED | OUTPATIENT
Start: 2021-01-13 | End: 2021-05-19 | Stop reason: SDUPTHER

## 2021-01-13 RX ORDER — LITHIUM CARBONATE 600 MG/1
600 CAPSULE ORAL
Qty: 30 CAPSULE | Refills: 2 | Status: SHIPPED | OUTPATIENT
Start: 2021-01-13 | End: 2021-03-28

## 2021-01-13 RX ORDER — BUSPIRONE HYDROCHLORIDE 15 MG/1
15 TABLET ORAL 3 TIMES DAILY
Qty: 90 TABLET | Refills: 2 | Status: SHIPPED | OUTPATIENT
Start: 2021-01-13 | End: 2021-02-01

## 2021-01-13 NOTE — ANESTHESIA PREPROCEDURE EVALUATION
Procedure:  COLONOSCOPY  EGD    Relevant Problems   ANESTHESIA  old charts reviwed      CARDIO   (+) Mixed hyperlipidemia      ENDO (within normal limits)      /RENAL (within normal limits)      HEMATOLOGY (within normal limits)      MUSCULOSKELETAL   (+) Chronic low back pain      NEURO/PSYCH  hx of ECT tx   Hx of polysubstance abuse   (+) Generalized anxiety disorder   (+) History of alcohol abuse   (+) History of cocaine use   (+) Major depressive disorder, recurrent, severe without psychotic features ([facility] [address] )   (+) Other specified anxiety disorders      PULMONARY  ex smoker   (+) Obstructive sleep apnea syndrome   (-) Smoking   (-) URI (upper respiratory infection)        Physical Exam    Airway    Mallampati score: II  TM Distance: >3 FB  Neck ROM: full     Dental       Cardiovascular  Cardiovascular exam normal    Pulmonary  Pulmonary exam normal     Other Findings  Fixed upper and lower teeth and in good repair      Anesthesia Plan  ASA Score- 3     Anesthesia Type- IV sedation with anesthesia with ASA Monitors  Additional Monitors:   Airway Plan:           Plan Factors-Exercise tolerance (METS): >4 METS  Chart reviewed  Existing labs reviewed  Patient summary reviewed  Patient is not a current smoker  Patient did not smoke on day of surgery  Obstructive sleep apnea risk education given perioperatively  Induction- intravenous  Postoperative Plan-     Informed Consent- Anesthetic plan and risks discussed with patient and mother  I personally reviewed this patient with the CRNA  Discussed and agreed on the Anesthesia Plan with the CRNA  Brent White

## 2021-01-13 NOTE — PSYCH
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 this has been going well  Some increased anxiety with initially starting this new position  Reports that he has adequate interest and motivation outside of work  Spends time with family and has been dating someone he knew for a couple years  Appetite is good has been following healthy diet  He has been much more physically active as well since being at his new position  Physically reports that he was having some issues with constipation and abdominal pain  He was seen by Gastroenterology and is having an upcoming EGD and colonoscopy  He is also started on Colace stool softener  We also discussed increasing fluid intake  No other new medications or medical issues noted  He denies suicidal ideation, intent or plan at present, has no suicidal ideation, intent or plan at present  He denies any auditory hallucinations and visual hallucinations, denies any other delusional thinking, denies any delusional thinking  He denies any side effects from medications      HPI ROS Appetite Changes and Sleep: normal appetite, normal sleep    Review Of Systems:      Constitutional Negative   ENT Negative   Cardiovascular Negative   Respiratory Negative   Gastrointestinal Negative   Genitourinary Negative   Musculoskeletal Negative   Integumentary Negative   Neurological Negative   Endocrine Negative   Other Symptoms Negative and None       Laboratory Results: No results found for this or any previous visit  Substance Abuse History:    Social History     Substance and Sexual Activity   Drug Use Not Currently    Types: Marijuana    Comment: medical marijuana       Family Psychiatric History:     No family history on file      The following portions of the patient's history were reviewed and updated as appropriate: past family history, past medical history, past social history, past surgical history and problem list     Social History     Socioeconomic History    Marital status: Single     Spouse name: Not on file    Number of children: Not on file    Years of education: Not on file    Highest education level: Not on file   Occupational History    Not on file   Social Needs    Financial resource strain: Not on file    Food insecurity     Worry: Not on file     Inability: Not on file    Transportation needs     Medical: Not on file     Non-medical: Not on file   Tobacco Use    Smoking status: Former Smoker     Quit date: 2004     Years since quittin 0    Smokeless tobacco: Never Used   Substance and Sexual Activity    Alcohol use: Not Currently     Frequency: Never     Binge frequency: Never     Comment: 6 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    Drug use: Not Currently     Types: Marijuana     Comment: medical marijuana    Sexual activity: Not on file   Lifestyle    Physical activity     Days per week: Not on file     Minutes per session: Not on file    Stress: Not on file   Relationships    Social connections     Talks on phone: Not on file     Gets together: Not on file     Attends Jew service: Not on file     Active member of club or organization: Not on file     Attends meetings of clubs or organizations: Not on file     Relationship status: Not on file    Intimate partner violence     Fear of current or ex partner: Not on file     Emotionally abused: Not on file     Physically abused: Not on file     Forced sexual activity: Not on file   Other Topics Concern    Not on file   Social History Narrative    Not on file     Social History     Social History Narrative    Not on file        Social History     Tobacco History     Smoking Status  Former Smoker Quit date  1/1/2004    Smokeless Tobacco Use  Never Used          Alcohol History     Alcohol Use Status  Not Currently Comment  6 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          Drug Use     Drug Use Status  Not Currently Types  Marijuana Comment  medical marijuana          Sexual Activity     Sexually Active  Not Asked          Activities of Daily Living    Not Asked               Additional Substance Use Detail     Questions Responses    Problems Due to Past Use of Alcohol? Yes    Problems Due to Past Use of Substances?  Yes    Substance Use Assessment Denies substance use within the past 12 months    Alcohol Use Frequency Denies use in past 12 months    Cannabis frequency Past regular use    Comment: Past rare use on 6/9/2020 Past rare use ->Past regular use on 7/24/2020     Heroin Frequency Denies use in past 12 months    Cannabis method Smoke    Comment: Smoke on 7/24/2020     1st Use of Alcohol age 13    Cannabis 1st Use medical marijuana for anxiety    Last Use of Alcohol & Amount sober 6 5 years    Longest Abstinence from Alcohol 6 5 years    Cocaine frequency Past regular use    Comment: Never used on 6/9/2020 Never used ->Past regular use on 7/24/2020     Crack Cocaine Frequency Prior dependence    Methamphetamine Frequency Denies use in past 12 months    Narcotic Frequency Denies use in past 12 months    Benzodiazepine Frequency Denies use in past 12 months    Amphetamine frequency Denies use in past 12 months    Barbituate Frequency Denies use use in past 12 months    Inhalant frequency Never used    Comment: Never used on 6/9/2020     Hallucinogen frequency Past regular use    Comment: Never used on 6/9/2020 Never used ->Past regular use on 7/24/2020     Ecstasy frequency Never used    Comment: Never used on 6/9/2020     Other drug frequency Never used    Comment: Never used on 6/9/2020     Opiate frequency Denies use in past 12 months    Not reviewed  OBJECTIVE:     Mental Status Evaluation:    Appearance age appropriate, casually dressed   Behavior pleasant, cooperative, good eye contact   Speech normal volume, normal pitch   Mood Less anxious, less depressed   Affect Mood congruent   Thought Processes logical   Associations intact associations   Thought Content normal   Perceptual Disturbances: none   Abnormal Thoughts  Risk Potential Suicidal ideation - None  Homicidal ideation - None  Potential for aggression - No   Orientation oriented to person, place, time/date and situation   Memory recent and remote memory grossly intact   Cosciousness alert and awake   Attention Span attention span and concentration are age appropriate   Intellect Not formally assessed   Insight age appropriate    Judgement good    Muscle Strength and  Gait Steady gait   Language Within normal limits   Fund of Knowledge displays adequate knowledge of current events   Pain none   Pain Scale 0       Assessment/Plan:       Diagnoses and all orders for this visit:    Major depressive disorder, recurrent, severe without psychotic features (HCC)    Generalized anxiety disorder          Treatment Recommendations/Precautions:  Lithium 600 mg daily  Cymbalta 60 mg daily  BuSpar 15 mg t i d   Gabapentin 300 mg t i d   Trazodone 50 mg q h s  P r n  Lorazepam 0 5 mg p r n , has not been taking  Follow-up in one month and he will call sooner if any questions or concerns  Continue current medications    Risks/Benefits      Risks, Benefits And Possible Side Effects Of Medications:    Risks, benefits, and possible side effects of medications explained to patient and patient verbalizes understanding and agreement for treatment      Controlled Medication Discussion:     Not applicable    Psychotherapy Provided: Individual psychotherapy provided: No

## 2021-01-14 ENCOUNTER — ANESTHESIA (OUTPATIENT)
Dept: GASTROENTEROLOGY | Facility: HOSPITAL | Age: 46
End: 2021-01-14

## 2021-01-14 ENCOUNTER — HOSPITAL ENCOUNTER (OUTPATIENT)
Dept: GASTROENTEROLOGY | Facility: HOSPITAL | Age: 46
Setting detail: OUTPATIENT SURGERY
Discharge: HOME | End: 2021-01-14
Attending: INTERNAL MEDICINE
Payer: COMMERCIAL

## 2021-01-14 VITALS — HEART RATE: 63 BPM

## 2021-01-14 VITALS
SYSTOLIC BLOOD PRESSURE: 108 MMHG | TEMPERATURE: 96.4 F | BODY MASS INDEX: 28.49 KG/M2 | HEIGHT: 73 IN | WEIGHT: 215 LBS | DIASTOLIC BLOOD PRESSURE: 72 MMHG | HEART RATE: 67 BPM | OXYGEN SATURATION: 100 % | RESPIRATION RATE: 18 BRPM

## 2021-01-14 DIAGNOSIS — Z12.11 SCREENING FOR COLON CANCER: ICD-10-CM

## 2021-01-14 DIAGNOSIS — R10.10 UPPER ABDOMINAL PAIN: ICD-10-CM

## 2021-01-14 DIAGNOSIS — R11.10 REGURGITATION OF FOOD: ICD-10-CM

## 2021-01-14 DIAGNOSIS — K21.9 GASTROESOPHAGEAL REFLUX DISEASE, UNSPECIFIED WHETHER ESOPHAGITIS PRESENT: ICD-10-CM

## 2021-01-14 PROCEDURE — 88305 TISSUE EXAM BY PATHOLOGIST: CPT | Performed by: PATHOLOGY

## 2021-01-14 PROCEDURE — 43239 EGD BIOPSY SINGLE/MULTIPLE: CPT | Performed by: INTERNAL MEDICINE

## 2021-01-14 PROCEDURE — 88312 SPECIAL STAINS GROUP 1: CPT | Performed by: PATHOLOGY

## 2021-01-14 PROCEDURE — 45385 COLONOSCOPY W/LESION REMOVAL: CPT | Performed by: INTERNAL MEDICINE

## 2021-01-14 RX ORDER — PROPOFOL 10 MG/ML
INJECTION, EMULSION INTRAVENOUS AS NEEDED
Status: DISCONTINUED | OUTPATIENT
Start: 2021-01-14 | End: 2021-01-14

## 2021-01-14 RX ORDER — SODIUM CHLORIDE 9 MG/ML
125 INJECTION, SOLUTION INTRAVENOUS CONTINUOUS
Status: DISCONTINUED | OUTPATIENT
Start: 2021-01-14 | End: 2021-01-18 | Stop reason: HOSPADM

## 2021-01-14 RX ADMIN — PROPOFOL 50 MG: 10 INJECTION, EMULSION INTRAVENOUS at 10:24

## 2021-01-14 RX ADMIN — PROPOFOL 30 MG: 10 INJECTION, EMULSION INTRAVENOUS at 10:53

## 2021-01-14 RX ADMIN — PROPOFOL 50 MG: 10 INJECTION, EMULSION INTRAVENOUS at 10:59

## 2021-01-14 RX ADMIN — PROPOFOL 50 MG: 10 INJECTION, EMULSION INTRAVENOUS at 11:02

## 2021-01-14 RX ADMIN — SODIUM CHLORIDE: 0.9 INJECTION, SOLUTION INTRAVENOUS at 10:18

## 2021-01-14 RX ADMIN — PROPOFOL 50 MG: 10 INJECTION, EMULSION INTRAVENOUS at 10:23

## 2021-01-14 RX ADMIN — PROPOFOL 150 MG: 10 INJECTION, EMULSION INTRAVENOUS at 10:22

## 2021-01-14 RX ADMIN — SODIUM CHLORIDE 125 ML/HR: 0.9 INJECTION, SOLUTION INTRAVENOUS at 09:11

## 2021-01-14 RX ADMIN — PROPOFOL 50 MG: 10 INJECTION, EMULSION INTRAVENOUS at 10:28

## 2021-01-14 RX ADMIN — PROPOFOL 30 MG: 10 INJECTION, EMULSION INTRAVENOUS at 10:50

## 2021-01-14 RX ADMIN — PROPOFOL 30 MG: 10 INJECTION, EMULSION INTRAVENOUS at 10:45

## 2021-01-14 RX ADMIN — PROPOFOL 50 MG: 10 INJECTION, EMULSION INTRAVENOUS at 10:32

## 2021-01-14 RX ADMIN — PROPOFOL 30 MG: 10 INJECTION, EMULSION INTRAVENOUS at 10:42

## 2021-01-14 RX ADMIN — PROPOFOL 50 MG: 10 INJECTION, EMULSION INTRAVENOUS at 10:30

## 2021-01-14 RX ADMIN — PROPOFOL 50 MG: 10 INJECTION, EMULSION INTRAVENOUS at 10:56

## 2021-01-14 RX ADMIN — PROPOFOL 30 MG: 10 INJECTION, EMULSION INTRAVENOUS at 11:12

## 2021-01-14 RX ADMIN — PROPOFOL 40 MG: 10 INJECTION, EMULSION INTRAVENOUS at 11:16

## 2021-01-14 RX ADMIN — PROPOFOL 50 MG: 10 INJECTION, EMULSION INTRAVENOUS at 10:37

## 2021-01-14 RX ADMIN — PROPOFOL 50 MG: 10 INJECTION, EMULSION INTRAVENOUS at 10:26

## 2021-01-14 NOTE — NURSING NOTE
Received from  at 1126  Sleeping, but easily arouseable by calling his name  Denies pain  Respirations easy and non labored  IV fluids continue  Call bell in reach

## 2021-01-14 NOTE — INTERVAL H&P NOTE
H&P reviewed  After examining the patient I find no changes in the patients condition since the H&P had been written      Vitals:    01/14/21 0901   BP: 113/72   Pulse: 84   Resp: 16   Temp: (!) 97 °F (36 1 °C)   SpO2: 96%

## 2021-01-14 NOTE — NURSING NOTE
Pt tolerated PO  Denied nausea  Dr Eulalio Whitmore in to speak with pt  Discharge instructions provided and reviewed with pt  Pt verbalized understanding of same  No complaints offered  Mother at bedside

## 2021-01-14 NOTE — DISCHARGE INSTRUCTIONS
Colonoscopy   WHAT YOU NEED TO KNOW:   A colonoscopy is a procedure to examine the inside of your colon (intestine) with a scope  Polyps or tissue growths may have been removed during your colonoscopy  It is normal to feel bloated and to have some abdominal discomfort  You should be passing gas  If you have hemorrhoids or you had polyps removed, you may have a small amount of bleeding  DISCHARGE INSTRUCTIONS:   Call your doctor if:   · You have a large amount of bright red blood in your bowel movements  · Your abdomen is hard and firm and you have severe pain  · You have sudden trouble breathing  · You develop a rash or hives  · You have a fever within 24 hours of your procedure  · You have not had a bowel movement for 3 days after your procedure  · You have questions or concerns about your condition or care  After your colonoscopy:   · Do not lift, strain, or run  for 3 days  · Rest as much as possible  You have been given medicine to relax you  Do not  drive or make important decisions for at least 24 hours  Return to your normal activity as directed  · Relieve gas and discomfort from bloating  by lying on your left side with a heating pad on your abdomen  You may need to take short walks to help the gas move out  Eat small meals until bloating is relieved  If you had polyps removed: For 7 days after your procedure:  · Do not  take aspirin  · Do not  go on long car rides  Help prevent constipation:   · Eat a variety of healthy foods  Healthy foods include fruit, vegetables, whole-grain breads, low-fat dairy products, beans, lean meat, and fish  Ask if you need to be on a special diet  Your healthcare provider may recommend that you eat high-fiber foods such as cooked beans  Fiber helps you have regular bowel movements  · Drink liquids as directed  Adults should drink between 9 and 13 eight-ounce cups of liquid every day  Ask what amount is best for you   For most people, good liquids to drink are water, juice, and milk  · Exercise as directed  Talk to your healthcare provider about the best exercise plan for you  Exercise can help prevent constipation, decrease your blood pressure and improve your health  Follow up with your healthcare provider as directed:  Write down your questions so you remember to ask them during your visits  © Copyright [address] Information is for End User's use only and may not be sold, redistributed or otherwise used for commercial purposes  All illustrations and images included in CareNotes® are the copyrighted property of A D A M , Inc  or [address] [place] [address] [employer]   The above information is an  only  It is not intended as medical advice for individual conditions or treatments  Talk to your doctor, nurse or pharmacist before following any medical regimen to see if it is safe and effective for you  Upper Endoscopy   WHAT YOU NEED TO KNOW:   An upper endoscopy is also called an upper gastrointestinal (GI) endoscopy, or an esophagogastroduodenoscopy (EGD)  You may feel bloated, gassy, or have some abdominal discomfort after your procedure  Your throat may be sore for 24 to 36 hours  You may burp or pass gas from air that is still inside your body  DISCHARGE INSTRUCTIONS:   Call 911 if:   · You have sudden chest pain or trouble breathing  Seek care immediately if:   · You feel dizzy or faint  · You have trouble swallowing  · You have severe throat pain  · Your bowel movements are very dark or black  · Your abdomen is hard and firm and you have severe pain  · You vomit blood  Contact your healthcare provider if:   · You feel full or bloated and cannot burp or pass gas  · You have not had a bowel movement for 3 days after your procedure  · You have neck pain  · You have a fever or chills  · You have nausea or are vomiting  · You have a rash or hives      · You have questions or concerns about your endoscopy  Relieve a sore throat:  Suck on throat lozenges or crushed ice  Gargle with a small amount of warm salt water  Mix 1 teaspoon of salt and 1 cup of warm water to make salt water  Relieve gas and discomfort from bloating:  Lie on your right side with a heating pad on your abdomen  Take short walks to help pass gas  Eat small meals until bloating is relieved  Rest after your procedure:  Do not drive or make important decisions until the day after your procedure  Return to your normal activity as directed  You can usually return to work the day after your procedure  Follow up with your healthcare provider as directed:  Write down your questions so you remember to ask them during your visits  © Copyright [address] Information is for End User's use only and may not be sold, redistributed or otherwise used for commercial purposes  All illustrations and images included in CareNotes® are the copyrighted property of A D A M , Inc  or [facility] [place]   The above information is an  only  It is not intended as medical advice for individual conditions or treatments  Talk to your doctor, nurse or pharmacist before following any medical regimen to see if it is safe and effective for you

## 2021-01-20 DIAGNOSIS — A04.8 H. PYLORI INFECTION: Primary | ICD-10-CM

## 2021-01-20 RX ORDER — OMEPRAZOLE 40 MG/1
40 CAPSULE, DELAYED RELEASE ORAL
Qty: 28 CAPSULE | Refills: 0 | Status: SHIPPED | OUTPATIENT
Start: 2021-01-20 | End: 2021-03-30

## 2021-01-20 RX ORDER — AMOXICILLIN 500 MG/1
1000 TABLET, FILM COATED ORAL 2 TIMES DAILY
Qty: 56 TABLET | Refills: 0 | Status: SHIPPED | OUTPATIENT
Start: 2021-01-20 | End: 2021-02-03

## 2021-01-20 RX ORDER — CLARITHROMYCIN 500 MG/1
500 TABLET, COATED ORAL EVERY 12 HOURS SCHEDULED
Qty: 28 TABLET | Refills: 0 | Status: SHIPPED | OUTPATIENT
Start: 2021-01-20 | End: 2021-02-03

## 2021-01-28 DIAGNOSIS — F41.1 GENERALIZED ANXIETY DISORDER: ICD-10-CM

## 2021-01-28 RX ORDER — BUSPIRONE HYDROCHLORIDE 15 MG/1
TABLET ORAL
Qty: 90 TABLET | Refills: 1 | OUTPATIENT
Start: 2021-01-28

## 2021-01-31 DIAGNOSIS — F41.1 GENERALIZED ANXIETY DISORDER: ICD-10-CM

## 2021-02-01 RX ORDER — BUSPIRONE HYDROCHLORIDE 15 MG/1
TABLET ORAL
Qty: 90 TABLET | Refills: 1 | Status: SHIPPED | OUTPATIENT
Start: 2021-02-01 | End: 2021-06-07 | Stop reason: SDUPTHER

## 2021-02-18 ENCOUNTER — TELEMEDICINE (OUTPATIENT)
Dept: PSYCHIATRY | Facility: CLINIC | Age: 46
End: 2021-02-18
Payer: COMMERCIAL

## 2021-02-18 DIAGNOSIS — F33.2 MAJOR DEPRESSIVE DISORDER, RECURRENT, SEVERE WITHOUT PSYCHOTIC FEATURES (HCC): Primary | ICD-10-CM

## 2021-02-18 DIAGNOSIS — F41.1 GENERALIZED ANXIETY DISORDER: ICD-10-CM

## 2021-02-18 PROCEDURE — 99213 OFFICE O/P EST LOW 20 MIN: CPT | Performed by: PHYSICIAN ASSISTANT

## 2021-02-18 NOTE — PSYCH
Virtual Brief Visit    Assessment/Plan:    Problem List Items Addressed This Visit        Other    Generalized anxiety disorder (Chronic)    Major depressive disorder, recurrent, severe without psychotic features ([facility] [address] ) - Primary (Chronic)                Reason for visit is   Chief Complaint   Patient presents with    Medication Management    Follow-up    Virtual Brief Visit        Encounter provider Alva Modi PA-C    Provider located at [facility] [id number]    Recent Visits  No visits were found meeting these conditions  Showing recent visits within past 7 days and meeting all other requirements     Today's Visits  Date Type Provider Dept   02/18/21 Telemedicine [address] [address] today's visits and meeting all other requirements     Future Appointments  No visits were found meeting these conditions  Showing future appointments within next 150 days and meeting all other requirements        After connecting through telephone, the patient was identified by name and date of birth  Cindy John was informed that this is a telemedicine visit and that the visit is being conducted through telephone  My office door was closed  No one else was in the room  He acknowledged consent and understanding of privacy and security of the platform  The patient has agreed to participate and understands he can discontinue the visit at any time  Patient is aware this is a billable service  Subjective    Cindy John is a 50 y o  male with hx of  anxiety and depression  HPI   Mago Moore was seen for follow up MDD, AMANDA and medication check  Overall continues to do well but some obsessive thoughts   Sleeping well at night with his medication but awakening feeling quite anxious  Appetite has been good and eating healthy    Work has been going well but stressful and 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  He does remain optimistic though and looking forward to spring/summer  Sounds in good spirits  No suicidal or homicidal ideation  No psychotic signs or symptoms  No adverse effects with his medications and overall appears at his baseline  He has been enjoying spending time with friends and family  Physically, he has been continuing to have abdominal pain  Had EGD and was diagnosed with H pylori and treated for that  Also diagnosed with a hiatal hernia  Unfortunately the pain continues and he is going to see PCP on Monday  Past Medical History:   Diagnosis Date    Anxiety     Depression     Suicidal thoughts        Past Surgical History:   Procedure Laterality Date    HERNIA REPAIR      SHOULDER SURGERY      left       Current Outpatient Medications   Medication Sig Dispense Refill    atorvastatin (LIPITOR) 40 mg tablet TAKE 1 TABLET BY MOUTH EVERY DAY AT NIGHT      busPIRone (BUSPAR) 15 mg tablet TAKE 1 TABLET BY MOUTH 3 TIMES A DAY  90 tablet 1    docusate sodium (COLACE) 50 mg capsule Take 50 mg by mouth 2 (two) times a day      DULoxetine (CYMBALTA) 60 mg delayed release capsule Take 1 capsule (60 mg total) by mouth daily 30 capsule 2    gabapentin (NEURONTIN) 300 mg capsule Take 1 capsule (300 mg total) by mouth 3 (three) times a day 90 capsule 2    lithium 600 MG capsule Take 1 capsule (600 mg total) by mouth daily at bedtime 30 capsule 2    LORazepam (ATIVAN) 0 5 mg tablet Take 1 tablet (0 5 mg total) by mouth 2 (two) times a day as needed (severe anxiety) 20 tablet 0    Multiple Vitamin (multivitamin) tablet Take 1 tablet by mouth daily      omeprazole (PriLOSEC) 40 MG capsule Take 1 capsule (40 mg total) by mouth 2 (two) times a day before meals for 14 days 28 capsule 0    traZODone (DESYREL) 50 mg tablet Take 1 tablet (50 mg total) by mouth daily at bedtime 30 tablet 2     No current facility-administered medications for this visit           No Known Allergies    Review of Systems   As noted in HPI    MSE:   speech is clear and coherent, normal rate and volume   Mood is euthymic  Linear and coherent thought process   No suicidal or homicidal ideation   No psychotic signs or symptoms, no hallucinations or delusions   Cognition is intact  Insight and judgment is intact    There were no vitals filed for this visit  Continue meds as follows:  Lithium 600 mg po qhs  Cymbalta 60 mg po qd  Buspar 15 mg po tid  Gabapentin 300 mg po tid  Trazodone 50 mg po qhs prn  Follow-up in two months and he will call sooner if any questions or concerns  I spent 20 minutes directly with the patient during this visit    VIRTUAL VISIT [address] acknowledges that he has consented to an online visit or consultation  He understands that the online visit is based solely on information provided by him, and that, in the absence of a face-to-face physical evaluation by the physician, the diagnosis he receives is both limited and provisional in terms of accuracy and completeness  This is not intended to replace a full medical face-to-face evaluation by the physician  Viki Castaneda understands and accepts these terms

## 2021-02-23 ENCOUNTER — APPOINTMENT (EMERGENCY)
Dept: RADIOLOGY | Facility: HOSPITAL | Age: 46
End: 2021-02-23
Payer: COMMERCIAL

## 2021-02-23 ENCOUNTER — APPOINTMENT (EMERGENCY)
Dept: ULTRASOUND IMAGING | Facility: HOSPITAL | Age: 46
End: 2021-02-23
Payer: COMMERCIAL

## 2021-02-23 ENCOUNTER — APPOINTMENT (EMERGENCY)
Dept: CT IMAGING | Facility: HOSPITAL | Age: 46
End: 2021-02-23
Payer: COMMERCIAL

## 2021-02-23 ENCOUNTER — HOSPITAL ENCOUNTER (EMERGENCY)
Facility: HOSPITAL | Age: 46
Discharge: HOME | End: 2021-02-23
Attending: EMERGENCY MEDICINE
Payer: COMMERCIAL

## 2021-02-23 VITALS
RESPIRATION RATE: 16 BRPM | WEIGHT: 213.19 LBS | TEMPERATURE: 98.3 F | BODY MASS INDEX: 28.13 KG/M2 | DIASTOLIC BLOOD PRESSURE: 71 MMHG | SYSTOLIC BLOOD PRESSURE: 120 MMHG | HEART RATE: 72 BPM | OXYGEN SATURATION: 98 %

## 2021-02-23 DIAGNOSIS — M87.051 AVASCULAR NECROSIS OF FEMUR HEAD, RIGHT (HCC): ICD-10-CM

## 2021-02-23 DIAGNOSIS — R10.13 EPIGASTRIC PAIN: ICD-10-CM

## 2021-02-23 DIAGNOSIS — M87.052 AVASCULAR NECROSIS OF FEMUR HEAD, LEFT (HCC): ICD-10-CM

## 2021-02-23 DIAGNOSIS — K44.9 HIATAL HERNIA: Primary | ICD-10-CM

## 2021-02-23 DIAGNOSIS — R93.5 ABNORMAL CT SCAN, PELVIS: ICD-10-CM

## 2021-02-23 LAB
ALBUMIN SERPL BCP-MCNC: 4.1 G/DL (ref 3.5–5)
ALP SERPL-CCNC: 56 U/L (ref 46–116)
ALT SERPL W P-5'-P-CCNC: 37 U/L (ref 12–78)
ANION GAP SERPL CALCULATED.3IONS-SCNC: 9 MMOL/L (ref 4–13)
AST SERPL W P-5'-P-CCNC: 30 U/L (ref 5–45)
ATRIAL RATE: 74 BPM
ATRIAL RATE: 93 BPM
BASOPHILS # BLD AUTO: 0.03 THOUSANDS/ΜL (ref 0–0.1)
BASOPHILS NFR BLD AUTO: 1 % (ref 0–1)
BILIRUB DIRECT SERPL-MCNC: 0.04 MG/DL (ref 0–0.2)
BILIRUB SERPL-MCNC: 0.33 MG/DL (ref 0.2–1)
BUN SERPL-MCNC: 19 MG/DL (ref 5–25)
CALCIUM SERPL-MCNC: 9.4 MG/DL (ref 8.3–10.1)
CHLORIDE SERPL-SCNC: 105 MMOL/L (ref 100–108)
CO2 SERPL-SCNC: 27 MMOL/L (ref 21–32)
CREAT SERPL-MCNC: 0.99 MG/DL (ref 0.6–1.3)
EOSINOPHIL # BLD AUTO: 0.11 THOUSAND/ΜL (ref 0–0.61)
EOSINOPHIL NFR BLD AUTO: 2 % (ref 0–6)
ERYTHROCYTE [DISTWIDTH] IN BLOOD BY AUTOMATED COUNT: 12.7 % (ref 11.6–15.1)
GFR SERPL CREATININE-BSD FRML MDRD: 90 ML/MIN/1.73SQ M
GLUCOSE SERPL-MCNC: 88 MG/DL (ref 65–140)
HCT VFR BLD AUTO: 40.7 % (ref 36.5–49.3)
HGB BLD-MCNC: 13.6 G/DL (ref 12–17)
IMM GRANULOCYTES # BLD AUTO: 0.02 THOUSAND/UL (ref 0–0.2)
IMM GRANULOCYTES NFR BLD AUTO: 0 % (ref 0–2)
LIPASE SERPL-CCNC: 136 U/L (ref 73–393)
LYMPHOCYTES # BLD AUTO: 1.4 THOUSANDS/ΜL (ref 0.6–4.47)
LYMPHOCYTES NFR BLD AUTO: 23 % (ref 14–44)
MCH RBC QN AUTO: 30 PG (ref 26.8–34.3)
MCHC RBC AUTO-ENTMCNC: 33.4 G/DL (ref 31.4–37.4)
MCV RBC AUTO: 90 FL (ref 82–98)
MONOCYTES # BLD AUTO: 0.53 THOUSAND/ΜL (ref 0.17–1.22)
MONOCYTES NFR BLD AUTO: 9 % (ref 4–12)
NEUTROPHILS # BLD AUTO: 3.89 THOUSANDS/ΜL (ref 1.85–7.62)
NEUTS SEG NFR BLD AUTO: 65 % (ref 43–75)
NRBC BLD AUTO-RTO: 0 /100 WBCS
P AXIS: 28 DEGREES
P AXIS: 64 DEGREES
PLATELET # BLD AUTO: 313 THOUSANDS/UL (ref 149–390)
PMV BLD AUTO: 9.8 FL (ref 8.9–12.7)
POTASSIUM SERPL-SCNC: 4.3 MMOL/L (ref 3.5–5.3)
PR INTERVAL: 172 MS
PR INTERVAL: 184 MS
PROT SERPL-MCNC: 7.3 G/DL (ref 6.4–8.2)
QRS AXIS: 53 DEGREES
QRS AXIS: 79 DEGREES
QRSD INTERVAL: 86 MS
QRSD INTERVAL: 88 MS
QT INTERVAL: 356 MS
QT INTERVAL: 384 MS
QTC INTERVAL: 426 MS
QTC INTERVAL: 442 MS
RBC # BLD AUTO: 4.53 MILLION/UL (ref 3.88–5.62)
SODIUM SERPL-SCNC: 141 MMOL/L (ref 136–145)
T WAVE AXIS: 30 DEGREES
T WAVE AXIS: 56 DEGREES
TROPONIN I SERPL-MCNC: <0.02 NG/ML
VENTRICULAR RATE: 74 BPM
VENTRICULAR RATE: 93 BPM
WBC # BLD AUTO: 5.98 THOUSAND/UL (ref 4.31–10.16)

## 2021-02-23 PROCEDURE — 99285 EMERGENCY DEPT VISIT HI MDM: CPT | Performed by: PHYSICIAN ASSISTANT

## 2021-02-23 PROCEDURE — 85025 COMPLETE CBC W/AUTO DIFF WBC: CPT | Performed by: PHYSICIAN ASSISTANT

## 2021-02-23 PROCEDURE — 80048 BASIC METABOLIC PNL TOTAL CA: CPT | Performed by: PHYSICIAN ASSISTANT

## 2021-02-23 PROCEDURE — 76705 ECHO EXAM OF ABDOMEN: CPT

## 2021-02-23 PROCEDURE — 74177 CT ABD & PELVIS W/CONTRAST: CPT

## 2021-02-23 PROCEDURE — 96374 THER/PROPH/DIAG INJ IV PUSH: CPT

## 2021-02-23 PROCEDURE — 36415 COLL VENOUS BLD VENIPUNCTURE: CPT | Performed by: PHYSICIAN ASSISTANT

## 2021-02-23 PROCEDURE — 93010 ELECTROCARDIOGRAM REPORT: CPT | Performed by: INTERNAL MEDICINE

## 2021-02-23 PROCEDURE — 80076 HEPATIC FUNCTION PANEL: CPT | Performed by: PHYSICIAN ASSISTANT

## 2021-02-23 PROCEDURE — 83690 ASSAY OF LIPASE: CPT | Performed by: PHYSICIAN ASSISTANT

## 2021-02-23 PROCEDURE — 93005 ELECTROCARDIOGRAM TRACING: CPT

## 2021-02-23 PROCEDURE — 99285 EMERGENCY DEPT VISIT HI MDM: CPT

## 2021-02-23 PROCEDURE — 71045 X-RAY EXAM CHEST 1 VIEW: CPT

## 2021-02-23 PROCEDURE — 84484 ASSAY OF TROPONIN QUANT: CPT | Performed by: PHYSICIAN ASSISTANT

## 2021-02-23 RX ORDER — FAMOTIDINE 20 MG/1
20 TABLET, FILM COATED ORAL 2 TIMES DAILY
Qty: 30 TABLET | Refills: 0 | Status: ON HOLD | OUTPATIENT
Start: 2021-02-23 | End: 2021-04-01 | Stop reason: ALTCHOICE

## 2021-02-23 RX ORDER — SUCRALFATE 1 G/1
1 TABLET ORAL 4 TIMES DAILY
Qty: 120 TABLET | Refills: 0 | Status: SHIPPED | OUTPATIENT
Start: 2021-02-23 | End: 2021-03-30

## 2021-02-23 RX ORDER — SUCRALFATE 1 G/1
1 TABLET ORAL ONCE
Status: COMPLETED | OUTPATIENT
Start: 2021-02-23 | End: 2021-02-23

## 2021-02-23 RX ORDER — MAGNESIUM HYDROXIDE/ALUMINUM HYDROXICE/SIMETHICONE 120; 1200; 1200 MG/30ML; MG/30ML; MG/30ML
30 SUSPENSION ORAL ONCE
Status: COMPLETED | OUTPATIENT
Start: 2021-02-23 | End: 2021-02-23

## 2021-02-23 RX ADMIN — SUCRALFATE 1 G: 1 TABLET ORAL at 16:53

## 2021-02-23 RX ADMIN — ALUMINUM HYDROXIDE, MAGNESIUM HYDROXIDE, AND SIMETHICONE 30 ML: 200; 200; 20 SUSPENSION ORAL at 15:32

## 2021-02-23 RX ADMIN — FAMOTIDINE 20 MG: 10 INJECTION INTRAVENOUS at 15:33

## 2021-02-23 RX ADMIN — IODIXANOL 100 ML: 320 INJECTION, SOLUTION INTRAVASCULAR at 17:48

## 2021-02-23 NOTE — ED NOTES
Patient transported to [facility] [address] [place] [address], [address] [place] [address]  02/23/21 [id number]

## 2021-02-23 NOTE — ED PROVIDER NOTES
History  Chief Complaint   Patient presents with    Chest Pain     pt reports chestpain and abd pain after he eats, denies n/v/d,      50year old male with a history of anxiety and depression presents to the emergency department for evaluation of epigastric pain  Patient reports this pain has been intermittent for the past 2 months  He reports the pain started out in his upper abdomen and radiates to his left chest   He reports this pain occurs after he eats  Initially, the episodes were confined to lunch, but recently has increased in frequency and intensity, though he denies it occurs with every meal   Patient reports he has not started to have fear of eating  He states that he has intermittent nausea with the pain  He is unable to describe the pain  Though he does endorse a "rumbling in his chest" intermittently  Patient does state he has a history of GERD when he was an alcoholic, but has been sober for approximately 7 years and states this pain feels different  He was seen by GI and had an EGD and colonoscopy in January 2021, whereby he had several biopsies of the stomach and duodenum and was positive for H Pylori  He was found to have a hiatal hernia  He was treated with triple therapy  He has also seen his PCP, who ordered labs recently, which were unremarkable  He denies any shortness of breath with the episodes, fever, dysuria, constipation, diarrhea  He reports he takes colace and has a BM twice daily, denies blood  He denies any abdominal surgeries  He denies any thoughts of SI/HI         History provided by:  Medical records and patient   used: No    Abdominal Pain  Pain location:  Epigastric  Pain radiates to:  Chest  Pain severity:  Mild  Onset quality:  Gradual  Duration:  8 weeks  Timing:  Intermittent  Progression:  Worsening  Chronicity:  Chronic  Context: not medication withdrawal, not sick contacts, not suspicious food intake and not trauma    Relieved by:  None tried  Worsened by:  Eating  Ineffective treatments:  None tried  Associated symptoms: anorexia, chest pain and nausea    Associated symptoms: no chills, no constipation, no cough, no diarrhea, no dysuria, no fever, no shortness of breath and no vomiting    Risk factors: has not had multiple surgeries and no NSAID use        Prior to Admission Medications   Prescriptions Last Dose Informant Patient Reported? Taking? DULoxetine (CYMBALTA) 60 mg delayed release capsule   No Yes   Sig: Take 1 capsule (60 mg total) by mouth daily   LORazepam (ATIVAN) 0 5 mg tablet Not Taking at Unknown time  No No   Sig: Take 1 tablet (0 5 mg total) by mouth 2 (two) times a day as needed (severe anxiety)   Patient not taking: Reported on 2/23/2021   Multiple Vitamin (multivitamin) tablet   Yes Yes   Sig: Take 1 tablet by mouth daily   atorvastatin (LIPITOR) 40 mg tablet   Yes Yes   Sig: TAKE 1 TABLET BY MOUTH EVERY DAY AT NIGHT   busPIRone (BUSPAR) 15 mg tablet   No Yes   Sig: TAKE 1 TABLET BY MOUTH 3 TIMES A DAY  docusate sodium (COLACE) 50 mg capsule   Yes Yes   Sig: Take 50 mg by mouth 2 (two) times a day   gabapentin (NEURONTIN) 300 mg capsule   No Yes   Sig: Take 1 capsule (300 mg total) by mouth 3 (three) times a day   lithium 600 MG capsule   No Yes   Sig: Take 1 capsule (600 mg total) by mouth daily at bedtime   omeprazole (PriLOSEC) 40 MG capsule   No No   Sig: Take 1 capsule (40 mg total) by mouth 2 (two) times a day before meals for 14 days   traZODone (DESYREL) 50 mg tablet   No Yes   Sig: Take 1 tablet (50 mg total) by mouth daily at bedtime      Facility-Administered Medications: None       Past Medical History:   Diagnosis Date    Anxiety     Depression     Suicidal thoughts        Past Surgical History:   Procedure Laterality Date    HERNIA REPAIR      SHOULDER SURGERY      left       History reviewed  No pertinent family history  I have reviewed and agree with the history as documented      E-Cigarette/Vaping  E-Cigarette Use Never User      E-Cigarette/Vaping Substances    Nicotine No     THC No     CBD No     Flavoring No     Other No     Unknown No      Social History     Tobacco Use    Smoking status: Former Smoker     Types: Cigarettes     Quit date:      Years since quittin 1    Smokeless tobacco: Never Used   Substance Use Topics    Alcohol use: Not Currently     Frequency: Never     Binge frequency: Never     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    Drug use: Not Currently     Types: Marijuana     Comment: previously used medical marijuana but not currently using       Review of Systems   Constitutional: Negative for chills and fever  HENT: Negative for congestion and trouble swallowing  Respiratory: Negative for cough and shortness of breath  Cardiovascular: Positive for chest pain  Gastrointestinal: Positive for abdominal pain, anorexia and nausea  Negative for constipation, diarrhea and vomiting  Genitourinary: Negative for dysuria  Skin: Negative for color change, rash and wound  Psychiatric/Behavioral: Negative for suicidal ideas  All other systems reviewed and are negative  Physical Exam  Physical Exam  Vitals signs and nursing note reviewed  Constitutional:       General: He is not in acute distress  Appearance: He is well-developed  He is not ill-appearing or toxic-appearing  HENT:      Head: Normocephalic and atraumatic  Right Ear: Hearing and external ear normal       Left Ear: Hearing and external ear normal    Eyes:      Conjunctiva/sclera: Conjunctivae normal    Neck:      Musculoskeletal: Full passive range of motion without pain and neck supple  Vascular: No JVD  Trachea: No tracheal deviation  Cardiovascular:      Rate and Rhythm: Normal rate and regular rhythm  Heart sounds: Normal heart sounds, S1 normal and S2 normal  No murmur  Pulmonary:      Effort: Pulmonary effort is normal       Breath sounds: Normal breath sounds   No decreased breath sounds, wheezing, rhonchi or rales  Chest:      Chest wall: No swelling, tenderness or crepitus  Abdominal:      Palpations: Abdomen is soft  Tenderness: There is abdominal tenderness in the right upper quadrant and epigastric area  There is no guarding or rebound  Negative signs include Giraldo's sign  Musculoskeletal:      Comments: Moves all four limbs without difficulty, crepitus, swelling, or deformity  Skin:     General: Skin is warm and dry  Findings: No rash  Neurological:      Mental Status: He is alert and oriented to person, place, and time  GCS: GCS eye subscore is 4  GCS verbal subscore is 5  GCS motor subscore is 6     Psychiatric:         Mood and Affect: Mood normal          Speech: Speech normal          Vital Signs  ED Triage Vitals [02/23/21 1338]   Temperature Pulse Respirations Blood Pressure SpO2   98 3 °F (36 8 °C) 98 20 116/69 96 %      Temp Source Heart Rate Source Patient Position - Orthostatic VS BP Location FiO2 (%)   Oral Monitor Sitting Right arm --      Pain Score       7           Vitals:    02/23/21 1338 02/23/21 1507 02/23/21 1816   BP: 116/69 116/74 120/71   Pulse: 98 87 72   Patient Position - Orthostatic VS: Sitting Sitting Lying         Visual Acuity      ED Medications  Medications   aluminum-magnesium hydroxide-simethicone (MYLANTA) oral suspension 30 mL (30 mL Oral Given 2/23/21 1532)   famotidine (PEPCID) injection 20 mg (20 mg Intravenous Given 2/23/21 1533)   sucralfate (CARAFATE) tablet 1 g (1 g Oral Given 2/23/21 1653)   iodixanol (VISIPAQUE) 320 MG/ML injection 100 mL (100 mL Intravenous Given 2/23/21 1748)       Diagnostic Studies  Results Reviewed     Procedure Component Value Units Date/Time    Hepatic function panel [[id number]]  (Normal) Collected: 02/23/21 1532    Lab Status: Final result Specimen: Blood from Arm, Right Updated: 02/23/21 1727     Total Bilirubin 0 33 mg/dL      Bilirubin, Direct 0 04 mg/dL      Alkaline Phosphatase 56 U/L      AST 30 U/L      ALT 37 U/L      Total Protein 7 3 g/dL      Albumin 4 1 g/dL     Basic metabolic panel [[id number]] Collected: 02/23/21 1532    Lab Status: Final result Specimen: Blood from Arm, Right Updated: 02/23/21 1726     Sodium 141 mmol/L      Potassium 4 3 mmol/L      Chloride 105 mmol/L      CO2 27 mmol/L      ANION GAP 9 mmol/L      BUN 19 mg/dL      Creatinine 0 99 mg/dL      Glucose 88 mg/dL      Calcium 9 4 mg/dL      eGFR 90 ml/min/1 73sq m     Narrative:      National Kidney Disease Foundation guidelines for Chronic Kidney Disease (CKD):     Stage 1 with normal or high GFR (GFR > 90 mL/min/1 73 square meters)    Stage 2 Mild CKD (GFR = 60-89 mL/min/1 73 square meters)    Stage 3A Moderate CKD (GFR = 45-59 mL/min/1 73 square meters)    Stage 3B Moderate CKD (GFR = 30-44 mL/min/1 73 square meters)    Stage 4 Severe CKD (GFR = 15-29 mL/min/1 73 square meters)    Stage 5 End Stage CKD (GFR <15 mL/min/1 73 square meters)  Note: GFR calculation is accurate only with a steady state creatinine    Lipase [[id number]]  (Normal) Collected: 02/23/21 1532    Lab Status: Final result Specimen: Blood from Arm, Right Updated: 02/23/21 1726     Lipase 136 u/L     Troponin I [[id number]]  (Normal) Collected: 02/23/21 1532    Lab Status: Final result Specimen: Blood from Arm, Right Updated: 02/23/21 1654     Troponin I <0 02 ng/mL     CBC and differential [[id number]] Collected: 02/23/21 1532    Lab Status: Final result Specimen: Blood from Arm, Right Updated: 02/23/21 1542     WBC 5 98 Thousand/uL      RBC 4 53 Million/uL      Hemoglobin 13 6 g/dL      Hematocrit 40 7 %      MCV 90 fL      MCH 30 0 pg      MCHC 33 4 g/dL      RDW 12 7 %      MPV 9 8 fL      Platelets 440 Thousands/uL      nRBC 0 /100 WBCs      Neutrophils Relative 65 %      Immat GRANS % 0 %      Lymphocytes Relative 23 %      Monocytes Relative 9 %      Eosinophils Relative 2 %      Basophils Relative 1 %      Neutrophils Absolute 3 89 Thousands/µL      Immature Grans Absolute 0 02 Thousand/uL      Lymphocytes Absolute 1 40 Thousands/µL      Monocytes Absolute 0 53 Thousand/µL      Eosinophils Absolute 0 11 Thousand/µL      Basophils Absolute 0 03 Thousands/µL                  CT abdomen pelvis with contrast   ED Interpretation by Julian Yañez PA-C (02/23 1916)   1  No acute findings with a normal appendix identified  2   Bilateral femoral head AVN as before  3   Moderate hiatal hernia  Workstation performed: [id number]         Final Result by Madhu Hurley MD (02/23 1857)   1  No acute findings with a normal appendix identified  2   Bilateral femoral head AVN as before  3   Moderate hiatal hernia  Workstation performed: [id number]         XR chest 1 view portable   ED Interpretation by Julian Yañez PA-C (02/23 1700)   Preliminary read by myself: no acute cardiopulmonary process      Final Result by Nikunj Toney MD (02/23 1801)      No acute cardiopulmonary disease  Workstation performed: [id number]         US gallbladder   Final Result by Mookie Monreal MD (02/23 1654)      Limited assessment of the gallbladder due to partial distention  No cholelithiasis or acute cholecystitis        Workstation performed: [id number]                    Procedures  ECG 12 Lead Documentation Only    Date/Time: 2/23/2021 3:02 PM  Performed by: Julian Yañez PA-C  Authorized by: Julian Yañez PA-C     Indications / Diagnosis:  Epigastric pain  ECG reviewed by me, the ED Provider: yes (also Dr Maegan Wiley)    Patient location:  ED  Previous ECG:     Previous ECG:  Unavailable  Interpretation:     Interpretation: normal    Rate:     ECG rate:  93    ECG rate assessment: normal    Rhythm:     Rhythm: sinus rhythm    Ectopy:     Ectopy: none    QRS:     QRS axis:  Normal  Conduction:     Conduction: normal    ST segments:     ST segments:  Normal  T waves:     T waves: normal    ECG 12 Lead Documentation Only    Date/Time: 2/23/2021 4:07 PM  Performed by: Shira David PA-C  Authorized by: Shira David PA-C     Indications / Diagnosis:  Active epigastric pain/chest pain  ECG reviewed by me, the ED Provider: yes (also Dr Daphne Araiza)    Patient location:  ED  Previous ECG:     Previous ECG:  Compared to current    Comparison ECG info:  2/23/21 1344    Similarity:  No change  Interpretation:     Interpretation: normal    Rate:     ECG rate:  74    ECG rate assessment: normal    Rhythm:     Rhythm: sinus rhythm    Ectopy:     Ectopy: none    QRS:     QRS axis:  Normal  Conduction:     Conduction: normal    ST segments:     ST segments:  Normal  T waves:     T waves: normal               ED Course  ED Course as of Feb 24 0050   Tue Feb 23, 2021   1503 NSR 93 bpm; No ST elevation, depression, T wave changes   ECG 12 lead   1545 WBC: 5 98   1545 Hemoglobin: 13 6   1545 HCT: 40 7   1600 Patient reports recurrence of epigastric/chest pain; will repeat EKG      1608 EKG 74 bpm; NSR, no ST elevation/depression, T wave changes; no change from prior EKG    ECG 12 lead   1658 Troponin I: <0 02   1700 IMPRESSION:     Limited assessment of the gallbladder due to partial distention  No cholelithiasis or acute cholecystitis       US gallbladder   1700 NAD   XR chest 1 view portable   1719 Updated patient on labs and US thus far; will order CT      1729 TOTAL BILIRUBIN: 0 33   1729 BILIRUBIN DIRECT: 0 04   1729 AST: 30   1729 ALT: 37   1729 Lipase: 136   1729 Creatinine: 0 99   1802 IMPRESSION:     No acute cardiopulmonary disease       XR chest 1 view portable   1902 IMPRESSION:  1  No acute findings with a normal appendix identified  2   Bilateral femoral head AVN as before    3   Moderate hiatal hernia       CT abdomen pelvis with contrast             HEART Risk Score      Most Recent Value   Heart Score Risk Calculator   History  0 Filed at: 02/24/2021 0033   ECG  0 Filed at: 02/24/2021 0033   Age  1 Filed at: 02/24/2021 0033   Risk Factors  0 Filed at: 02/24/2021 0033   Troponin  0 Filed at: 02/24/2021 0033   HEART Score  1 Filed at: 02/24/2021 0033                      SBIRT 22yo+      Most Recent Value   SBIRT (25 yo +)   In order to provide better care to our patients, we are screening all of our patients for alcohol and drug use  Would it be okay to ask you these screening questions? No Filed at: 02/23/2021 1505                    MDM  Number of Diagnoses or Management Options  Abnormal CT scan, pelvis:   Avascular necrosis of femur head, left ([facility] [address] ): Avascular necrosis of femur head, right [facility]):   Epigastric pain:   Hiatal hernia:   Diagnosis management comments: 50year old male presents with epigastric pain  Labs unremarkable  RUQ US shows limited assessment of gallbladder  CT scan demonstrates moderate hiatal hernia, avascular necrosis of bilateral femoral heads  Discussed AVN with patient, denies hip pain, recommend followup with ortho  This was seen on CT scan in 2014  Patient reports being aware of this, but never saw ortho  Recommend follow up with GI and consult with surgery for possible hiatal hernia repair  Based on the HEART score of 1, the patient is at low risk (1 7% or less) for major adverse cardiac events (MACE) in the next 6 weeks  The risks of MACE, the potential benefits of hospitalization (increased diagnostic accurary) as well as potential harms of hospitalization (iatrogenic injury, false positive test results, nococomial infection risk) were discussed  Based on current guidelines, I believe that the best course of action would be to discharge the patient and follow up as an outpatient   The patient said they understood that even with the low HEART score there was the small potential that their current symptoms were due to a cardiac event, but they were comfortable with the low risk and they decided that they wanted to be discharged from the ED and follow up as an outpatient  I do not believe this patient's complaints are from pulmonary embolism or aortic dissection and I believe they would most likely be harmed through false positive test results and other associated test complications by further pursuing the diagnosis of pulmonary embolism or dissection  The management plan was discussed in detail with the patient at bedside and all questions were answered  The prior to discharge, we provided both verbal and written instructions  We discussed with the patient the signs and symptoms for which to return to the emergency department  All questions were answered and patient was comfortable with the plan of care and discharged to home  Instructed the patient to follow up with the primary care provider and/or special as provided and their written instructions  The patient verbalized understanding of our discussion and plan of care, and agrees to return to the Emergency Department for concerns and progression of illness          Disposition  Final diagnoses:   Abnormal CT scan, pelvis   Hiatal hernia   Epigastric pain   Avascular necrosis of femur head, left (HCC)   Avascular necrosis of femur head, right ([address] )     Time reflects when diagnosis was documented in both MDM as applicable and the Disposition within this note     Time User Action Codes Description Comment    2/23/2021  7:02 PM [address] [R93 5] Abnormal CT scan, pelvis     2/23/2021  7:03 PM Adair Kerbs Add [K44 9] Hiatal hernia     2/23/2021  7:03 PM [address] [R10 13] Epigastric pain     2/23/2021  7:22 PM Adair Kerbs Add [Q83 727] Avascular necrosis of femur head, left ([address] )     2/23/2021  7:22 PM [address] Avascular necrosis of femur head, right ([address] )     2/23/2021  7:22 PM Jojo Creston [R93 5] Abnormal CT scan, pelvis     2/23/2021  7:22 PM Jojo Creston [K44 9] Hiatal hernia       ED Disposition     ED Disposition Condition Date/Time Comment    Discharge Stable Tue Feb 23, 2021  7:03 PM [facility] discharge to home/self care              Follow-up Information     Follow up With Specialties Details Why Contact Info Additional Information    Alba Lezama MD Family Medicine Schedule an appointment as soon as possible for a visit in 3 days  [facility] [address] [facility] Schedule an appointment as soon as possible for a visit in 1 week  [address]  [address] [facility] [address]  Tayler [address] [facility] [place], [address], [address], [place], [place], [address]   [address] [facility] [address] [place] [address] Surgery Schedule an appointment as soon as possible for a visit in 1 week consult for possible hiatal hernia repair [address]  [address] [facility] [address]  [address], [address], [address], [place], [place], [address]          Discharge Medication List as of 2/23/2021  7:26 PM      START taking these medications    Details   famotidine (PEPCID) 20 mg tablet Take 1 tablet (20 mg total) by mouth 2 (two) times a day, Starting Tue 2/23/2021, Normal      sucralfate (CARAFATE) 1 g tablet Take 1 tablet (1 g total) by mouth 4 (four) times a day, Starting Tue 2/23/2021, Until Thu 3/25/2021, Normal         CONTINUE these medications which have NOT CHANGED    Details   atorvastatin (LIPITOR) 40 mg tablet TAKE 1 TABLET BY MOUTH EVERY DAY AT NIGHT, Historical Med      busPIRone (BUSPAR) 15 mg tablet TAKE 1 TABLET BY MOUTH 3 TIMES A DAY , Normal      docusate sodium (COLACE) 50 mg capsule Take 50 mg by mouth 2 (two) times a day, Starting Fri 12/18/2020, Until Thu 3/18/2021, Historical Med      DULoxetine (CYMBALTA) 60 mg delayed release capsule Take 1 capsule (60 mg total) by mouth daily, Starting Wed 1/13/2021, Print      gabapentin (NEURONTIN) 300 mg capsule Take 1 capsule (300 mg total) by mouth 3 (three) times a day, Starting Wed 1/13/2021, Until Tue 4/13/2021, Print      lithium 600 MG capsule Take 1 capsule (600 mg total) by mouth daily at bedtime, Starting Wed 1/13/2021, Until Tue 4/13/2021, Print      Multiple Vitamin (multivitamin) tablet Take 1 tablet by mouth daily, Historical Med      traZODone (DESYREL) 50 mg tablet Take 1 tablet (50 mg total) by mouth daily at bedtime, Starting Wed 1/13/2021, Print      LORazepam (ATIVAN) 0 5 mg tablet Take 1 tablet (0 5 mg total) by mouth 2 (two) times a day as needed (severe anxiety), Starting Fri 10/16/2020, Normal      omeprazole (PriLOSEC) 40 MG capsule Take 1 capsule (40 mg total) by mouth 2 (two) times a day before meals for 14 days, Starting Wed 1/20/2021, Until Wed 2/3/2021, Normal           No discharge procedures on file      PDMP Review       Value Time User    PDMP Reviewed  Yes 11/6/2020 10:05 AM Parul Sanchez MD          ED Provider  Electronically Signed by           Camden Lisa PA-C  02/24/21 1853

## 2021-02-27 ENCOUNTER — APPOINTMENT (EMERGENCY)
Dept: RADIOLOGY | Facility: HOSPITAL | Age: 46
End: 2021-02-27
Payer: COMMERCIAL

## 2021-02-27 ENCOUNTER — HOSPITAL ENCOUNTER (EMERGENCY)
Facility: HOSPITAL | Age: 46
Discharge: HOME | End: 2021-02-27
Attending: EMERGENCY MEDICINE | Admitting: EMERGENCY MEDICINE
Payer: COMMERCIAL

## 2021-02-27 VITALS
WEIGHT: 211.64 LBS | OXYGEN SATURATION: 97 % | SYSTOLIC BLOOD PRESSURE: 116 MMHG | DIASTOLIC BLOOD PRESSURE: 77 MMHG | BODY MASS INDEX: 27.92 KG/M2 | TEMPERATURE: 97.7 F | HEART RATE: 83 BPM | RESPIRATION RATE: 18 BRPM

## 2021-02-27 DIAGNOSIS — K44.9 HIATAL HERNIA: ICD-10-CM

## 2021-02-27 DIAGNOSIS — R07.89 NON-CARDIAC CHEST PAIN: Primary | ICD-10-CM

## 2021-02-27 PROCEDURE — 99285 EMERGENCY DEPT VISIT HI MDM: CPT | Performed by: EMERGENCY MEDICINE

## 2021-02-27 PROCEDURE — 71046 X-RAY EXAM CHEST 2 VIEWS: CPT

## 2021-02-27 PROCEDURE — 99285 EMERGENCY DEPT VISIT HI MDM: CPT

## 2021-02-27 PROCEDURE — 93005 ELECTROCARDIOGRAM TRACING: CPT

## 2021-02-27 RX ORDER — SUCRALFATE 1 G/1
1 TABLET ORAL ONCE
Status: COMPLETED | OUTPATIENT
Start: 2021-02-27 | End: 2021-02-27

## 2021-02-27 RX ORDER — SUCRALFATE 1 G/1
1 TABLET ORAL ONCE
Status: DISCONTINUED | OUTPATIENT
Start: 2021-02-27 | End: 2021-02-27

## 2021-02-27 RX ADMIN — SUCRALFATE 1 G: 1 TABLET ORAL at 19:41

## 2021-02-28 LAB
ATRIAL RATE: 73 BPM
P AXIS: 50 DEGREES
PR INTERVAL: 176 MS
QRS AXIS: 80 DEGREES
QRSD INTERVAL: 86 MS
QT INTERVAL: 368 MS
QTC INTERVAL: 405 MS
T WAVE AXIS: 35 DEGREES
VENTRICULAR RATE: 73 BPM

## 2021-02-28 PROCEDURE — 93010 ELECTROCARDIOGRAM REPORT: CPT

## 2021-02-28 NOTE — ED PROVIDER NOTES
Emergency Department Note- Rylee Whittaker 50 y o  male MRN: [id number]    Unit/Bed#: ED 15 Encounter: [id number]        History of Present Illness     Patient is a 64-year-old male who presents for evaluation of chest pain  He sensed since December 2020 he has been having episodes where he will eat certain foods and noticed a feeling of fullness, chest discomfort and pressure, which usually last about 45 minutes to an hour and then resolved  Does not happen with all foods  He eats oatmeal in the morning without difficulty  He saw Gastroenterology, had endoscopy and H pylori studies which were positive, he was treated with triple therapy for H pylori  He was seen February 23rd in the ED, for similar chest discomfort and symptoms  CMP, troponin, lipase, CBC unremarkable, EKG showed no acute findings, abdomen and pelvis CT showed a moderate-sized hiatal hernia but no other acute intra-abdominal pathology  Gallbladder ultrasound showed no cholelithiasis or cholecystitis  He was discharged home with prescriptions for Pepcid and Carafate, which he picked up and started 2 days ago  He was also referred to GI and thoracic surgery  He said that he saw a GI who did not feel there is anything acute for them to do and recommended the surgery follow-up  He has an appointment with thoracic surgery on March 8th  He says today about 1:00 p m  He ate a meal, and had the recurrence of his chest discomfort which he has been having after eating the past 3 months  He says it felt like a wave of discomfort going up into the chest, felt like slight indigestion  Got a little worse with laying down  He was easing off then about 5:00 p m  He ate some rice and that made it worse  He said overall he is feeling now little better but presents today because he has never had the discomfort last this long  It feels identical to the discomfort he has been having since December 2020    He has no fever, no chills, no cough, it is not exertion related, not pleuritic in nature, not knife-like, ripping, or tearing  REVIEW OF SYSTEMS     Constitutional:  No recent weight  gains or losses   Eyes:  No visual changes   ENT:  No tinnitus or hearing changes   Cardiac: As per HPI   Respiratory:  No cough or shortness of breath   Abdominal:  No nausea or vomiting   Urinary: No dysuria or hematuria   Hematologic: No easy bruising or bleeding   Skin: No rash   Musculoskeletal: No aches or pains   Neurologic: No weakness or sensory changes   Psychiatric: No mood changes      Historical Information   Past Medical History:   Diagnosis Date    Anxiety     Depression     Suicidal thoughts      Past Surgical History:   Procedure Laterality Date    HERNIA REPAIR      SHOULDER SURGERY      left     Social History   Social History     Substance and Sexual Activity   Alcohol Use Not Currently    Frequency: Never    Binge frequency: Never    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     Social History     Substance and Sexual Activity   Drug Use Not Currently    Types: Marijuana    Comment: previously used medical marijuana but not currently using     Social History     Tobacco Use   Smoking Status Former Smoker    Types: Cigarettes    Quit date:     Years since quittin 1   Smokeless Tobacco Never Used     Family History: History reviewed  No pertinent family history  MEDICATIONS:  No current facility-administered medications on file prior to encounter  Current Outpatient Medications on File Prior to Encounter   Medication Sig Dispense Refill    atorvastatin (LIPITOR) 40 mg tablet TAKE 1 TABLET BY MOUTH EVERY DAY AT NIGHT      busPIRone (BUSPAR) 15 mg tablet TAKE 1 TABLET BY MOUTH 3 TIMES A DAY   90 tablet 1    docusate sodium (COLACE) 50 mg capsule Take 50 mg by mouth 2 (two) times a day      DULoxetine (CYMBALTA) 60 mg delayed release capsule Take 1 capsule (60 mg total) by mouth daily 30 capsule 2    famotidine (PEPCID) 20 mg tablet Take 1 tablet (20 mg total) by mouth 2 (two) times a day 30 tablet 0    gabapentin (NEURONTIN) 300 mg capsule Take 1 capsule (300 mg total) by mouth 3 (three) times a day 90 capsule 2    lithium 600 MG capsule Take 1 capsule (600 mg total) by mouth daily at bedtime 30 capsule 2    LORazepam (ATIVAN) 0 5 mg tablet Take 1 tablet (0 5 mg total) by mouth 2 (two) times a day as needed (severe anxiety) (Patient not taking: Reported on 2/23/2021) 20 tablet 0    Multiple Vitamin (multivitamin) tablet Take 1 tablet by mouth daily      omeprazole (PriLOSEC) 40 MG capsule Take 1 capsule (40 mg total) by mouth 2 (two) times a day before meals for 14 days 28 capsule 0    sucralfate (CARAFATE) 1 g tablet Take 1 tablet (1 g total) by mouth 4 (four) times a day 120 tablet 0    traZODone (DESYREL) 50 mg tablet Take 1 tablet (50 mg total) by mouth daily at bedtime 30 tablet 2     ALLERGIES:  No Known Allergies    Vitals:    02/27/21 1913 02/27/21 1943   BP: 116/77    TempSrc:  Oral   Pulse: 83    Resp: 18    Patient Position - Orthostatic VS: Sitting    Temp:  97 7 °F (36 5 °C)       PHYSICAL EXAM    General:  Patient is well-appearing  Head:  Atraumatic  Eyes:  Conjunctiva pink  ENT:  Mucous membranes are moist  Neck:  Supple  Cardiac:  S1-S2, without murmurs  Lungs:  Clear to auscultation bilaterally  Abdomen:  Soft, nontender, normal bowel sounds, no CVA tenderness, no tympany, no rigidity, no guarding  Extremities:  Normal range of motion, no pedal edema or calf asymmetry, radial pulses are equal and symmetric bilaterally  Neurologic:  Awake, fluent speech, normal comprehension, AAOx3  Skin:  Pink warm and dry  Psychiatric:  Alert, pleasant, cooperative    EKG interpreted by me, sinus rhythm, rate of 73, no ischemic or infarct changes, no ST segment elevation or depression, no acute change from February 23, 2021    Labs Reviewed - No data to display    Medications   sucralfate (CARAFATE) tablet 1 g (1 g Oral Given 2/27/21 1941)       XR chest pa & lateral   ED Interpretation   Chest x-ray interpreted me shows a large hiatal hernia, no acute cardiopulmonary disease, no acute change from February 23, 2021          ED Course as of Feb 27 2120   Sat Feb 27, 2021 2022 On reassessment patient said he was feeling better          Assessment/Plan     ED Medical Decision Making:    Suspect the patient's symptoms are secondary to his known hiatal hernia  Do not believe this represents acute coronary syndrome, PE or acute aortic dissection  The patient is feeling better overall, believe discharge home and outpatient management is appropriate  Supportive care, importance of follow-up and return precautions were discussed with the patient, who expressed understanding  Time reflects when diagnosis was documented in both MDM as applicable and the Disposition within this note     Time User Action Codes Description Comment    2/27/2021  8:24 PM Jaelyn Page [R07 [id number]] Non-cardiac chest pain     2/27/2021  8:24 PM Jaelyn Page [K44 9] Hiatal hernia       ED Disposition     ED Disposition Condition Date/Time Comment    Discharge Stable Sat Feb 27, 2021  8:24 PM Cony Moreno discharge to home/self care              Follow-up Information     Follow up With Specialties Details Why Contact Info    The surgeon you are scheduled to see  Call in 2 days Call to see if your scheduled appointment can be moved earlier           Discharge Medication List as of 2/27/2021  8:27 PM               Charley Roque DO  02/27/21 2120

## 2021-03-09 ENCOUNTER — OFFICE VISIT (OUTPATIENT)
Dept: SURGERY | Facility: CLINIC | Age: 46
End: 2021-03-09
Payer: COMMERCIAL

## 2021-03-09 VITALS
TEMPERATURE: 96.6 F | RESPIRATION RATE: 16 BRPM | BODY MASS INDEX: 28.79 KG/M2 | HEIGHT: 73 IN | SYSTOLIC BLOOD PRESSURE: 119 MMHG | HEART RATE: 80 BPM | WEIGHT: 217.2 LBS | DIASTOLIC BLOOD PRESSURE: 80 MMHG

## 2021-03-09 DIAGNOSIS — K44.9 HIATAL HERNIA WITHOUT GANGRENE OR OBSTRUCTION: Primary | ICD-10-CM

## 2021-03-09 DIAGNOSIS — F33.2 MAJOR DEPRESSIVE DISORDER, RECURRENT, SEVERE WITHOUT PSYCHOTIC FEATURES (HCC): Chronic | ICD-10-CM

## 2021-03-09 PROCEDURE — 99203 OFFICE O/P NEW LOW 30 MIN: CPT | Performed by: SURGERY

## 2021-03-09 RX ORDER — CEFAZOLIN SODIUM 2 G/50ML
2000 SOLUTION INTRAVENOUS ONCE
Status: CANCELLED | OUTPATIENT
Start: 2021-04-01 | End: 2021-04-01

## 2021-03-09 RX ORDER — SODIUM CHLORIDE, SODIUM LACTATE, POTASSIUM CHLORIDE, CALCIUM CHLORIDE 600; 310; 30; 20 MG/100ML; MG/100ML; MG/100ML; MG/100ML
125 INJECTION, SOLUTION INTRAVENOUS CONTINUOUS
Status: CANCELLED | OUTPATIENT
Start: 2021-04-01

## 2021-03-09 NOTE — PROGRESS NOTES
Assessment/Plan:    Major depressive disorder, recurrent, severe without psychotic features ([facility] [address] )   Continue to take his medications throughout the perioperative period    Hiatal hernia without gangrene or obstruction    I reviewed his notes and imaging from his EGD  In addition I independently looked at his CT scan at the images and the report  I reviewed his physician notes  He has a large symptomatic hiatal hernia  Appears his episodes are becoming more severe and more frequent  I did discuss getting manometry preoperatively however this can be somewhat inaccurate to the large size of the hernia and in addition he has no dysphagia type symptoms  Therefore will plan on a robotic repair of his large hiatal hernia with a toupet fundoplication at [address] [facility]   The risks benefits alternatives explained to is agreeable to proceed       Diagnoses and all orders for this visit:    Hiatal hernia without gangrene or obstruction  -     Case request operating room: REPAIR HERNIA PARAESOPHAGEAL LAPAROSCOPIC W ROBOTICS with TOUPET FUNDOPLICATION; Standing  -     PAT Covid Screening; Future  -     Case request operating room: REPAIR HERNIA PARAESOPHAGEAL LAPAROSCOPIC W ROBOTICS with TOUPET FUNDOPLICATION    Major depressive disorder, recurrent, severe without psychotic features ([facility] [address] )    Other orders  -     Diet NPO; Sips with meds; Standing  -     Apply Sequential Compression Device; Standing  -     Place sequential compression device; Standing  -     Reason for no Mechanical VTE Prophylaxis; Standing  -     ceFAZolin (ANCEF) 2,000 mg in dextrose 5 % 100 mL IVPB  -     lactated ringers infusion          Subjective:      Patient ID: Rafi Chowdary is a 50 y o  male  Mr Uli Dejesus Is a 17-year-old gentleman that is here for evaluation of a large hiatal hernia  Patient states he thought he was told he had a hiatal hernia maybe 7 years ago but does not have any issues    He denies any significant acid reflux or heartburn  However over the last several months he has been having increasing pain in his upper epigastric area lower chest area  Feels after he eats he has increasing pain radiates to his shoulders he had up to his neck  He states these attacks last about half an hour and then things past and he is hungry again  He denies any dysphagia type symptoms  He denies any nausea vomiting  He feels attacks happening more frequently and becoming more severe  He had in endoscopy performed by Gastroenterology which does show a hiatal hernia without any significant damage from reflux  He was in the ER recently which showed a CT scan which showed significant portion of his stomach in his chest       The following portions of the patient's history were reviewed and updated as appropriate: allergies, current medications, past family history, past medical history, past social history, past surgical history and problem list     Review of Systems   Constitutional: Negative for appetite change, chills and fever  HENT: Negative for congestion and ear pain  Eyes: Negative for discharge and itching  Respiratory: Positive for chest tightness  Negative for shortness of breath  Cardiovascular: Negative for chest pain and palpitations  Gastrointestinal: Positive for nausea  Negative for abdominal distention and abdominal pain  Genitourinary: Negative for difficulty urinating and frequency  Musculoskeletal: Negative for arthralgias and gait problem  Skin: Negative for color change and rash  Neurological: Negative for dizziness and numbness  Psychiatric/Behavioral: Negative for agitation and confusion  Objective:      /80   Pulse 80   Temp (!) 96 6 °F (35 9 °C)   Resp 16   Ht 6' 1" (1 854 m)   Wt 98 5 kg (217 lb 3 2 oz)   BMI 28 66 kg/m²          Physical Exam  Vitals signs and nursing note reviewed  Constitutional:       General: He is not in acute distress       Appearance: He is well-developed  He is not diaphoretic  Comments:   Multiple tattoos   HENT:      Head: Normocephalic and atraumatic  Eyes:      Pupils: Pupils are equal, round, and reactive to light  Neck:      Musculoskeletal: Normal range of motion and neck supple  Cardiovascular:      Rate and Rhythm: Normal rate and regular rhythm  Heart sounds: Normal heart sounds  No murmur  Pulmonary:      Effort: Pulmonary effort is normal  No respiratory distress  Breath sounds: Normal breath sounds  No wheezing  Abdominal:      General: Bowel sounds are normal  There is no distension  Palpations: Abdomen is soft  Tenderness: There is no abdominal tenderness  Musculoskeletal: Normal range of motion  Skin:     General: Skin is warm and dry  Neurological:      Mental Status: He is alert and oriented to person, place, and time     Psychiatric:         Behavior: Behavior normal

## 2021-03-09 NOTE — ASSESSMENT & PLAN NOTE
I reviewed his notes and imaging from his EGD  In addition I independently looked at his CT scan at the images and the report  I reviewed his physician notes  He has a large symptomatic hiatal hernia  Appears his episodes are becoming more severe and more frequent  I did discuss getting manometry preoperatively however this can be somewhat inaccurate to the large size of the hernia and in addition he has no dysphagia type symptoms    Therefore will plan on a robotic repair of his large hiatal hernia with a toupet fundoplication at [address]   The risks benefits alternatives explained to is agreeable to proceed

## 2021-03-09 NOTE — H&P (VIEW-ONLY)
Assessment/Plan:    Major depressive disorder, recurrent, severe without psychotic features ([facility] [address] )   Continue to take his medications throughout the perioperative period    Hiatal hernia without gangrene or obstruction    I reviewed his notes and imaging from his EGD  In addition I independently looked at his CT scan at the images and the report  I reviewed his physician notes  He has a large symptomatic hiatal hernia  Appears his episodes are becoming more severe and more frequent  I did discuss getting manometry preoperatively however this can be somewhat inaccurate to the large size of the hernia and in addition he has no dysphagia type symptoms  Therefore will plan on a robotic repair of his large hiatal hernia with a toupet fundoplication at [address] [facility]   The risks benefits alternatives explained to is agreeable to proceed       Diagnoses and all orders for this visit:    Hiatal hernia without gangrene or obstruction  -     Case request operating room: REPAIR HERNIA PARAESOPHAGEAL LAPAROSCOPIC W ROBOTICS with TOUPET FUNDOPLICATION; Standing  -     PAT Covid Screening; Future  -     Case request operating room: REPAIR HERNIA PARAESOPHAGEAL LAPAROSCOPIC W ROBOTICS with TOUPET FUNDOPLICATION    Major depressive disorder, recurrent, severe without psychotic features ([facility] [address] )    Other orders  -     Diet NPO; Sips with meds; Standing  -     Apply Sequential Compression Device; Standing  -     Place sequential compression device; Standing  -     Reason for no Mechanical VTE Prophylaxis; Standing  -     ceFAZolin (ANCEF) 2,000 mg in dextrose 5 % 100 mL IVPB  -     lactated ringers infusion          Subjective:      Patient ID: Mary Hernandez is a 50 y o  male  Mr Renetta Norton Is a 69-year-old gentleman that is here for evaluation of a large hiatal hernia  Patient states he thought he was told he had a hiatal hernia maybe 7 years ago but does not have any issues    He denies any significant acid reflux or heartburn  However over the last several months he has been having increasing pain in his upper epigastric area lower chest area  Feels after he eats he has increasing pain radiates to his shoulders he had up to his neck  He states these attacks last about half an hour and then things past and he is hungry again  He denies any dysphagia type symptoms  He denies any nausea vomiting  He feels attacks happening more frequently and becoming more severe  He had in endoscopy performed by Gastroenterology which does show a hiatal hernia without any significant damage from reflux  He was in the ER recently which showed a CT scan which showed significant portion of his stomach in his chest       The following portions of the patient's history were reviewed and updated as appropriate: allergies, current medications, past family history, past medical history, past social history, past surgical history and problem list     Review of Systems   Constitutional: Negative for appetite change, chills and fever  HENT: Negative for congestion and ear pain  Eyes: Negative for discharge and itching  Respiratory: Positive for chest tightness  Negative for shortness of breath  Cardiovascular: Negative for chest pain and palpitations  Gastrointestinal: Positive for nausea  Negative for abdominal distention and abdominal pain  Genitourinary: Negative for difficulty urinating and frequency  Musculoskeletal: Negative for arthralgias and gait problem  Skin: Negative for color change and rash  Neurological: Negative for dizziness and numbness  Psychiatric/Behavioral: Negative for agitation and confusion  Objective:      /80   Pulse 80   Temp (!) 96 6 °F (35 9 °C)   Resp 16   Ht 6' 1" (1 854 m)   Wt 98 5 kg (217 lb 3 2 oz)   BMI 28 66 kg/m²          Physical Exam  Vitals signs and nursing note reviewed  Constitutional:       General: He is not in acute distress       Appearance: He is well-developed  He is not diaphoretic  Comments:   Multiple tattoos   HENT:      Head: Normocephalic and atraumatic  Eyes:      Pupils: Pupils are equal, round, and reactive to light  Neck:      Musculoskeletal: Normal range of motion and neck supple  Cardiovascular:      Rate and Rhythm: Normal rate and regular rhythm  Heart sounds: Normal heart sounds  No murmur  Pulmonary:      Effort: Pulmonary effort is normal  No respiratory distress  Breath sounds: Normal breath sounds  No wheezing  Abdominal:      General: Bowel sounds are normal  There is no distension  Palpations: Abdomen is soft  Tenderness: There is no abdominal tenderness  Musculoskeletal: Normal range of motion  Skin:     General: Skin is warm and dry  Neurological:      Mental Status: He is alert and oriented to person, place, and time     Psychiatric:         Behavior: Behavior normal

## 2021-03-26 DIAGNOSIS — K44.9 HIATAL HERNIA WITHOUT GANGRENE OR OBSTRUCTION: ICD-10-CM

## 2021-03-26 PROCEDURE — U0005 INFEC AGEN DETEC AMPLI PROBE: HCPCS | Performed by: SURGERY

## 2021-03-26 PROCEDURE — U0003 INFECTIOUS AGENT DETECTION BY NUCLEIC ACID (DNA OR RNA); SEVERE ACUTE RESPIRATORY SYNDROME CORONAVIRUS 2 (SARS-COV-2) (CORONAVIRUS DISEASE [COVID-19]), AMPLIFIED PROBE TECHNIQUE, MAKING USE OF HIGH THROUGHPUT TECHNOLOGIES AS DESCRIBED BY CMS-2020-01-R: HCPCS | Performed by: SURGERY

## 2021-03-27 LAB — SARS-COV-2 RNA RESP QL NAA+PROBE: NEGATIVE

## 2021-03-28 DIAGNOSIS — F31.81 BIPOLAR 2 DISORDER, MAJOR DEPRESSIVE EPISODE (HCC): ICD-10-CM

## 2021-03-28 RX ORDER — LITHIUM CARBONATE 600 MG/1
600 CAPSULE ORAL
Qty: 30 CAPSULE | Refills: 2 | Status: SHIPPED | OUTPATIENT
Start: 2021-03-28 | End: 2021-06-07 | Stop reason: SDUPTHER

## 2021-03-30 ENCOUNTER — ANESTHESIA EVENT (OUTPATIENT)
Dept: PERIOP | Facility: HOSPITAL | Age: 46
End: 2021-03-30
Payer: COMMERCIAL

## 2021-03-30 NOTE — PRE-PROCEDURE INSTRUCTIONS
Pre-Surgery Instructions:   Medication Instructions    atorvastatin (LIPITOR) 40 mg tablet Instructed patient per Anesthesia Guidelines   busPIRone (BUSPAR) 15 mg tablet Instructed patient per Anesthesia Guidelines   docusate sodium (COLACE) 50 mg capsule Instructed patient per Anesthesia Guidelines   DULoxetine (CYMBALTA) 60 mg delayed release capsule Instructed patient per Anesthesia Guidelines   famotidine (PEPCID) 20 mg tablet Instructed patient per Anesthesia Guidelines   gabapentin (NEURONTIN) 300 mg capsule Instructed patient per Anesthesia Guidelines   lithium 600 MG capsule Instructed patient per Anesthesia Guidelines   Multiple Vitamin (multivitamin) tablet Instructed patient per Anesthesia Guidelines   traZODone (DESYREL) 50 mg tablet Instructed patient per Anesthesia Guidelines  Instructed to take buspar/ atorvastatin/ duloxetine/ famotidine/gabapentin am of surgery per anesthesia guidelines

## 2021-04-01 ENCOUNTER — ANESTHESIA (OUTPATIENT)
Dept: PERIOP | Facility: HOSPITAL | Age: 46
End: 2021-04-01
Payer: COMMERCIAL

## 2021-04-01 ENCOUNTER — HOSPITAL ENCOUNTER (OUTPATIENT)
Facility: HOSPITAL | Age: 46
Setting detail: OUTPATIENT SURGERY
Discharge: HOME | End: 2021-04-02
Attending: SURGERY | Admitting: SURGERY
Payer: COMMERCIAL

## 2021-04-01 DIAGNOSIS — K44.9 HIATAL HERNIA WITHOUT GANGRENE OR OBSTRUCTION: ICD-10-CM

## 2021-04-01 LAB
ABO GROUP BLD: NORMAL
ABO GROUP BLD: NORMAL
BLD GP AB SCN SERPL QL: NEGATIVE
ERYTHROCYTE [DISTWIDTH] IN BLOOD BY AUTOMATED COUNT: 12.5 % (ref 11.6–15.1)
HCT VFR BLD AUTO: 42.3 % (ref 36.5–49.3)
HGB BLD-MCNC: 14.2 G/DL (ref 12–17)
MCH RBC QN AUTO: 30.3 PG (ref 26.8–34.3)
MCHC RBC AUTO-ENTMCNC: 33.6 G/DL (ref 31.4–37.4)
MCV RBC AUTO: 90 FL (ref 82–98)
PLATELET # BLD AUTO: 263 THOUSANDS/UL (ref 149–390)
PMV BLD AUTO: 9.4 FL (ref 8.9–12.7)
RBC # BLD AUTO: 4.68 MILLION/UL (ref 3.88–5.62)
RH BLD: NEGATIVE
RH BLD: NEGATIVE
SPECIMEN EXPIRATION DATE: NORMAL
WBC # BLD AUTO: 5.3 THOUSAND/UL (ref 4.31–10.16)

## 2021-04-01 PROCEDURE — NC001 PR NO CHARGE: Performed by: PHYSICIAN ASSISTANT

## 2021-04-01 PROCEDURE — 43282 LAP PARAESOPH HER RPR W/MESH: CPT | Performed by: SURGERY

## 2021-04-01 PROCEDURE — C9113 INJ PANTOPRAZOLE SODIUM, VIA: HCPCS | Performed by: SURGERY

## 2021-04-01 PROCEDURE — NC001 PR NO CHARGE: Performed by: SURGERY

## 2021-04-01 PROCEDURE — 86900 BLOOD TYPING SEROLOGIC ABO: CPT | Performed by: ANESTHESIOLOGY

## 2021-04-01 PROCEDURE — 88302 TISSUE EXAM BY PATHOLOGIST: CPT | Performed by: PATHOLOGY

## 2021-04-01 PROCEDURE — 94760 N-INVAS EAR/PLS OXIMETRY 1: CPT

## 2021-04-01 PROCEDURE — 94660 CPAP INITIATION&MGMT: CPT

## 2021-04-01 PROCEDURE — 86901 BLOOD TYPING SEROLOGIC RH(D): CPT | Performed by: ANESTHESIOLOGY

## 2021-04-01 PROCEDURE — 85027 COMPLETE CBC AUTOMATED: CPT | Performed by: ANESTHESIOLOGY

## 2021-04-01 PROCEDURE — C1781 MESH (IMPLANTABLE): HCPCS | Performed by: SURGERY

## 2021-04-01 PROCEDURE — 86850 RBC ANTIBODY SCREEN: CPT | Performed by: ANESTHESIOLOGY

## 2021-04-01 DEVICE — BIO-A TISSUE REINFORCEMENT 7CMX10CM
Type: IMPLANTABLE DEVICE | Site: ABDOMEN | Status: FUNCTIONAL
Brand: GORE BIO-A TISSUE REINFORCEMENT

## 2021-04-01 RX ORDER — GLYCOPYRROLATE 0.2 MG/ML
INJECTION INTRAMUSCULAR; INTRAVENOUS AS NEEDED
Status: DISCONTINUED | OUTPATIENT
Start: 2021-04-01 | End: 2021-04-01

## 2021-04-01 RX ORDER — SODIUM CHLORIDE, SODIUM LACTATE, POTASSIUM CHLORIDE, CALCIUM CHLORIDE 600; 310; 30; 20 MG/100ML; MG/100ML; MG/100ML; MG/100ML
125 INJECTION, SOLUTION INTRAVENOUS CONTINUOUS
Status: DISCONTINUED | OUTPATIENT
Start: 2021-04-01 | End: 2021-04-01

## 2021-04-01 RX ORDER — HYDROMORPHONE HCL 110MG/55ML
PATIENT CONTROLLED ANALGESIA SYRINGE INTRAVENOUS AS NEEDED
Status: DISCONTINUED | OUTPATIENT
Start: 2021-04-01 | End: 2021-04-01

## 2021-04-01 RX ORDER — ACETAMINOPHEN 160 MG/5ML
650 SUSPENSION, ORAL (FINAL DOSE FORM) ORAL EVERY 4 HOURS PRN
Status: DISCONTINUED | OUTPATIENT
Start: 2021-04-01 | End: 2021-04-02 | Stop reason: HOSPADM

## 2021-04-01 RX ORDER — HEPARIN SODIUM 5000 [USP'U]/ML
5000 INJECTION, SOLUTION INTRAVENOUS; SUBCUTANEOUS EVERY 8 HOURS SCHEDULED
Status: DISCONTINUED | OUTPATIENT
Start: 2021-04-01 | End: 2021-04-02 | Stop reason: HOSPADM

## 2021-04-01 RX ORDER — FENTANYL CITRATE 50 UG/ML
INJECTION, SOLUTION INTRAMUSCULAR; INTRAVENOUS AS NEEDED
Status: DISCONTINUED | OUTPATIENT
Start: 2021-04-01 | End: 2021-04-01

## 2021-04-01 RX ORDER — EPHEDRINE SULFATE 50 MG/ML
INJECTION INTRAVENOUS AS NEEDED
Status: DISCONTINUED | OUTPATIENT
Start: 2021-04-01 | End: 2021-04-01

## 2021-04-01 RX ORDER — KETAMINE HCL IN NACL, ISO-OSM 100MG/10ML
SYRINGE (ML) INJECTION AS NEEDED
Status: DISCONTINUED | OUTPATIENT
Start: 2021-04-01 | End: 2021-04-01

## 2021-04-01 RX ORDER — OXYCODONE HCL 5 MG/5 ML
10 SOLUTION, ORAL ORAL EVERY 4 HOURS PRN
Status: DISCONTINUED | OUTPATIENT
Start: 2021-04-01 | End: 2021-04-02 | Stop reason: HOSPADM

## 2021-04-01 RX ORDER — GABAPENTIN 300 MG/1
300 CAPSULE ORAL 3 TIMES DAILY
Status: DISCONTINUED | OUTPATIENT
Start: 2021-04-01 | End: 2021-04-02 | Stop reason: HOSPADM

## 2021-04-01 RX ORDER — DULOXETIN HYDROCHLORIDE 60 MG/1
60 CAPSULE, DELAYED RELEASE ORAL DAILY
Status: DISCONTINUED | OUTPATIENT
Start: 2021-04-01 | End: 2021-04-02 | Stop reason: HOSPADM

## 2021-04-01 RX ORDER — HYDROMORPHONE HCL/PF 1 MG/ML
0.5 SYRINGE (ML) INJECTION
Status: DISCONTINUED | OUTPATIENT
Start: 2021-04-01 | End: 2021-04-02 | Stop reason: HOSPADM

## 2021-04-01 RX ORDER — HYDROMORPHONE HCL/PF 1 MG/ML
0.5 SYRINGE (ML) INJECTION
Status: DISCONTINUED | OUTPATIENT
Start: 2021-04-01 | End: 2021-04-01 | Stop reason: HOSPADM

## 2021-04-01 RX ORDER — BUPIVACAINE HYDROCHLORIDE AND EPINEPHRINE 2.5; 5 MG/ML; UG/ML
INJECTION, SOLUTION EPIDURAL; INFILTRATION; INTRACAUDAL; PERINEURAL AS NEEDED
Status: DISCONTINUED | OUTPATIENT
Start: 2021-04-01 | End: 2021-04-01 | Stop reason: HOSPADM

## 2021-04-01 RX ORDER — NEOSTIGMINE METHYLSULFATE 1 MG/ML
INJECTION INTRAVENOUS AS NEEDED
Status: DISCONTINUED | OUTPATIENT
Start: 2021-04-01 | End: 2021-04-01

## 2021-04-01 RX ORDER — MIDAZOLAM HYDROCHLORIDE 2 MG/2ML
INJECTION, SOLUTION INTRAMUSCULAR; INTRAVENOUS AS NEEDED
Status: DISCONTINUED | OUTPATIENT
Start: 2021-04-01 | End: 2021-04-01

## 2021-04-01 RX ORDER — SODIUM CHLORIDE, SODIUM LACTATE, POTASSIUM CHLORIDE, CALCIUM CHLORIDE 600; 310; 30; 20 MG/100ML; MG/100ML; MG/100ML; MG/100ML
100 INJECTION, SOLUTION INTRAVENOUS CONTINUOUS
Status: DISCONTINUED | OUTPATIENT
Start: 2021-04-01 | End: 2021-04-02 | Stop reason: HOSPADM

## 2021-04-01 RX ORDER — PROPOFOL 10 MG/ML
INJECTION, EMULSION INTRAVENOUS AS NEEDED
Status: DISCONTINUED | OUTPATIENT
Start: 2021-04-01 | End: 2021-04-01

## 2021-04-01 RX ORDER — SODIUM CHLORIDE 9 MG/ML
125 INJECTION, SOLUTION INTRAVENOUS CONTINUOUS
Status: DISCONTINUED | OUTPATIENT
Start: 2021-04-01 | End: 2021-04-01

## 2021-04-01 RX ORDER — VECURONIUM BROMIDE 1 MG/ML
INJECTION, POWDER, LYOPHILIZED, FOR SOLUTION INTRAVENOUS AS NEEDED
Status: DISCONTINUED | OUTPATIENT
Start: 2021-04-01 | End: 2021-04-01

## 2021-04-01 RX ORDER — LITHIUM CARBONATE 300 MG/1
600 CAPSULE ORAL
Status: DISCONTINUED | OUTPATIENT
Start: 2021-04-01 | End: 2021-04-02 | Stop reason: HOSPADM

## 2021-04-01 RX ORDER — CEFAZOLIN SODIUM 2 G/50ML
2000 SOLUTION INTRAVENOUS ONCE
Status: COMPLETED | OUTPATIENT
Start: 2021-04-01 | End: 2021-04-01

## 2021-04-01 RX ORDER — LIDOCAINE HYDROCHLORIDE 10 MG/ML
0.5 INJECTION, SOLUTION EPIDURAL; INFILTRATION; INTRACAUDAL; PERINEURAL ONCE AS NEEDED
Status: DISCONTINUED | OUTPATIENT
Start: 2021-04-01 | End: 2021-04-02 | Stop reason: HOSPADM

## 2021-04-01 RX ORDER — TRAZODONE HYDROCHLORIDE 50 MG/1
50 TABLET ORAL
Status: DISCONTINUED | OUTPATIENT
Start: 2021-04-01 | End: 2021-04-02 | Stop reason: HOSPADM

## 2021-04-01 RX ORDER — ESMOLOL HYDROCHLORIDE 10 MG/ML
INJECTION INTRAVENOUS AS NEEDED
Status: DISCONTINUED | OUTPATIENT
Start: 2021-04-01 | End: 2021-04-01

## 2021-04-01 RX ORDER — ONDANSETRON 2 MG/ML
4 INJECTION INTRAMUSCULAR; INTRAVENOUS EVERY 6 HOURS PRN
Status: DISCONTINUED | OUTPATIENT
Start: 2021-04-01 | End: 2021-04-01 | Stop reason: HOSPADM

## 2021-04-01 RX ORDER — FENTANYL CITRATE 50 UG/ML
50 INJECTION, SOLUTION INTRAMUSCULAR; INTRAVENOUS
Status: DISCONTINUED | OUTPATIENT
Start: 2021-04-01 | End: 2021-04-01 | Stop reason: HOSPADM

## 2021-04-01 RX ORDER — PROMETHAZINE HYDROCHLORIDE 25 MG/ML
6.25 INJECTION, SOLUTION INTRAMUSCULAR; INTRAVENOUS ONCE
Status: COMPLETED | OUTPATIENT
Start: 2021-04-01 | End: 2021-04-01

## 2021-04-01 RX ORDER — ONDANSETRON 2 MG/ML
4 INJECTION INTRAMUSCULAR; INTRAVENOUS EVERY 6 HOURS PRN
Status: DISCONTINUED | OUTPATIENT
Start: 2021-04-01 | End: 2021-04-02 | Stop reason: HOSPADM

## 2021-04-01 RX ORDER — ATORVASTATIN CALCIUM 20 MG/1
20 TABLET, FILM COATED ORAL
Status: DISCONTINUED | OUTPATIENT
Start: 2021-04-01 | End: 2021-04-02 | Stop reason: HOSPADM

## 2021-04-01 RX ORDER — PANTOPRAZOLE SODIUM 40 MG/1
40 INJECTION, POWDER, FOR SOLUTION INTRAVENOUS
Status: DISCONTINUED | OUTPATIENT
Start: 2021-04-01 | End: 2021-04-02 | Stop reason: HOSPADM

## 2021-04-01 RX ORDER — OXYCODONE HCL 5 MG/5 ML
5 SOLUTION, ORAL ORAL EVERY 4 HOURS PRN
Status: DISCONTINUED | OUTPATIENT
Start: 2021-04-01 | End: 2021-04-02 | Stop reason: HOSPADM

## 2021-04-01 RX ADMIN — HYDROMORPHONE HYDROCHLORIDE 0.25 MG: 2 INJECTION INTRAMUSCULAR; INTRAVENOUS; SUBCUTANEOUS at 13:54

## 2021-04-01 RX ADMIN — SODIUM CHLORIDE, SODIUM LACTATE, POTASSIUM CHLORIDE, AND CALCIUM CHLORIDE 125 ML/HR: .6; .31; .03; .02 INJECTION, SOLUTION INTRAVENOUS at 08:37

## 2021-04-01 RX ADMIN — PHENYLEPHRINE HYDROCHLORIDE 100 MCG: 10 INJECTION INTRAVENOUS at 11:39

## 2021-04-01 RX ADMIN — PHENYLEPHRINE HYDROCHLORIDE 100 MCG: 10 INJECTION INTRAVENOUS at 13:24

## 2021-04-01 RX ADMIN — LITHIUM CARBONATE 600 MG: 300 CAPSULE, GELATIN COATED ORAL at 20:52

## 2021-04-01 RX ADMIN — PHENYLEPHRINE HYDROCHLORIDE 100 MCG: 10 INJECTION INTRAVENOUS at 12:50

## 2021-04-01 RX ADMIN — PROPOFOL 200 MG: 10 INJECTION, EMULSION INTRAVENOUS at 11:02

## 2021-04-01 RX ADMIN — SODIUM CHLORIDE: 0.9 INJECTION, SOLUTION INTRAVENOUS at 14:24

## 2021-04-01 RX ADMIN — EPHEDRINE SULFATE 15 MG: 50 INJECTION, SOLUTION INTRAVENOUS at 11:27

## 2021-04-01 RX ADMIN — GLYCOPYRROLATE 0.4 MG: 0.2 INJECTION, SOLUTION INTRAMUSCULAR; INTRAVENOUS at 14:23

## 2021-04-01 RX ADMIN — VECURONIUM BROMIDE 3 MG: 1 INJECTION, POWDER, LYOPHILIZED, FOR SOLUTION INTRAVENOUS at 11:46

## 2021-04-01 RX ADMIN — VECURONIUM BROMIDE 3 MG: 1 INJECTION, POWDER, LYOPHILIZED, FOR SOLUTION INTRAVENOUS at 12:21

## 2021-04-01 RX ADMIN — GLYCOPYRROLATE 0.2 MG: 0.2 INJECTION, SOLUTION INTRAMUSCULAR; INTRAVENOUS at 11:25

## 2021-04-01 RX ADMIN — EPHEDRINE SULFATE 10 MG: 50 INJECTION, SOLUTION INTRAVENOUS at 11:33

## 2021-04-01 RX ADMIN — PHENYLEPHRINE HYDROCHLORIDE 100 MCG: 10 INJECTION INTRAVENOUS at 12:24

## 2021-04-01 RX ADMIN — PHENYLEPHRINE HYDROCHLORIDE 100 MCG: 10 INJECTION INTRAVENOUS at 12:30

## 2021-04-01 RX ADMIN — PROPOFOL 50 MG: 10 INJECTION, EMULSION INTRAVENOUS at 14:19

## 2021-04-01 RX ADMIN — ESMOLOL HYDROCHLORIDE 10 MG: 10 INJECTION, SOLUTION INTRAVENOUS at 13:07

## 2021-04-01 RX ADMIN — BUSPIRONE HYDROCHLORIDE 15 MG: 10 TABLET ORAL at 20:52

## 2021-04-01 RX ADMIN — PROMETHAZINE HYDROCHLORIDE 6.25 MG: 25 INJECTION INTRAMUSCULAR; INTRAVENOUS at 14:20

## 2021-04-01 RX ADMIN — PHENYLEPHRINE HYDROCHLORIDE 100 MCG: 10 INJECTION INTRAVENOUS at 11:13

## 2021-04-01 RX ADMIN — MIDAZOLAM 1 MG: 1 INJECTION INTRAMUSCULAR; INTRAVENOUS at 10:54

## 2021-04-01 RX ADMIN — SODIUM CHLORIDE: 0.9 INJECTION, SOLUTION INTRAVENOUS at 12:59

## 2021-04-01 RX ADMIN — ACETAMINOPHEN 650 MG: 650 SUSPENSION ORAL at 20:52

## 2021-04-01 RX ADMIN — SODIUM CHLORIDE, SODIUM LACTATE, POTASSIUM CHLORIDE, AND CALCIUM CHLORIDE 100 ML/HR: .6; .31; .03; .02 INJECTION, SOLUTION INTRAVENOUS at 16:00

## 2021-04-01 RX ADMIN — HEPARIN SODIUM 5000 UNITS: 5000 INJECTION INTRAVENOUS; SUBCUTANEOUS at 20:54

## 2021-04-01 RX ADMIN — CEFAZOLIN SODIUM 2000 MG: 2 SOLUTION INTRAVENOUS at 10:45

## 2021-04-01 RX ADMIN — MIDAZOLAM 3 MG: 1 INJECTION INTRAMUSCULAR; INTRAVENOUS at 10:51

## 2021-04-01 RX ADMIN — GABAPENTIN 300 MG: 300 CAPSULE ORAL at 17:38

## 2021-04-01 RX ADMIN — ESMOLOL HYDROCHLORIDE 10 MG: 10 INJECTION, SOLUTION INTRAVENOUS at 13:18

## 2021-04-01 RX ADMIN — GABAPENTIN 300 MG: 300 CAPSULE ORAL at 20:52

## 2021-04-01 RX ADMIN — PHENYLEPHRINE HYDROCHLORIDE 100 MCG: 10 INJECTION INTRAVENOUS at 13:06

## 2021-04-01 RX ADMIN — SODIUM CHLORIDE, SODIUM LACTATE, POTASSIUM CHLORIDE, AND CALCIUM CHLORIDE: .6; .31; .03; .02 INJECTION, SOLUTION INTRAVENOUS at 11:47

## 2021-04-01 RX ADMIN — Medication 25 MG: at 11:20

## 2021-04-01 RX ADMIN — PHENYLEPHRINE HYDROCHLORIDE 100 MCG: 10 INJECTION INTRAVENOUS at 13:58

## 2021-04-01 RX ADMIN — ATORVASTATIN CALCIUM 20 MG: 20 TABLET, FILM COATED ORAL at 17:38

## 2021-04-01 RX ADMIN — HYDROMORPHONE HYDROCHLORIDE 0.5 MG: 2 INJECTION INTRAMUSCULAR; INTRAVENOUS; SUBCUTANEOUS at 11:52

## 2021-04-01 RX ADMIN — FENTANYL CITRATE 50 MCG: 50 INJECTION, SOLUTION INTRAMUSCULAR; INTRAVENOUS at 11:20

## 2021-04-01 RX ADMIN — VECURONIUM BROMIDE 7 MG: 1 INJECTION, POWDER, LYOPHILIZED, FOR SOLUTION INTRAVENOUS at 11:03

## 2021-04-01 RX ADMIN — VECURONIUM BROMIDE 2 MG: 1 INJECTION, POWDER, LYOPHILIZED, FOR SOLUTION INTRAVENOUS at 13:28

## 2021-04-01 RX ADMIN — BUSPIRONE HYDROCHLORIDE 15 MG: 10 TABLET ORAL at 17:38

## 2021-04-01 RX ADMIN — PHENYLEPHRINE HYDROCHLORIDE 100 MCG: 10 INJECTION INTRAVENOUS at 11:04

## 2021-04-01 RX ADMIN — GLYCOPYRROLATE 0.2 MG: 0.2 INJECTION, SOLUTION INTRAMUSCULAR; INTRAVENOUS at 11:26

## 2021-04-01 RX ADMIN — FENTANYL CITRATE 50 MCG: 50 INJECTION, SOLUTION INTRAMUSCULAR; INTRAVENOUS at 10:59

## 2021-04-01 RX ADMIN — PANTOPRAZOLE SODIUM 40 MG: 40 INJECTION, POWDER, FOR SOLUTION INTRAVENOUS at 17:38

## 2021-04-01 RX ADMIN — NEOSTIGMINE METHYLSULFATE 3.5 MG: 1 INJECTION INTRAVENOUS at 14:23

## 2021-04-01 RX ADMIN — OXYCODONE HYDROCHLORIDE 10 MG: 5 SOLUTION ORAL at 18:12

## 2021-04-01 RX ADMIN — VECURONIUM BROMIDE 2 MG: 1 INJECTION, POWDER, LYOPHILIZED, FOR SOLUTION INTRAVENOUS at 12:58

## 2021-04-01 RX ADMIN — HYDROMORPHONE HYDROCHLORIDE 0.25 MG: 2 INJECTION INTRAMUSCULAR; INTRAVENOUS; SUBCUTANEOUS at 13:48

## 2021-04-01 NOTE — PROGRESS NOTES
Progress Note - Rayray Qualia 50 y o  male MRN: [id number]    Unit/Bed#: E5 -07 Encounter: [id number]      Assessment:  49 y/o M w hiatal hernia s/p robotic paraesophageal hernia repair on 4/1  Vss  Afebrile  abd soft/ nontender/ non distended  Incisions c/d/i  Plan:   Clear liquid diet  Contrast swallow study tomorrow  Ambulate  dvt ppx  Work with incentive spirometer    Subjective:   Feels well  No complaints  Tolerating clears  Denied fever, chills, chest pain, shortness of breath, nausea, vomiting, or abdominal pain this afternoon  Objective:     Vitals: Blood pressure 107/72, pulse 100, temperature 97 7 °F (36 5 °C), temperature source Temporal, resp  rate 17, height 6' 1" (1 854 m), weight 98 4 kg (217 lb), SpO2 90 %  ,Body mass index is 28 63 kg/m²  Intake/Output Summary (Last 24 hours) at 4/1/2021 1843  Last data filed at 4/1/2021 1801  Gross per 24 hour   Intake 3600 ml   Output 835 ml   Net 2765 ml       Physical Exam  General: NAD  HEENT: NC/AT  MMM  Cv: RRR     Lungs: normal effort  Ab: Soft, NT/ND  Ex: no CCE  Neuro: AAOx3    Scheduled Meds:  Current Facility-Administered Medications   Medication Dose Route Frequency Provider Last Rate    acetaminophen  650 mg Oral Q4H PRN Flory Tellez MD      atorvastatin  20 mg Oral Daily With Romi Mo MD      busPIRone  15 mg Oral TID Flory Tellez MD      DULoxetine  60 mg Oral Daily Flory Tellez MD      gabapentin  300 mg Oral TID Flory Tellez MD      heparin (porcine)  5,000 Units Subcutaneous [facility] Flory Tellez MD      HYDROmorphone  0 5 mg Intravenous Q3H PRN Flory Tellez MD      lactated ringers  1,000 mL Intravenous Once PRN Flory Tellez MD      And    lactated ringers  1,000 mL Intravenous Once PRN Flory Tellez MD      lactated ringers  100 mL/hr Intravenous Continuous Flory Tellez  mL/hr (04/01/21 1600)    lidocaine (PF)  0 5 mL Infiltration Once PRN Flory Tellez MD      lithium  600 mg Oral HS Maya Jones MD      ondansetron  4 mg Intravenous Q6H PRN Maya Jones MD     [facility] oxyCODONE  10 mg Oral Q4H PRN Maya Jones MD      oxyCODONE  5 mg Oral Q4H PRN Maya Jones MD      pantoprazole  40 mg Intravenous Q24H Ponce Lazo MD      sodium chloride  1,000 mL Intravenous Once PRN Maya Jones MD      And    sodium chloride  1,000 mL Intravenous Once PRN Maya Jones MD     [facility] traZODone  50 mg Oral HS Maya Jones MD       Continuous Infusions:lactated ringers, 100 mL/hr, Last Rate: 100 mL/hr (04/01/21 1600)      PRN Meds:   acetaminophen    HYDROmorphone    lactated ringers **AND** lactated ringers    lidocaine (PF)    ondansetron    oxyCODONE    oxyCODONE    sodium chloride **AND** sodium chloride      Invasive Devices     Peripheral Intravenous Line            Peripheral IV 02/23/21 Right Antecubital 37 days    Peripheral IV 04/01/21 Left Hand less than 1 day                Lab, Imaging and other studies: I have personally reviewed pertinent reports      VTE Pharmacologic Prophylaxis: Sequential compression device (Venodyne)   VTE Mechanical Prophylaxis: sequential compression device

## 2021-04-01 NOTE — OP NOTE
OPERATIVE REPORT  PATIENT NAME: Peg Vasquez    :  1975  MRN: [id number]  Pt Location: [facility] OR ROOM 08    SURGERY DATE: 2021    Surgeon(s) and Role:     * Lori Wilson MD - Primary     * Melinda Gary MD - Assisting     * Corey Daniel PA-C    Preop Diagnosis:  Hiatal hernia without gangrene or obstruction [K44 9]    Post-Op Diagnosis Codes:     * Hiatal hernia without gangrene or obstruction [K44 9]    Procedure(s) (LRB):  REPAIR HERNIA PARAESOPHAGEAL LAPAROSCOPIC W ROBOTICS with TOUPET FUNDOPLICATION cruroplasty with mesh (N/A)    Specimen(s):  ID Type Source Tests Collected by Time Destination   1 : Paraesophageal hernia sac Tissue Soft Tissue, Other TISSUE EXAM Lori Wilson MD 2021 1418        Estimated Blood Loss:   25 mL    Drains:  [REMOVED] Urethral Catheter Non-latex 16 Fr  (Removed)   Amt returned on insertion(mL) 10 mL 21 1123   Collection Container Standard drainage bag 21 1123   Number of days: 0       Anesthesia Type:   General    Operative Indications:  Hiatal hernia without gangrene or obstruction [K44 9]      Operative Findings:  Very large hiatal hernia with half the stomach in the chest    Complications:   None    Procedure and Technique:  The patient was seen again in the Holding Room  The risks, benefits, complications, treatment options, and expected outcomes were discussed with the patient  The patient and/or family concurred with the proposed plan, giving informed consent  The site of surgery properly noted/marked  The patient was taken to Operating Room, identified as Peg Vasquez  and the procedure verified  A Time Out was held after prepping and draping in sterile fashion  The above information was confirmed  Prior to the induction of general anesthesia, antibiotic prophylaxis was administered  General endotracheal anesthesia was then administered and tolerated well  After the induction, the abdomen was prepped in the usual sterile fashion  An incision was made hand's breath below the xiphoid with 11 blade scalpel  Tissueswere dissected over clamps down to the fascia  The fascia was elevated and opened with an 11 blade scalpel  The 8mm trocar was placed in under direct visualization  A 5 mm incision was made subxiphoid area and a liver retractor was placed in and the left lobe of liver was elevated revealing the  Diaphragm and the retractor was secured to the bed  The next two 8 mm trocars were placed on either side in the midclavicular line and a final 5 mm trocars placed laterally on the left  The robot was now docked  Attention was turned to the stomach where the pars flaccida was opened with scissors and vessel sealer to the right matti  The peritoneum at the edge of the matti was opened with scissors and vessel sealer entering into the avascular plane around the hernia sac  The attachments w dissected along the matti towards the anterior portion  Using blunt dissection the avascular plane was dissected around the hernia sac dissecting it out of the mediastinum  Then the left matti was freed up from hernial attachments reducing the hernia sac  Next the avascular plane was dissected under the esophagus across the left side  A Penrose drain was passed through the space and wrapped around the esophagus and secured with a Endoloop  Dissection was continued until the gastroesophageal junction was at least 2 cm inside the abdominal cavity  The vagus nerve was preserved  The short gastrics were dissected with Harmonic scalpel from midway down the greater curvature all the way up to the left matti  Care was taken to ensure hemostasis  Also to ensure careful dissection along the gastro -splenic ligament  Now the dissection was complete  The GE junction was identified  At least 2 cm in the abdominal cavity  The Cruroplasty was performed next     The posterior matti were reapproximated with interrupted simple 0 Ethibond Sutures  A bougie was in place to prevent closing this defect too tightly  Then a [employer] mesh was brought on and deployed posteriorly and secured with tacking device  The fundus was grasped and pulled posterior to esophagus and a 039 degree fundoplication was performed  Three interrupted 0 Ethibond sutures were placed on either side attaching the stomach to the esophagus  The robot was now undocked  The liver retractor was removed  The midline trochar had fascia closed with figure-of-eight 0 Vicryl suture using the [employer] suture passer  The gas was deflated  The skin was closed with interrupted 4-0 Monocryl suture  Histocryl was applied  Please note that the PA was essential for assistance throughout the entire procedure including opening closing retraction and visualization     I was present for the entire procedure    Patient Disposition:  PACU     SIGNATURE: Whitley Oneil MD  DATE: April 1, 2021  TIME: 2:30 PM

## 2021-04-01 NOTE — PLAN OF CARE
Problem: PAIN - ADULT  Goal: Verbalizes/displays adequate comfort level or baseline comfort level  Description: Interventions:  - Encourage patient to monitor pain and request assistance  - Assess pain using appropriate pain scale  - Administer analgesics based on type and severity of pain and evaluate response  - Implement non-pharmacological measures as appropriate and evaluate response  - Consider cultural and social influences on pain and pain management  - Notify physician/advanced practitioner if interventions unsuccessful or patient reports new pain  Outcome: Progressing     Problem: INFECTION - ADULT  Goal: Absence or prevention of progression during hospitalization  Description: INTERVENTIONS:  - Assess and monitor for signs and symptoms of infection  - Monitor lab/diagnostic results  - Monitor all insertion sites, i e  indwelling lines, tubes, and drains  - Monitor endotracheal if appropriate and nasal secretions for changes in amount and color  - [place] appropriate cooling/warming therapies per order  - Administer medications as ordered  - Instruct and encourage patient and family to use good hand hygiene technique  - Identify and instruct in appropriate isolation precautions for identified infection/condition  Outcome: Progressing     Problem: SAFETY ADULT  Goal: Patient will remain free of falls  Description: INTERVENTIONS:  - Assess patient frequently for physical needs  -  Identify cognitive and physical deficits and behaviors that affect risk of falls    -  [place] fall precautions as indicated by assessment   - Educate patient/family on patient safety including physical limitations  - Instruct patient to call for assistance with activity based on assessment  - Modify environment to reduce risk of injury  - Consider OT/PT consult to assist with strengthening/mobility  Outcome: Progressing  Goal: Maintain or return to baseline ADL function  Description: INTERVENTIONS:  -  Assess patient's ability to carry out ADLs; assess patient's baseline for ADL function and identify physical deficits which impact ability to perform ADLs (bathing, care of mouth/teeth, toileting, grooming, dressing, etc )  - Assess/evaluate cause of self-care deficits   - Assess range of motion  - Assess patient's mobility; develop plan if impaired  - Assess patient's need for assistive devices and provide as appropriate  - Encourage maximum independence but intervene and supervise when necessary  - Involve family in performance of ADLs  - Assess for home care needs following discharge   - Consider OT consult to assist with ADL evaluation and planning for discharge  - Provide patient education as appropriate  Outcome: Progressing  Goal: Maintain or return mobility status to optimal level  Description: INTERVENTIONS:  - Assess patient's baseline mobility status (ambulation, transfers, stairs, etc )    - Identify cognitive and physical deficits and behaviors that affect mobility  - Identify mobility aids required to assist with transfers and/or ambulation (gait belt, sit-to-stand, lift, walker, cane, etc )  - [place] fall precautions as indicated by assessment  - Record patient progress and toleration of activity level on Mobility SBAR; progress patient to next Phase/Stage  - Instruct patient to call for assistance with activity based on assessment  - Consider rehabilitation consult to assist with strengthening/weightbearing, etc   Outcome: Progressing     Problem: DISCHARGE PLANNING  Goal: Discharge to home or other facility with appropriate resources  Description: INTERVENTIONS:  - Identify barriers to discharge w/patient and caregiver  - Arrange for needed discharge resources and transportation as appropriate  - Identify discharge learning needs (meds, wound care, etc )  - Arrange for interpretive services to assist at discharge as needed  - Refer to Case Management Department for coordinating discharge planning if the patient needs post-hospital services based on physician/advanced practitioner order or complex needs related to functional status, cognitive ability, or social support system  Outcome: Progressing     Problem: Knowledge Deficit  Goal: Patient/family/caregiver demonstrates understanding of disease process, treatment plan, medications, and discharge instructions  Description: Complete learning assessment and assess knowledge base    Interventions:  - Provide teaching at level of understanding  - Provide teaching via preferred learning methods  Outcome: Progressing

## 2021-04-01 NOTE — ANESTHESIA PREPROCEDURE EVALUATION
Procedure:  REPAIR HERNIA PARAESOPHAGEAL LAPAROSCOPIC W ROBOTICS with TOUPET FUNDOPLICATION (N/A Abdomen)    Relevant Problems   ANESTHESIA  old charts reviwed      CARDIO   (+) Mixed hyperlipidemia      ENDO (within normal limits)      GI/HEPATIC   (+) Hiatal hernia without gangrene or obstruction      /RENAL (within normal limits)      HEMATOLOGY (within normal limits)      MUSCULOSKELETAL   (+) Chronic low back pain      NEURO/PSYCH  hx of ECT tx   Hx of polysubstance abuse   (+) Generalized anxiety disorder   (+) History of alcohol abuse   (+) History of cocaine use   (+) Major depressive disorder, recurrent, severe without psychotic features ([facility] [address] )   (+) Other specified anxiety disorders      PULMONARY  ex smoker   (+) Obstructive sleep apnea syndrome   (-) Smoking   (-) URI (upper respiratory infection)      Other   (+) Herniated lumbar intervertebral disc   (+) Polysubstance dependence (HCC)        Physical Exam    Airway    Mallampati score: II  TM Distance: >3 FB  Neck ROM: full     Dental   No notable dental hx     Cardiovascular  Rhythm: regular, Rate: normal, Cardiovascular exam normal    Pulmonary  Pulmonary exam normal Breath sounds clear to auscultation,     Other Findings  Fixed upper and lower teeth and in good repair      Anesthesia Plan  ASA Score- 3     Anesthesia Type- general with ASA Monitors  Additional Monitors:   Airway Plan:           Plan Factors-Exercise tolerance (METS): >4 METS  Chart reviewed  Existing labs reviewed  Patient summary reviewed  Patient is not a current smoker  Patient did not smoke on day of surgery  Obstructive sleep apnea risk education given perioperatively  Induction- intravenous  Postoperative Plan-     Informed Consent- Anesthetic plan and risks discussed with patient and mother

## 2021-04-01 NOTE — INTERVAL H&P NOTE
H&P reviewed  After examining the patient I find no changes in the patients condition since the H&P had been written      Vitals:    04/01/21 0826   BP: 99/63   Pulse: 73   Resp: 16   Temp: 98 °F (36 7 °C)   SpO2: 95%

## 2021-04-02 ENCOUNTER — APPOINTMENT (OUTPATIENT)
Dept: RADIOLOGY | Facility: HOSPITAL | Age: 46
End: 2021-04-02
Payer: COMMERCIAL

## 2021-04-02 VITALS
HEIGHT: 73 IN | BODY MASS INDEX: 28.76 KG/M2 | TEMPERATURE: 97.4 F | DIASTOLIC BLOOD PRESSURE: 82 MMHG | HEART RATE: 72 BPM | RESPIRATION RATE: 18 BRPM | SYSTOLIC BLOOD PRESSURE: 129 MMHG | WEIGHT: 217 LBS | OXYGEN SATURATION: 97 %

## 2021-04-02 LAB
ANION GAP SERPL CALCULATED.3IONS-SCNC: 6 MMOL/L (ref 4–13)
BASOPHILS # BLD AUTO: 0.01 THOUSANDS/ΜL (ref 0–0.1)
BASOPHILS NFR BLD AUTO: 0 % (ref 0–1)
BUN SERPL-MCNC: 13 MG/DL (ref 5–25)
CALCIUM SERPL-MCNC: 9.1 MG/DL (ref 8.3–10.1)
CHLORIDE SERPL-SCNC: 107 MMOL/L (ref 100–108)
CO2 SERPL-SCNC: 30 MMOL/L (ref 21–32)
CREAT SERPL-MCNC: 0.88 MG/DL (ref 0.6–1.3)
EOSINOPHIL # BLD AUTO: 0.01 THOUSAND/ΜL (ref 0–0.61)
EOSINOPHIL NFR BLD AUTO: 0 % (ref 0–6)
ERYTHROCYTE [DISTWIDTH] IN BLOOD BY AUTOMATED COUNT: 12.6 % (ref 11.6–15.1)
GFR SERPL CREATININE-BSD FRML MDRD: 102 ML/MIN/1.73SQ M
GLUCOSE P FAST SERPL-MCNC: 94 MG/DL (ref 65–99)
GLUCOSE SERPL-MCNC: 94 MG/DL (ref 65–140)
HCT VFR BLD AUTO: 39.8 % (ref 36.5–49.3)
HGB BLD-MCNC: 13.1 G/DL (ref 12–17)
IMM GRANULOCYTES # BLD AUTO: 0.03 THOUSAND/UL (ref 0–0.2)
IMM GRANULOCYTES NFR BLD AUTO: 0 % (ref 0–2)
LYMPHOCYTES # BLD AUTO: 1.3 THOUSANDS/ΜL (ref 0.6–4.47)
LYMPHOCYTES NFR BLD AUTO: 14 % (ref 14–44)
MCH RBC QN AUTO: 29.8 PG (ref 26.8–34.3)
MCHC RBC AUTO-ENTMCNC: 32.9 G/DL (ref 31.4–37.4)
MCV RBC AUTO: 91 FL (ref 82–98)
MONOCYTES # BLD AUTO: 0.94 THOUSAND/ΜL (ref 0.17–1.22)
MONOCYTES NFR BLD AUTO: 10 % (ref 4–12)
NEUTROPHILS # BLD AUTO: 6.89 THOUSANDS/ΜL (ref 1.85–7.62)
NEUTS SEG NFR BLD AUTO: 76 % (ref 43–75)
NRBC BLD AUTO-RTO: 0 /100 WBCS
PLATELET # BLD AUTO: 257 THOUSANDS/UL (ref 149–390)
PMV BLD AUTO: 9.6 FL (ref 8.9–12.7)
POTASSIUM SERPL-SCNC: 3.8 MMOL/L (ref 3.5–5.3)
RBC # BLD AUTO: 4.4 MILLION/UL (ref 3.88–5.62)
SODIUM SERPL-SCNC: 143 MMOL/L (ref 136–145)
WBC # BLD AUTO: 9.18 THOUSAND/UL (ref 4.31–10.16)

## 2021-04-02 PROCEDURE — 74220 X-RAY XM ESOPHAGUS 1CNTRST: CPT

## 2021-04-02 PROCEDURE — C9113 INJ PANTOPRAZOLE SODIUM, VIA: HCPCS | Performed by: SURGERY

## 2021-04-02 PROCEDURE — 97166 OT EVAL MOD COMPLEX 45 MIN: CPT

## 2021-04-02 PROCEDURE — 97163 PT EVAL HIGH COMPLEX 45 MIN: CPT

## 2021-04-02 PROCEDURE — 80048 BASIC METABOLIC PNL TOTAL CA: CPT | Performed by: SURGERY

## 2021-04-02 PROCEDURE — 94660 CPAP INITIATION&MGMT: CPT

## 2021-04-02 PROCEDURE — NC001 PR NO CHARGE: Performed by: SURGERY

## 2021-04-02 PROCEDURE — 99024 POSTOP FOLLOW-UP VISIT: CPT | Performed by: SURGERY

## 2021-04-02 PROCEDURE — 85025 COMPLETE CBC W/AUTO DIFF WBC: CPT | Performed by: SURGERY

## 2021-04-02 RX ORDER — OXYCODONE HYDROCHLORIDE 5 MG/1
5 TABLET ORAL EVERY 4 HOURS PRN
Qty: 16 TABLET | Refills: 0 | Status: SHIPPED | OUTPATIENT
Start: 2021-04-02 | End: 2021-04-12

## 2021-04-02 RX ADMIN — DULOXETINE HYDROCHLORIDE 60 MG: 60 CAPSULE, DELAYED RELEASE ORAL at 08:58

## 2021-04-02 RX ADMIN — SODIUM CHLORIDE, SODIUM LACTATE, POTASSIUM CHLORIDE, AND CALCIUM CHLORIDE 100 ML/HR: .6; .31; .03; .02 INJECTION, SOLUTION INTRAVENOUS at 01:36

## 2021-04-02 RX ADMIN — PANTOPRAZOLE SODIUM 40 MG: 40 INJECTION, POWDER, FOR SOLUTION INTRAVENOUS at 08:58

## 2021-04-02 RX ADMIN — IOHEXOL 100 ML: 350 INJECTION, SOLUTION INTRAVENOUS at 10:47

## 2021-04-02 RX ADMIN — GABAPENTIN 300 MG: 300 CAPSULE ORAL at 08:58

## 2021-04-02 RX ADMIN — BUSPIRONE HYDROCHLORIDE 15 MG: 10 TABLET ORAL at 08:58

## 2021-04-02 RX ADMIN — HEPARIN SODIUM 5000 UNITS: 5000 INJECTION INTRAVENOUS; SUBCUTANEOUS at 06:31

## 2021-04-02 RX ADMIN — OXYCODONE HYDROCHLORIDE 10 MG: 5 SOLUTION ORAL at 10:51

## 2021-04-02 RX ADMIN — OXYCODONE HYDROCHLORIDE 10 MG: 5 SOLUTION ORAL at 00:08

## 2021-04-02 RX ADMIN — OXYCODONE HYDROCHLORIDE 5 MG: 5 SOLUTION ORAL at 06:31

## 2021-04-02 RX ADMIN — ACETAMINOPHEN 650 MG: 650 SUSPENSION ORAL at 14:51

## 2021-04-02 NOTE — OCCUPATIONAL THERAPY NOTE
Occupational Therapy Evaluation     Patient Name: Johanna Saul  EKLRN'T Date: 4/2/2021  Problem List  Principal Problem:    Hiatal hernia without gangrene or obstruction    Past Medical History  Past Medical History:   Diagnosis Date    Anxiety     Chest pain     after eating- seen in ER several x - believes secondary to hernia    CPAP (continuous positive airway pressure) dependence     Depression     Hiatal hernia     Hyperlipidemia     PONV (postoperative nausea and vomiting)     Sleep apnea     Suicidal thoughts     Wears glasses      Past Surgical History  Past Surgical History:   Procedure Laterality Date    COLONOSCOPY      INGUINAL HERNIA REPAIR Right     OR LAP, REPAIR PARAESOPHAGEAL HERNIA, INCL FUNDOPLASTY W/ MESH N/A 4/1/2021    Procedure: REPAIR HERNIA PARAESOPHAGEAL LAPAROSCOPIC W ROBOTICS with TOUPET FUNDOPLICATION cruroplasty with mesh;  Surgeon: Chino Bass MD;  Location: [facility];  Service: General    SHOULDER SURGERY      left           04/02/21 0930   Note Type   Note type Evaluation   Restrictions/Precautions   Weight Bearing Precautions Per Order No   Other Precautions Fall Risk;Pain;Multiple lines   Pain Assessment   Pain Assessment Tool 0-10   Pain Score 8   Pain Location/Orientation Orientation: Bilateral;Orientation: Upper; Location: Abdomen; Location: Chest   Hospital Pain Intervention(s) Repositioned; Ambulation/increased activity; Emotional support; Rest   Multiple Pain Sites No   Home Living   Type of Home House   Home Layout Two level;1/2 bath on main level;Bed/bath upstairs;Stairs to enter with rails  (2 BRIANDA, FOS to 2nd floor bedroom/full bath)   Bathroom Shower/Tub Tub/shower unit   Bathroom Toilet Standard   Bathroom Equipment   (denies DME)   [address]   (denies DME)   Additional Comments Pt lives with son in a two level house with 2 BRIANDA and FOS to 2nd floor bedroom/full bathroom   Pt reports son is in/out, can assist when home  Prior Function   Level of [place] Independent with ADLs and functional mobility   Lives With Son   Receives Help From Family   ADL Assistance Independent   IADLs Independent   Falls in the last 6 months 0   Vocational Full time employment   Comments At baseline, pt was I w/ ADLs, IADLs, and functional mobility/transfers w/o use of AD, (+) , FT employed, and denies falls PTA  Lifestyle   Autonomy At baseline, pt was I w/ ADLs, IADLs, and functional mobility/transfers w/o use of AD, (+) , FT employed, and denies falls PTA  Reciprocal Relationships Son   Service to Others FT employed-    Intrinsic Gratification Watching TV   Psychosocial   Psychosocial (WDL) WDL   ADL   Where Assessed Edge of bed   Eating Assistance 7  Independent   Grooming Assistance 6  Modified Independent   UB Bathing Assistance 6  Modified Independent   LB Bathing Assistance 5  Supervision/Setup   UB Dressing Assistance 6  Modified independent   LB Dressing Assistance 5  Supervision/Setup   Toileting Assistance  6  Modified independent   Functional Assistance 6  Modified independent   Additional Comments Pt reports has been going to/from bathroom independently    Bed Mobility   Supine to Sit 5  Supervision   Additional items HOB elevated; Bedrails; Increased time required   Sit to Supine 5  Supervision   Additional items Increased time required;HOB elevated   Additional Comments Vitals EOB: BP- 133/95, HR- 72 bpm, SPO2- 95% on RA   Transfers   Sit to Stand 6  Modified independent   Additional items Bedrails; Increased time required   Stand to Sit 6  Modified independent   Additional items Bedrails; Increased time required   Functional Mobility   Functional Mobility 5  Supervision   Additional Comments Assist x1 w/o use of AD; No LOBs noted   Vitals post-mobility: BP- 146/97, HR- 62 bpm, SPO2- 99% on RA   Balance   Static Sitting Good   Dynamic Sitting Fair +   Static Standing Good   Dynamic Standing Fair + Ambulatory Fair +   Activity Tolerance   Activity Tolerance Patient limited by pain   Medical Staff Ed Hogan, DENNIS   Nurse Made Aware yes; León Umaña, RN   RUE Assessment   RUE Assessment WNL   RUE Strength   RUE Overall Strength Within Functional Limits - able to perform ADL tasks with strength  (4+/5 throughout)   LUE Assessment   LUE Assessment WNL   LUE Strength   LUE Overall Strength Within Functional Limits - able to perform ADL tasks with strength  (4+/5 throughout)   Hand Function   Gross Motor Coordination Functional   Fine Motor Coordination Functional   Sensation   Light Touch No apparent deficits   Proprioception   Proprioception No apparent deficits   Vision-Basic Assessment   Current Vision Wears glasses all the time   Vision - Complex Assessment   Ocular Range of Motion [facility]   Acuity Able to read clock/calendar on wall without difficulty; Able to read employee name badge without difficulty   Perception   Inattention/Neglect Appears intact   Cognition   Overall Cognitive Status [facility]   Arousal/Participation Alert; Cooperative   Attention Within functional limits   Orientation Level Oriented X4   Memory Within functional limits   Following Commands Follows all commands and directions without difficulty   Comments Pleasant and cooperative  Engages in conversation appropriately   Assessment   Prognosis Good   Assessment Pt is a 50 y o  male seen for OT evaluation s/p adm to [facility] on 4/1/2021 w/ Hiatal hernia without gangrene or obstruction s/p robotic paraesophageal hernia repair on 4/1/21  Comorbidities affecting pts functional performance include a significant PMH of Anxiety, Chest pain, Depression, Hiatal hernia, HLD, Sleep apnea, and suicidal thoughts  Pt with active OT orders and activity orders for Up as tolerated  Pt lives with son in a two level house with 2 BRIANDA and FOS to 2nd floor bedroom/full bathroom  Pt reports son is in/out, can assist when home   At baseline, pt was I w/ ADLs, IADLs, and functional mobility/transfers w/o use of AD, (+) , FT employed, and denies falls PTA  Upon evaluation, pt currently functioning at a [address] I level for overall ADLs, Supervision for bed mobility, Mod I for transfers, and Supervision for functional mobility 2* the following deficits impacting occupational performance: decreased balance, decreased tolerance and increased pain  These impairments, however do not limit pts ability to safely engage in all baseline areas of occupation, as pt is functioning at baseline level of performance and denies concerns regarding going home once pain resolves  The patient's raw score on the AM-PAC Daily Activity inpatient short form is 22, standardized score is 47 1, greater than 39 4  Patients at this level are likely to benefit from discharge to home  Please refer to the recommendation of the Occupational Therapist for safe discharge planning  Based on the aforementioned OT evaluation, functional performance deficits, and assessments, pt has been identified as a Moderate complexity evaluation  No further acute OT needs identified at this time  Recommend continued mobilization with hospital staff while in the hospital to increase pts endurance and strength upon D/C  From OT standpoint, recommend D/C home with family support when medically cleared  D/C pt from OT caseload at this time  Goals   Patient Goals To have less pain   Plan   OT Frequency Eval only  (D/C OT)   Recommendation   OT Discharge Recommendation Return to previous environment with social support   OT - OK to Discharge Yes  (when medically cleared)   AM-PAC Daily Activity Inpatient   Lower Body Dressing 3   Bathing 3   Toileting 4   Upper Body Dressing 4   Grooming 4   Eating 4   Daily Activity Raw Score 22   Daily Activity Standardized Score (Calc for Raw Score >=11) 47  1   AM-PAC Applied Cognition Inpatient   Following a Speech/Presentation 4   Understanding Ordinary Conversation 4   Taking Medications 4   Remembering Where Things Are Placed or Put Away 4   Remembering List of 4-5 Errands 4   Taking Care of Complicated Tasks 4   Applied Cognition Raw Score 24   Applied Cognition Standardized Score 62 21          Antonietta Castillo OTR/L

## 2021-04-02 NOTE — PHYSICAL THERAPY NOTE
PT EVALUATION    50 y o     [id number]    Hiatal hernia without gangrene or obstruction [K44 9]    Past Medical History:   Diagnosis Date    Anxiety     Chest pain     after eating- seen in ER several x - believes secondary to hernia    CPAP (continuous positive airway pressure) dependence     Depression     Hiatal hernia     Hyperlipidemia     PONV (postoperative nausea and vomiting)     Sleep apnea     Suicidal thoughts     Wears glasses          Past Surgical History:   Procedure Laterality Date    COLONOSCOPY      INGUINAL HERNIA REPAIR Right     MO LAP, REPAIR PARAESOPHAGEAL HERNIA, INCL FUNDOPLASTY W/ MESH N/A 4/1/2021    Procedure: REPAIR HERNIA PARAESOPHAGEAL LAPAROSCOPIC W ROBOTICS with TOUPET FUNDOPLICATION cruroplasty with mesh;  Surgeon: Peter Grossman MD;  Location: [facility];  Service: General    SHOULDER SURGERY      left      04/02/21 0915   PT Last Visit   PT Visit Date 04/02/21   Note Type   Note type Evaluation   Pain Assessment   Pain Score 8   Pain Location/Orientation Orientation: Left;Orientation: Upper; Location: Shoulder; Location: Chest  (MD aware-likely gas post operatively)   Home Living   Type of [address] [place] [address] Two level;Bed/bath upstairs;1/2 bath on main level;Stairs to enter with rails  (2 BRIANDA)   Bathroom Shower/Tub Tub/shower unit   [employer] Standard   Additional Comments resides with son who is in and out  [facility]  [facility]   Prior Function   Level of [place] Independent with ADLs and functional mobility   Lives With Son   Receives Help From Family   ADL Assistance Independent   IADLs Independent   Falls in the last 6 months 0   Vocational Full time employment   Comments Independent and working as a  prior to admission      Restrictions/Precautions   Weight Bearing Precautions Per Order No   Other Precautions Fall Risk;Pain;Multiple lines   General   Additional Pertinent History Pt is 49 y/o male admitted for hiatal hernia repair  Family/Caregiver Present No   Cognition   Overall Cognitive Status WFL   RUE Assessment   RUE Assessment WFL   LUE Assessment   LUE Assessment WFL   RLE Assessment   RLE Assessment WFL   LLE Assessment   LLE Assessment WFL   Coordination   Movements are Fluid and Coordinated 1   Bed Mobility   Supine to Sit 5  Supervision   Additional items Increased time required; Bedrails;HOB elevated   Sit to Supine 5  Supervision   Additional items Increased time required;HOB elevated   Additional Comments BP /95 RA O2 sat 95%, HR 72  Transfers   Sit to Stand 6  Modified independent   Stand to Sit 6  Modified independent   Additional Comments increased time to acheive upright posture related to pain  Ambulation/Elevation   Gait pattern   (WFL  Slight flexed posture )   Gait Assistance 5  Supervision   Additional items Verbal cues   Assistive Device None   Distance Amb without  ft x 1   BP p ambulatio n146/97, RA O2 sat 99%, HR 62   Stair Management Assistance Not tested  (verball review of step to technique for pacing/pain )   Balance   Static Sitting Good   Dynamic Sitting Fair +   Static Standing Good   Dynamic Standing Fair +   Ambulatory Fair +   Endurance Deficit   Endurance Deficit Yes   Endurance Deficit Description pain  Activity Tolerance   Activity Tolerance Patient limited by pain   Medical Staff Made Aware Nurse, Mel Kolb   Nurse Made Aware yes   Assessment   Prognosis Good   Problem List Decreased endurance; Impaired balance;Decreased mobility;Pain   Assessment Siri Gonzalez is a 51 y/o male presents for hiatal hernia repair on 4/1  Pt with hx of sleep apnea, hyperlipidemia, anxiety, hiatal hernia  Prior to admission completely independent without AD use, working as a  and driving  No hx of falls  Resides with son in 2 story home  Currently presents with functional limitations related to pain  Is S for bed mobility  West for transfers and S for ambulation    No LOB with ambulation, however flexed posture related to pain  Vitals:  133/95 pre ambulation with RA O2 sat 95% and HR 72  After ambulation 146/97 and RA O2 sat 99% with HR 62  Encouraged post op mobilization with support of nursing staff as no skilled PT intervention apparent at this time  The patient's AM-PAC Basic Mobility Inpatient Short Form Raw Score is 21, Standardized Score is 45 55  A standardized score greater than 42 9 suggests the patient may benefit from discharge to home  Please also refer to the recommendation of the Physical Therapist for safe discharge planning  Anticipate as pain resolves ability to return home with family support  Will d/c PT  AMBULATE with nursing staff 2-3 times daily recommended to foster outcomes  Barriers to Discharge Inaccessible home environment   Goals   Patient Goals pain to resolve   Plan   Treatment/Interventions Bed mobility;Gait training; Compensatory technique education;Spoke to nursing;OT;Functional transfer training; Endurance training;Patient/family training  (PT eval and d/c,)   PT Frequency One time visit  (d/c PT)   Recommendation   PT Discharge Recommendation Return to previous environment with social support   PT - OK to Discharge   (when medically stable and pain improved  )   AM-PAC Basic Mobility Inpatient   Turning in Bed Without Bedrails 3   Lying on Back to Sitting on Edge of Flat Bed 3   Moving Bed to Chair 4   Standing Up From Chair 4   Walk in Room 4   Climb 3-5 Stairs 3   Basic Mobility Inpatient Raw Score 21   Basic Mobility Standardized Score 45 55     History: co - morbidities, fall risk,multiple lines, steps, pain  Exam: mild impairments in locomotion,  balance,posture, activitiy tolerance    Clinical: unstable/unpredictable ( fall risk, pain, decreased activity tolerance compared to baseline)  Complexity:high    Nora Barroso, PT

## 2021-04-02 NOTE — PROGRESS NOTES
Progress Note - Chapo Dawkins 50 y o  male MRN: [id number]    Unit/Bed#: E5 -84 Encounter: [id number]      Assessment:  51 y/o M w hiatal hernia s/p robotic paraesophageal hernia repair on 4/1  Doing well POD1  Vss  Afebrile  abd soft/ nontender/ non distended  Plan:  Continue clears  F/u contrast swallow  Ambulate  dvt ppx  Anticipate dc home later today    Subjective:   Feels well  Mild abd discomfort  Denied any nausea or vomiting  No chest pain or shortness of breath  Objective:     Vitals: Blood pressure 129/82, pulse 72, temperature (!) 97 4 °F (36 3 °C), temperature source Temporal, resp  rate 18, height 6' 1" (1 854 m), weight 98 4 kg (217 lb), SpO2 97 %  ,Body mass index is 28 63 kg/m²  Intake/Output Summary (Last 24 hours) at 4/2/2021 0855  Last data filed at 4/2/2021 0701  Gross per 24 hour   Intake 4550 ml   Output 1935 ml   Net 2615 ml       Physical Exam  General: NAD  HEENT: NC/AT  MMM  Cv: RRR     Lungs: normal effort  Ab: Soft, NT/ND  Ex: no CCE  Neuro: AAOx3    Scheduled Meds:  Current Facility-Administered Medications   Medication Dose Route Frequency Provider Last Rate    acetaminophen  650 mg Oral Q4H PRN Nati Valera MD      atorvastatin  20 mg Oral Daily With Catarina Hinojosa MD      busPIRone  15 mg Oral TID Nati Valera MD      DULoxetine  60 mg Oral Daily Nati Valera MD      gabapentin  300 mg Oral TID Nati Valera MD      heparin (porcine)  5,000 Units Subcutaneous [facility] Nati Valera MD      HYDROmorphone  0 5 mg Intravenous Q3H PRN Nati Valera MD      lactated ringers  1,000 mL Intravenous Once PRN Nati Valera MD      And    lactated ringers  1,000 mL Intravenous Once PRN Nati Valera MD      lactated ringers  100 mL/hr Intravenous Continuous Nati Valera  mL/hr (04/02/21 0136)    lidocaine (PF)  0 5 mL Infiltration Once PRN Nati Valera MD      lithium  600 mg Oral HS Nati Valera MD     [facility] ondansetron  4 mg Intravenous Q6H PRN Opal Campbell MD      oxyCODONE  10 mg Oral Q4H PRN Opal Campbell MD      oxyCODONE  5 mg Oral Q4H PRN Opal Campbell MD      pantoprazole  40 mg Intravenous Q24H Ponce Lazo MD      sodium chloride  1,000 mL Intravenous Once PRN Opal Campbell MD      And    sodium chloride  1,000 mL Intravenous Once PRN MD Maxime Bess traZODone  50 mg Oral HS Opal Campbell MD       Continuous Infusions:lactated ringers, 100 mL/hr, Last Rate: 100 mL/hr (04/02/21 0136)      PRN Meds:   acetaminophen    HYDROmorphone    lactated ringers **AND** lactated ringers    lidocaine (PF)    ondansetron    oxyCODONE    oxyCODONE    sodium chloride **AND** sodium chloride      Invasive Devices     Peripheral Intravenous Line            Peripheral IV 02/23/21 Right Antecubital 37 days    Peripheral IV 04/01/21 Left Hand 1 day                Lab, Imaging and other studies: I have personally reviewed pertinent reports      VTE Pharmacologic Prophylaxis: Sequential compression device (Venodyne)   VTE Mechanical Prophylaxis: sequential compression device

## 2021-04-02 NOTE — PLAN OF CARE
Problem: PAIN - ADULT  Goal: Verbalizes/displays adequate comfort level or baseline comfort level  Description: Interventions:  - Encourage patient to monitor pain and request assistance  - Assess pain using appropriate pain scale  - Administer analgesics based on type and severity of pain and evaluate response  - Implement non-pharmacological measures as appropriate and evaluate response  - Consider cultural and social influences on pain and pain management  - Notify physician/advanced practitioner if interventions unsuccessful or patient reports new pain  Outcome: Progressing     Problem: INFECTION - ADULT  Goal: Absence or prevention of progression during hospitalization  Description: INTERVENTIONS:  - Assess and monitor for signs and symptoms of infection  - Monitor lab/diagnostic results  - Monitor all insertion sites, i e  indwelling lines, tubes, and drains  - Monitor endotracheal if appropriate and nasal secretions for changes in amount and color  - [place] appropriate cooling/warming therapies per order  - Administer medications as ordered  - Instruct and encourage patient and family to use good hand hygiene technique  - Identify and instruct in appropriate isolation precautions for identified infection/condition  Outcome: Progressing     Problem: SAFETY ADULT  Goal: Patient will remain free of falls  Description: INTERVENTIONS:  - Assess patient frequently for physical needs  -  Identify cognitive and physical deficits and behaviors that affect risk of falls    -  [place] fall precautions as indicated by assessment   - Educate patient/family on patient safety including physical limitations  - Instruct patient to call for assistance with activity based on assessment  - Modify environment to reduce risk of injury  - Consider OT/PT consult to assist with strengthening/mobility  Outcome: Progressing  Goal: Maintain or return to baseline ADL function  Description: INTERVENTIONS:  -  Assess patient's ability to carry out ADLs; assess patient's baseline for ADL function and identify physical deficits which impact ability to perform ADLs (bathing, care of mouth/teeth, toileting, grooming, dressing, etc )  - Assess/evaluate cause of self-care deficits   - Assess range of motion  - Assess patient's mobility; develop plan if impaired  - Assess patient's need for assistive devices and provide as appropriate  - Encourage maximum independence but intervene and supervise when necessary  - Involve family in performance of ADLs  - Assess for home care needs following discharge   - Consider OT consult to assist with ADL evaluation and planning for discharge  - Provide patient education as appropriate  Outcome: Progressing  Goal: Maintain or return mobility status to optimal level  Description: INTERVENTIONS:  - Assess patient's baseline mobility status (ambulation, transfers, stairs, etc )    - Identify cognitive and physical deficits and behaviors that affect mobility  - Identify mobility aids required to assist with transfers and/or ambulation (gait belt, sit-to-stand, lift, walker, cane, etc )  - [place] fall precautions as indicated by assessment  - Record patient progress and toleration of activity level on Mobility SBAR; progress patient to next Phase/Stage  - Instruct patient to call for assistance with activity based on assessment  - Consider rehabilitation consult to assist with strengthening/weightbearing, etc   Outcome: Progressing     Problem: DISCHARGE PLANNING  Goal: Discharge to home or other facility with appropriate resources  Description: INTERVENTIONS:  - Identify barriers to discharge w/patient and caregiver  - Arrange for needed discharge resources and transportation as appropriate  - Identify discharge learning needs (meds, wound care, etc )  - Arrange for interpretive services to assist at discharge as needed  - Refer to Case Management Department for coordinating discharge planning if the patient needs post-hospital services based on physician/advanced practitioner order or complex needs related to functional status, cognitive ability, or social support system  Outcome: Progressing     Problem: Knowledge Deficit  Goal: Patient/family/caregiver demonstrates understanding of disease process, treatment plan, medications, and discharge instructions  Description: Complete learning assessment and assess knowledge base    Interventions:  - Provide teaching at level of understanding  - Provide teaching via preferred learning methods  Outcome: Progressing

## 2021-04-02 NOTE — CONSULTS
Consult: nissen fundoplication postop diet  Discussed diet progression, clear liquids, full liquids, soft diet  Reviewed tips for keeping stomach from stretching, eating small frequent meals, eating slowly, taking small bites, chewing well, avoid drinking large amounts of fluids with meals, sitting upright after meals for 2 hours  Provided tips for to avoid gas such using straws, chewing gum, reviewed gas forming foods such as beans, lentils, onions, broccoli, cauliflower, cabbage, discussed avodign caffein, carbonated drinks, alcohol, citrus and tomato products  Reviewed each food group what is recommended and not recommended  PT was provided with written material on Diet After Nissen Fundoplication Surgery  PT was able to answer teach back appropriately  PT currently having gas pain in chest area, encouraged to walk as able but PT stated " I can't even stand up d/t the pain"  Will continue to monitor diet advancement, tolerance, intake and for any further diet questions

## 2021-04-02 NOTE — DISCHARGE SUMMARY
Discharge Summary - General Surgery   Cadence Murphy 50 y o  male MRN: [id number]  Unit/Bed#: E5 -01 Encounter: [id number]    Admission Date: 4/1/21     Discharge Date: 4/2/21    Admitting Diagnosis: Hiatal hernia without gangrene or obstruction [K44 9]    Discharge Diagnosis: Hiatal hernia    Resolved Problems  Date Reviewed: 4/1/2021    None          Attending: Shabana Ceron Physician(s): none    Procedures Performed: No orders of the defined types were placed in this encounter  4/1/21 Robotic paraesophageal hernia repair    Pathology: none    Hospital Course: Patient presented for scheduled elective PEH hernia repair  He underwent the above listed procedure and tolerated well  He was observed overnight and tolerated clear liquids  An esophagram was performed on POD 1 and was within normal limits  At this point he had met discharge criteria and was discharged to home  He will be seen in the clinic within 2-4 weeks  Condition at Discharge: good     Discharge instructions/Information to patient and family:   See after visit summary for information provided to patient and family  Provisions for Follow-Up Care:  See after visit summary for information related to follow-up care and any pertinent home health orders  Disposition: See After Visit Summary for discharge disposition information  Planned Readmission: No    Discharge Statement   I spent 25 minutes discharging the patient  This time was spent on the day of discharge  I had direct contact with the patient on the day of discharge  Additional documentation is required if more than 30 minutes were spent on discharge  Discharge Medications:  See after visit summary for reconciled discharge medications provided to patient and family

## 2021-04-02 NOTE — DISCHARGE INSTRUCTIONS
[facility]  Post-Operative Care Instructions  Dr Pierre Jim MD, [facility]  [phone]    1  General: You will feel pulling sensations around the wound or funny aches and pains around the incisions  This is normal  Even minor surgery is a change in your body and this is your bodys way of reaction to it  If you have had abdominal surgery, it may help to support the incision with a small pillow or blanket for comfort when moving or coughing  2  Wound care:  Okay to shower  The glue will fall off over the next week or 2     3  Water: You may shower over the wound, unless there are drain tubes left in place  Do not bathe or use a pool or hot tub until cleared by the physician  4  Activity: You may go up and down stairs, walk as much as you are comfortable, but walk at least 3 times each day  If you have had abdominal surgery, do not lift anything heavier than 20 pounds for at least 4 weeks, unless cleared by the doctor  5  Diet: You may resume a regular diet  If you had a same-day surgery or overnight stay surgery, he may wish to eat lightly for a few days: soups, crackers, and sandwiches  You may resume a regular diet when ready  6  Medications: Resume all of your previous medications, unless told otherwise by the doctor  A good option for pain control is to start with Tylenol and ibuprofen and alternate taking them every 2 hours for 1-2 days  If this is not sufficient then substituted the Tylenol for the narcotic pain medicine prescribed  Insure that you do not take more than 4000 mg of Tylenol per day  You do not need to take the narcotic pain medications unless you are having significant pain and discomfort  7  Driving: You will need someone to drive you home on the day of surgery  Do not drive or make any important decisions while on narcotic pain medication or 24 hours and after anesthesia or sedation for surgery   Generally, you may drive when your off all narcotic pain medications  8  Upset Stomach: You may take Maalox, Tums, or similar items for an upset stomach  If your narcotic pain medication causes an upset stomach, do not take it on an empty stomach  Try taking it with at least some crackers or toast      9  Constipation: Patients often experienced constipation after surgery  You may take over-the-counter medication for this, such as Metamucil, Senokot, Dulcolax, milk of magnesia, etc  You may take a suppository unless you have had anorectal surgery such as a procedure on your hemorrhoids  If you experience significant nausea or vomiting after abdominal surgery, call the office before trying any of these medications  10  Call the office: If you are experiencing any of the following, fevers above 101 5°, significant nausea or vomiting, if the wound develops drainage and/or is excessive redness around the wound, or if you have significant diarrhea or other worsening symptoms  11  Pain: You may be given a prescription for pain  This will be given to the hospital, the day of surgery  12  Sexual Activity: You may resume sexual activity when you feel ready and comfortable and your incision is sealed and healed without apparent infection risk

## 2021-04-08 DIAGNOSIS — Z23 ENCOUNTER FOR IMMUNIZATION: ICD-10-CM

## 2021-04-20 ENCOUNTER — OFFICE VISIT (OUTPATIENT)
Dept: SURGERY | Facility: CLINIC | Age: 46
End: 2021-04-20

## 2021-04-20 VITALS — BODY MASS INDEX: 27.7 KG/M2 | WEIGHT: 209 LBS | TEMPERATURE: 97.3 F | HEIGHT: 73 IN

## 2021-04-20 DIAGNOSIS — Z87.19 STATUS POST REPAIR OF PARAESOPHAGEAL DIAPHRAGMATIC HERNIA: Primary | ICD-10-CM

## 2021-04-20 DIAGNOSIS — Z98.890 STATUS POST REPAIR OF PARAESOPHAGEAL DIAPHRAGMATIC HERNIA: Primary | ICD-10-CM

## 2021-04-20 PROCEDURE — 99024 POSTOP FOLLOW-UP VISIT: CPT | Performed by: SURGERY

## 2021-04-20 NOTE — ASSESSMENT & PLAN NOTE
Overall he is recovering from his surgery  His abdominal incisional pain is improving  His activities are drain to normal   However he is having some epigastric dysphagia type symptoms which she states were not present prior to the surgery  He had postoperative upper GI swallow that did not show any abnormalities and good flow the soft through the esophagus and no evidence of recurrence  I told him that this is most likely just postoperative sensation will improve with time  He was unable to get esophageal manometry preoperatively as he was a lot of discomfort and did not want a weight longer and a can be somewhat inaccurate was such a large hernias  He could have underlying esophageal  Motility issue  On follow-up back in 1 month  If his symptoms are persisting at that point can consider doing manometry    If they resolve then time was the answer

## 2021-04-20 NOTE — PROGRESS NOTES
Assessment/Plan:    Status post repair of paraesophageal diaphragmatic hernia   Overall he is recovering from his surgery  His abdominal incisional pain is improving  His activities are drain to normal   However he is having some epigastric dysphagia type symptoms which she states were not present prior to the surgery  He had postoperative upper GI swallow that did not show any abnormalities and good flow the soft through the esophagus and no evidence of recurrence  I told him that this is most likely just postoperative sensation will improve with time  He was unable to get esophageal manometry preoperatively as he was a lot of discomfort and did not want a weight longer and a can be somewhat inaccurate was such a large hernias  He could have underlying esophageal  Motility issue  On follow-up back in 1 month  If his symptoms are persisting at that point can consider doing manometry  If they resolve then time was the answer       Diagnoses and all orders for this visit:    Status post repair of paraesophageal diaphragmatic hernia          Subjective:      Patient ID: Bakari Carmona is a 50 y o  male  Mr Kristine Jiang Is a 57-year-old gentleman that is here for evaluation of a large hiatal hernia  Patient states he thought he was told he had a hiatal hernia maybe 7 years ago but does not have any issues  He denies any significant acid reflux or heartburn  However over the last several months he has been having increasing pain in his upper epigastric area lower chest area  Feels after he eats he has increasing pain radiates to his shoulders he had up to his neck  He states these attacks last about half an hour and then things past and he is hungry again  He denies any dysphagia type symptoms  He denies any nausea vomiting  He feels attacks happening more frequently and becoming more severe    He had in endoscopy performed by Gastroenterology which does show a hiatal hernia without any significant damage from reflux  He was in the ER recently which showed a CT scan which showed significant portion of his stomach in his chest       04/20/2021  He is now 2 weeks status post robotic repair of large paraesophageal hernia with fundoplication  He states that his symptoms preoperatively have changed and he is no longer has abdominal cramping pain but he has been feeling some slightly higher up discomfort higher epigastric pain almost dysphagia type symptoms  He denies fevers or chills  He states abdominal pain is from the incisions has almost resolved        The following portions of the patient's history were reviewed and updated as appropriate: allergies, current medications, past family history, past medical history, past social history, past surgical history and problem list     Review of Systems      Objective:      Temp (!) 97 3 °F (36 3 °C)   Ht 6' 1" (1 854 m)   Wt 94 8 kg (209 lb)   BMI 27 57 kg/m²          Physical Exam  Abdominal:      Comments:   Incisions clean dry intact

## 2021-04-28 DIAGNOSIS — F33.2 MAJOR DEPRESSIVE DISORDER, RECURRENT, SEVERE WITHOUT PSYCHOTIC FEATURES (HCC): ICD-10-CM

## 2021-04-28 RX ORDER — DULOXETIN HYDROCHLORIDE 60 MG/1
CAPSULE, DELAYED RELEASE ORAL
Qty: 30 CAPSULE | Refills: 2 | Status: SHIPPED | OUTPATIENT
Start: 2021-04-28 | End: 2021-06-07 | Stop reason: SDUPTHER

## 2021-05-12 DIAGNOSIS — F41.1 GENERALIZED ANXIETY DISORDER: ICD-10-CM

## 2021-05-12 RX ORDER — GABAPENTIN 300 MG/1
CAPSULE ORAL
Qty: 90 CAPSULE | Refills: 2 | Status: SHIPPED | OUTPATIENT
Start: 2021-05-12 | End: 2021-05-19 | Stop reason: SDUPTHER

## 2021-05-18 ENCOUNTER — OFFICE VISIT (OUTPATIENT)
Dept: SURGERY | Facility: CLINIC | Age: 46
End: 2021-05-18

## 2021-05-18 ENCOUNTER — TELEPHONE (OUTPATIENT)
Dept: PSYCHIATRY | Facility: CLINIC | Age: 46
End: 2021-05-18

## 2021-05-18 VITALS
RESPIRATION RATE: 16 BRPM | HEIGHT: 73 IN | HEART RATE: 88 BPM | BODY MASS INDEX: 27.7 KG/M2 | DIASTOLIC BLOOD PRESSURE: 79 MMHG | WEIGHT: 209 LBS | TEMPERATURE: 97.7 F | SYSTOLIC BLOOD PRESSURE: 115 MMHG

## 2021-05-18 DIAGNOSIS — Z87.19 STATUS POST REPAIR OF PARAESOPHAGEAL DIAPHRAGMATIC HERNIA: Primary | ICD-10-CM

## 2021-05-18 DIAGNOSIS — Z98.890 STATUS POST REPAIR OF PARAESOPHAGEAL DIAPHRAGMATIC HERNIA: Primary | ICD-10-CM

## 2021-05-18 PROCEDURE — 99024 POSTOP FOLLOW-UP VISIT: CPT | Performed by: SURGERY

## 2021-05-18 NOTE — TELEPHONE ENCOUNTER
Jeffrey Lin called to request refills of trazodone and gabapentin  Send to [facility]   The gabapentin was not received by the pharmacist on 5/12

## 2021-05-19 DIAGNOSIS — F41.1 GENERALIZED ANXIETY DISORDER: ICD-10-CM

## 2021-05-19 DIAGNOSIS — F51.04 PSYCHOPHYSIOLOGICAL INSOMNIA: ICD-10-CM

## 2021-05-19 RX ORDER — TRAZODONE HYDROCHLORIDE 50 MG/1
50 TABLET ORAL
Qty: 30 TABLET | Refills: 2 | Status: SHIPPED | OUTPATIENT
Start: 2021-05-19 | End: 2021-10-25

## 2021-05-19 RX ORDER — GABAPENTIN 300 MG/1
300 CAPSULE ORAL 3 TIMES DAILY
Qty: 90 CAPSULE | Refills: 2 | Status: SHIPPED | OUTPATIENT
Start: 2021-05-19 | End: 2021-06-07 | Stop reason: SDUPTHER

## 2021-05-19 RX ORDER — TRAZODONE HYDROCHLORIDE 50 MG/1
50 TABLET ORAL
Qty: 30 TABLET | Refills: 2 | Status: SHIPPED | OUTPATIENT
Start: 2021-05-19 | End: 2021-05-19 | Stop reason: SDUPTHER

## 2021-05-28 NOTE — PROGRESS NOTES
Assessment/Plan:    Status post repair of paraesophageal diaphragmatic hernia  Overall he is doing great  His symptoms have resolved  He is back to normal   Tolerating a diet  Follow-up p r n  Diagnoses and all orders for this visit:    Status post repair of paraesophageal diaphragmatic hernia          Subjective:      Patient ID: Smooth Whelan is a 50 y o  male      HPI        Review of Systems      Objective:      /79   Pulse 88   Temp 97 7 °F (36 5 °C)   Resp 16   Ht 6' 1" (1 854 m)   Wt 94 8 kg (209 lb)   BMI 27 57 kg/m²          Physical Exam

## 2021-05-28 NOTE — ASSESSMENT & PLAN NOTE
Overall he is doing great  His symptoms have resolved  He is back to normal   Tolerating a diet  Follow-up p r n

## 2021-06-07 ENCOUNTER — TELEMEDICINE (OUTPATIENT)
Dept: PSYCHIATRY | Facility: CLINIC | Age: 46
End: 2021-06-07
Payer: COMMERCIAL

## 2021-06-07 DIAGNOSIS — Z79.899 ENCOUNTER FOR LITHIUM MONITORING: ICD-10-CM

## 2021-06-07 DIAGNOSIS — F41.1 GENERALIZED ANXIETY DISORDER: ICD-10-CM

## 2021-06-07 DIAGNOSIS — Z51.81 ENCOUNTER FOR LITHIUM MONITORING: ICD-10-CM

## 2021-06-07 DIAGNOSIS — F33.2 MAJOR DEPRESSIVE DISORDER, RECURRENT, SEVERE WITHOUT PSYCHOTIC FEATURES (HCC): Primary | ICD-10-CM

## 2021-06-07 PROCEDURE — 99443 PR PHYS/QHP TELEPHONE EVALUATION 21-30 MIN: CPT | Performed by: PHYSICIAN ASSISTANT

## 2021-06-07 RX ORDER — GABAPENTIN 300 MG/1
300 CAPSULE ORAL 3 TIMES DAILY
Qty: 90 CAPSULE | Refills: 2 | Status: SHIPPED | OUTPATIENT
Start: 2021-06-07 | End: 2021-10-12 | Stop reason: SDUPTHER

## 2021-06-07 RX ORDER — BUSPIRONE HYDROCHLORIDE 15 MG/1
15 TABLET ORAL 3 TIMES DAILY
Qty: 90 TABLET | Refills: 2 | Status: SHIPPED | OUTPATIENT
Start: 2021-06-07 | End: 2021-10-18

## 2021-06-07 RX ORDER — LITHIUM CARBONATE 600 MG/1
600 CAPSULE ORAL
Qty: 30 CAPSULE | Refills: 2 | Status: SHIPPED | OUTPATIENT
Start: 2021-06-07 | End: 2021-10-06 | Stop reason: SDUPTHER

## 2021-06-07 RX ORDER — DULOXETIN HYDROCHLORIDE 60 MG/1
60 CAPSULE, DELAYED RELEASE ORAL DAILY
Qty: 30 CAPSULE | Refills: 2 | Status: SHIPPED | OUTPATIENT
Start: 2021-06-07 | End: 2021-10-25

## 2021-06-07 NOTE — BH TREATMENT PLAN
TREATMENT PLAN (Medication Management Only)        [facility]    Name and Date of Birth:  Jeff Orourke 50 y o  1975  Date of Treatment Plan: June 7, 2021  Diagnosis/Diagnoses:    1  Major depressive disorder, recurrent, severe without psychotic features ([address] )    2  Generalized anxiety disorder    3  Encounter for lithium monitoring      Strengths/Personal Resources for Self-Care: supportive family, supportive friends, taking medications as prescribed  Area/Areas of need (in own words): Decrease irritability  1  Long Term Goal: maintain control of depression  Target Date:3 months - 9/7/2021  Person/Persons responsible for completion of goal: Sherrie Grimes  2  Short Term Objective (s) - How will we reach this goal?:   A  Provider new recommended medication/dosage changes and/or continue medication(s): continue current medications as prescribed  B  Check lithium level  C  N/A  Target Date:3 months - 9/7/2021  Person/Persons Responsible for Completion of Goal: Raman  Progress Towards Goals: stable  Treatment Modality: medication management every 3 months  Review due 180 days from date of this plan: 6 months - 12/7/2021  Expected length of service: ongoing treatment  My Physician/PA/NP and I have developed this plan together and I agree to work on the goals and objectives  I understand the treatment goals that were developed for my treatment

## 2021-06-08 ENCOUNTER — APPOINTMENT (EMERGENCY)
Dept: CT IMAGING | Facility: HOSPITAL | Age: 46
End: 2021-06-08
Payer: COMMERCIAL

## 2021-06-08 ENCOUNTER — HOSPITAL ENCOUNTER (EMERGENCY)
Facility: HOSPITAL | Age: 46
Discharge: HOME | End: 2021-06-08
Attending: EMERGENCY MEDICINE | Admitting: EMERGENCY MEDICINE
Payer: COMMERCIAL

## 2021-06-08 VITALS
HEART RATE: 75 BPM | SYSTOLIC BLOOD PRESSURE: 108 MMHG | RESPIRATION RATE: 16 BRPM | OXYGEN SATURATION: 98 % | DIASTOLIC BLOOD PRESSURE: 65 MMHG | WEIGHT: 204.81 LBS | TEMPERATURE: 98.8 F | BODY MASS INDEX: 27.02 KG/M2

## 2021-06-08 DIAGNOSIS — K58.0 IRRITABLE BOWEL SYNDROME WITH DIARRHEA: Primary | ICD-10-CM

## 2021-06-08 LAB
ALBUMIN SERPL BCP-MCNC: 3.6 G/DL (ref 3.5–5)
ALP SERPL-CCNC: 64 U/L (ref 46–116)
ALT SERPL W P-5'-P-CCNC: 37 U/L (ref 12–78)
ANION GAP SERPL CALCULATED.3IONS-SCNC: 5 MMOL/L (ref 4–13)
AST SERPL W P-5'-P-CCNC: 21 U/L (ref 5–45)
BASOPHILS # BLD AUTO: 0.03 THOUSANDS/ΜL (ref 0–0.1)
BASOPHILS NFR BLD AUTO: 0 % (ref 0–1)
BILIRUB SERPL-MCNC: 0.39 MG/DL (ref 0.2–1)
BILIRUB UR QL STRIP: NEGATIVE
BUN SERPL-MCNC: 13 MG/DL (ref 5–25)
CALCIUM SERPL-MCNC: 9.3 MG/DL (ref 8.3–10.1)
CHLORIDE SERPL-SCNC: 108 MMOL/L (ref 100–108)
CLARITY UR: CLEAR
CO2 SERPL-SCNC: 30 MMOL/L (ref 21–32)
COLOR UR: YELLOW
CREAT SERPL-MCNC: 0.89 MG/DL (ref 0.6–1.3)
EOSINOPHIL # BLD AUTO: 0.18 THOUSAND/ΜL (ref 0–0.61)
EOSINOPHIL NFR BLD AUTO: 3 % (ref 0–6)
ERYTHROCYTE [DISTWIDTH] IN BLOOD BY AUTOMATED COUNT: 12.4 % (ref 11.6–15.1)
GFR SERPL CREATININE-BSD FRML MDRD: 102 ML/MIN/1.73SQ M
GLUCOSE SERPL-MCNC: 94 MG/DL (ref 65–140)
GLUCOSE UR STRIP-MCNC: NEGATIVE MG/DL
HCT VFR BLD AUTO: 41.5 % (ref 36.5–49.3)
HGB BLD-MCNC: 13.8 G/DL (ref 12–17)
HGB UR QL STRIP.AUTO: NEGATIVE
IMM GRANULOCYTES # BLD AUTO: 0.03 THOUSAND/UL (ref 0–0.2)
IMM GRANULOCYTES NFR BLD AUTO: 0 % (ref 0–2)
KETONES UR STRIP-MCNC: NEGATIVE MG/DL
LEUKOCYTE ESTERASE UR QL STRIP: NEGATIVE
LIPASE SERPL-CCNC: 50 U/L (ref 73–393)
LITHIUM SERPL-SCNC: 0.5 MMOL/L (ref 0.5–1)
LYMPHOCYTES # BLD AUTO: 1.32 THOUSANDS/ΜL (ref 0.6–4.47)
LYMPHOCYTES NFR BLD AUTO: 19 % (ref 14–44)
MAGNESIUM SERPL-MCNC: 2 MG/DL (ref 1.6–2.6)
MCH RBC QN AUTO: 30.1 PG (ref 26.8–34.3)
MCHC RBC AUTO-ENTMCNC: 33.3 G/DL (ref 31.4–37.4)
MCV RBC AUTO: 91 FL (ref 82–98)
MONOCYTES # BLD AUTO: 0.48 THOUSAND/ΜL (ref 0.17–1.22)
MONOCYTES NFR BLD AUTO: 7 % (ref 4–12)
NEUTROPHILS # BLD AUTO: 4.9 THOUSANDS/ΜL (ref 1.85–7.62)
NEUTS SEG NFR BLD AUTO: 71 % (ref 43–75)
NITRITE UR QL STRIP: NEGATIVE
NRBC BLD AUTO-RTO: 0 /100 WBCS
PH UR STRIP.AUTO: 5 [PH] (ref 4.5–8)
PLATELET # BLD AUTO: 283 THOUSANDS/UL (ref 149–390)
PMV BLD AUTO: 9.6 FL (ref 8.9–12.7)
POTASSIUM SERPL-SCNC: 4.5 MMOL/L (ref 3.5–5.3)
PROT SERPL-MCNC: 7.1 G/DL (ref 6.4–8.2)
PROT UR STRIP-MCNC: NEGATIVE MG/DL
RBC # BLD AUTO: 4.58 MILLION/UL (ref 3.88–5.62)
SODIUM SERPL-SCNC: 143 MMOL/L (ref 136–145)
SP GR UR STRIP.AUTO: 1.02 (ref 1–1.03)
UROBILINOGEN UR QL STRIP.AUTO: 0.2 E.U./DL
WBC # BLD AUTO: 6.94 THOUSAND/UL (ref 4.31–10.16)

## 2021-06-08 PROCEDURE — 80053 COMPREHEN METABOLIC PANEL: CPT | Performed by: PHYSICIAN ASSISTANT

## 2021-06-08 PROCEDURE — 87493 C DIFF AMPLIFIED PROBE: CPT | Performed by: PHYSICIAN ASSISTANT

## 2021-06-08 PROCEDURE — 83735 ASSAY OF MAGNESIUM: CPT | Performed by: PHYSICIAN ASSISTANT

## 2021-06-08 PROCEDURE — 87338 HPYLORI STOOL AG IA: CPT | Performed by: PHYSICIAN ASSISTANT

## 2021-06-08 PROCEDURE — 99284 EMERGENCY DEPT VISIT MOD MDM: CPT | Performed by: PHYSICIAN ASSISTANT

## 2021-06-08 PROCEDURE — 85025 COMPLETE CBC W/AUTO DIFF WBC: CPT | Performed by: PHYSICIAN ASSISTANT

## 2021-06-08 PROCEDURE — 80178 ASSAY OF LITHIUM: CPT | Performed by: PHYSICIAN ASSISTANT

## 2021-06-08 PROCEDURE — 83690 ASSAY OF LIPASE: CPT | Performed by: PHYSICIAN ASSISTANT

## 2021-06-08 PROCEDURE — 96361 HYDRATE IV INFUSION ADD-ON: CPT

## 2021-06-08 PROCEDURE — 81003 URINALYSIS AUTO W/O SCOPE: CPT

## 2021-06-08 PROCEDURE — 96360 HYDRATION IV INFUSION INIT: CPT

## 2021-06-08 PROCEDURE — 74177 CT ABD & PELVIS W/CONTRAST: CPT

## 2021-06-08 PROCEDURE — 99284 EMERGENCY DEPT VISIT MOD MDM: CPT

## 2021-06-08 PROCEDURE — 36415 COLL VENOUS BLD VENIPUNCTURE: CPT | Performed by: PHYSICIAN ASSISTANT

## 2021-06-08 RX ORDER — DICYCLOMINE HCL 20 MG
20 TABLET ORAL 2 TIMES DAILY
Qty: 20 TABLET | Refills: 0 | Status: SHIPPED | OUTPATIENT
Start: 2021-06-08

## 2021-06-08 RX ORDER — DICYCLOMINE HCL 20 MG
20 TABLET ORAL ONCE
Status: COMPLETED | OUTPATIENT
Start: 2021-06-08 | End: 2021-06-08

## 2021-06-08 RX ADMIN — IOHEXOL 100 ML: 350 INJECTION, SOLUTION INTRAVENOUS at 13:04

## 2021-06-08 RX ADMIN — DICYCLOMINE HYDROCHLORIDE 20 MG: 20 TABLET ORAL at 13:44

## 2021-06-08 RX ADMIN — SODIUM CHLORIDE 1000 ML: 0.9 INJECTION, SOLUTION INTRAVENOUS at 11:21

## 2021-06-08 NOTE — ED PROVIDER NOTES
History  Chief Complaint   Patient presents with    Diarrhea     watery diarrhea since saturday,  hx of h pylori  occasional abd pain  denies fever or vomiting      Patient concerned because lithium levels have not been checked in a while and psychiatrist told him that could cause diarrhea  Also concerned because he was treated for H  Pylori back in April and wasn't sure if it had come back  No blood in stool  No real abdominal pain or cramping  Denies urinary symptoms  Diarrhea  Quality:  Watery  Severity:  Moderate  Onset quality:  Gradual  Duration:  3 days  Timing:  Constant  Relieved by:  Nothing  Worsened by:  Liquids  Ineffective treatments:  None tried  Associated symptoms: no abdominal pain, no arthralgias, no chills, no recent cough, no diaphoresis, no fever, no URI and no vomiting    Risk factors: no recent antibiotic use        Prior to Admission Medications   Prescriptions Last Dose Informant Patient Reported? Taking?    DULoxetine (CYMBALTA) 60 mg delayed release capsule   No No   Sig: Take 1 capsule (60 mg total) by mouth daily   Multiple Vitamin (multivitamin) tablet   Yes No   Sig: Take 1 tablet by mouth daily   atorvastatin (LIPITOR) 40 mg tablet   Yes No   Sig: Take 20 mg by mouth daily    busPIRone (BUSPAR) 15 mg tablet   No No   Sig: Take 1 tablet (15 mg total) by mouth 3 (three) times a day   docusate sodium (COLACE) 50 mg capsule   Yes No   Sig: Take 50 mg by mouth 2 (two) times a day   gabapentin (NEURONTIN) 300 mg capsule   No No   Sig: Take 1 capsule (300 mg total) by mouth 3 (three) times a day   lithium 600 MG capsule   No No   Sig: Take 1 capsule (600 mg total) by mouth daily at bedtime   traZODone (DESYREL) 50 mg tablet   No No   Sig: Take 1 tablet (50 mg total) by mouth daily at bedtime      Facility-Administered Medications: None       Past Medical History:   Diagnosis Date    Anxiety     Chest pain     after eating- seen in ER several x - believes secondary to hernia    CPAP (continuous positive airway pressure) dependence     Depression     H  pylori infection     Hiatal hernia     Hyperlipidemia     PONV (postoperative nausea and vomiting)     Sleep apnea     Suicidal thoughts     Wears glasses        Past Surgical History:   Procedure Laterality Date    COLONOSCOPY      INGUINAL HERNIA REPAIR Right     [place] LAP, REPAIR PARAESOPHAGEAL HERNIA, INCL FUNDOPLASTY W/ MESH N/A 2021    Procedure: REPAIR HERNIA PARAESOPHAGEAL LAPAROSCOPIC W ROBOTICS with TOUPET FUNDOPLICATION cruroplasty with mesh;  Surgeon: Jenifer Diallo MD;  Location: [facility];  Service: General    SHOULDER SURGERY      left       History reviewed  No pertinent family history  I have reviewed and agree with the history as documented  E-Cigarette/Vaping    E-Cigarette Use Never User      E-Cigarette/Vaping Substances    Nicotine No     THC No     CBD No     Flavoring No     Other No     Unknown No      Social History     Tobacco Use    Smoking status: Former Smoker     Types: Cigarettes     Quit date:      Years since quittin 4    Smokeless tobacco: Never Used   Substance Use Topics    Alcohol use: Not Currently     Frequency: Never     Binge frequency: Never     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    Drug use: Not Currently     Types: Marijuana     Comment: previously used medical marijuana but not currently using       Review of Systems   Constitutional: Negative for chills, diaphoresis and fever  HENT: Negative for ear pain and sore throat  Eyes: Negative for pain and visual disturbance  Respiratory: Negative for cough and shortness of breath  Cardiovascular: Negative for chest pain and palpitations  Gastrointestinal: Positive for diarrhea  Negative for abdominal pain and vomiting  Genitourinary: Negative for dysuria and hematuria  Musculoskeletal: Negative for arthralgias and back pain  Skin: Negative for color change and rash     Neurological: Negative for seizures and syncope  All other systems reviewed and are negative  Physical Exam  Physical Exam  Vitals signs and nursing note reviewed  Constitutional:       General: He is not in acute distress  Appearance: Normal appearance  He is not ill-appearing, toxic-appearing or diaphoretic  HENT:      Head: Normocephalic and atraumatic  Mouth/Throat:      Mouth: Mucous membranes are moist    Eyes:      General: No scleral icterus  Pupils: Pupils are equal, round, and reactive to light  Neck:      Musculoskeletal: Normal range of motion  Cardiovascular:      Rate and Rhythm: Normal rate and regular rhythm  Pulses: Normal pulses  Heart sounds: Normal heart sounds  Pulmonary:      Effort: Pulmonary effort is normal       Breath sounds: Normal breath sounds  Abdominal:      General: Abdomen is flat  There is no distension  Palpations: Abdomen is soft  Tenderness: There is no abdominal tenderness  There is no guarding or rebound  Hernia: No hernia is present  Musculoskeletal: Normal range of motion  General: No swelling or tenderness  Skin:     General: Skin is warm  Capillary Refill: Capillary refill takes less than 2 seconds  Neurological:      General: No focal deficit present  Mental Status: He is alert     Psychiatric:         Mood and Affect: Mood normal          Vital Signs  ED Triage Vitals [06/08/21 1043]   Temperature Pulse Respirations Blood Pressure SpO2   98 8 °F (37 1 °C) 75 16 108/65 98 %      Temp Source Heart Rate Source Patient Position - Orthostatic VS BP Location FiO2 (%)   Oral Monitor Sitting Right arm --      Pain Score       No Pain           Vitals:    06/08/21 1043   BP: 108/65   Pulse: 75   Patient Position - Orthostatic VS: Sitting         Visual Acuity      ED Medications  Medications   sodium chloride 0 9 % bolus 1,000 mL (0 mL Intravenous Stopped 6/8/21 1345)   iohexol (OMNIPAQUE) 350 MG/ML injection (SINGLE-DOSE) 100 mL (100 mL Intravenous Given 6/8/21 1304)   dicyclomine (BENTYL) tablet 20 mg (20 mg Oral Given 6/8/21 1344)       Diagnostic Studies  Results Reviewed     Procedure Component Value Units Date/Time    H  pylori antigen, stool [[id number]] Collected: 06/08/21 1138    Lab Status: In process Specimen: Stool from Rectum Updated: 06/08/21 1150    Clostridium difficile toxin by PCR with EIA [[id number]] Collected: 06/08/21 1108    Lab Status:  In process Specimen: Stool from Per Rectum Updated: 06/08/21 1150    Urine Macroscopic, POC [[id number]] Collected: 06/08/21 1146    Lab Status: Final result Specimen: Urine Updated: 06/08/21 1148     Color, UA Yellow     Clarity, UA Clear     pH, UA 5 0     Leukocytes, UA Negative     Nitrite, UA Negative     Protein, UA Negative mg/dl      Glucose, UA Negative mg/dl      Ketones, UA Negative mg/dl      Urobilinogen, UA 0 2 E U /dl      Bilirubin, UA Negative     Blood, UA Negative     Specific Gravity, UA 1 025    Narrative:      CLINITEK RESULT    Lithium level [[id number]]  (Normal) Collected: 06/08/21 1120    Lab Status: Final result Specimen: Blood from Arm, Right Updated: 06/08/21 1140     Lithium Lvl 0 5 mmol/L     CMP [[id number]] Collected: 06/08/21 1108    Lab Status: Final result Specimen: Blood from Arm, Right Updated: 06/08/21 1138     Sodium 143 mmol/L      Potassium 4 5 mmol/L      Chloride 108 mmol/L      CO2 30 mmol/L      ANION GAP 5 mmol/L      BUN 13 mg/dL      Creatinine 0 89 mg/dL      Glucose 94 mg/dL      Calcium 9 3 mg/dL      AST 21 U/L      ALT 37 U/L      Alkaline Phosphatase 64 U/L      Total Protein 7 1 g/dL      Albumin 3 6 g/dL      Total Bilirubin 0 39 mg/dL      eGFR 102 ml/min/1 73sq m     Narrative:      [place] guidelines for Chronic Kidney Disease (CKD):     Stage 1 with normal or high GFR (GFR > 90 mL/min/1 73 square meters)    Stage 2 Mild CKD (GFR = 60-89 mL/min/1 73 square meters)    Stage 3A Moderate CKD (GFR = 45-59 mL/min/1 73 square meters)    Stage 3B Moderate CKD (GFR = 30-44 mL/min/1 73 square meters)    Stage 4 Severe CKD (GFR = 15-29 mL/min/1 73 square meters)    Stage 5 End Stage CKD (GFR <15 mL/min/1 73 square meters)  Note: GFR calculation is accurate only with a steady state creatinine    Lipase [[id number]]  (Abnormal) Collected: 06/08/21 1108    Lab Status: Final result Specimen: Blood from Arm, Right Updated: 06/08/21 1138     Lipase 50 u/L     Magnesium [[id number]]  (Normal) Collected: 06/08/21 1108    Lab Status: Final result Specimen: Blood from Arm, Right Updated: 06/08/21 1138     Magnesium 2 0 mg/dL     CBC and differential [[id number]] Collected: 06/08/21 1108    Lab Status: Final result Specimen: Blood from Arm, Right Updated: 06/08/21 1116     WBC 6 94 Thousand/uL      RBC 4 58 Million/uL      Hemoglobin 13 8 g/dL      Hematocrit 41 5 %      MCV 91 fL      MCH 30 1 pg      MCHC 33 3 g/dL      RDW 12 4 %      MPV 9 6 fL      Platelets 611 Thousands/uL      nRBC 0 /100 WBCs      Neutrophils Relative 71 %      Immat GRANS % 0 %      Lymphocytes Relative 19 %      Monocytes Relative 7 %      Eosinophils Relative 3 %      Basophils Relative 0 %      Neutrophils Absolute 4 90 Thousands/µL      Immature Grans Absolute 0 03 Thousand/uL      Lymphocytes Absolute 1 32 Thousands/µL      Monocytes Absolute 0 48 Thousand/µL      Eosinophils Absolute 0 18 Thousand/µL      Basophils Absolute 0 03 Thousands/µL                  CT Abdomen pelvis with contrast   Final Result by Urmila Arrington MD (06/08 1332)      No acute findings in the abdomen or pelvis  Reidentified bilateral femoral head avascular necrosis  Follow-up with nonurgent orthopedic consultation if not already performed  The study was marked in [facility] for immediate notification               Workstation performed: [id number]                    Procedures  Procedures         ED Course  ED Course as of Jun 08 1538   Tue Jun 08, 2021   1117 Cbc is normal      1338 CT abd/pelvis: IMPRESSION:     No acute findings in the abdomen or pelvis  SBIRT 22yo+      Most Recent Value   SBIRT (22 yo +)   In order to provide better care to our patients, we are screening all of our patients for alcohol and drug use  Would it be okay to ask you these screening questions? Yes Filed at: 06/08/2021 1101   Initial Alcohol Screen: US AUDIT-C    1  How often do you have a drink containing alcohol?  0 Filed at: 06/08/2021 1101   2  How many drinks containing alcohol do you have on a typical day you are drinking? 0 Filed at: 06/08/2021 1101   3a  Male UNDER 65: How often do you have five or more drinks on one occasion? 0 Filed at: 06/08/2021 1101   3b  FEMALE Any Age, or MALE 65+: How often do you have 4 or more drinks on one occassion? 0 Filed at: 06/08/2021 1101   Audit-C Score  0 Filed at: 06/08/2021 1101   [place]: How many times in the past year have you    Used an illegal drug or used a prescription medication for non-medical reasons? Never Filed at: 06/08/2021 1101                    MDM  Number of Diagnoses or Management Options  Irritable bowel syndrome with diarrhea: new and requires workup  Diagnosis management comments: The patient was seen and examined  Laboratory studies revealed no abnormalities  Imaging revealed no abnormalities  The patient was treated with NSS bolus and bentyl  The patient was re-examined after treatment and disposition of discharge with outpatient follow up to discuss possible motility issue with Gen Surgery was made as per documented per prior note from general surgery  The test results were discussed with the patient/family  All questions were answered to patient/family's satisfaction  Anticipatory guidance and return precautions were discussed at length  They verbalized understanding and agreement with the plan  The patient remained stable while under my care in the Emergency Department            Amount and/or Complexity of Data Reviewed  Clinical lab tests: reviewed and ordered  Tests in the radiology section of CPT®: ordered and reviewed  Review and summarize past medical records: yes  Independent visualization of images, tracings, or specimens: yes    Risk of Complications, Morbidity, and/or Mortality  Presenting problems: moderate  Diagnostic procedures: moderate  Management options: moderate    Patient Progress  Patient progress: stable      Disposition  Final diagnoses:   Irritable bowel syndrome with diarrhea     Time reflects when diagnosis was documented in both MDM as applicable and the Disposition within this note     Time User Action Codes Description Comment    6/8/2021  1:57 PM Hebert, [facility] [K58 [id number]] Irritable bowel syndrome with diarrhea       ED Disposition     ED Disposition Condition Date/Time Comment    Discharge Stable Tue Jun 8, 2021  1:57 PM Eloise Verdugo discharge to home/self care              Follow-up Information     Follow up With Specialties Details Why Contact Info    Rere Mancera MD Family Medicine   [place] [id number] [facility]      Ariel Cervantes MD General Surgery, Wound Care Schedule an appointment as soon as possible for a visit  As needed 3 [address] [facility]  [phone]            Discharge Medication List as of 6/8/2021  1:59 PM      START taking these medications    Details   dicyclomine (BENTYL) 20 mg tablet Take 1 tablet (20 mg total) by mouth 2 (two) times a day, Starting Tue 6/8/2021, Normal         CONTINUE these medications which have NOT CHANGED    Details   atorvastatin (LIPITOR) 40 mg tablet Take 20 mg by mouth daily , Starting Fri 12/18/2020, Historical Med      busPIRone (BUSPAR) 15 mg tablet Take 1 tablet (15 mg total) by mouth 3 (three) times a day, Starting Mon 6/7/2021, Normal      docusate sodium (COLACE) 50 mg capsule Take 50 mg by mouth 2 (two) times a day, Starting Fri 12/18/2020, Until Tue 3/30/2021, Historical Med      DULoxetine (CYMBALTA) 60 mg delayed release capsule Take 1 capsule (60 mg total) by mouth daily, Starting Mon 6/7/2021, Normal      gabapentin (NEURONTIN) 300 mg capsule Take 1 capsule (300 mg total) by mouth 3 (three) times a day, Starting Mon 6/7/2021, Normal      lithium 600 MG capsule Take 1 capsule (600 mg total) by mouth daily at bedtime, Starting Mon 6/7/2021, Until Sun 9/5/2021, Normal      Multiple Vitamin (multivitamin) tablet Take 1 tablet by mouth daily, Historical Med      traZODone (DESYREL) 50 mg tablet Take 1 tablet (50 mg total) by mouth daily at bedtime, Starting Wed 5/19/2021, Normal           No discharge procedures on file      PDMP Review       Value Time User    PDMP Reviewed  Yes 11/6/2020 10:05 AM Leda Martinez MD          ED Provider  Electronically Signed by           Gray Sage PA-C  06/08/21 2603

## 2021-06-08 NOTE — DISCHARGE INSTRUCTIONS
Results for orders placed or performed during the hospital encounter of 06/08/21   CBC and differential   Result Value Ref Range    WBC 6 94 4 31 - 10 16 Thousand/uL    RBC 4 58 3 88 - 5 62 Million/uL    Hemoglobin 13 8 12 0 - 17 0 g/dL    Hematocrit 41 5 36 5 - 49 3 %    MCV 91 82 - 98 fL    MCH 30 1 26 8 - 34 3 pg    MCHC 33 3 31 4 - 37 4 g/dL    RDW 12 4 11 6 - 15 1 %    MPV 9 6 8 9 - 12 7 fL    Platelets 514 227 - 996 Thousands/uL    nRBC 0 /100 WBCs    Neutrophils Relative 71 43 - 75 %    Immat GRANS % 0 0 - 2 %    Lymphocytes Relative 19 14 - 44 %    Monocytes Relative 7 4 - 12 %    Eosinophils Relative 3 0 - 6 %    Basophils Relative 0 0 - 1 %    Neutrophils Absolute 4 90 1 85 - 7 62 Thousands/µL    Immature Grans Absolute 0 03 0 00 - 0 20 Thousand/uL    Lymphocytes Absolute 1 32 0 60 - 4 47 Thousands/µL    Monocytes Absolute 0 48 0 17 - 1 22 Thousand/µL    Eosinophils Absolute 0 18 0 00 - 0 61 Thousand/µL    Basophils Absolute 0 03 0 00 - 0 10 Thousands/µL   CMP   Result Value Ref Range    Sodium 143 136 - 145 mmol/L    Potassium 4 5 3 5 - 5 3 mmol/L    Chloride 108 100 - 108 mmol/L    CO2 30 21 - 32 mmol/L    ANION GAP 5 4 - 13 mmol/L    BUN 13 5 - 25 mg/dL    Creatinine 0 89 0 60 - 1 30 mg/dL    Glucose 94 65 - 140 mg/dL    Calcium 9 3 8 3 - 10 1 mg/dL    AST 21 5 - 45 U/L    ALT 37 12 - 78 U/L    Alkaline Phosphatase 64 46 - 116 U/L    Total Protein 7 1 6 4 - 8 2 g/dL    Albumin 3 6 3 5 - 5 0 g/dL    Total Bilirubin 0 39 0 20 - 1 00 mg/dL    eGFR 102 ml/min/1 73sq m   Lipase   Result Value Ref Range    Lipase 50 (L) 73 - 393 u/L   Magnesium   Result Value Ref Range    Magnesium 2 0 1 6 - 2 6 mg/dL   Lithium level   Result Value Ref Range    Lithium Lvl 0 5 0 5 - 1 0 mmol/L   Urine Macroscopic, POC   Result Value Ref Range    Color, UA Yellow     Clarity, UA Clear     pH, UA 5 0 4 5 - 8 0    Leukocytes, UA Negative Negative    Nitrite, UA Negative Negative    Protein, UA Negative Negative mg/dl    Glucose, UA Negative Negative mg/dl    Ketones, UA Negative Negative mg/dl    Urobilinogen, UA 0 2 0 2, 1 0 E U /dl E U /dl    Bilirubin, UA Negative Negative    Blood, UA Negative Negative    Specific Gravity, UA 1 025 1 003 - 1 030     CT Abdomen pelvis with contrast   Final Result      No acute findings in the abdomen or pelvis  Reidentified bilateral femoral head avascular necrosis  Follow-up with nonurgent orthopedic consultation if not already performed  The study was marked in [facility] for immediate notification               Workstation performed: [id number]

## 2021-06-09 LAB
C DIFF TOX GENS STL QL NAA+PROBE: NEGATIVE
H PYLORI AG STL QL IA: NEGATIVE

## 2021-08-30 ENCOUNTER — APPOINTMENT (OUTPATIENT)
Dept: LAB | Facility: HOSPITAL | Age: 46
End: 2021-08-30
Payer: COMMERCIAL

## 2021-08-30 DIAGNOSIS — E78.2 MIXED HYPERLIPIDEMIA: ICD-10-CM

## 2021-08-30 DIAGNOSIS — F32.A PRESENILE DEMENTIA WITH DEPRESSION WITHOUT BEHAVIORAL DISTURBANCE (HCC): ICD-10-CM

## 2021-08-30 DIAGNOSIS — F03.90 PRESENILE DEMENTIA WITH DEPRESSION WITHOUT BEHAVIORAL DISTURBANCE (HCC): ICD-10-CM

## 2021-08-30 LAB
ALBUMIN SERPL BCP-MCNC: 4 G/DL (ref 3.5–5)
ALP SERPL-CCNC: 54 U/L (ref 46–116)
ALT SERPL W P-5'-P-CCNC: 44 U/L (ref 12–78)
ANION GAP SERPL CALCULATED.3IONS-SCNC: 9 MMOL/L (ref 4–13)
AST SERPL W P-5'-P-CCNC: 25 U/L (ref 5–45)
BASOPHILS # BLD AUTO: 0.05 THOUSANDS/ΜL (ref 0–0.1)
BASOPHILS NFR BLD AUTO: 1 % (ref 0–1)
BILIRUB SERPL-MCNC: 0.52 MG/DL (ref 0.2–1)
BUN SERPL-MCNC: 15 MG/DL (ref 5–25)
CALCIUM SERPL-MCNC: 9.1 MG/DL (ref 8.3–10.1)
CHLORIDE SERPL-SCNC: 105 MMOL/L (ref 100–108)
CHOLEST SERPL-MCNC: 138 MG/DL (ref 50–200)
CO2 SERPL-SCNC: 25 MMOL/L (ref 21–32)
CREAT SERPL-MCNC: 1.03 MG/DL (ref 0.6–1.3)
EOSINOPHIL # BLD AUTO: 0.16 THOUSAND/ΜL (ref 0–0.61)
EOSINOPHIL NFR BLD AUTO: 3 % (ref 0–6)
ERYTHROCYTE [DISTWIDTH] IN BLOOD BY AUTOMATED COUNT: 12.7 % (ref 11.6–15.1)
GFR SERPL CREATININE-BSD FRML MDRD: 86 ML/MIN/1.73SQ M
GLUCOSE P FAST SERPL-MCNC: 97 MG/DL (ref 65–99)
HCT VFR BLD AUTO: 41.6 % (ref 36.5–49.3)
HDLC SERPL-MCNC: 49 MG/DL
HGB BLD-MCNC: 14.3 G/DL (ref 12–17)
IMM GRANULOCYTES # BLD AUTO: 0.02 THOUSAND/UL (ref 0–0.2)
IMM GRANULOCYTES NFR BLD AUTO: 0 % (ref 0–2)
LDLC SERPL CALC-MCNC: 74 MG/DL (ref 0–100)
LYMPHOCYTES # BLD AUTO: 1.27 THOUSANDS/ΜL (ref 0.6–4.47)
LYMPHOCYTES NFR BLD AUTO: 20 % (ref 14–44)
MCH RBC QN AUTO: 30.4 PG (ref 26.8–34.3)
MCHC RBC AUTO-ENTMCNC: 34.4 G/DL (ref 31.4–37.4)
MCV RBC AUTO: 89 FL (ref 82–98)
MONOCYTES # BLD AUTO: 0.5 THOUSAND/ΜL (ref 0.17–1.22)
MONOCYTES NFR BLD AUTO: 8 % (ref 4–12)
NEUTROPHILS # BLD AUTO: 4.46 THOUSANDS/ΜL (ref 1.85–7.62)
NEUTS SEG NFR BLD AUTO: 68 % (ref 43–75)
NONHDLC SERPL-MCNC: 89 MG/DL
NRBC BLD AUTO-RTO: 0 /100 WBCS
PLATELET # BLD AUTO: 271 THOUSANDS/UL (ref 149–390)
PMV BLD AUTO: 10 FL (ref 8.9–12.7)
POTASSIUM SERPL-SCNC: 3.9 MMOL/L (ref 3.5–5.3)
PROT SERPL-MCNC: 7.5 G/DL (ref 6.4–8.2)
RBC # BLD AUTO: 4.7 MILLION/UL (ref 3.88–5.62)
SODIUM SERPL-SCNC: 139 MMOL/L (ref 136–145)
T4 FREE SERPL-MCNC: 0.69 NG/DL (ref 0.76–1.46)
TRIGL SERPL-MCNC: 77 MG/DL
TSH SERPL DL<=0.05 MIU/L-ACNC: 0.67 UIU/ML (ref 0.36–3.74)
WBC # BLD AUTO: 6.46 THOUSAND/UL (ref 4.31–10.16)

## 2021-08-30 PROCEDURE — 84443 ASSAY THYROID STIM HORMONE: CPT

## 2021-08-30 PROCEDURE — 84439 ASSAY OF FREE THYROXINE: CPT

## 2021-08-30 PROCEDURE — 36415 COLL VENOUS BLD VENIPUNCTURE: CPT

## 2021-08-30 PROCEDURE — 80053 COMPREHEN METABOLIC PANEL: CPT

## 2021-08-30 PROCEDURE — 80061 LIPID PANEL: CPT

## 2021-08-30 PROCEDURE — 85025 COMPLETE CBC W/AUTO DIFF WBC: CPT

## 2021-09-22 ENCOUNTER — OFFICE VISIT (OUTPATIENT)
Dept: GASTROENTEROLOGY | Facility: MEDICAL CENTER | Age: 46
End: 2021-09-22
Payer: COMMERCIAL

## 2021-09-22 VITALS
TEMPERATURE: 97.3 F | SYSTOLIC BLOOD PRESSURE: 116 MMHG | WEIGHT: 220 LBS | DIASTOLIC BLOOD PRESSURE: 78 MMHG | BODY MASS INDEX: 29.03 KG/M2 | HEART RATE: 121 BPM

## 2021-09-22 DIAGNOSIS — K44.9 HIATAL HERNIA WITHOUT GANGRENE OR OBSTRUCTION: ICD-10-CM

## 2021-09-22 DIAGNOSIS — R19.7 DIARRHEA, UNSPECIFIED TYPE: Primary | ICD-10-CM

## 2021-09-22 PROCEDURE — 99214 OFFICE O/P EST MOD 30 MIN: CPT | Performed by: PHYSICIAN ASSISTANT

## 2021-09-22 NOTE — PROGRESS NOTES
Assessment/Plan:     Diagnoses and all orders for this visit:    Diarrhea, unspecified type  Patient states since his hiatal hernia repair he has had multiple loose stools anywhere from 4-7 times per day  He states he feels these becoming dehydrated as he is at times shaky, sweaty, dizzy, with pain radiating into his chest and tachycardia  He was given Bentyl in the emergency room without significant improvement  TSH, CMP, CT scan all within normal limits  Previous colonoscopy appeared within normal limits as well  Would recommend starting with checking stool studies as mentioned below, celiac artery ruled out on previous endoscopy  Also recommend starting a fiber supplement to help with stool bulking  Can also start Imodium on an as-needed basis  He is tachycardic during exam today, would recommend following up with PCP for EKG and possible stress test   -     Calprotectin,Fecal; Future  -     Clostridium difficile toxin by PCR with EIA; Future  -     Stool Enteric Bacterial Panel by PCR; Future  -     Giardia antigen; Future    Hiatal hernia without gangrene or obstruction  Patient was previously describing episodes of dysphagia and chest discomfort, underwent endoscopy and found to have a large hiatal hernia  He has since undergone hiatal hernia repair and these symptoms have resolved  Will see him back in 3 months or sooner if necessary  Subjective:      Patient ID: Jim Robles is a 48 y o  male  HPI     This is a follow-up for dysphagia, abdominal pain and constipation  Patient previously was seen and described pain in his upper abdomen after eating  States he had difficulty with food going down  He was also having hard infrequent stools  Was recommended he undergo endoscopy and colonoscopy for further evaluation  EGD showed a 7 centimeter herniation of both GE junction and stomach, moderate, patchy erythematous mucosa in the antrum, mild patchy erosion in the duodenal bulb  Biopsy was negative for celiac disease, was found to have H pylori  He was treated with triple therapy, stool red occasion was achieved  He did undergo colonoscopy at the same time which did show 1 tubular adenomatous polyp, otherwise within normal limits  He has since underwent hiatal hernia repair and denies any difficulty with swallowing  He states since his surgery he has noticed that his stools have changed to diarrhea  He typically averages approximately 4 watery/loose stools a day but this can increase up to 7 times per day  He states they typically wake him from sleep and has had an episode of fecal incontinence  He did go to the emergency room in June and was checked for C diff which was negative  He did have a CT scan at that time as well which was within normal limits  Thyroid and CMP were checked on 08/30 also within normal limits  He states at times he feels dizzy, fatigued, shaky, sweaty, heart racing      Patient Active Problem List   Diagnosis    Generalized anxiety disorder    Major depressive disorder, recurrent, severe without psychotic features ([facility] [address] )    Other specified anxiety disorders    Obstructive sleep apnea syndrome    Mixed hyperlipidemia    History of cocaine use    History of alcohol abuse    Herniated lumbar intervertebral disc    Polysubstance dependence (HCC)    Chronic low back pain    Hiatal hernia without gangrene or obstruction    Status post repair of paraesophageal diaphragmatic hernia    Diarrhea     No Known Allergies  Current Outpatient Medications on File Prior to Visit   Medication Sig    atorvastatin (LIPITOR) 40 mg tablet Take 20 mg by mouth daily     busPIRone (BUSPAR) 15 mg tablet Take 1 tablet (15 mg total) by mouth 3 (three) times a day    DULoxetine (CYMBALTA) 60 mg delayed release capsule Take 1 capsule (60 mg total) by mouth daily    gabapentin (NEURONTIN) 300 mg capsule Take 1 capsule (300 mg total) by mouth 3 (three) times a day    Multiple Vitamin (multivitamin) tablet Take 1 tablet by mouth daily    traZODone (DESYREL) 50 mg tablet Take 1 tablet (50 mg total) by mouth daily at bedtime    dicyclomine (BENTYL) 20 mg tablet Take 1 tablet (20 mg total) by mouth 2 (two) times a day (Patient not taking: Reported on 2021)    docusate sodium (COLACE) 50 mg capsule Take 50 mg by mouth 2 (two) times a day    lithium 600 MG capsule Take 1 capsule (600 mg total) by mouth daily at bedtime     No current facility-administered medications on file prior to visit  History reviewed  No pertinent family history    Past Medical History:   Diagnosis Date    Anxiety     Chest pain     after eating- seen in ER several x - believes secondary to hernia    CPAP (continuous positive airway pressure) dependence     Depression     H  pylori infection     Hiatal hernia     Hyperlipidemia     PONV (postoperative nausea and vomiting)     Sleep apnea     Suicidal thoughts     Wears glasses      Social History     Socioeconomic History    Marital status: Single     Spouse name: None    Number of children: None    Years of education: None    Highest education level: None   Occupational History    None   Tobacco Use    Smoking status: Former Smoker     Types: Cigarettes     Quit date:      Years since quittin 7    Smokeless tobacco: Never Used   Vaping Use    Vaping Use: Never used   Substance and Sexual Activity    Alcohol use: Not Currently     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    Drug use: Not Currently     Types: Marijuana     Comment: previously used medical marijuana but not currently using    Sexual activity: None   Other Topics Concern    None   Social History Narrative    None     Social Determinants of Health     Financial Resource Strain:     Difficulty of Paying Living Expenses:    Food Insecurity:     Worried About Running Out of Food in the Last Year:     Lee of Food in the Last Year:    Transportation Needs:     Lack of Transportation (Medical):  Lack of Transportation (Non-Medical):    Physical Activity:     Days of Exercise per Week:     Minutes of Exercise per Session:    Stress:     Feeling of Stress :    Social Connections:     Frequency of Communication with Friends and Family:     Frequency of Social Gatherings with Friends and Family:     Attends Sabianist Services:     Active Member of Clubs or Organizations:     Attends Club or Organization Meetings:     Marital Status:    Intimate Partner Violence:     Fear of Current or Ex-Partner:     Emotionally Abused:     Physically Abused:     Sexually Abused:      Past Surgical History:   Procedure Laterality Date    COLONOSCOPY      INGUINAL HERNIA REPAIR Right     NV LAP, REPAIR PARAESOPHAGEAL HERNIA, INCL FUNDOPLASTY W/ MESH N/A 4/1/2021    Procedure: REPAIR HERNIA PARAESOPHAGEAL LAPAROSCOPIC W ROBOTICS with TOUPET FUNDOPLICATION cruroplasty with mesh;  Surgeon: Cata Irizarry MD;  Location: [facility];  Service: General    SHOULDER SURGERY      left         Review of Systems   All other systems reviewed and are negative  Objective:      /78   Pulse (!) 121   Temp (!) 97 3 °F (36 3 °C)   Wt 99 8 kg (220 lb)   BMI 29 03 kg/m²          Physical Exam  Constitutional:       Appearance: He is well-developed  HENT:      Head: Normocephalic and atraumatic  Eyes:      Conjunctiva/sclera: Conjunctivae normal    Cardiovascular:      Rate and Rhythm: Regular rhythm  Tachycardia present  Pulmonary:      Effort: Pulmonary effort is normal       Breath sounds: Normal breath sounds  Abdominal:      General: Bowel sounds are normal  There is no distension  Palpations: Abdomen is soft  Tenderness: There is no abdominal tenderness  Musculoskeletal:         General: Normal range of motion  Cervical back: Normal range of motion  Skin:     General: Skin is warm and dry     Neurological:      Mental Status: He is alert and oriented to person, place, and time     Psychiatric:         Mood and Affect: Mood normal          Behavior: Behavior normal

## 2021-10-06 DIAGNOSIS — F33.2 MAJOR DEPRESSIVE DISORDER, RECURRENT, SEVERE WITHOUT PSYCHOTIC FEATURES (HCC): ICD-10-CM

## 2021-10-06 DIAGNOSIS — F41.1 GENERALIZED ANXIETY DISORDER: ICD-10-CM

## 2021-10-06 RX ORDER — LITHIUM CARBONATE 600 MG/1
600 CAPSULE ORAL
Qty: 30 CAPSULE | Refills: 2 | Status: SHIPPED | OUTPATIENT
Start: 2021-10-06 | End: 2021-10-29

## 2021-10-11 ENCOUNTER — APPOINTMENT (OUTPATIENT)
Dept: LAB | Facility: MEDICAL CENTER | Age: 46
End: 2021-10-11
Payer: COMMERCIAL

## 2021-10-11 DIAGNOSIS — R19.7 DIARRHEA, UNSPECIFIED TYPE: ICD-10-CM

## 2021-10-11 PROCEDURE — 87493 C DIFF AMPLIFIED PROBE: CPT

## 2021-10-11 PROCEDURE — 87505 NFCT AGENT DETECTION GI: CPT

## 2021-10-11 PROCEDURE — 83993 ASSAY FOR CALPROTECTIN FECAL: CPT

## 2021-10-11 PROCEDURE — 87329 GIARDIA AG IA: CPT

## 2021-10-12 DIAGNOSIS — F41.1 GENERALIZED ANXIETY DISORDER: ICD-10-CM

## 2021-10-12 LAB
C DIFF TOX GENS STL QL NAA+PROBE: NEGATIVE
CAMPYLOBACTER DNA SPEC NAA+PROBE: NORMAL
SALMONELLA DNA SPEC QL NAA+PROBE: NORMAL
SHIGA TOXIN STX GENE SPEC NAA+PROBE: NORMAL
SHIGELLA DNA SPEC QL NAA+PROBE: NORMAL

## 2021-10-13 LAB — G LAMBLIA AG STL QL IA: NEGATIVE

## 2021-10-13 RX ORDER — GABAPENTIN 300 MG/1
300 CAPSULE ORAL 3 TIMES DAILY
Qty: 90 CAPSULE | Refills: 2 | Status: SHIPPED | OUTPATIENT
Start: 2021-10-13 | End: 2021-10-29 | Stop reason: SDUPTHER

## 2021-10-14 LAB — CALPROTECTIN STL-MCNT: 19 UG/G (ref 0–120)

## 2021-10-29 ENCOUNTER — OFFICE VISIT (OUTPATIENT)
Dept: PSYCHIATRY | Facility: CLINIC | Age: 46
End: 2021-10-29
Payer: COMMERCIAL

## 2021-10-29 DIAGNOSIS — F51.04 PSYCHOPHYSIOLOGICAL INSOMNIA: ICD-10-CM

## 2021-10-29 DIAGNOSIS — F33.2 MAJOR DEPRESSIVE DISORDER, RECURRENT, SEVERE WITHOUT PSYCHOTIC FEATURES (HCC): ICD-10-CM

## 2021-10-29 DIAGNOSIS — F41.1 GENERALIZED ANXIETY DISORDER: ICD-10-CM

## 2021-10-29 DIAGNOSIS — K58.0 IRRITABLE BOWEL SYNDROME WITH DIARRHEA: ICD-10-CM

## 2021-10-29 PROCEDURE — 99214 OFFICE O/P EST MOD 30 MIN: CPT | Performed by: PHYSICIAN ASSISTANT

## 2021-10-29 RX ORDER — DULOXETIN HYDROCHLORIDE 60 MG/1
60 CAPSULE, DELAYED RELEASE ORAL DAILY
Qty: 30 CAPSULE | Refills: 2 | Status: SHIPPED | OUTPATIENT
Start: 2021-10-29 | End: 2021-12-13 | Stop reason: SDUPTHER

## 2021-10-29 RX ORDER — LITHIUM 8 MEQ/5ML
16 SOLUTION ORAL
Qty: 500 ML | Refills: 2 | Status: SHIPPED | OUTPATIENT
Start: 2021-10-29 | End: 2021-11-03 | Stop reason: RX

## 2021-10-29 RX ORDER — BUSPIRONE HYDROCHLORIDE 15 MG/1
15 TABLET ORAL 3 TIMES DAILY
Qty: 90 TABLET | Refills: 2 | Status: SHIPPED | OUTPATIENT
Start: 2021-10-29 | End: 2022-01-19 | Stop reason: SDUPTHER

## 2021-10-29 RX ORDER — GABAPENTIN 300 MG/1
300 CAPSULE ORAL 3 TIMES DAILY
Qty: 90 CAPSULE | Refills: 2 | Status: SHIPPED | OUTPATIENT
Start: 2021-10-29 | End: 2022-01-19 | Stop reason: SDUPTHER

## 2021-10-29 RX ORDER — TRAZODONE HYDROCHLORIDE 50 MG/1
50 TABLET ORAL
Qty: 30 TABLET | Refills: 2 | Status: SHIPPED | OUTPATIENT
Start: 2021-10-29 | End: 2022-01-19 | Stop reason: SDUPTHER

## 2021-11-03 DIAGNOSIS — F33.2 MAJOR DEPRESSIVE DISORDER, RECURRENT, SEVERE WITHOUT PSYCHOTIC FEATURES (HCC): Primary | ICD-10-CM

## 2021-11-03 DIAGNOSIS — A04.8 H. PYLORI INFECTION: ICD-10-CM

## 2021-11-03 RX ORDER — LITHIUM CARBONATE 300 MG/1
TABLET, FILM COATED, EXTENDED RELEASE ORAL
Qty: 60 TABLET | Refills: 1 | Status: SHIPPED | OUTPATIENT
Start: 2021-11-03 | End: 2022-03-23

## 2021-11-22 LAB — LITHIUM SERPL-SCNC: 0.5 MMOL/L (ref 0.6–1.2)

## 2021-11-24 ENCOUNTER — OFFICE VISIT (OUTPATIENT)
Dept: PSYCHIATRY | Facility: CLINIC | Age: 46
End: 2021-11-24
Payer: COMMERCIAL

## 2021-11-24 DIAGNOSIS — F33.2 MAJOR DEPRESSIVE DISORDER, RECURRENT, SEVERE WITHOUT PSYCHOTIC FEATURES (HCC): Primary | ICD-10-CM

## 2021-11-24 DIAGNOSIS — F41.1 GENERALIZED ANXIETY DISORDER: ICD-10-CM

## 2021-11-24 DIAGNOSIS — K58.0 IRRITABLE BOWEL SYNDROME WITH DIARRHEA: ICD-10-CM

## 2021-11-24 PROCEDURE — 99214 OFFICE O/P EST MOD 30 MIN: CPT | Performed by: PHYSICIAN ASSISTANT

## 2021-12-13 ENCOUNTER — OFFICE VISIT (OUTPATIENT)
Dept: PSYCHIATRY | Facility: CLINIC | Age: 46
End: 2021-12-13
Payer: COMMERCIAL

## 2021-12-13 DIAGNOSIS — F33.2 MAJOR DEPRESSIVE DISORDER, RECURRENT, SEVERE WITHOUT PSYCHOTIC FEATURES (HCC): ICD-10-CM

## 2021-12-13 DIAGNOSIS — F41.1 GENERALIZED ANXIETY DISORDER: Primary | ICD-10-CM

## 2021-12-13 DIAGNOSIS — Z79.899 ENCOUNTER FOR LITHIUM MONITORING: ICD-10-CM

## 2021-12-13 DIAGNOSIS — Z51.81 ENCOUNTER FOR LITHIUM MONITORING: ICD-10-CM

## 2021-12-13 PROCEDURE — 99214 OFFICE O/P EST MOD 30 MIN: CPT | Performed by: PHYSICIAN ASSISTANT

## 2021-12-13 RX ORDER — DULOXETINE 40 MG/1
40 CAPSULE, DELAYED RELEASE ORAL DAILY
Qty: 30 CAPSULE | Refills: 2 | Status: SHIPPED | OUTPATIENT
Start: 2021-12-13 | End: 2022-03-03

## 2021-12-19 ENCOUNTER — OFFICE VISIT (OUTPATIENT)
Dept: URGENT CARE | Facility: MEDICAL CENTER | Age: 46
End: 2021-12-19
Payer: COMMERCIAL

## 2021-12-19 VITALS
WEIGHT: 215 LBS | BODY MASS INDEX: 29.12 KG/M2 | TEMPERATURE: 98.4 F | HEIGHT: 72 IN | HEART RATE: 85 BPM | OXYGEN SATURATION: 99 % | RESPIRATION RATE: 18 BRPM

## 2021-12-19 DIAGNOSIS — R06.02 SHORTNESS OF BREATH: ICD-10-CM

## 2021-12-19 DIAGNOSIS — J06.9 ACUTE URI: Primary | ICD-10-CM

## 2021-12-19 PROCEDURE — 87636 SARSCOV2 & INF A&B AMP PRB: CPT | Performed by: PHYSICIAN ASSISTANT

## 2021-12-19 PROCEDURE — G0382 LEV 3 HOSP TYPE B ED VISIT: HCPCS | Performed by: PHYSICIAN ASSISTANT

## 2021-12-20 LAB
FLUAV RNA RESP QL NAA+PROBE: NEGATIVE
FLUBV RNA RESP QL NAA+PROBE: NEGATIVE
SARS-COV-2 RNA RESP QL NAA+PROBE: NEGATIVE

## 2022-01-04 DIAGNOSIS — F41.1 GENERALIZED ANXIETY DISORDER: ICD-10-CM

## 2022-01-04 DIAGNOSIS — F33.2 MAJOR DEPRESSIVE DISORDER, RECURRENT, SEVERE WITHOUT PSYCHOTIC FEATURES (HCC): ICD-10-CM

## 2022-01-04 RX ORDER — LITHIUM CARBONATE 600 MG/1
600 CAPSULE ORAL
Qty: 30 CAPSULE | Refills: 2 | Status: SHIPPED | OUTPATIENT
Start: 2022-01-04 | End: 2022-03-23 | Stop reason: SDUPTHER

## 2022-01-05 ENCOUNTER — HOSPITAL ENCOUNTER (OUTPATIENT)
Dept: RADIOLOGY | Facility: HOSPITAL | Age: 47
Discharge: HOME | End: 2022-01-05
Payer: COMMERCIAL

## 2022-01-05 DIAGNOSIS — R52 PAIN: ICD-10-CM

## 2022-01-05 PROCEDURE — 72050 X-RAY EXAM NECK SPINE 4/5VWS: CPT

## 2022-01-05 PROCEDURE — 72072 X-RAY EXAM THORAC SPINE 3VWS: CPT

## 2022-01-05 PROCEDURE — 72110 X-RAY EXAM L-2 SPINE 4/>VWS: CPT

## 2022-01-19 ENCOUNTER — OFFICE VISIT (OUTPATIENT)
Dept: PSYCHIATRY | Facility: CLINIC | Age: 47
End: 2022-01-19
Payer: COMMERCIAL

## 2022-01-19 DIAGNOSIS — Z51.81 ENCOUNTER FOR LITHIUM MONITORING: ICD-10-CM

## 2022-01-19 DIAGNOSIS — F33.2 MAJOR DEPRESSIVE DISORDER, RECURRENT, SEVERE WITHOUT PSYCHOTIC FEATURES (HCC): Primary | ICD-10-CM

## 2022-01-19 DIAGNOSIS — Z79.899 ENCOUNTER FOR LITHIUM MONITORING: ICD-10-CM

## 2022-01-19 DIAGNOSIS — F41.1 GENERALIZED ANXIETY DISORDER: ICD-10-CM

## 2022-01-19 DIAGNOSIS — F51.04 PSYCHOPHYSIOLOGICAL INSOMNIA: ICD-10-CM

## 2022-01-19 PROCEDURE — 99214 OFFICE O/P EST MOD 30 MIN: CPT | Performed by: PHYSICIAN ASSISTANT

## 2022-01-19 RX ORDER — TRAZODONE HYDROCHLORIDE 50 MG/1
TABLET ORAL
Qty: 30 TABLET | Refills: 2 | Status: SHIPPED | OUTPATIENT
Start: 2022-01-19 | End: 2022-03-23 | Stop reason: SDUPTHER

## 2022-01-19 RX ORDER — BUSPIRONE HYDROCHLORIDE 15 MG/1
15 TABLET ORAL 3 TIMES DAILY
Qty: 90 TABLET | Refills: 2 | Status: SHIPPED | OUTPATIENT
Start: 2022-01-19 | End: 2022-03-23 | Stop reason: SDUPTHER

## 2022-01-19 RX ORDER — GABAPENTIN 300 MG/1
CAPSULE ORAL
Qty: 90 CAPSULE | Refills: 2 | Status: SHIPPED | OUTPATIENT
Start: 2022-01-19 | End: 2022-03-23 | Stop reason: SDUPTHER

## 2022-01-19 NOTE — PSYCH
PROGRESS NOTE        [facility]      Name and Date of Birth:  Erin Michaels  48 y o  1975    Date of Visit: 01/19/22    SUBJECTIVE:  Stefanie Weber was seen for follow-up of major depression disorder and for medication management  States the past couple days he has not been sleeping as well at night  Has been having more anxiety and thinking a lot more at night  He has been "forgetting" afternoon dose of gabapentin and buspar the past couple weeks  Discussed changing gabapentin to 300 mg and 600 mg po at bedtime to increase compliance and help more with sleep  He continues with some irritability and "shorter fuse"- states he has been like this since a childhood though  No physical aggression or agitation  States that work has not been as busy lately so he has had more time off  This may have been causing him some more anxiety and more time to [address]"  He denies any suicidal or homicidal ideation  No psychotic signs symptoms  Appetite is adequate  Physically he has been feeling okay  States his GI symptoms have improved  Outside of work is not been getting out much which we discussed  Had been exercising on a regular basis in the past and we discussed him going to the gym again which he is in agreement with  He denies suicidal ideation, intent or plan at present, has no suicidal ideation, intent or plan at present  He denies any auditory hallucinations and visual hallucinations, denies any other delusional thinking, denies any delusional thinking  He denies any side effects from medications      HPI ROS Appetite Changes and Sleep: normal appetite, decreased sleep    Review Of Systems:      Constitutional Negative   ENT Negative   Cardiovascular Negative   Respiratory Negative   Gastrointestinal Negative   Genitourinary Negative   Musculoskeletal Negative   Integumentary Negative   Neurological Negative   Endocrine Negative   Other Symptoms Negative and None       Laboratory Results: No results found for this or any previous visit  Substance Abuse History:    Social History     Substance and Sexual Activity   Drug Use Not Currently    Types: Marijuana    Comment: previously used medical marijuana but not currently using       Family Psychiatric History:     No family history on file      The following portions of the patient's history were reviewed and updated as appropriate: past family history, past medical history, past social history, past surgical history and problem list     Social History     Socioeconomic History    Marital status: Single     Spouse name: Not on file    Number of children: Not on file    Years of education: Not on file    Highest education level: Not on file   Occupational History    Not on file   Tobacco Use    Smoking status: Former Smoker     Types: Cigarettes     Quit date:      Years since quittin 0    Smokeless tobacco: Never Used   Vaping Use    Vaping Use: Never used   Substance and Sexual Activity    Alcohol use: Not Currently     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    Drug use: Not Currently     Types: Marijuana     Comment: previously used medical marijuana but not currently using    Sexual activity: Not on file   Other Topics Concern    Not on file   Social History Narrative    Not on file     Social Determinants of Health     Financial Resource Strain: Not on file   Food Insecurity: Not on file   Transportation Needs: Not on file   Physical Activity: Not on file   Stress: Not on file   Social Connections: Not on file   Intimate Partner Violence: Not on file   Housing Stability: Not on file     Social History     Social History Narrative    Not on file        Social History     Tobacco History     Smoking Status  Former Smoker Quit date  2004 Smoking Tobacco Type  Cigarettes    Smokeless Tobacco Use  Never Used          Alcohol History     Alcohol Use Status  Not Currently Comment  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          Drug Use     Drug Use Status  Not Currently Types  Marijuana Comment  previously used medical marijuana but not currently using          Sexual Activity     Sexually Active  Not Asked          Activities of Daily Living    Not Asked               Additional Substance Use Detail     Questions Responses    Problems Due to Past Use of Alcohol? Yes    Problems Due to Past Use of Substances? Yes    Substance Use Assessment Denies substance use within the past 12 months    Alcohol Use Frequency Denies use in past 12 months    Cannabis frequency Past regular use    Comment: Past rare use on 6/9/2020 Past rare use ->Past regular use on 7/24/2020     Heroin Frequency Denies use in past 12 months    Cannabis method Smoke    Comment: Smoke on 7/24/2020     1st Use of Alcohol age 13    Cannabis 1st Use medical marijuana for anxiety    Last Use of Alcohol & Amount sober 6 5 years    Longest Abstinence from Alcohol 6 5 years    Cocaine frequency Past regular use    Comment: Never used on 6/9/2020 Never used ->Past regular use on 7/24/2020     Crack Cocaine Frequency Prior dependence    Methamphetamine Frequency Denies use in past 12 months    Narcotic Frequency Denies use in past 12 months    Benzodiazepine Frequency Denies use in past 12 months    Amphetamine frequency Denies use in past 12 months    Barbituate Frequency Denies use use in past 12 months    Inhalant frequency Never used    Comment: Never used on 6/9/2020     Hallucinogen frequency Past regular use    Comment: Never used on 6/9/2020 Never used ->Past regular use on 7/24/2020     Ecstasy frequency Never used    Comment: Never used on 6/9/2020     Other drug frequency Never used    Comment: Never used on 6/9/2020     Opiate frequency Denies use in past 12 months    Not reviewed              OBJECTIVE:     Mental Status Evaluation:    Appearance age appropriate, casually dressed, good eye contact   Behavior polite, cooperative   Speech normal volume, normal pitch   Mood Anxious, dysthymic   Affect anxious   Thought Processes logical   Associations intact associations   Thought Content normal   Perceptual Disturbances: none   Abnormal Thoughts  Risk Potential Suicidal ideation - None  Homicidal ideation - None  Potential for aggression - No   Orientation oriented to person, place, time/date and situation   Memory recent and remote memory grossly intact   Cosciousness alert and awake   Attention Span attention span and concentration are fair   Intellect Not formally assessed    Insight age appropriate    Judgement good    Muscle Strength and  Gait muscle strength and tone were normal   Language no difficulty naming common objects   Fund of Knowledge displays adequate knowledge of current events   Pain none   Pain Scale 0       Assessment/Plan:       Diagnoses and all orders for this visit:    Major depressive disorder, recurrent, severe without psychotic features (HCC)    Generalized anxiety disorder  -     busPIRone (BUSPAR) 15 mg tablet; Take 1 tablet (15 mg total) by mouth 3 (three) times a day  -     gabapentin (NEURONTIN) 300 mg capsule; 1 po qam and 2 po qhs    Encounter for lithium monitoring    Psychophysiological insomnia  -     traZODone (DESYREL) 50 mg tablet; 1/2 - 1 po qhs prn          Treatment Recommendations/Precautions:  Lithium 600 mg at bedtime   Cymbalta 40 mg daily   BuSpar 15 mg t i d  with breakfast lunch and dinner  Change Gabapentin to 300 mg qam and 600 mg po qhs  Trazodone 50 mg half to one at bedtime p r n  Follow-up in two months he will call sooner if any questions or concerns  Written instructions given regarding medications   He will also start exercising on a regular basis    Risks/Benefits      Risks, Benefits And Possible Side Effects Of Medications:    Risks, benefits, and possible side effects of medications explained to patient and patient verbalizes understanding and agreement for treatment      Controlled Medication Discussion:     Not applicable    Psychotherapy Provided:     Individual psychotherapy provided: No

## 2022-02-22 ENCOUNTER — TELEPHONE (OUTPATIENT)
Dept: OTHER | Facility: OTHER | Age: 47
End: 2022-02-22

## 2022-02-22 NOTE — TELEPHONE ENCOUNTER
Patient is calling to ask if he could be seen in the office today for an appt for increased anxiety  Please call

## 2022-03-03 DIAGNOSIS — F33.2 MAJOR DEPRESSIVE DISORDER, RECURRENT, SEVERE WITHOUT PSYCHOTIC FEATURES (HCC): ICD-10-CM

## 2022-03-03 RX ORDER — DULOXETINE 40 MG/1
40 CAPSULE, DELAYED RELEASE ORAL DAILY
Qty: 30 CAPSULE | Refills: 2 | Status: SHIPPED | OUTPATIENT
Start: 2022-03-03 | End: 2022-03-09

## 2022-03-09 ENCOUNTER — TELEPHONE (OUTPATIENT)
Dept: PSYCHIATRY | Facility: CLINIC | Age: 47
End: 2022-03-09

## 2022-03-09 DIAGNOSIS — F33.2 MAJOR DEPRESSIVE DISORDER, RECURRENT, SEVERE WITHOUT PSYCHOTIC FEATURES (HCC): ICD-10-CM

## 2022-03-09 RX ORDER — DULOXETIN HYDROCHLORIDE 20 MG/1
CAPSULE, DELAYED RELEASE ORAL
Qty: 60 CAPSULE | Refills: 1 | Status: SHIPPED | OUTPATIENT
Start: 2022-03-09 | End: 2022-03-23 | Stop reason: SDUPTHER

## 2022-03-09 NOTE — TELEPHONE ENCOUNTER
Josemanuel diggs called asked if you can send a new script for his Duloxetine  Patient is asking for 20mg instead of the 40mg because it's too expensive  He would like 20mg and take it twice a day

## 2022-03-23 ENCOUNTER — OFFICE VISIT (OUTPATIENT)
Dept: PSYCHIATRY | Facility: CLINIC | Age: 47
End: 2022-03-23
Payer: COMMERCIAL

## 2022-03-23 DIAGNOSIS — Z79.899 ENCOUNTER FOR LITHIUM MONITORING: ICD-10-CM

## 2022-03-23 DIAGNOSIS — F51.04 PSYCHOPHYSIOLOGICAL INSOMNIA: ICD-10-CM

## 2022-03-23 DIAGNOSIS — F33.2 MAJOR DEPRESSIVE DISORDER, RECURRENT, SEVERE WITHOUT PSYCHOTIC FEATURES (HCC): Primary | ICD-10-CM

## 2022-03-23 DIAGNOSIS — F41.1 GENERALIZED ANXIETY DISORDER: ICD-10-CM

## 2022-03-23 DIAGNOSIS — Z51.81 ENCOUNTER FOR LITHIUM MONITORING: ICD-10-CM

## 2022-03-23 PROCEDURE — 99214 OFFICE O/P EST MOD 30 MIN: CPT | Performed by: PHYSICIAN ASSISTANT

## 2022-03-23 RX ORDER — BUSPIRONE HYDROCHLORIDE 15 MG/1
15 TABLET ORAL 3 TIMES DAILY
Qty: 90 TABLET | Refills: 2 | Status: SHIPPED | OUTPATIENT
Start: 2022-03-23 | End: 2022-06-01 | Stop reason: SDUPTHER

## 2022-03-23 RX ORDER — GABAPENTIN 300 MG/1
CAPSULE ORAL
Qty: 90 CAPSULE | Refills: 2 | Status: SHIPPED | OUTPATIENT
Start: 2022-03-23 | End: 2022-06-01 | Stop reason: SDUPTHER

## 2022-03-23 RX ORDER — TRAZODONE HYDROCHLORIDE 50 MG/1
TABLET ORAL
Qty: 30 TABLET | Refills: 2 | Status: SHIPPED | OUTPATIENT
Start: 2022-03-23 | End: 2022-06-01 | Stop reason: SDUPTHER

## 2022-03-23 RX ORDER — LITHIUM CARBONATE 600 MG/1
600 CAPSULE ORAL
Qty: 30 CAPSULE | Refills: 2 | Status: SHIPPED | OUTPATIENT
Start: 2022-03-23 | End: 2022-06-01 | Stop reason: SDUPTHER

## 2022-03-23 RX ORDER — DULOXETIN HYDROCHLORIDE 20 MG/1
CAPSULE, DELAYED RELEASE ORAL
Qty: 60 CAPSULE | Refills: 2 | Status: SHIPPED | OUTPATIENT
Start: 2022-03-23 | End: 2022-06-01 | Stop reason: SDUPTHER

## 2022-04-10 ENCOUNTER — HOSPITAL ENCOUNTER (EMERGENCY)
Facility: HOSPITAL | Age: 47
Discharge: HOME | End: 2022-04-10
Attending: EMERGENCY MEDICINE | Admitting: EMERGENCY MEDICINE
Payer: COMMERCIAL

## 2022-04-10 ENCOUNTER — APPOINTMENT (EMERGENCY)
Dept: RADIOLOGY | Facility: HOSPITAL | Age: 47
End: 2022-04-10
Payer: COMMERCIAL

## 2022-04-10 VITALS
SYSTOLIC BLOOD PRESSURE: 117 MMHG | RESPIRATION RATE: 16 BRPM | OXYGEN SATURATION: 100 % | HEART RATE: 78 BPM | BODY MASS INDEX: 29.9 KG/M2 | WEIGHT: 220.46 LBS | DIASTOLIC BLOOD PRESSURE: 74 MMHG | TEMPERATURE: 97.8 F

## 2022-04-10 DIAGNOSIS — M25.552 LEFT HIP PAIN: Primary | ICD-10-CM

## 2022-04-10 PROCEDURE — 73502 X-RAY EXAM HIP UNI 2-3 VIEWS: CPT

## 2022-04-10 PROCEDURE — 99284 EMERGENCY DEPT VISIT MOD MDM: CPT | Performed by: PHYSICIAN ASSISTANT

## 2022-04-10 PROCEDURE — 99283 EMERGENCY DEPT VISIT LOW MDM: CPT

## 2022-04-10 NOTE — Clinical Note
Teressa Menjivar was seen and treated in our emergency department on 4/10/2022  Diagnosis:     Roman Gomez  may return to work on return date  He may return on this date: 04/14/2022         If you have any questions or concerns, please don't hesitate to call        Balbir Ramires PA-C    ______________________________           _______________          _______________  Hospital Representative                              Date                                Time

## 2022-04-10 NOTE — ED PROVIDER NOTES
History  Chief Complaint   Patient presents with    Hip Pain     Pt reports left hip pain denies injury - pt reprots started weds - pt walked into traige      Patient is a 42-year-old male past medical history of anxiety, depression, bipolar disorder, hyperlipidemia who presents for evaluation of left hip pain  Patient states symptoms started about 4 days ago  He states it was gradual onset and has become worse  He states that he can not find a comfortable position and could not sleep last night  Pain is worse with palpation and range of motion of the hip  He states pain is starting to go into his left buttock/lower back  He states that he has had some intermittent hip pain and low back pain in the past however he states he sees a chiropractor and this usually helps  He states he does do mountain biking but denies any fall or injury  He states he does have a physical job with lots of bending, lifting but again denies any specific incident that initiated the pain  He denies any redness, swelling, warmth, rash or lesions to the hip  He denies any fever, chills, chest pain, shortness breath, abdominal pain, dysuria, hematuria, bladder or bowel dysfunction, numbness or weakness, recent illness/URI symptoms  Denies any history of cancer or IV drug use  Prior to Admission Medications   Prescriptions Last Dose Informant Patient Reported? Taking?    DULoxetine (CYMBALTA) 20 mg capsule   No No   Si po qd   Multiple Vitamin (multivitamin) tablet   Yes No   Sig: Take 1 tablet by mouth daily   atorvastatin (LIPITOR) 40 mg tablet   Yes No   Sig: Take 20 mg by mouth daily    busPIRone (BUSPAR) 15 mg tablet   No No   Sig: Take 1 tablet (15 mg total) by mouth 3 (three) times a day   dicyclomine (BENTYL) 20 mg tablet   No No   Sig: Take 1 tablet (20 mg total) by mouth 2 (two) times a day   Patient not taking: Reported on 2021    docusate sodium (COLACE) 50 mg capsule   Yes No   Sig: Take 50 mg by mouth 2 (two) times a day   gabapentin (NEURONTIN) 300 mg capsule   No No   Si po qam and 2 po qhs   lithium 600 MG capsule   No No   Sig: Take 1 capsule (600 mg total) by mouth daily at bedtime   traZODone (DESYREL) 50 mg tablet   No No   Si/2 - 1 po qhs prn      Facility-Administered Medications: None       Past Medical History:   Diagnosis Date    Anxiety     Chest pain     after eating- seen in ER several x - believes secondary to hernia    CPAP (continuous positive airway pressure) dependence     Depression     H  pylori infection     Hiatal hernia     Hyperlipidemia     PONV (postoperative nausea and vomiting)     Sleep apnea     Suicidal thoughts     Wears glasses        Past Surgical History:   Procedure Laterality Date    COLONOSCOPY      INGUINAL HERNIA REPAIR Right     OR LAP, REPAIR PARAESOPHAGEAL HERNIA, INCL FUNDOPLASTY W/ MESH N/A 2021    Procedure: REPAIR HERNIA PARAESOPHAGEAL LAPAROSCOPIC W ROBOTICS with TOUPET FUNDOPLICATION cruroplasty with mesh;  Surgeon: Raul Minaya MD;  Location: [facility];  Service: General    SHOULDER SURGERY      left       History reviewed  No pertinent family history  I have reviewed and agree with the history as documented  E-Cigarette/Vaping    E-Cigarette Use Never User      E-Cigarette/Vaping Substances    Nicotine No     THC No     CBD No     Flavoring No     Other No     Unknown No      Social History     Tobacco Use    Smoking status: Former Smoker     Types: Cigarettes     Quit date:      Years since quittin 2    Smokeless tobacco: Never Used   Vaping Use    Vaping Use: Never used   Substance Use Topics    Alcohol use: Not Currently     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    Drug use: Not Currently     Types: Marijuana     Comment: previously used medical marijuana but not currently using       Review of Systems   Constitutional: Negative for chills and fever  Respiratory: Negative for shortness of breath      Cardiovascular: Negative for chest pain  Gastrointestinal: Negative for abdominal pain, diarrhea, nausea and vomiting  Genitourinary: Negative for decreased urine volume, difficulty urinating, dysuria, frequency and hematuria  Musculoskeletal: Positive for arthralgias  Negative for joint swelling  Skin: Negative for rash  Neurological: Negative for weakness and numbness  All other systems reviewed and are negative  Physical Exam  Physical Exam  Vitals and nursing note reviewed  Constitutional:       General: He is not in acute distress  Appearance: Normal appearance  He is normal weight  He is not ill-appearing, toxic-appearing or diaphoretic  HENT:      Head: Normocephalic and atraumatic  Right Ear: External ear normal       Left Ear: External ear normal    Eyes:      Conjunctiva/sclera: Conjunctivae normal    Cardiovascular:      Rate and Rhythm: Normal rate and regular rhythm  Heart sounds: Normal heart sounds  No murmur heard  Pulmonary:      Effort: Pulmonary effort is normal  No respiratory distress  Breath sounds: Normal breath sounds  No stridor  No wheezing or rhonchi  Abdominal:      General: Abdomen is flat  Bowel sounds are normal  There is no distension  Palpations: Abdomen is soft  Tenderness: There is no abdominal tenderness  There is no guarding  Musculoskeletal:      Cervical back: Normal range of motion  Right hip: Normal       Left hip: Tenderness and bony tenderness present  No deformity, lacerations or crepitus  Decreased range of motion  Normal strength  Comments: Reproducible tenderness to palpation over the left lateral hip over the greater trochanteric bursa  No erythema, swelling, warmth, rash, lesions  ROM intact with some discomfort with hip ROM  NVI distally  Pedal pulses intact  No other pain to palpation  Skin:     General: Skin is warm and dry  Capillary Refill: Capillary refill takes less than 2 seconds        Findings: No erythema or rash  Neurological:      General: No focal deficit present  Mental Status: He is alert  Psychiatric:         Mood and Affect: Mood normal          Vital Signs  ED Triage Vitals [04/10/22 0951]   Temperature Pulse Respirations Blood Pressure SpO2   97 8 °F (36 6 °C) 78 16 117/74 100 %      Temp Source Heart Rate Source Patient Position - Orthostatic VS BP Location FiO2 (%)   Oral Monitor Sitting Right arm --      Pain Score       --           Vitals:    04/10/22 0951   BP: 117/74   Pulse: 78   Patient Position - Orthostatic VS: Sitting         Visual Acuity      ED Medications  Medications - No data to display    Diagnostic Studies  Results Reviewed     None                 XR hip/pelv 2-3 vws left if performed    (Results Pending)              Procedures  Procedures         ED Course                                             MDM  Number of Diagnoses or Management Options  Left hip pain  Diagnosis management comments: Xray left hip - reviewed by myself and interpreted as no acute bony abnormality  Discussed results with patient  Explained that xray will be further read by radiologist and if any discrepancies arise he will be contacted  Discussed supportive care  Patient states he was informed he should be careful/not take NSAIDs due to his psych medications/lithium  Instructed patient he should speak with his PCP and psychiatrist regarding taking NSAIDS and if it is safe and/or if he should have lithium levels checked while taking this  Discussed follow up with PCP and orthopedics  Given contact information to follow up  Discussed strict return precautions if symptoms worsen or new symptoms arise  Patient states understanding and agrees with plan          Amount and/or Complexity of Data Reviewed  Tests in the radiology section of CPT®: ordered and reviewed  Independent visualization of images, tracings, or specimens: yes    Patient Progress  Patient progress: stable      Disposition  Final diagnoses:   Left hip pain     Time reflects when diagnosis was documented in both MDM as applicable and the Disposition within this note     Time User Action Codes Description Comment    4/10/2022 12:22 PM Elizabeth Page [O35 [id number]] Left hip pain       ED Disposition     ED Disposition Condition Date/Time Comment    Discharge Stable Sun Apr 10, 2022 12:22 PM Марина Schaefer  discharge to home/self care              Follow-up Information     Follow up With Specialties Details Why Contact Info Additional Information    Nonnie Sever, MD Family Medicine Schedule an appointment as soon as possible for a visit in 1 day  [place] [phone]  [address] [facility] Orthopedic Surgery Schedule an appointment as soon as possible for a visit   [address] [facility] [place] [phone] [address] [facility], [address] [facility], [address], [place], [phone]   [address] [facility] Emergency Department Emergency Medicine  If symptoms worsen [facility] [phone]  [address] [facility] Emergency Department, [address] [place] [address], [phone]          Discharge Medication List as of 4/10/2022 12:23 PM      CONTINUE these medications which have NOT CHANGED    Details   atorvastatin (LIPITOR) 40 mg tablet Take 20 mg by mouth daily , Starting Fri 12/18/2020, Historical Med      busPIRone (BUSPAR) 15 mg tablet Take 1 tablet (15 mg total) by mouth 3 (three) times a day, Starting Wed 3/23/2022, Normal      dicyclomine (BENTYL) 20 mg tablet Take 1 tablet (20 mg total) by mouth 2 (two) times a day, Starting Tue 6/8/2021, Normal      docusate sodium (COLACE) 50 mg capsule Take 50 mg by mouth 2 (two) times a day, Starting Fri 12/18/2020, Until Fri 10/29/2021, Historical Med      DULoxetine (CYMBALTA) 20 mg capsule 2 po qd, Normal      gabapentin (NEURONTIN) 300 mg capsule 1 po qam and 2 po qhs, Normal      lithium 600 MG capsule Take 1 capsule (600 mg total) by mouth daily at bedtime, Starting Wed 3/23/2022, Until Tue 6/21/2022, Normal      Multiple Vitamin (multivitamin) tablet Take 1 tablet by mouth daily, Historical Med      traZODone (DESYREL) 50 mg tablet 1/2 - 1 po qhs prn, Normal             No discharge procedures on file      PDMP Review       Value Time User    PDMP Reviewed  Yes 11/6/2020 10:05 AM Jorge Donovan MD          ED Provider  Electronically Signed by           Daymon Dubin, PA-C  04/10/22 9377

## 2022-04-11 ENCOUNTER — TELEPHONE (OUTPATIENT)
Dept: PSYCHIATRY | Facility: CLINIC | Age: 47
End: 2022-04-11

## 2022-04-11 NOTE — TELEPHONE ENCOUNTER
Has a hip injury, and doctor wants Aaron Feliz to take some antiinflammatory meds  But Aaron Feliz wants advice, to see if it's safe to have the other physician prescribe something to help with his hip pain

## 2022-04-11 NOTE — TELEPHONE ENCOUNTER
Spoke with Bakari Payne  Discussed NSAIDS potentially increasing his lithium level which he is aware of  Last lithium level was 0 5 and tends to run from 0 3-0 5  Discussed taking 400 milligrams of ibuprofen as needed  Instructed patient if he is taking this to 3 times a day for more than about 3-4 days to contact me and we will check another lithium level  Also discussed signs and symptoms of elevated lithium lithium toxicity and he verbalized understanding  He will call me sooner if any questions or concerns

## 2022-06-01 ENCOUNTER — OFFICE VISIT (OUTPATIENT)
Dept: PSYCHIATRY | Facility: CLINIC | Age: 47
End: 2022-06-01
Payer: COMMERCIAL

## 2022-06-01 DIAGNOSIS — F41.1 GENERALIZED ANXIETY DISORDER: ICD-10-CM

## 2022-06-01 DIAGNOSIS — F33.2 MAJOR DEPRESSIVE DISORDER, RECURRENT, SEVERE WITHOUT PSYCHOTIC FEATURES (HCC): Primary | ICD-10-CM

## 2022-06-01 DIAGNOSIS — Z79.899 ENCOUNTER FOR LITHIUM MONITORING: ICD-10-CM

## 2022-06-01 DIAGNOSIS — Z51.81 ENCOUNTER FOR LITHIUM MONITORING: ICD-10-CM

## 2022-06-01 DIAGNOSIS — F51.04 PSYCHOPHYSIOLOGICAL INSOMNIA: ICD-10-CM

## 2022-06-01 PROCEDURE — 99214 OFFICE O/P EST MOD 30 MIN: CPT | Performed by: PHYSICIAN ASSISTANT

## 2022-06-01 RX ORDER — TRAZODONE HYDROCHLORIDE 50 MG/1
TABLET ORAL
Qty: 30 TABLET | Refills: 2 | Status: SHIPPED | OUTPATIENT
Start: 2022-06-01

## 2022-06-01 RX ORDER — DULOXETIN HYDROCHLORIDE 20 MG/1
CAPSULE, DELAYED RELEASE ORAL
Qty: 60 CAPSULE | Refills: 2 | Status: SHIPPED | OUTPATIENT
Start: 2022-06-01

## 2022-06-01 RX ORDER — BUSPIRONE HYDROCHLORIDE 10 MG/1
10 TABLET ORAL 3 TIMES DAILY
Qty: 90 TABLET | Refills: 2 | Status: SHIPPED | OUTPATIENT
Start: 2022-06-01

## 2022-06-01 RX ORDER — GABAPENTIN 300 MG/1
CAPSULE ORAL
Qty: 90 CAPSULE | Refills: 2 | Status: SHIPPED | OUTPATIENT
Start: 2022-06-01

## 2022-06-01 RX ORDER — LITHIUM CARBONATE 600 MG/1
600 CAPSULE ORAL
Qty: 30 CAPSULE | Refills: 2 | Status: SHIPPED | OUTPATIENT
Start: 2022-06-01 | End: 2022-08-30

## 2022-06-01 NOTE — PSYCH
PROGRESS NOTE        [facility]      Name and Date of Birth:  Elia Handley  48 y o  1975    Date of Visit: 06/01/22    SUBJECTIVE:  Marjorie Shipman was seen for follow-up of major depression, generalized anxiety and for medication management  States that things have been about the same"  Occasionally has episodes where he feels easily irritated  States that he has been managing better though  States that work has been stable  He is sleeping well at night with trazodone  Appetite is adequate  Denies any recent suicidal ideation  Fair interest and motivation  Physically has been having some hip pain often on  No other new medical issues  States that he feels dizzy and somewhat tired at times and questioning if medication is causing  Has not noted that this is any worse with taking gabapentin 300 mg in the morning as opposed evening  Since being on lithium he has significantly improved so hesitant to change this medication  States that he is staying hydrated and drinking enough water  Also taking Cymbalta 40 mg daily, BuSpar 15 mg t i d  and gabapentin 300 the morning 600 at night  He denies suicidal ideation, intent or plan at present, has no suicidal ideation, intent or plan at present  He denies any auditory hallucinations and visual hallucinations, denies any other delusional thinking, denies any delusional thinking  Gail Hawkins HPI ROS Appetite Changes and Sleep: normal appetite, normal sleep    Review Of Systems:      Constitutional Negative   ENT Negative   Cardiovascular Negative   Respiratory Negative   Gastrointestinal Negative   Genitourinary Negative   Musculoskeletal Negative   Integumentary Negative   Neurological Negative   Endocrine Negative   Other Symptoms Negative and None       Laboratory Results: No results found for this or any previous visit      Substance Abuse History:    Social History     Substance and Sexual Activity   Drug Use Not Currently    Types: Marijuana    Comment: previously used medical marijuana but not currently using       Family Psychiatric History:     No family history on file      The following portions of the patient's history were reviewed and updated as appropriate: past family history, past medical history, past social history, past surgical history and problem list     Social History     Socioeconomic History    Marital status: Single     Spouse name: Not on file    Number of children: Not on file    Years of education: Not on file    Highest education level: Not on file   Occupational History    Not on file   Tobacco Use    Smoking status: Former Smoker     Types: Cigarettes     Quit date:      Years since quittin 4    Smokeless tobacco: Never Used   Vaping Use    Vaping Use: Never used   Substance and Sexual Activity    Alcohol use: Not Currently     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    Drug use: Not Currently     Types: Marijuana     Comment: previously used medical marijuana but not currently using    Sexual activity: Not on file   Other Topics Concern    Not on file   Social History Narrative    Not on file     Social Determinants of Health     Financial Resource Strain: Not on file   Food Insecurity: Not on file   Transportation Needs: Not on file   Physical Activity: Not on file   Stress: Not on file   Social Connections: Not on file   Intimate Partner Violence: Not on file   Housing Stability: Not on file     Social History     Social History Narrative    Not on file        Social History     Tobacco History     Smoking Status  Former Smoker Quit date  2004 Smoking Tobacco Type  Cigarettes    Smokeless Tobacco Use  Never Used          Alcohol History     Alcohol Use Status  Not Currently Comment  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          Drug Use     Drug Use Status  Not Currently Types  Marijuana Comment  previously used medical marijuana but not currently using          Sexual Activity     Sexually Active  Not Asked          Activities of Daily Living    Not Asked               Additional Substance Use Detail     Questions Responses    Problems Due to Past Use of Alcohol? Yes    Problems Due to Past Use of Substances? Yes    Substance Use Assessment Denies substance use within the past 12 months    Alcohol Use Frequency Denies use in past 12 months    Cannabis frequency Past regular use    Comment: Past rare use on 6/9/2020 Past rare use ->Past regular use on 7/24/2020     Heroin Frequency Denies use in past 12 months    Cannabis method Smoke    Comment: Smoke on 7/24/2020     1st Use of Alcohol age 13    Cannabis 1st Use medical marijuana for anxiety    Last Use of Alcohol & Amount sober 6 5 years    Longest Abstinence from Alcohol 6 5 years    Cocaine frequency Past regular use    Comment: Never used on 6/9/2020 Never used ->Past regular use on 7/24/2020     Crack Cocaine Frequency Prior dependence    Methamphetamine Frequency Denies use in past 12 months    Narcotic Frequency Denies use in past 12 months    Benzodiazepine Frequency Denies use in past 12 months    Amphetamine frequency Denies use in past 12 months    Barbituate Frequency Denies use use in past 12 months    Inhalant frequency Never used    Comment: Never used on 6/9/2020     Hallucinogen frequency Past regular use    Comment: Never used on 6/9/2020 Never used ->Past regular use on 7/24/2020     Ecstasy frequency Never used    Comment: Never used on 6/9/2020     Other drug frequency Never used    Comment: Never used on 6/9/2020     Opiate frequency Denies use in past 12 months    Not reviewed              OBJECTIVE:     Mental Status Evaluation:    Appearance age appropriate, casually dressed   Behavior pleasant, cooperative   Speech normal volume, normal pitch   Mood Currently euthymic   Affect Congruent   Thought Processes logical   Associations intact associations   Thought Content normal   Perceptual Disturbances: none Abnormal Thoughts  Risk Potential Suicidal ideation - None  Homicidal ideation - None  Potential for aggression - No   Orientation oriented to person, place, time/date and situation   Memory recent and remote memory grossly intact   Cosciousness alert and awake   Attention Span attention span and concentration are age appropriate   Intellect Not formally assessed   Insight age appropriate    Judgement good    Muscle Strength and  Gait Steady gait   Language no difficulty naming common objects   Fund of Knowledge displays adequate knowledge of current events   Pain none   Pain Scale 0       Assessment/Plan:       Diagnoses and all orders for this visit:    Major depressive disorder, recurrent, severe without psychotic features ([facility] [address] )  -     lithium 600 MG capsule; Take 1 capsule (600 mg total) by mouth daily at bedtime  -     DULoxetine (CYMBALTA) 20 mg capsule; 2 po qd  -     Lithium level; Future    Encounter for lithium monitoring  -     Lithium level; Future    Generalized anxiety disorder  -     busPIRone (BUSPAR) 10 mg tablet; Take 1 tablet (10 mg total) by mouth 3 (three) times a day  -     gabapentin (NEURONTIN) 300 mg capsule; 1 po qam and 2 po qhs  -     lithium 600 MG capsule; Take 1 capsule (600 mg total) by mouth daily at bedtime    Psychophysiological insomnia  -     traZODone (DESYREL) 50 mg tablet; 1/2 - 1 po qhs prn          Treatment Recommendations/Precautions:  Lithium 600 mg at bedtime   Cymbalta 40 mg daily  BuSpar 15 mg t i d  check, will decrease to 10 mg t i d  and monitor for improvement in dizziness  Gabapentin 300 mg the morning 600 mg at night  Trazodone 50 mg 1/2 -1 p o  q h s  p r n    Check lithium level  Follow-up in two months and he will call me sooner if any questions or concerns  Continue current medications    Risks/Benefits      Risks, Benefits And Possible Side Effects Of Medications:    Risks, benefits, and possible side effects of medications explained to patient and patient verbalizes understanding and agreement for treatment      Controlled Medication Discussion:     Not applicable    Psychotherapy Provided:     Individual psychotherapy provided: No

## 2022-06-01 NOTE — BH TREATMENT PLAN
TREATMENT PLAN (Medication Management Only)        [employer]    Name and Date of Birth:  Karen Pang  48 y o  1975  Date of Treatment Plan: June 1, 2022  Diagnosis/Diagnoses:    1  Major depressive disorder, recurrent, severe without psychotic features ([facility] [address] )    2  Encounter for lithium monitoring    3  Generalized anxiety disorder    4  Psychophysiological insomnia      Strengths/Personal Resources for Self-Care: supportive family  Area/Areas of need (in own words): Managing reactions  1  Long Term Goal: maintain control of anxiety  Target Date:2 months - 8/1/2022  Person/Persons responsible for completion of goal: Raman  2  Short Term Objective (s) - How will we reach this goal?:   A  Provider new recommended medication/dosage changes and/or continue medication(s): Decrease BuSpar to 10 mg t i d , continue Cymbalta, lithium, trazodone  B  Check lithium level  C  N/A  Target Date:2 months - 8/1/2022  Person/Persons Responsible for Completion of Goal: Raman  Progress Towards Goals: stable  Treatment Modality: medication management every 2 months  Review due 180 days from date of this plan: 6 months - 12/1/2022  Expected length of service: ongoing treatment  My Physician/PA/NP and I have developed this plan together and I agree to work on the goals and objectives  I understand the treatment goals that were developed for my treatment

## 2022-06-23 ENCOUNTER — HOSPITAL ENCOUNTER (EMERGENCY)
Facility: HOSPITAL | Age: 47
Discharge: HOME | End: 2022-06-23
Attending: EMERGENCY MEDICINE
Payer: COMMERCIAL

## 2022-06-23 ENCOUNTER — APPOINTMENT (EMERGENCY)
Dept: RADIOLOGY | Facility: HOSPITAL | Age: 47
End: 2022-06-23
Payer: COMMERCIAL

## 2022-06-23 VITALS
WEIGHT: 215 LBS | SYSTOLIC BLOOD PRESSURE: 114 MMHG | OXYGEN SATURATION: 97 % | HEIGHT: 72 IN | RESPIRATION RATE: 18 BRPM | BODY MASS INDEX: 29.12 KG/M2 | DIASTOLIC BLOOD PRESSURE: 72 MMHG | HEART RATE: 68 BPM

## 2022-06-23 DIAGNOSIS — R06.00 DYSPNEA: ICD-10-CM

## 2022-06-23 DIAGNOSIS — E87.6 HYPOKALEMIA: ICD-10-CM

## 2022-06-23 DIAGNOSIS — R07.9 CHEST PAIN: Primary | ICD-10-CM

## 2022-06-23 DIAGNOSIS — U07.1 COVID-19: ICD-10-CM

## 2022-06-23 LAB
ANION GAP SERPL CALCULATED.3IONS-SCNC: 8 MMOL/L (ref 4–13)
ATRIAL RATE: 82 BPM
BASOPHILS # BLD AUTO: 0.02 THOUSANDS/ΜL (ref 0–0.1)
BASOPHILS NFR BLD AUTO: 0 % (ref 0–1)
BUN SERPL-MCNC: 13 MG/DL (ref 5–25)
CALCIUM SERPL-MCNC: 9.2 MG/DL (ref 8.3–10.1)
CARDIAC TROPONIN I PNL SERPL HS: 2 NG/L
CHLORIDE SERPL-SCNC: 106 MMOL/L (ref 100–108)
CO2 SERPL-SCNC: 27 MMOL/L (ref 21–32)
CREAT SERPL-MCNC: 0.93 MG/DL (ref 0.6–1.3)
D DIMER PPP FEU-MCNC: 0.3 UG/ML FEU
EOSINOPHIL # BLD AUTO: 0.17 THOUSAND/ΜL (ref 0–0.61)
EOSINOPHIL NFR BLD AUTO: 3 % (ref 0–6)
ERYTHROCYTE [DISTWIDTH] IN BLOOD BY AUTOMATED COUNT: 11.8 % (ref 11.6–15.1)
GFR SERPL CREATININE-BSD FRML MDRD: 96 ML/MIN/1.73SQ M
GLUCOSE SERPL-MCNC: 84 MG/DL (ref 65–140)
HCT VFR BLD AUTO: 43.4 % (ref 36.5–49.3)
HGB BLD-MCNC: 15.1 G/DL (ref 12–17)
IMM GRANULOCYTES # BLD AUTO: 0.03 THOUSAND/UL (ref 0–0.2)
IMM GRANULOCYTES NFR BLD AUTO: 1 % (ref 0–2)
LITHIUM SERPL-SCNC: 0.3 MMOL/L (ref 0.5–1)
LYMPHOCYTES # BLD AUTO: 1.17 THOUSANDS/ΜL (ref 0.6–4.47)
LYMPHOCYTES NFR BLD AUTO: 20 % (ref 14–44)
MCH RBC QN AUTO: 30.9 PG (ref 26.8–34.3)
MCHC RBC AUTO-ENTMCNC: 34.8 G/DL (ref 31.4–37.4)
MCV RBC AUTO: 89 FL (ref 82–98)
MONOCYTES # BLD AUTO: 0.34 THOUSAND/ΜL (ref 0.17–1.22)
MONOCYTES NFR BLD AUTO: 6 % (ref 4–12)
NEUTROPHILS # BLD AUTO: 4.01 THOUSANDS/ΜL (ref 1.85–7.62)
NEUTS SEG NFR BLD AUTO: 70 % (ref 43–75)
NRBC BLD AUTO-RTO: 0 /100 WBCS
P AXIS: 44 DEGREES
PLATELET # BLD AUTO: 302 THOUSANDS/UL (ref 149–390)
PMV BLD AUTO: 10.1 FL (ref 8.9–12.7)
POTASSIUM SERPL-SCNC: 3.4 MMOL/L (ref 3.5–5.3)
PR INTERVAL: 176 MS
QRS AXIS: 17 DEGREES
QRSD INTERVAL: 86 MS
QT INTERVAL: 306 MS
QTC INTERVAL: 357 MS
RBC # BLD AUTO: 4.89 MILLION/UL (ref 3.88–5.62)
SODIUM SERPL-SCNC: 141 MMOL/L (ref 136–145)
T WAVE AXIS: 34 DEGREES
VENTRICULAR RATE: 82 BPM
WBC # BLD AUTO: 5.74 THOUSAND/UL (ref 4.31–10.16)

## 2022-06-23 PROCEDURE — 99285 EMERGENCY DEPT VISIT HI MDM: CPT

## 2022-06-23 PROCEDURE — 85379 FIBRIN DEGRADATION QUANT: CPT | Performed by: PHYSICIAN ASSISTANT

## 2022-06-23 PROCEDURE — 93005 ELECTROCARDIOGRAM TRACING: CPT

## 2022-06-23 PROCEDURE — 84484 ASSAY OF TROPONIN QUANT: CPT | Performed by: PHYSICIAN ASSISTANT

## 2022-06-23 PROCEDURE — 99285 EMERGENCY DEPT VISIT HI MDM: CPT | Performed by: PHYSICIAN ASSISTANT

## 2022-06-23 PROCEDURE — 80178 ASSAY OF LITHIUM: CPT | Performed by: PHYSICIAN ASSISTANT

## 2022-06-23 PROCEDURE — 96374 THER/PROPH/DIAG INJ IV PUSH: CPT

## 2022-06-23 PROCEDURE — 71045 X-RAY EXAM CHEST 1 VIEW: CPT

## 2022-06-23 PROCEDURE — 80048 BASIC METABOLIC PNL TOTAL CA: CPT | Performed by: PHYSICIAN ASSISTANT

## 2022-06-23 PROCEDURE — 93010 ELECTROCARDIOGRAM REPORT: CPT | Performed by: INTERNAL MEDICINE

## 2022-06-23 PROCEDURE — 36415 COLL VENOUS BLD VENIPUNCTURE: CPT | Performed by: PHYSICIAN ASSISTANT

## 2022-06-23 PROCEDURE — 85025 COMPLETE CBC W/AUTO DIFF WBC: CPT | Performed by: PHYSICIAN ASSISTANT

## 2022-06-23 RX ORDER — KETOROLAC TROMETHAMINE 30 MG/ML
15 INJECTION, SOLUTION INTRAMUSCULAR; INTRAVENOUS ONCE
Status: COMPLETED | OUTPATIENT
Start: 2022-06-23 | End: 2022-06-23

## 2022-06-23 RX ADMIN — KETOROLAC TROMETHAMINE 15 MG: 30 INJECTION, SOLUTION INTRAMUSCULAR; INTRAVENOUS at 09:52

## 2022-06-23 NOTE — ED PROVIDER NOTES
History  Chief Complaint   Patient presents with    Chest Pain     Pt reports chest pain started 2 days ago increasingly worse over the past few days  Woke up today with midsternal pain described sharp  Also reports SOB  Positive covid test last Thursday       46y  o male with PMH of anxiety, depression, h pylori, hiatal hernia, HLD, sleep apnea requiring CPAP and suicidal thoughts presents to the ER for evaluation  Patient reports testing positive for covid on   Patient reports having symptoms that have been coming and going but presents to the ER for chest pain and dyspnea  Patient denies taking any medication for these symptoms  He reports constant sharp chest pain that radiates from the left side of his chest across to the right side  He reports dyspnea mostly with exertion and deep breathing  Associated symptoms: subjective fever, chills, congestion, cough and diarrhea  He denies sore throat, nausea, vomiting, urinary symptoms, weakness or paresthesias  History provided by:  Patient   used: No        Prior to Admission Medications   Prescriptions Last Dose Informant Patient Reported? Taking?    DULoxetine (CYMBALTA) 20 mg capsule 2022 at Unknown time  No Yes   Si po qd   Multiple Vitamin (multivitamin) tablet 2022 at Unknown time  Yes Yes   Sig: Take 1 tablet by mouth daily   atorvastatin (LIPITOR) 40 mg tablet 2022 at Unknown time  Yes Yes   Sig: Take 20 mg by mouth daily    busPIRone (BUSPAR) 10 mg tablet 2022 at Unknown time  No Yes   Sig: Take 1 tablet (10 mg total) by mouth 3 (three) times a day   dicyclomine (BENTYL) 20 mg tablet Not Taking at Unknown time  No No   Sig: Take 1 tablet (20 mg total) by mouth 2 (two) times a day   Patient not taking: No sig reported   gabapentin (NEURONTIN) 300 mg capsule 2022 at Unknown time  No Yes   Si po qam and 2 po qhs   lithium 600 MG capsule 2022 at Unknown time  No Yes   Sig: Take 1 capsule (600 mg total) by mouth daily at bedtime   traZODone (DESYREL) 50 mg tablet   No Yes   Si/2 - 1 po qhs prn      Facility-Administered Medications: None       Past Medical History:   Diagnosis Date    Anxiety     Chest pain     after eating- seen in ER several x - believes secondary to hernia    CPAP (continuous positive airway pressure) dependence     Depression     H  pylori infection     Hiatal hernia     Hyperlipidemia     PONV (postoperative nausea and vomiting)     Sleep apnea     Suicidal thoughts     Wears glasses        Past Surgical History:   Procedure Laterality Date    COLONOSCOPY      INGUINAL HERNIA REPAIR Right     ND LAP, REPAIR PARAESOPHAGEAL HERNIA, INCL FUNDOPLASTY W/ MESH N/A 2021    Procedure: REPAIR HERNIA PARAESOPHAGEAL LAPAROSCOPIC W ROBOTICS with TOUPET FUNDOPLICATION cruroplasty with mesh;  Surgeon: Brenna Vale MD;  Location: [facility];  Service: General    SHOULDER SURGERY      left       History reviewed  No pertinent family history  I have reviewed and agree with the history as documented  E-Cigarette/Vaping    E-Cigarette Use Never User      E-Cigarette/Vaping Substances    Nicotine No     THC No     CBD No     Flavoring No     Other No     Unknown No      Social History     Tobacco Use    Smoking status: Former Smoker     Types: Cigarettes     Quit date:      Years since quittin 4    Smokeless tobacco: Never Used   Vaping Use    Vaping Use: Never used   Substance Use Topics    Alcohol use: Not Currently     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    Drug use: Not Currently     Types: Marijuana     Comment: previously used medical marijuana but not currently using       Review of Systems   Constitutional: Positive for chills and fever  Negative for activity change and appetite change  HENT: Positive for congestion  Negative for drooling, ear discharge, ear pain, facial swelling, rhinorrhea and sore throat  Eyes: Negative for redness     Respiratory: Positive for cough and shortness of breath  Cardiovascular: Positive for chest pain  Gastrointestinal: Positive for diarrhea  Negative for abdominal pain, nausea and vomiting  Musculoskeletal: Negative for neck stiffness  Skin: Negative for rash  Allergic/Immunologic: Negative for food allergies  Neurological: Negative for weakness and numbness  Physical Exam  Physical Exam  Vitals and nursing note reviewed  Constitutional:       General: He is not in acute distress  Appearance: He is not toxic-appearing  HENT:      Head: Normocephalic and atraumatic  Eyes:      Conjunctiva/sclera: Conjunctivae normal    Neck:      Trachea: No tracheal deviation  Cardiovascular:      Rate and Rhythm: Normal rate and regular rhythm  Heart sounds: Normal heart sounds, S1 normal and S2 normal  No murmur heard  No friction rub  No gallop  Pulmonary:      Effort: Pulmonary effort is normal  No respiratory distress  Breath sounds: Normal breath sounds  No decreased breath sounds, wheezing, rhonchi or rales  Chest:      Chest wall: No tenderness  Abdominal:      General: Bowel sounds are normal  There is no distension  Palpations: Abdomen is soft  Tenderness: There is no abdominal tenderness  There is no guarding or rebound  Musculoskeletal:      Cervical back: Normal range of motion and neck supple  Skin:     General: Skin is warm and dry  Findings: No rash  Neurological:      Mental Status: He is alert  GCS: GCS eye subscore is 4  GCS verbal subscore is 5  GCS motor subscore is 6     Psychiatric:         Mood and Affect: Mood normal          Vital Signs  ED Triage Vitals [06/23/22 0843]   Temp Pulse Respirations Blood Pressure SpO2   -- 100 18 122/86 100 %      Temp src Heart Rate Source Patient Position - Orthostatic VS BP Location FiO2 (%)   -- Monitor Lying Right arm --      Pain Score       6           Vitals:    06/23/22 0843 06/23/22 1030   BP: 122/86 114/72 Pulse: 100 68   Patient Position - Orthostatic VS: Lying Lying         Visual Acuity      ED Medications  Medications   ketorolac (TORADOL) injection 15 mg (15 mg Intravenous Given 6/23/22 0952)       Diagnostic Studies  Results Reviewed     Procedure Component Value Units Date/Time    Lithium level [[id number]]  (Abnormal) Collected: 06/23/22 1059    Lab Status: Final result Specimen: Blood from Arm, Left Updated: 06/23/22 1308     Lithium Lvl 0 3 mmol/L     D-Dimer [[id number]]  (Normal) Collected: 06/23/22 0956    Lab Status: Final result Specimen: Blood from Arm, Left Updated: 06/23/22 1033     D-Dimer, Quant 0 30 ug/ml FEU     HS Troponin 0hr (reflex protocol) [[id number]]  (Normal) Collected: 06/23/22 0956    Lab Status: Final result Specimen: Blood from Arm, Left Updated: 06/23/22 1025     hs TnI 0hr 2 ng/L     Basic metabolic panel [[id number]]  (Abnormal) Collected: 06/23/22 0956    Lab Status: Final result Specimen: Blood from Arm, Left Updated: 06/23/22 1013     Sodium 141 mmol/L      Potassium 3 4 mmol/L      Chloride 106 mmol/L      CO2 27 mmol/L      ANION GAP 8 mmol/L      BUN 13 mg/dL      Creatinine 0 93 mg/dL      Glucose 84 mg/dL      Calcium 9 2 mg/dL      eGFR 96 ml/min/1 73sq m     Narrative:      Raquel guidelines for Chronic Kidney Disease (CKD):     Stage 1 with normal or high GFR (GFR > 90 mL/min/1 73 square meters)    Stage 2 Mild CKD (GFR = 60-89 mL/min/1 73 square meters)    Stage 3A Moderate CKD (GFR = 45-59 mL/min/1 73 square meters)    Stage 3B Moderate CKD (GFR = 30-44 mL/min/1 73 square meters)    Stage 4 Severe CKD (GFR = 15-29 mL/min/1 73 square meters)    Stage 5 End Stage CKD (GFR <15 mL/min/1 73 square meters)  Note: GFR calculation is accurate only with a steady state creatinine    CBC and differential [[id number]] Collected: 06/23/22 0956    Lab Status: Final result Specimen: Blood from Arm, Left Updated: 06/23/22 1003     WBC 5 74 Thousand/uL RBC 4 89 Million/uL      Hemoglobin 15 1 g/dL      Hematocrit 43 4 %      MCV 89 fL      MCH 30 9 pg      MCHC 34 8 g/dL      RDW 11 8 %      MPV 10 1 fL      Platelets 532 Thousands/uL      nRBC 0 /100 WBCs      Neutrophils Relative 70 %      Immat GRANS % 1 %      Lymphocytes Relative 20 %      Monocytes Relative 6 %      Eosinophils Relative 3 %      Basophils Relative 0 %      Neutrophils Absolute 4 01 Thousands/µL      Immature Grans Absolute 0 03 Thousand/uL      Lymphocytes Absolute 1 17 Thousands/µL      Monocytes Absolute 0 34 Thousand/µL      Eosinophils Absolute 0 17 Thousand/µL      Basophils Absolute 0 02 Thousands/µL                  XR chest 1 view portable   Final Result by Lila Romero DO (06/23 1028)   No acute cardiopulmonary disease  Workstation performed: [id number]                    Procedures  ECG 12 Lead Documentation Only    Date/Time: 6/23/2022 8:49 AM  Performed by: Agatha Burton PA-C  Authorized by: Agatha Burton PA-C     Indications / Diagnosis:  Chest pain  ECG reviewed by me, the ED Provider: yes    Patient location:  ED  Interpretation:     Interpretation: normal    Rate:     ECG rate:  82    ECG rate assessment: normal    Rhythm:     Rhythm: sinus rhythm    Ectopy:     Ectopy: none    QRS:     QRS intervals:  Normal  ST segments:     ST segments:  Normal  T waves:     T waves: inverted      Inverted:  AVR             ED Course  ED Course as of 06/23/22 1414   Thu Jun 23, 2022   1055 Patient reports feeling lightheaded after given Toradol  Will PO challenge and allow rest and reassess                HEART Risk Score    Flowsheet Row Most Recent Value   Heart Score Risk Calculator    History 0 Filed at: 06/23/2022 1344   ECG 0 Filed at: 06/23/2022 1344   Age 1 Filed at: 06/23/2022 1344   Risk Factors 1 Filed at: 06/23/2022 1344   Troponin 0 Filed at: 06/23/2022 1344   HEART Score 2 Filed at: 06/23/2022 1344                        SBIRT 22yo+ Flowsheet Row Most Recent Value   SBIRT (23 yo +)    In order to provide better care to our patients, we are screening all of our patients for alcohol and drug use  Would it be okay to ask you these screening questions? No Filed at: 06/23/2022 0846                    MDM  Number of Diagnoses or Management Options  Chest pain: new and requires workup  COVID-19: new and requires workup  Dyspnea: new and requires workup  Hypokalemia: new and requires workup  Diagnosis management comments: DDX consists of but not limited to: covid, PE, ACS, pneumonia    Will check labs and imaging  Temperature obtained and was 98 degrees  1055 Patient reports feeling lightheaded after given Toradol  Will PO challenge and allow rest and reassess  Informed patient of lab and imaging findings  Patient reporting improvement in symptoms  Will discharge  The management plan was discussed in detail with the patient at bedside and all questions were answered  Prior to discharge, we provided both verbal and written instructions  We discussed with the patient the signs and symptoms for which to return to the emergency department  All questions were answered and patient was comfortable with the plan of care and discharged to home  Instructed the patient to follow up with the primary care provider and/or specialist provided and their written instructions  The patient verbalized understanding of our discussion and plan of care, and agrees to return to the Emergency Department for concerns and progression of illness  At discharge, I instructed the patient to:  -follow up with pcp  -take Tylenol or Motrin for pain  -rest and drink plenty of fluids  -return to the ER if symptoms worsened or new symptoms arose  Patient agreed to this plan and was stable at time of discharge           Amount and/or Complexity of Data Reviewed  Clinical lab tests: ordered and reviewed  Tests in the radiology section of CPT®: ordered and reviewed  Independent visualization of images, tracings, or specimens: yes    Patient Progress  Patient progress: stable      Disposition  Final diagnoses:   Chest pain   COVID-19   Dyspnea   Hypokalemia     Time reflects when diagnosis was documented in both MDM as applicable and the Disposition within this note     Time User Action Codes Description Comment    6/23/2022 10:40 AM Suzi Buys A Add [R07 9] Chest pain     6/23/2022 10:40 AM Suzi Buys A Add [U07 1] COVID-19     6/23/2022 10:40 AM Suzi Buys A Add [R06 00] Dyspnea     6/23/2022 10:40 AM Suzi Buys A Add [E87 6] Hypokalemia       ED Disposition     ED Disposition   Discharge    Condition   Stable    Date/Time   Thu Jun 23, 2022 10:40 AM    Comment   Wilfredo Lomax  discharge to home/self care                 Follow-up Information     Follow up With Specialties Details Why Tuan Minaya MD Family Medicine Schedule an appointment as soon as possible for a visit  As needed Fernando  [id number] [phone]            Discharge Medication List as of 6/23/2022 10:41 AM      CONTINUE these medications which have NOT CHANGED    Details   atorvastatin (LIPITOR) 40 mg tablet Take 20 mg by mouth daily , Starting Fri 12/18/2020, Historical Med      busPIRone (BUSPAR) 10 mg tablet Take 1 tablet (10 mg total) by mouth 3 (three) times a day, Starting Wed 6/1/2022, Normal      dicyclomine (BENTYL) 20 mg tablet Take 1 tablet (20 mg total) by mouth 2 (two) times a day, Starting Tue 6/8/2021, Normal      DULoxetine (CYMBALTA) 20 mg capsule 2 po qd, Normal      gabapentin (NEURONTIN) 300 mg capsule 1 po qam and 2 po qhs, Normal      lithium 600 MG capsule Take 1 capsule (600 mg total) by mouth daily at bedtime, Starting Wed 6/1/2022, Until Tue 8/30/2022, Normal      Multiple Vitamin (multivitamin) tablet Take 1 tablet by mouth daily, Historical Med      traZODone (DESYREL) 50 mg tablet 1/2 - 1 po qhs prn, Normal             No discharge procedures on file      PDMP Review       Value Time User    PDMP Reviewed  Yes 11/6/2020 10:05 AM Signe Siemens, MD          ED Provider  Electronically Signed by           Francisco Ramon PA-C  06/23/22 7662

## 2022-06-23 NOTE — DISCHARGE INSTRUCTIONS
DISCHARGE INSTRUCTIONS:    FOLLOW UP WITH YOUR PRIMARY CARE PROVIDER OR THE [address] [place] [address]  MAKE AN APPOINTMENT TO BE SEEN  TAKE TYLENOL OR MOTRIN FOR PAIN  REST AND DRINK PLENTY OF FLUIDS  IF SYMPTOMS WORSEN OR NEW SYMPTOMS ARISE, RETURN TO THE ER TO BE SEEN

## 2022-08-18 ENCOUNTER — OFFICE VISIT (OUTPATIENT)
Dept: PSYCHIATRY | Facility: CLINIC | Age: 47
End: 2022-08-18
Payer: COMMERCIAL

## 2022-08-18 DIAGNOSIS — F51.04 PSYCHOPHYSIOLOGICAL INSOMNIA: ICD-10-CM

## 2022-08-18 DIAGNOSIS — F33.2 MAJOR DEPRESSIVE DISORDER, RECURRENT, SEVERE WITHOUT PSYCHOTIC FEATURES (HCC): ICD-10-CM

## 2022-08-18 DIAGNOSIS — F41.1 GENERALIZED ANXIETY DISORDER: ICD-10-CM

## 2022-08-18 PROCEDURE — 99214 OFFICE O/P EST MOD 30 MIN: CPT | Performed by: PHYSICIAN ASSISTANT

## 2022-08-18 RX ORDER — BUSPIRONE HYDROCHLORIDE 10 MG/1
10 TABLET ORAL 3 TIMES DAILY
Qty: 90 TABLET | Refills: 2 | Status: SHIPPED | OUTPATIENT
Start: 2022-08-18

## 2022-08-18 RX ORDER — DULOXETIN HYDROCHLORIDE 20 MG/1
CAPSULE, DELAYED RELEASE ORAL
Qty: 60 CAPSULE | Refills: 2 | Status: SHIPPED | OUTPATIENT
Start: 2022-08-18

## 2022-08-18 RX ORDER — LITHIUM CARBONATE 600 MG/1
600 CAPSULE ORAL
Qty: 30 CAPSULE | Refills: 2 | Status: SHIPPED | OUTPATIENT
Start: 2022-08-18 | End: 2022-11-16

## 2022-08-18 RX ORDER — TRAZODONE HYDROCHLORIDE 50 MG/1
TABLET ORAL
Qty: 30 TABLET | Refills: 2 | Status: SHIPPED | OUTPATIENT
Start: 2022-08-18

## 2022-08-18 NOTE — PSYCH
This note was not shared with the patient due to reasonable likelihood of causing patient harm      PROGRESS NOTE        [facility]      Name and Date of Birth:  Tonda Mcburney  48 y o  1975    Date of Visit: 08/18/22    SUBJECTIVE:  Ashok De Jesus was seen for follow-up major depression, generalized anxiety and for medication management  Since his last visit he continues have episodes where he feels easily irritated  At his last visit BuSpar was reduced to 10 mg 3 times a day due to dizziness and he states that his dizziness has improved  States he gets angry easily and will yell or get frustrated  No physical aggression or agitation  States he had an episode where he feels more depressed and down at times as well  Denies any suicidal intention  States that he thinks about his family/son  and would not do anything because of them  Also has his dog  States after couple days his depression had improved  Reports that he has been performing well at work though and focus and concentration has been stable there  States that outside of work he has not been doing much of anything  Has been sleeping well at night  Appetite is adequate  He has not been exercising as we had discussed  Has been compliant with taking his medications as prescribed  Physically he is having some increased back pain today and is going to see the chiropractor  In the past he had been on Latuda and Rexulti in combination with antidepressant therapy  He has history of ADHD symptoms throughout childhood with some residual symptoms remaining  History of substance abuse ( cocaine)  has been sober since 2013  He denies suicidal ideation, intent or plan at present, has no suicidal ideation, intent or plan at present  He denies any auditory hallucinations and visual hallucinations, denies any other delusional thinking, denies any delusional thinking      He denies any side effects from medications    HPI ROS Appetite Changes and Sleep: normal appetite, normal sleep    Review Of Systems:      Constitutional Negative   ENT Negative   Cardiovascular Negative   Respiratory Negative   Gastrointestinal Negative   Genitourinary Negative   Musculoskeletal Back pain   Integumentary Negative   Neurological Negative   Endocrine Negative   Other Symptoms Negative and None       Laboratory Results: No results found for this or any previous visit  Substance Abuse History:    Social History     Substance and Sexual Activity   Drug Use Not Currently    Types: Marijuana    Comment: previously used medical marijuana but not currently using       Family Psychiatric History:     No family history on file      The following portions of the patient's history were reviewed and updated as appropriate: past family history, past medical history, past social history, past surgical history and problem list     Social History     Socioeconomic History    Marital status: Single     Spouse name: Not on file    Number of children: Not on file    Years of education: Not on file    Highest education level: Not on file   Occupational History    Not on file   Tobacco Use    Smoking status: Former Smoker     Types: Cigarettes     Quit date:      Years since quittin 6    Smokeless tobacco: Never Used   Vaping Use    Vaping Use: Never used   Substance and Sexual Activity    Alcohol use: Not Currently     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    Drug use: Not Currently     Types: Marijuana     Comment: previously used medical marijuana but not currently using    Sexual activity: Not on file   Other Topics Concern    Not on file   Social History Narrative    Not on file     Social Determinants of Health     Financial Resource Strain: Not on file   Food Insecurity: Not on file   Transportation Needs: Not on file   Physical Activity: Not on file   Stress: Not on file   Social Connections: Not on file Intimate Partner Violence: Not on file   Housing Stability: Not on file     Social History     Social History Narrative    Not on file        Social History     Tobacco History     Smoking Status  Former Smoker Quit date  1/1/2004 Smoking Tobacco Type  Cigarettes    Smokeless Tobacco Use  Never Used          Alcohol History     Alcohol Use Status  Not Currently Comment  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          Drug Use     Drug Use Status  Not Currently Types  Marijuana Comment  previously used medical marijuana but not currently using          Sexual Activity     Sexually Active  Not Asked          Activities of Daily Living    Not Asked               Additional Substance Use Detail     Questions Responses    Problems Due to Past Use of Alcohol? Yes    Problems Due to Past Use of Substances?  Yes    Substance Use Assessment Denies substance use within the past 12 months    Alcohol Use Frequency Denies use in past 12 months    Cannabis frequency Past regular use    Comment: Past rare use on 6/9/2020 Past rare use ->Past regular use on 7/24/2020     Heroin Frequency Denies use in past 12 months    Cannabis method Smoke    Comment: Smoke on 7/24/2020     1st Use of Alcohol age 13    Cannabis 1st Use medical marijuana for anxiety    Last Use of Alcohol & Amount sober 6 5 years    Longest Abstinence from Alcohol 6 5 years    Cocaine frequency Past regular use    Comment: Never used on 6/9/2020 Never used ->Past regular use on 7/24/2020     Crack Cocaine Frequency Prior dependence    Methamphetamine Frequency Denies use in past 12 months    Narcotic Frequency Denies use in past 12 months    Benzodiazepine Frequency Denies use in past 12 months    Amphetamine frequency Denies use in past 12 months    Barbituate Frequency Denies use use in past 12 months    Inhalant frequency Never used    Comment: Never used on 6/9/2020     Hallucinogen frequency Past regular use    Comment: Never used on 6/9/2020 Never used ->Past regular use on 7/24/2020     Ecstasy frequency Never used    Comment: Never used on 6/9/2020     Other drug frequency Never used    Comment: Never used on 6/9/2020     Opiate frequency Denies use in past 12 months    Not reviewed  OBJECTIVE:     Mental Status Evaluation:    Appearance age appropriate, casually dressed   Behavior pleasant, cooperative   Speech normal volume, normal pitch   Mood Labile   Affect Mood congruent, currently calm   Thought Processes logical   Associations intact associations   Thought Content normal   Perceptual Disturbances: none   Abnormal Thoughts  Risk Potential Suicidal ideation - None  Homicidal ideation - None  Potential for aggression - No   Orientation oriented to person, place, time/date and situation   Memory recent and remote memory grossly intact   Cosciousness alert and awake   Attention Span attention span and concentration are fair   Intellect Not formally assessed   Insight age appropriate    Judgement good    Muscle Strength and  Gait Steady gait   Language no difficulty naming common objects   Fund of Knowledge displays adequate knowledge of current events   Pain Moderate   Pain Scale Pain       Assessment/Plan:       Diagnoses and all orders for this visit:    Generalized anxiety disorder  -     lurasidone (Latuda) 40 mg tablet; 1/2 po qd with dinner x 7 days then 1 po qd with dinner  -     busPIRone (BUSPAR) 10 mg tablet; Take 1 tablet (10 mg total) by mouth 3 (three) times a day  -     lithium 600 MG capsule; Take 1 capsule (600 mg total) by mouth daily at bedtime    Major depressive disorder, recurrent, severe without psychotic features (HCC)  -     lurasidone (Latuda) 40 mg tablet; 1/2 po qd with dinner x 7 days then 1 po qd with dinner  -     lithium 600 MG capsule;  Take 1 capsule (600 mg total) by mouth daily at bedtime  -     DULoxetine (CYMBALTA) 20 mg capsule; 2 po qd    Psychophysiological insomnia  -     lurasidone (Latuda) 40 mg tablet; 1/2 po qd with dinner x 7 days then 1 po qd with dinner  -     traZODone (DESYREL) 50 mg tablet; 1/2 - 1 po qhs prn          Treatment Recommendations/Precautions:  Decrease gabapentin to 600 mg at bedtime x3 nights then 300 mg at bedtime x3 nights then discontinue     Start Latuda 40 mg half a tab daily with dinner x7 days then one tablet daily with dinner    Continue Lithium 600 mg at bedtime     Continue Cymbalta 40 mg daily     Continue BuSpar 10 mg t i d  consider discontinuation in the future to decrease polypharmacy    Trazodone 50 mg half to one at bedtime as needed  Lithium level 0 3 on June 23, 2022    Follow-up in one month and he will call me sooner if any questions or concerns   Written instructions provided to patient regarding above treatment plan and he is in agreement with medications and treatment plan   All questions were answered and potential for adverse effects was discussed    Risks/Benefits      Risks, Benefits And Possible Side Effects Of Medications:    Risks, benefits, and possible side effects of medications explained to patient and patient verbalizes understanding and agreement for treatment      Controlled Medication Discussion:     Not applicable    Psychotherapy Provided:     Individual psychotherapy provided: No

## 2022-08-19 ENCOUNTER — TELEPHONE (OUTPATIENT)
Dept: PSYCHIATRY | Facility: CLINIC | Age: 47
End: 2022-08-19

## 2022-08-19 DIAGNOSIS — F41.1 GENERALIZED ANXIETY DISORDER: Primary | ICD-10-CM

## 2022-08-19 RX ORDER — GABAPENTIN 300 MG/1
CAPSULE ORAL
Qty: 14 CAPSULE | Refills: 0 | Status: SHIPPED | OUTPATIENT
Start: 2022-08-19 | End: 2022-09-12

## 2022-08-24 ENCOUNTER — APPOINTMENT (OUTPATIENT)
Dept: RADIOLOGY | Facility: HOSPITAL | Age: 47
End: 2022-08-24
Payer: COMMERCIAL

## 2022-08-24 ENCOUNTER — HOSPITAL ENCOUNTER (EMERGENCY)
Facility: HOSPITAL | Age: 47
Discharge: HOME | End: 2022-08-24
Attending: EMERGENCY MEDICINE
Payer: COMMERCIAL

## 2022-08-24 VITALS
WEIGHT: 218.26 LBS | TEMPERATURE: 98.4 F | SYSTOLIC BLOOD PRESSURE: 166 MMHG | HEART RATE: 90 BPM | DIASTOLIC BLOOD PRESSURE: 88 MMHG | BODY MASS INDEX: 29.6 KG/M2 | OXYGEN SATURATION: 98 % | RESPIRATION RATE: 16 BRPM

## 2022-08-24 DIAGNOSIS — S60.112A SUBUNGUAL HEMATOMA OF LEFT THUMB, INITIAL ENCOUNTER: Primary | ICD-10-CM

## 2022-08-24 DIAGNOSIS — S46.912A STRAIN OF LEFT UPPER ARM, INITIAL ENCOUNTER: ICD-10-CM

## 2022-08-24 PROCEDURE — 11740 EVACUATION SUBUNGUAL HMTMA: CPT | Performed by: EMERGENCY MEDICINE

## 2022-08-24 PROCEDURE — 99283 EMERGENCY DEPT VISIT LOW MDM: CPT

## 2022-08-24 PROCEDURE — 73140 X-RAY EXAM OF FINGER(S): CPT

## 2022-08-24 PROCEDURE — 99284 EMERGENCY DEPT VISIT MOD MDM: CPT | Performed by: EMERGENCY MEDICINE

## 2022-08-24 RX ORDER — NAPROXEN 500 MG/1
500 TABLET ORAL 2 TIMES DAILY PRN
Qty: 14 TABLET | Refills: 0 | Status: SHIPPED | OUTPATIENT
Start: 2022-08-24

## 2022-08-24 NOTE — ED PROVIDER NOTES
History  Chief Complaint   Patient presents with    Finger Injury     Smashed left thumb  Has bruising under nail  49 yo M using a wrench at work, that suddenly gave way causing his hand to fly back and his thumb struck something  He developed pain under the nail bed of his thumb with a subungal hematoma  He drove home from work and has noticed left arm pain radiating to his neck as well   He feels like his bicep is twitching      History provided by:  Patient      Prior to Admission Medications   Prescriptions Last Dose Informant Patient Reported? Taking?    DULoxetine (CYMBALTA) 20 mg capsule   No Yes   Si po qd   Multiple Vitamin (multivitamin) tablet   Yes Yes   Sig: Take 1 tablet by mouth daily   atorvastatin (LIPITOR) 40 mg tablet   Yes Yes   Sig: Take 20 mg by mouth daily    busPIRone (BUSPAR) 10 mg tablet   No Yes   Sig: Take 1 tablet (10 mg total) by mouth 3 (three) times a day   dicyclomine (BENTYL) 20 mg tablet Not Taking at Unknown time  No No   Sig: Take 1 tablet (20 mg total) by mouth 2 (two) times a day   Patient not taking: No sig reported   gabapentin (Neurontin) 300 mg capsule   No Yes   Si po qhs x 3 nights then 1 po qhs   lithium 600 MG capsule   No Yes   Sig: Take 1 capsule (600 mg total) by mouth daily at bedtime   lurasidone (Latuda) 40 mg tablet   No Yes   Si/2 po qd with dinner x 7 days then 1 po qd with dinner   traZODone (DESYREL) 50 mg tablet   No Yes   Si/2 - 1 po qhs prn      Facility-Administered Medications: None       Past Medical History:   Diagnosis Date    Anxiety     Chest pain     after eating- seen in ER several x - believes secondary to hernia    CPAP (continuous positive airway pressure) dependence     Depression     H  pylori infection     Hiatal hernia     Hyperlipidemia     PONV (postoperative nausea and vomiting)     Sleep apnea     Suicidal thoughts     Wears glasses        Past Surgical History:   Procedure Laterality Date    COLONOSCOPY      INGUINAL HERNIA REPAIR Right     [place] LAP, REPAIR PARAESOPHAGEAL HERNIA, INCL FUNDOPLASTY W/ MESH N/A 2021    Procedure: REPAIR HERNIA PARAESOPHAGEAL LAPAROSCOPIC W ROBOTICS with TOUPET FUNDOPLICATION cruroplasty with mesh;  Surgeon: Lorena Monreal MD;  Location: [facility];  Service: General    SHOULDER SURGERY      left       History reviewed  No pertinent family history  I have reviewed and agree with the history as documented  E-Cigarette/Vaping    E-Cigarette Use Never User      E-Cigarette/Vaping Substances    Nicotine No     THC No     CBD No     Flavoring No     Other No     Unknown No      Social History     Tobacco Use    Smoking status: Former Smoker     Types: Cigarettes     Quit date:      Years since quittin 6    Smokeless tobacco: Never Used   Vaping Use    Vaping Use: Never used   Substance Use Topics    Alcohol use: Not Currently     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    Drug use: Not Currently     Types: Marijuana     Comment: previously used medical marijuana but not currently using       Review of Systems   Constitutional: Negative for appetite change, chills and fever  HENT: Negative for sore throat  Respiratory: Negative for cough, shortness of breath and wheezing  Cardiovascular: Negative for chest pain and palpitations  Gastrointestinal: Negative for abdominal pain, diarrhea, nausea and vomiting  Genitourinary: Negative for dysuria and hematuria  Musculoskeletal: Negative for neck pain  Skin: Negative for rash  Neurological: Negative for dizziness, weakness and headaches  Psychiatric/Behavioral: Negative for suicidal ideas  All other systems reviewed and are negative  Physical Exam  Physical Exam  Vitals and nursing note reviewed  Constitutional:       Appearance: He is well-developed  HENT:      Head: Normocephalic and atraumatic        Right Ear: External ear normal       Left Ear: External ear normal       Nose: Nose normal  Eyes:      Conjunctiva/sclera: Conjunctivae normal       Pupils: Pupils are equal, round, and reactive to light  Cardiovascular:      Rate and Rhythm: Normal rate  Pulmonary:      Effort: Pulmonary effort is normal    Abdominal:      Tenderness: There is no abdominal tenderness  Musculoskeletal:         General: Normal range of motion  Left shoulder: No swelling or deformity  Normal range of motion  Left upper arm: No swelling, edema, deformity or tenderness  Hands:       Cervical back: Normal range of motion and neck supple  Skin:     General: Skin is warm and dry  Neurological:      Mental Status: He is alert and oriented to person, place, and time  Cranial Nerves: No cranial nerve deficit  Coordination: Coordination normal    Psychiatric:         Behavior: Behavior normal          Thought Content:  Thought content normal          Judgment: Judgment normal          Vital Signs  ED Triage Vitals   Temperature Pulse Respirations Blood Pressure SpO2   08/24/22 1636 08/24/22 1636 08/24/22 1636 08/24/22 1636 08/24/22 1637   98 °F (36 7 °C) 90 16 166/88 98 %      Temp Source Heart Rate Source Patient Position - Orthostatic VS BP Location FiO2 (%)   08/24/22 1637 -- -- -- --   Oral          Pain Score       --                  Vitals:    08/24/22 1636   BP: 166/88   Pulse: 90         Visual Acuity      ED Medications  Medications - No data to display    Diagnostic Studies  Results Reviewed     None                 XR thumb 2 views LEFT   ED Interpretation by Scooby Ryan MD (08/24 1821)   Suspect artifact rather than avulsion fracture of left distal phalanx,                  Procedures  General Procedure    Date/Time: 8/24/2022 6:03 PM  Performed by: Scooby Ryan MD  Authorized by: Scooby Ryan MD     Patient location:  ED  Assisting Provider(s): No    Consent:     Consent obtained:  Verbal  Indications:     Indications:  Subungal hematoma of left thumb  Anesthesia (see MAR for exact dosages): Anesthesia method:  None  Procedure Detail:     Equipment used:  Cordless electrocautery    Procedure note (site, laterality, method, findings):  Maximal area of hematoma trephinated with electrocautery with release of blood and improvement in pain  Post-procedure details:     Patient tolerance of procedure: Tolerated well, no immediate complications             ED Course  ED Course as of 08/24/22 1924   Wed Aug 24, 2022   1818 XR thumb 2 views LEFT  No fracture, I suspect overlying shadow at base of distal phalanx    1835 Reviewed results with patient at bedside and updated on the plan  I don't think he has a fractue but informed him the xray will be overread and he will get a call  Also, I went over the possibility his nail will fall off depending on the pressure of the nail bed, and to follow up with ortho for persistent upper arm or shoulder pain  MDM    Disposition  Final diagnoses:   Subungual hematoma of left thumb, initial encounter   Strain of left upper arm, initial encounter     Time reflects when diagnosis was documented in both MDM as applicable and the Disposition within this note     Time User Action Codes Description Comment    8/24/2022  6:23 PM Lissette Jett Add [X56 [id number]] Subungual hematoma of left thumb, initial encounter     8/24/2022  6:23 PM Lissette Jett Add [[id number]] Strain of left upper arm, initial encounter       ED Disposition     ED Disposition   Discharge    Condition   Good    Date/Time   Wed Aug 24, 2022  6:23 PM    Comment   Shailesh Fly  discharge to home/self care                 Follow-up Information     Follow up With Specialties Details Why Contact Info Additional [address] [facility] Call  If symptoms worsen [address]  [address] [facility] [id number] [address] [facility] [place], [address], [address], [place], [place], [address] [phone]          Discharge Medication List as of 8/24/2022  6:25 PM      START taking these medications    Details   naproxen (NAPROSYN) 500 mg tablet Take 1 tablet (500 mg total) by mouth 2 (two) times a day as needed for mild pain or moderate pain for up to 14 doses, Starting Wed 8/24/2022, Normal         CONTINUE these medications which have NOT CHANGED    Details   atorvastatin (LIPITOR) 40 mg tablet Take 20 mg by mouth daily , Starting Fri 12/18/2020, Historical Med      busPIRone (BUSPAR) 10 mg tablet Take 1 tablet (10 mg total) by mouth 3 (three) times a day, Starting Thu 8/18/2022, Normal      DULoxetine (CYMBALTA) 20 mg capsule 2 po qd, Normal      gabapentin (Neurontin) 300 mg capsule 2 po qhs x 3 nights then 1 po qhs, Normal      lithium 600 MG capsule Take 1 capsule (600 mg total) by mouth daily at bedtime, Starting Thu 8/18/2022, Until Wed 11/16/2022, Normal      lurasidone (Latuda) 40 mg tablet 1/2 po qd with dinner x 7 days then 1 po qd with dinner, Normal      Multiple Vitamin (multivitamin) tablet Take 1 tablet by mouth daily, Historical Med      traZODone (DESYREL) 50 mg tablet 1/2 - 1 po qhs prn, Normal      dicyclomine (BENTYL) 20 mg tablet Take 1 tablet (20 mg total) by mouth 2 (two) times a day, Starting Tue 6/8/2021, Normal             No discharge procedures on file      PDMP Review       Value Time User    PDMP Reviewed  Yes 11/6/2020 10:05 AM Dionisio Li MD          ED Provider  Electronically Signed by           Ralph Vance MD  08/24/22 9683

## 2022-08-24 NOTE — DISCHARGE INSTRUCTIONS
Muscle Strain   WHAT YOU NEED TO KNOW:   A muscle strain is a twist, pull, or tear of a muscle or tendon  A tendon is a strong elastic tissue that connects a muscle to a bone  Signs of a strained muscle include bruising and swelling over the area, pain with movement, and loss of strength  DISCHARGE INSTRUCTIONS:   Return to the emergency department if:   You suddenly cannot feel or move your injured muscle  Contact your healthcare provider if:   Your pain and swelling worsen or do not go away  You have questions or concerns about your condition or care  3 to 7 days after the injury:  Use Rest, Ice, Compression, and Elevation (RICE) to help stop bruising and decrease pain and swelling  Rest:  Rest your muscle to allow your injury to heal  When the pain decreases, begin normal, slow movements  For mild and moderate muscle strains, you should rest your muscles for about 2 days  However, if you have a severe muscle strain, you should rest for 10 to 14 days  Ice:  Put an ice pack on the injured area  Put a towel between the ice pack and your skin  Do not put the ice pack directly on your skin  You can use a package of frozen peas instead of an ice pack  Compression:  You may need to wrap an elastic bandage around the area to decrease swelling  It should be tight enough for you to feel support  Do not wrap it too tightly  Elevation:  Keep the injured muscle raised above your heart if possible  For example if you have a strain of your lower leg muscle, lie down and prop your leg up on pillows  This helps decrease pain and swelling

## 2022-09-12 ENCOUNTER — OFFICE VISIT (OUTPATIENT)
Dept: PSYCHIATRY | Facility: CLINIC | Age: 47
End: 2022-09-12
Payer: COMMERCIAL

## 2022-09-12 DIAGNOSIS — F41.1 GENERALIZED ANXIETY DISORDER: Primary | ICD-10-CM

## 2022-09-12 DIAGNOSIS — F33.2 MAJOR DEPRESSIVE DISORDER, RECURRENT, SEVERE WITHOUT PSYCHOTIC FEATURES (HCC): ICD-10-CM

## 2022-09-12 DIAGNOSIS — Z79.899 ENCOUNTER FOR LITHIUM MONITORING: ICD-10-CM

## 2022-09-12 DIAGNOSIS — Z51.81 ENCOUNTER FOR LITHIUM MONITORING: ICD-10-CM

## 2022-09-12 PROCEDURE — 99214 OFFICE O/P EST MOD 30 MIN: CPT | Performed by: PHYSICIAN ASSISTANT

## 2022-09-12 NOTE — PSYCH
This note was not shared with the patient due to reasonable likelihood of causing patient harm    PROGRESS NOTE        Ed ErlinWylie      Name and Date of Birth:  Drea Menard  50 y o  1975    Date of Visit: 09/12/22    SUBJECTIVE:  Jeffrey Lin was seen for follow-up of major depression, generalized anxiety and for medication management  States he feels less depressed since being on [place]  Feels more energy since being on Latuda verse gabapentin  No adverse effects noted  Still gets overwhelmed and overreacts to situations  States he gets easily irritated  Work is a trigger but he states it carries over into driving home and getting upset with his neighbors  States that he typically yells or feels internally restless when he gets angry  No physical aggression or agitation  He had been exercising a few years ago on a regular basis and acknowledges that he felt much better when he was doing this  We discussed starting with taking his dog for a short walk every day when he gets home  On a positive note he did get a bike that has an electric option  He is planning to start riding his bike outdoors again  No new medical issues or medications noted  He denies suicidal ideation, intent or plan at present, has no suicidal ideation, intent or plan at present  He denies any auditory hallucinations and visual hallucinations, denies any other delusional thinking, denies any delusional thinking  He denies any side effects from medications      HPI ROS Appetite Changes and Sleep: normal appetite, normal sleep    Review Of Systems:      Constitutional Negative   ENT Negative   Cardiovascular Negative   Respiratory Negative   Gastrointestinal Negative   Genitourinary Negative   Musculoskeletal Negative   Integumentary Negative   Neurological Negative   Endocrine Negative   Other Symptoms Negative and None       Laboratory Results: No results found for this or any previous visit  Substance Abuse History:    Social History     Substance and Sexual Activity   Drug Use Not Currently    Types: Marijuana    Comment: previously used medical marijuana but not currently using       Family Psychiatric History:     No family history on file      The following portions of the patient's history were reviewed and updated as appropriate: past family history, past medical history, past social history, past surgical history and problem list     Social History     Socioeconomic History    Marital status: Single     Spouse name: Not on file    Number of children: Not on file    Years of education: Not on file    Highest education level: Not on file   Occupational History    Not on file   Tobacco Use    Smoking status: Former Smoker     Types: Cigarettes     Quit date:      Years since quittin 7    Smokeless tobacco: Never Used   Vaping Use    Vaping Use: Never used   Substance and Sexual Activity    Alcohol use: Not Currently     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    Drug use: Not Currently     Types: Marijuana     Comment: previously used medical marijuana but not currently using    Sexual activity: Not on file   Other Topics Concern    Not on file   Social History Narrative    Not on file     Social Determinants of Health     Financial Resource Strain: Not on file   Food Insecurity: Not on file   Transportation Needs: Not on file   Physical Activity: Not on file   Stress: Not on file   Social Connections: Not on file   Intimate Partner Violence: Not on file   Housing Stability: Not on file     Social History     Social History Narrative    Not on file        Social History     Tobacco History     Smoking Status  Former Smoker Quit date  2004 Smoking Tobacco Type  Cigarettes    Smokeless Tobacco Use  Never Used          Alcohol History     Alcohol Use Status  Not Currently Comment  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          Drug Use     Drug Use Status  Not Currently Types  Marijuana Comment  previously used medical marijuana but not currently using          Sexual Activity     Sexually Active  Not Asked          Activities of Daily Living    Not Asked               Additional Substance Use Detail     Questions Responses    Problems Due to Past Use of Alcohol? Yes    Problems Due to Past Use of Substances? Yes    Substance Use Assessment Denies substance use within the past 12 months    Alcohol Use Frequency Denies use in past 12 months    Cannabis frequency Past regular use    Comment: Past rare use on 6/9/2020 Past rare use ->Past regular use on 7/24/2020     Heroin Frequency Denies use in past 12 months    Cannabis method Smoke    Comment: Smoke on 7/24/2020     1st Use of Alcohol age 13    Cannabis 1st Use medical marijuana for anxiety    Last Use of Alcohol & Amount sober 6 5 years    Longest Abstinence from Alcohol 6 5 years    Cocaine frequency Past regular use    Comment: Never used on 6/9/2020 Never used ->Past regular use on 7/24/2020     Crack Cocaine Frequency Prior dependence    Methamphetamine Frequency Denies use in past 12 months    Narcotic Frequency Denies use in past 12 months    Benzodiazepine Frequency Denies use in past 12 months    Amphetamine frequency Denies use in past 12 months    Barbituate Frequency Denies use use in past 12 months    Inhalant frequency Never used    Comment: Never used on 6/9/2020     Hallucinogen frequency Past regular use    Comment: Never used on 6/9/2020 Never used ->Past regular use on 7/24/2020     Ecstasy frequency Never used    Comment: Never used on 6/9/2020     Other drug frequency Never used    Comment: Never used on 6/9/2020     Opiate frequency Denies use in past 12 months    Not reviewed              OBJECTIVE:     Mental Status Evaluation:    Appearance age appropriate, casually dressed   Behavior pleasant, cooperative   Speech normal volume, normal pitch   Mood Currently calm   Affect Euthymic   Thought Processes logical   Associations intact associations   Thought Content normal   Perceptual Disturbances: none   Abnormal Thoughts  Risk Potential Suicidal ideation - None  Homicidal ideation - None  Potential for aggression - No   Orientation oriented to person, place, time/date and situation   Memory recent and remote memory grossly intact   Cosciousness alert and awake   Attention Span attention span and concentration are fair   Intellect Not formally assessed   Insight Fair   Judgement Fair    Muscle Strength and  Gait Steady gait   Language no difficulty naming common objects   Fund of Knowledge displays adequate knowledge of current events   Pain none   Pain Scale 0       Assessment/Plan:       Diagnoses and all orders for this visit:    Generalized anxiety disorder    Major depressive disorder, recurrent, severe without psychotic features ([facility] [address] )    Encounter for lithium monitoring          Treatment Recommendations/Precautions:  Latuda 40 mg daily with dinner  Lithium 600 mg at bedtime   Lithium level 0 3 on 6/23  Cymbalta 40 mg daily   Trazodone 50 mg half to one at bedtime p r n  BuSpar 10 mg t i d  will decrease to 5 mg 3 times a day   Question if higher dose of BuSpar in combination with Cymbalta is exacerbating lability  He has not noted much of a difference with his anxiety since adding BuSpar  He will follow-up in about two months she will call me sooner if any questions or concerns      Risks/Benefits      Risks, Benefits And Possible Side Effects Of Medications:    Risks, benefits, and possible side effects of medications explained to patient and patient verbalizes understanding and agreement for treatment      Controlled Medication Discussion:     Not applicable    Psychotherapy Provided:     Individual psychotherapy provided: No

## 2022-09-20 ENCOUNTER — TELEPHONE (OUTPATIENT)
Dept: PSYCHIATRY | Facility: CLINIC | Age: 47
End: 2022-09-20

## 2022-09-20 NOTE — TELEPHONE ENCOUNTER
Sudarshan Sage doesn;'t want to change his appointment, but asked for a call sometime this week regarding recent med change  Buspirone was cut in half, and he's getting panic attacks, and was a little shaky yesterday  He wants to go back to the full dosage  Had thoughts of suicide over the weekend  But he says no active thoughts/ or plan to act on anything  thinks just the dosage decrease caused it   Would like a call

## 2022-09-20 NOTE — TELEPHONE ENCOUNTER
Returned call and received voicemail  Instructed Raman to resume buspar dose of 10 mg po tid  Also he is to go to ED or call 911 if having SI  Asked him to call back to confirm message or if questions/concerns

## 2022-11-10 DIAGNOSIS — F51.04 PSYCHOPHYSIOLOGICAL INSOMNIA: ICD-10-CM

## 2022-11-10 DIAGNOSIS — F33.2 MAJOR DEPRESSIVE DISORDER, RECURRENT, SEVERE WITHOUT PSYCHOTIC FEATURES (HCC): ICD-10-CM

## 2022-11-10 DIAGNOSIS — F41.1 GENERALIZED ANXIETY DISORDER: ICD-10-CM

## 2022-11-10 RX ORDER — BUSPIRONE HYDROCHLORIDE 10 MG/1
TABLET ORAL
Qty: 90 TABLET | Refills: 2 | Status: SHIPPED | OUTPATIENT
Start: 2022-11-10

## 2022-11-16 ENCOUNTER — OFFICE VISIT (OUTPATIENT)
Dept: PSYCHIATRY | Facility: CLINIC | Age: 47
End: 2022-11-16

## 2022-11-16 VITALS — BODY MASS INDEX: 30.75 KG/M2 | HEIGHT: 72 IN | WEIGHT: 227 LBS

## 2022-11-16 DIAGNOSIS — F33.2 MAJOR DEPRESSIVE DISORDER, RECURRENT, SEVERE WITHOUT PSYCHOTIC FEATURES (HCC): ICD-10-CM

## 2022-11-16 DIAGNOSIS — F41.1 GENERALIZED ANXIETY DISORDER: ICD-10-CM

## 2022-11-16 DIAGNOSIS — Z51.81 ENCOUNTER FOR LITHIUM MONITORING: ICD-10-CM

## 2022-11-16 DIAGNOSIS — Z79.899 ENCOUNTER FOR LITHIUM MONITORING: ICD-10-CM

## 2022-11-16 DIAGNOSIS — F51.04 PSYCHOPHYSIOLOGICAL INSOMNIA: ICD-10-CM

## 2022-11-16 NOTE — PSYCH
This note was not shared with the patient due to reasonable likelihood of causing patient harm    PROGRESS NOTE        [facility]      Name and Date of Birth:  Raeann Contreras  50 y o  1975    Date of Visit: 11/16/22    SUBJECTIVE:  Steve Brian was seen for follow-up of major depression, generalized anxiety disorder and for medication management  At his last visit BuSpar was reduced to 5 mg t i d  He had called and stated he was not feeling as well so dose was reduced increase back to 10 mg t i d     Since being back on this dosage she feels as though he has been doing better  He states he has been maintaining his moods  Feels at his baseline  No significant depressive episodes  Denies any suicidal or homicidal ideation  States that he has had some days where he is forgetting to take his medication  States that he does not eat dinner consistently  Discussed teaching time of Luci tOero may be to lunchtime and setting alarms or reminders on his phone  Some stressors with work and challenges with dealing with other people  When he finds himself getting irritated he tries to "catch himself"  Sleeping and eating well  Has not been exercising which we discussed  He is planning to join gym again,      He denies suicidal ideation, intent or plan at present, has no suicidal ideation, intent or plan at present  He denies any auditory hallucinations and visual hallucinations, denies any other delusional thinking, denies any delusional thinking  He denies any side effects from medications      HPI ROS Appetite Changes and Sleep: normal appetite, normal sleep    Review Of Systems:      Constitutional Negative   ENT Negative   Cardiovascular Negative   Respiratory Negative   Gastrointestinal Negative   Genitourinary Negative   Musculoskeletal Negative   Integumentary Negative   Neurological Negative   Endocrine Negative   Other Symptoms Negative and None Laboratory Results: No results found for this or any previous visit  Substance Abuse History:    Social History     Substance and Sexual Activity   Drug Use Not Currently   • Types: Marijuana    Comment: previously used medical marijuana but not currently using       Family Psychiatric History:     No family history on file      The following portions of the patient's history were reviewed and updated as appropriate: past family history, past medical history, past social history, past surgical history and problem list     Social History     Socioeconomic History   • Marital status: Single     Spouse name: Not on file   • Number of children: Not on file   • Years of education: Not on file   • Highest education level: Not on file   Occupational History   • Not on file   Tobacco Use   • Smoking status: Former     Types: Cigarettes     Quit date:      Years since quittin 8   • Smokeless tobacco: Never   Vaping Use   • Vaping Use: Never used   Substance and Sexual Activity   • Alcohol use: Not Currently     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   • Drug use: Not Currently     Types: Marijuana     Comment: previously used medical marijuana but not currently using   • Sexual activity: Not on file   Other Topics Concern   • Not on file   Social History Narrative   • Not on file     Social Determinants of Health     Financial Resource Strain: Not on file   Food Insecurity: Not on file   Transportation Needs: Not on file   Physical Activity: Not on file   Stress: Not on file   Social Connections: Not on file   Intimate Partner Violence: Not on file   Housing Stability: Not on file     Social History     Social History Narrative   • Not on file        Social History     Tobacco History     Smoking Status  Former Quit Date   Smoking Tobacco Type  Cigarettes quit in     Smokeless Tobacco Use  Never          Alcohol History     Alcohol Use Status  Not Currently Comment  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          Drug Use     Drug Use Status  Not Currently Types  Marijuana Comment  previously used medical marijuana but not currently using          Sexual Activity     Sexually Active  Not Asked          Activities of Daily Living    Not Asked               Additional Substance Use Detail     Questions Responses    Problems Due to Past Use of Alcohol? Yes    Problems Due to Past Use of Substances? Yes    Substance Use Assessment Denies substance use within the past 12 months    Alcohol Use Frequency Denies use in past 12 months    Cannabis frequency Past regular use    Comment: Past rare use on 6/9/2020 Past rare use ->Past regular use on 7/24/2020     Heroin Frequency Denies use in past 12 months    Cannabis method Smoke    Comment: Smoke on 7/24/2020     1st Use of Alcohol age 13    Cannabis 1st Use medical marijuana for anxiety    Last Use of Alcohol & Amount sober 6 5 years    Longest Abstinence from Alcohol 6 5 years    Cocaine frequency Past regular use    Comment: Never used on 6/9/2020 Never used ->Past regular use on 7/24/2020     Crack Cocaine Frequency Prior dependence    Methamphetamine Frequency Denies use in past 12 months    Narcotic Frequency Denies use in past 12 months    Benzodiazepine Frequency Denies use in past 12 months    Amphetamine frequency Denies use in past 12 months    Barbituate Frequency Denies use use in past 12 months    Inhalant frequency Never used    Comment: Never used on 6/9/2020     Hallucinogen frequency Past regular use    Comment: Never used on 6/9/2020 Never used ->Past regular use on 7/24/2020     Ecstasy frequency Never used    Comment: Never used on 6/9/2020     Other drug frequency Never used    Comment: Never used on 6/9/2020     Opiate frequency Denies use in past 12 months    Not reviewed              OBJECTIVE:     Mental Status Evaluation:    Appearance age appropriate, casually dressed   Behavior pleasant, cooperative   Speech normal volume, normal pitch   Mood euthymic   Affect congruent Thought Processes logical   Associations intact associations   Thought Content normal   Perceptual Disturbances: none   Abnormal Thoughts  Risk Potential Suicidal ideation - None  Homicidal ideation - None  Potential for aggression - No   Orientation oriented to person, place, time/date and situation   Memory recent and remote memory grossly intact   Cosciousness alert and awake   Attention Span attention span and concentration are age appropriate   Intellect Not formally assessed   Insight age appropriate    Judgement good    Muscle Strength and  Gait muscle strength and tone were normal   Language no difficulty naming common objects   Fund of Knowledge displays adequate knowledge of current events   Pain none   Pain Scale 0       Assessment/Plan:       Diagnoses and all orders for this visit:    Major depressive disorder, recurrent, severe without psychotic features (HCC)  -     Lithium level; Future    Generalized anxiety disorder  -     Lithium level; Future    Psychophysiological insomnia    Encounter for lithium monitoring  -     Lithium level; Future          Treatment Recommendations/Precautions:  Latuda 40 mg daily with dinner   Lithium 600 mg at bedtime   Cymbalta 40 mg daily   Trazodone 50 mg half to one at bedtime   BuSpar 10 mg t i d   Repeat lithium level  Will follow-up in three months he will call me sooner if any questions or concerns  Labs reviewed from PCP, had CMP, cholesterol and TSH  Continue current medications    Risks/Benefits      Risks, Benefits And Possible Side Effects Of Medications:    Risks, benefits, and possible side effects of medications explained to patient and patient verbalizes understanding and agreement for treatment      Controlled Medication Discussion:     Not applicable    Psychotherapy Provided:     Individual psychotherapy provided: No

## 2022-11-25 ENCOUNTER — APPOINTMENT (OUTPATIENT)
Dept: LAB | Facility: HOSPITAL | Age: 47
End: 2022-11-25

## 2022-11-25 DIAGNOSIS — F33.2 MAJOR DEPRESSIVE DISORDER, RECURRENT, SEVERE WITHOUT PSYCHOTIC FEATURES (HCC): ICD-10-CM

## 2022-11-25 DIAGNOSIS — F41.1 GENERALIZED ANXIETY DISORDER: ICD-10-CM

## 2022-11-25 DIAGNOSIS — Z51.81 ENCOUNTER FOR LITHIUM MONITORING: ICD-10-CM

## 2022-11-25 DIAGNOSIS — Z79.899 ENCOUNTER FOR LITHIUM MONITORING: ICD-10-CM

## 2022-11-25 LAB — LITHIUM SERPL-SCNC: 0.4 MMOL/L (ref 0.5–1)

## 2022-12-04 DIAGNOSIS — F33.2 MAJOR DEPRESSIVE DISORDER, RECURRENT, SEVERE WITHOUT PSYCHOTIC FEATURES (HCC): ICD-10-CM

## 2022-12-04 DIAGNOSIS — F41.1 GENERALIZED ANXIETY DISORDER: ICD-10-CM

## 2022-12-05 RX ORDER — LITHIUM CARBONATE 600 MG/1
600 CAPSULE ORAL
Qty: 30 CAPSULE | Refills: 2 | Status: SHIPPED | OUTPATIENT
Start: 2022-12-05 | End: 2023-03-05

## 2022-12-05 RX ORDER — DULOXETIN HYDROCHLORIDE 20 MG/1
CAPSULE, DELAYED RELEASE ORAL
Qty: 60 CAPSULE | Refills: 2 | Status: SHIPPED | OUTPATIENT
Start: 2022-12-05

## 2022-12-11 DIAGNOSIS — F51.04 PSYCHOPHYSIOLOGICAL INSOMNIA: ICD-10-CM

## 2022-12-12 RX ORDER — TRAZODONE HYDROCHLORIDE 50 MG/1
TABLET ORAL
Qty: 30 TABLET | Refills: 2 | Status: SHIPPED | OUTPATIENT
Start: 2022-12-12

## 2023-01-13 ENCOUNTER — TELEPHONE (OUTPATIENT)
Dept: PSYCHIATRY | Facility: CLINIC | Age: 48
End: 2023-01-13

## 2023-01-13 NOTE — TELEPHONE ENCOUNTER
Trying to get a medical marijuana card  And before approved, he needs a letter stating he does not suffer from psychosis

## 2023-02-15 ENCOUNTER — TELEMEDICINE (OUTPATIENT)
Dept: PSYCHIATRY | Facility: CLINIC | Age: 48
End: 2023-02-15

## 2023-02-15 DIAGNOSIS — F51.04 PSYCHOPHYSIOLOGICAL INSOMNIA: ICD-10-CM

## 2023-02-15 DIAGNOSIS — Z79.899 ENCOUNTER FOR LITHIUM MONITORING: ICD-10-CM

## 2023-02-15 DIAGNOSIS — F41.1 GENERALIZED ANXIETY DISORDER: ICD-10-CM

## 2023-02-15 DIAGNOSIS — F33.2 MAJOR DEPRESSIVE DISORDER, RECURRENT, SEVERE WITHOUT PSYCHOTIC FEATURES (HCC): Primary | ICD-10-CM

## 2023-02-15 DIAGNOSIS — Z51.81 ENCOUNTER FOR LITHIUM MONITORING: ICD-10-CM

## 2023-02-15 RX ORDER — DULOXETIN HYDROCHLORIDE 20 MG/1
CAPSULE, DELAYED RELEASE ORAL
Qty: 60 CAPSULE | Refills: 2 | Status: SHIPPED | OUTPATIENT
Start: 2023-02-15

## 2023-02-15 RX ORDER — LITHIUM CARBONATE 600 MG/1
600 CAPSULE ORAL
Qty: 30 CAPSULE | Refills: 2 | Status: SHIPPED | OUTPATIENT
Start: 2023-02-15 | End: 2023-05-16

## 2023-02-15 RX ORDER — BUSPIRONE HYDROCHLORIDE 10 MG/1
10 TABLET ORAL 3 TIMES DAILY
Qty: 90 TABLET | Refills: 2 | Status: SHIPPED | OUTPATIENT
Start: 2023-02-15

## 2023-02-15 RX ORDER — TRAZODONE HYDROCHLORIDE 50 MG/1
TABLET ORAL
Qty: 30 TABLET | Refills: 2 | Status: SHIPPED | OUTPATIENT
Start: 2023-02-15

## 2023-02-15 NOTE — PSYCH
This note was not shared with the patient due to reasonable likelihood of causing patient harm      Virtual Regular Visit    Verification of patient location:    Patient is located in the following state in which I hold an active license [place]      Assessment/Plan:    Problem List Items Addressed This Visit        Other    Generalized anxiety disorder (Chronic)    Relevant Medications    DULoxetine (CYMBALTA) 20 mg capsule    traZODone (DESYREL) 50 mg tablet    lithium 600 MG capsule    busPIRone (BUSPAR) 10 mg tablet    Major depressive disorder, recurrent, severe without psychotic features (HCC) - Primary (Chronic)    Relevant Medications    DULoxetine (CYMBALTA) 20 mg capsule    traZODone (DESYREL) 50 mg tablet    lithium 600 MG capsule    busPIRone (BUSPAR) 10 mg tablet   Other Visit Diagnoses     Encounter for lithium monitoring        Psychophysiological insomnia        Relevant Medications    DULoxetine (CYMBALTA) 20 mg capsule    traZODone (DESYREL) 50 mg tablet    lithium 600 MG capsule    busPIRone (BUSPAR) 10 mg tablet          Goals addressed in session: Goal 1          Reason for visit is   Chief Complaint   Patient presents with   • Follow-up   • Medication Management   • Virtual Regular Visit        Encounter provider Moni Finn PA-C    Provider located at [address] [id number]      Recent Visits  No visits were found meeting these conditions  Showing recent visits within past 7 days and meeting all other requirements  Today's Visits  Date Type Provider Dept   02/15/23 [address] [place] [address], [address], ANNETTA Lee 70 today's visits and meeting all other requirements  Future Appointments  No visits were found meeting these conditions    Showing future appointments within next 150 days and meeting all other requirements       The patient was identified by name and date of birth  Micheline Erickson  was informed that this is a telemedicine visit and [facility] the [facility]  He agrees to proceed     My office door was closed  No one else was in the room  He acknowledged consent and understanding of privacy and security of the video platform  The patient has agreed to participate and understands they can discontinue the visit at any time  Patient is aware this is a billable service  Subjective  Micheline Erickson  is a 50 y o  male with MDD and anxiety   HPI   Charlie Dry was seen for follow up MDD, anxiety, and medication management  He is having some physical symptoms- last week had sinus pressure and continues with headache, stabbing headache  Feel dizzy signs  States that he is going to notify his PCP  Otherwise he is baseline with his moods  States that he has some ups and downs  Some episodes of depressive symptoms but states that they are not overwhelming and not lasting long  No suicidal or homicidal ideation  Eating is typically adequate  Appetite is good  Work has been stable  Good relationship with his family  Prior to not feeling well physically he had been exercising more          Past Medical History:   Diagnosis Date   • Anxiety    • Chest pain     after eating- seen in ER several x - believes secondary to hernia   • CPAP (continuous positive airway pressure) dependence    • Depression    • H  pylori infection    • Hiatal hernia    • Hyperlipidemia    • PONV (postoperative nausea and vomiting)    • Sleep apnea    • Suicidal thoughts    • Wears glasses        Past Surgical History:   Procedure Laterality Date   • COLONOSCOPY     • INGUINAL HERNIA REPAIR Right    • HI LAP, REPAIR PARAESOPHAGEAL HERNIA, INCL FUNDOPLASTY W/ MESH N/A 4/1/2021    Procedure: REPAIR HERNIA PARAESOPHAGEAL LAPAROSCOPIC W ROBOTICS with TOUPET FUNDOPLICATION cruroplasty with mesh;  Surgeon: Gt Drew MD;  Location: [facility];  Service: General   • SHOULDER SURGERY      left       Current Outpatient Medications   Medication Sig Dispense Refill   • busPIRone (BUSPAR) 10 mg tablet Take 1 tablet (10 mg total) by mouth 3 (three) times a day 90 tablet 2   • DULoxetine (CYMBALTA) 20 mg capsule TAKE 2 CAPSULES BY MOUTH EVERY DAY 60 capsule 2   • lithium 600 MG capsule Take 1 capsule (600 mg total) by mouth daily at bedtime 30 capsule 2   • traZODone (DESYREL) 50 mg tablet TAKE 1/2-1 TABLET BY MOUTH AT BEDTIME AS NEEDED 30 tablet 2   • atorvastatin (LIPITOR) 40 mg tablet Take 20 mg by mouth daily      • dicyclomine (BENTYL) 20 mg tablet Take 1 tablet (20 mg total) by mouth 2 (two) times a day (Patient not taking: No sig reported) 20 tablet 0   • lurasidone (Latuda) 40 mg tablet TAKE 1/2 TABLET BY MOUTH WITH DINNER FOR 7 DAYS, THEN 1 TABLET EVERY DAY WITH DINNER 30 tablet 2   • Multiple Vitamin (multivitamin) tablet Take 1 tablet by mouth daily     • naproxen (NAPROSYN) 500 mg tablet Take 1 tablet (500 mg total) by mouth 2 (two) times a day as needed for mild pain or moderate pain for up to 14 doses 14 tablet 0     No current facility-administered medications for this visit  No Known Allergies    Review of Systems  Noted in HPI  Video Exam    There were no vitals filed for this visit      Physical Exam   Status exam:  Speech clear, coherent, normal rate and volume  Adequate hygiene, good eye contact  Affect is appropriate, mood is currently euthymic  linear and coherent thought process  No suicidal or homicidal ideation  No psychotic signs or symptoms, no hallucinations and no delusions were voiced    cognition is intact  Insight and judgment intact      Medications and treatment as follows:  Latuda 40 mg daily with dinner  States Isamar Leo was over $1000  If a pocket price continues to be very high he will notify me and discussed we may prescribe 80 mg that he could take a half of the tablet to decrease his cost  Lithium 600 mg at bedtime  Cymbalta 40 mg daily  BuSpar 10 mg 3 times daily  Trazodone 50 mg 1/2-1 at bedtime  Will 0 4 lithium level 0 4 November 25, 2023  We will follow up in 3 months he will call sooner if any questions or concerns    Visit Time    Visit Start Time: 330  Visit Stop Time: 352  Total Visit Duration: 22 minutes

## 2023-02-15 NOTE — BH TREATMENT PLAN
TREATMENT PLAN (Medication Management Only)        [facility]    Name and Date of Birth:  Meg Burch  50 y o  1975  Date of Treatment Plan: February 15, 2023  Diagnosis/Diagnoses:    1  Major depressive disorder, recurrent, severe without psychotic features ([address] )    2  Generalized anxiety disorder    3  Encounter for lithium monitoring    4  Psychophysiological insomnia      Strengths/Personal Resources for Self-Care: supportive family  Area/Areas of need (in own words): "exercise more"  1  Long Term Goal: maintain control of depression  Target Date:6 months - 8/15/2023  Person/Persons responsible for completion of goal: Néstor Wing  2  Short Term Objective (s) - How will we reach this goal?:   A  Provider new recommended medication/dosage changes and/or continue medication(s): continue current medications as prescribed  B  N/A   C  N/A  Target Date:6 months - 8/15/2023  Person/Persons Responsible for Completion of Goal: Raman  Progress Towards Goals: stable  Treatment Modality: medication management every 3 months  Review due 180 days from date of this plan: 6 months - 8/15/2023  Expected length of service: ongoing treatment  My Physician/PA/NP and I have developed this plan together and I agree to work on the goals and objectives  I understand the treatment goals that were developed for my treatment

## 2023-05-22 DIAGNOSIS — F41.1 GENERALIZED ANXIETY DISORDER: ICD-10-CM

## 2023-05-22 RX ORDER — BUSPIRONE HYDROCHLORIDE 10 MG/1
TABLET ORAL
Qty: 90 TABLET | Refills: 2 | Status: SHIPPED | OUTPATIENT
Start: 2023-05-22

## 2023-06-06 DIAGNOSIS — F41.1 GENERALIZED ANXIETY DISORDER: ICD-10-CM

## 2023-06-06 DIAGNOSIS — F33.2 MAJOR DEPRESSIVE DISORDER, RECURRENT, SEVERE WITHOUT PSYCHOTIC FEATURES (HCC): ICD-10-CM

## 2023-06-06 DIAGNOSIS — F51.04 PSYCHOPHYSIOLOGICAL INSOMNIA: ICD-10-CM

## 2023-06-06 RX ORDER — TRAZODONE HYDROCHLORIDE 50 MG/1
TABLET ORAL
Qty: 30 TABLET | Refills: 2 | Status: SHIPPED | OUTPATIENT
Start: 2023-06-06

## 2023-06-06 RX ORDER — LITHIUM CARBONATE 600 MG/1
600 CAPSULE ORAL
Qty: 30 CAPSULE | Refills: 2 | Status: SHIPPED | OUTPATIENT
Start: 2023-06-06 | End: 2023-09-04

## 2023-06-06 RX ORDER — DULOXETIN HYDROCHLORIDE 20 MG/1
CAPSULE, DELAYED RELEASE ORAL
Qty: 60 CAPSULE | Refills: 2 | Status: SHIPPED | OUTPATIENT
Start: 2023-06-06

## 2023-06-06 RX ORDER — LURASIDONE HYDROCHLORIDE 40 MG/1
TABLET, FILM COATED ORAL
Qty: 30 TABLET | Refills: 2 | Status: SHIPPED | OUTPATIENT
Start: 2023-06-06

## 2023-07-26 ENCOUNTER — TELEMEDICINE (OUTPATIENT)
Dept: PSYCHIATRY | Facility: CLINIC | Age: 48
End: 2023-07-26
Payer: COMMERCIAL

## 2023-07-26 DIAGNOSIS — F51.04 PSYCHOPHYSIOLOGICAL INSOMNIA: ICD-10-CM

## 2023-07-26 DIAGNOSIS — Z51.81 ENCOUNTER FOR LITHIUM MONITORING: ICD-10-CM

## 2023-07-26 DIAGNOSIS — F33.2 MAJOR DEPRESSIVE DISORDER, RECURRENT, SEVERE WITHOUT PSYCHOTIC FEATURES (HCC): Primary | ICD-10-CM

## 2023-07-26 DIAGNOSIS — F41.1 GENERALIZED ANXIETY DISORDER: ICD-10-CM

## 2023-07-26 DIAGNOSIS — Z79.899 ENCOUNTER FOR LITHIUM MONITORING: ICD-10-CM

## 2023-07-26 PROCEDURE — 99214 OFFICE O/P EST MOD 30 MIN: CPT | Performed by: PHYSICIAN ASSISTANT

## 2023-07-26 RX ORDER — TRAZODONE HYDROCHLORIDE 50 MG/1
TABLET ORAL
Qty: 30 TABLET | Refills: 2 | Status: SHIPPED | OUTPATIENT
Start: 2023-07-26

## 2023-07-26 RX ORDER — BUSPIRONE HYDROCHLORIDE 10 MG/1
10 TABLET ORAL 3 TIMES DAILY
Qty: 90 TABLET | Refills: 2 | Status: SHIPPED | OUTPATIENT
Start: 2023-07-26

## 2023-07-26 RX ORDER — LITHIUM CARBONATE 600 MG/1
600 CAPSULE ORAL
Qty: 30 CAPSULE | Refills: 2 | Status: SHIPPED | OUTPATIENT
Start: 2023-07-26 | End: 2023-10-24

## 2023-07-26 RX ORDER — LURASIDONE HYDROCHLORIDE 40 MG/1
TABLET, FILM COATED ORAL
Qty: 30 TABLET | Refills: 2 | Status: SHIPPED | OUTPATIENT
Start: 2023-07-26

## 2023-07-26 RX ORDER — DULOXETIN HYDROCHLORIDE 20 MG/1
40 CAPSULE, DELAYED RELEASE ORAL DAILY
Qty: 60 CAPSULE | Refills: 2 | Status: SHIPPED | OUTPATIENT
Start: 2023-07-26

## 2023-07-26 NOTE — PSYCH
This note was not shared with the patient due to reasonable likelihood of causing patient harm      Virtual Regular Visit    Verification of patient location:    Patient is located at Home in the following state in which I hold an active license [place]      Assessment/Plan:    Problem List Items Addressed This Visit        Other    Generalized anxiety disorder (Chronic)    Relevant Medications    busPIRone (BUSPAR) 10 mg tablet    lithium 600 MG capsule    lurasidone (LATUDA) 40 mg tablet    DULoxetine (CYMBALTA) 20 mg capsule    traZODone (DESYREL) 50 mg tablet    Major depressive disorder, recurrent, severe without psychotic features (HCC) - Primary (Chronic)    Relevant Medications    busPIRone (BUSPAR) 10 mg tablet    lithium 600 MG capsule    lurasidone (LATUDA) 40 mg tablet    DULoxetine (CYMBALTA) 20 mg capsule    traZODone (DESYREL) 50 mg tablet    Other Relevant Orders    Lithium level    Basic metabolic panel   Other Visit Diagnoses     Psychophysiological insomnia        Relevant Medications    busPIRone (BUSPAR) 10 mg tablet    lithium 600 MG capsule    lurasidone (LATUDA) 40 mg tablet    DULoxetine (CYMBALTA) 20 mg capsule    traZODone (DESYREL) 50 mg tablet    Encounter for lithium monitoring        Relevant Orders    Lithium level    Basic metabolic panel          Goals addressed in session: Goal 1          Reason for visit is   Chief Complaint   Patient presents with   • Follow-up   • Medication Management   • Virtual Regular Visit        Encounter provider Nanette Gaviria PA-C    Provider located at [address] [place] [id number]      Recent Visits  No visits were found meeting these conditions.   Showing recent visits within past 7 days and meeting all other requirements  Today's Visits  Date Type Provider Dept   07/26/23 Telemedicine Nanette Gaviria PA-C [facility]   Showing today's visits and meeting all other requirements  Future Appointments  No visits were found meeting these conditions. Showing future appointments within next 150 days and meeting all other requirements       The patient was identified by name and date of birth. Sea Gonzalez was informed that this is a telemedicine visit and [facility] He agrees to proceed. .  My office door was closed. No one else was in the room. He acknowledged consent and understanding of privacy and security of the video platform. The patient has agreed to participate and understands they can discontinue the visit at any time. Patient is aware this is a billable service. Subjective  Sea Gonzalez is a 50 y.o. male with MDD and anxiety . ОЛЕГ Tellez was seen for follow up and for medication management. States work was slow for 10 weeks which was challenging. He has difficulty when he does not have anything to do and then was lying around a lot. Now he is working regularly and back to his baseline. Feels better with his moods now and depression is stable. Anxiety has been stable. States that he was forgetting to take his Latuda with dinner so there was a couple weeks where he did not take [place] and was taking BuSpar only twice a day. States that his depression worsened so he is back to taking on a regular basis. Since being back taking his medications he is feeling better. No suicidal or homicidal ideation. Motivation has been good. He is thinking about visiting his son in [place]. Good relationship with his family.   Past Medical History:   Diagnosis Date   • Anxiety    • Chest pain     after eating- seen in ER several x - believes secondary to hernia   • CPAP (continuous positive airway pressure) dependence    • Depression    • H. pylori infection    • Hiatal hernia    • Hyperlipidemia    • PONV (postoperative nausea and vomiting)    • Sleep apnea    • Suicidal thoughts    • Wears glasses        Past Surgical History:   Procedure Laterality Date   • COLONOSCOPY     • INGUINAL HERNIA REPAIR Right    • OH LAPS RPR PARAESPHGL HRNA INCL FUNDPLSTY W/MESH N/A 4/1/2021    Procedure: REPAIR HERNIA PARAESOPHAGEAL LAPAROSCOPIC W ROBOTICS with TOUPET FUNDOPLICATION cruroplasty with mesh;  Surgeon: Reina Cannon MD;  Location: [facility];  Service: General   • SHOULDER SURGERY      left       Current Outpatient Medications   Medication Sig Dispense Refill   • busPIRone (BUSPAR) 10 mg tablet Take 1 tablet (10 mg total) by mouth 3 (three) times a day 90 tablet 2   • DULoxetine (CYMBALTA) 20 mg capsule Take 2 capsules (40 mg total) by mouth daily 60 capsule 2   • lithium 600 MG capsule Take 1 capsule (600 mg total) by mouth daily at bedtime 30 capsule 2   • lurasidone (LATUDA) 40 mg tablet TAKE 1 TABLET BY MOUTH DAILY WITH DINNER 30 tablet 2   • traZODone (DESYREL) 50 mg tablet TAKE 1/2 TO 1 TABLET BY MOUTH AT BEDTIME AS NEEDED 30 tablet 2   • atorvastatin (LIPITOR) 40 mg tablet Take 20 mg by mouth daily      • dicyclomine (BENTYL) 20 mg tablet Take 1 tablet (20 mg total) by mouth 2 (two) times a day 20 tablet 0   • Multiple Vitamin (multivitamin) tablet Take 1 tablet by mouth daily     • naproxen (NAPROSYN) 500 mg tablet Take 1 tablet (500 mg total) by mouth 2 (two) times a day as needed for mild pain or moderate pain for up to 14 doses 14 tablet 0     No current facility-administered medications for this visit. No Known Allergies    Review of Systems  As noted in HPI  Video Exam    There were no vitals filed for this visit.     Physical Exam   MSE:  Pt with clear, coherent speech, normal rate and volume,  Adequate hygiene, good eye contact,  Affect is currently appropriate, mood is euthymic  Linear and coherent thought process  No suicidal or homicidal ideation  No psychotic signs or symptoms, no hallucinations and no delusions were voiced  Cognition appears intact  Insight and judgment is intact    Medications and treatment plan as follows:  Continue lithium 600 mg at bedtime  Latuda 40 mg daily with dinner, discussed he may take this with breakfast  Also discussed taking it with food improves availability of medication but if he "forgets" to take with dinner he can take at bedtime  Continue Cymbalta 40 mg daily  BuSpar 10 mg 3 times daily  Trazodone 50 mg taking as needed  Repeat lithium level and BMP, prescriptions will be mailed to the patient  We will follow-up in 3 months on October 25 and he will call sooner if any questions or concerns    Visit Time    Visit Start Time: 1798  Visit Stop Time: 420  Total Visit Duration: 24 minutes

## 2023-07-26 NOTE — BH TREATMENT PLAN
TREATMENT PLAN (Medication Management Only)        [facility]    Name and Date of Birth:  Miley Faulkner. 50 y.o. 1975  Date of Treatment Plan: July 26, 2023  Diagnosis/Diagnoses:    1. Major depressive disorder, recurrent, severe without psychotic features ([address])    2. Psychophysiological insomnia    3. Generalized anxiety disorder    4. Encounter for lithium monitoring      Strengths/Personal Resources for Self-Care: supportive family. Area/Areas of need (in own words): "maintaining a routine"  1. Long Term Goal: maintain control of depression. Target Date:3 months - 10/26/2023  Person/Persons responsible for completion of goal: Raman  2. Short Term Objective (s) - How will we reach this goal?:   A. Provider new recommended medication/dosage changes and/or continue medication(s): continue current medications as prescribed. B. N/A.  C. N/A. Target Date:3 months - 10/26/2023  Person/Persons Responsible for Completion of Goal: Raman  Progress Towards Goals: stable  Treatment Modality: medication management every 3 months  Review due 180 days from date of this plan: 6 months - 1/26/2024  Expected length of service: ongoing treatment  My Physician/PA/NP and I have developed this plan together and I agree to work on the goals and objectives. I understand the treatment goals that were developed for my treatment.

## 2023-08-17 ENCOUNTER — APPOINTMENT (OUTPATIENT)
Dept: LAB | Facility: HOSPITAL | Age: 48
End: 2023-08-17
Payer: COMMERCIAL

## 2023-08-17 DIAGNOSIS — E78.5 HYPERLIPIDEMIA, UNSPECIFIED HYPERLIPIDEMIA TYPE: ICD-10-CM

## 2023-08-17 DIAGNOSIS — R53.83 TIREDNESS: ICD-10-CM

## 2023-08-17 DIAGNOSIS — Z79.899 ENCOUNTER FOR LITHIUM MONITORING: ICD-10-CM

## 2023-08-17 DIAGNOSIS — F33.2 MAJOR DEPRESSIVE DISORDER, RECURRENT, SEVERE WITHOUT PSYCHOTIC FEATURES (HCC): ICD-10-CM

## 2023-08-17 DIAGNOSIS — F32.9 PROLONGED DEPRESSIVE REACTION: ICD-10-CM

## 2023-08-17 DIAGNOSIS — Z51.81 ENCOUNTER FOR LITHIUM MONITORING: ICD-10-CM

## 2023-08-17 LAB
ALBUMIN SERPL BCP-MCNC: 4.2 G/DL (ref 3.5–5)
ALP SERPL-CCNC: 44 U/L (ref 34–104)
ALT SERPL W P-5'-P-CCNC: 25 U/L (ref 7–52)
ANION GAP SERPL CALCULATED.3IONS-SCNC: 5 MMOL/L
AST SERPL W P-5'-P-CCNC: 18 U/L (ref 13–39)
BASOPHILS # BLD AUTO: 0.04 THOUSANDS/ÂΜL (ref 0–0.1)
BASOPHILS NFR BLD AUTO: 1 % (ref 0–1)
BILIRUB SERPL-MCNC: 0.47 MG/DL (ref 0.2–1)
BUN SERPL-MCNC: 15 MG/DL (ref 5–25)
CALCIUM SERPL-MCNC: 9.5 MG/DL (ref 8.4–10.2)
CHLORIDE SERPL-SCNC: 106 MMOL/L (ref 96–108)
CHOLEST SERPL-MCNC: 192 MG/DL
CO2 SERPL-SCNC: 26 MMOL/L (ref 21–32)
CREAT SERPL-MCNC: 0.96 MG/DL (ref 0.6–1.3)
EOSINOPHIL # BLD AUTO: 0.18 THOUSAND/ÂΜL (ref 0–0.61)
EOSINOPHIL NFR BLD AUTO: 3 % (ref 0–6)
ERYTHROCYTE [DISTWIDTH] IN BLOOD BY AUTOMATED COUNT: 11.9 % (ref 11.6–15.1)
GFR SERPL CREATININE-BSD FRML MDRD: 92 ML/MIN/1.73SQ M
GLUCOSE P FAST SERPL-MCNC: 105 MG/DL (ref 65–99)
HCT VFR BLD AUTO: 43.6 % (ref 36.5–49.3)
HDLC SERPL-MCNC: 40 MG/DL
HGB BLD-MCNC: 14.6 G/DL (ref 12–17)
IMM GRANULOCYTES # BLD AUTO: 0.02 THOUSAND/UL (ref 0–0.2)
IMM GRANULOCYTES NFR BLD AUTO: 0 % (ref 0–2)
LDLC SERPL CALC-MCNC: 110 MG/DL (ref 0–100)
LITHIUM SERPL-SCNC: 0.5 MMOL/L (ref 0.6–1.2)
LYMPHOCYTES # BLD AUTO: 1.61 THOUSANDS/ÂΜL (ref 0.6–4.47)
LYMPHOCYTES NFR BLD AUTO: 27 % (ref 14–44)
MCH RBC QN AUTO: 30.3 PG (ref 26.8–34.3)
MCHC RBC AUTO-ENTMCNC: 33.5 G/DL (ref 31.4–37.4)
MCV RBC AUTO: 91 FL (ref 82–98)
MONOCYTES # BLD AUTO: 0.46 THOUSAND/ÂΜL (ref 0.17–1.22)
MONOCYTES NFR BLD AUTO: 8 % (ref 4–12)
NEUTROPHILS # BLD AUTO: 3.75 THOUSANDS/ÂΜL (ref 1.85–7.62)
NEUTS SEG NFR BLD AUTO: 61 % (ref 43–75)
NONHDLC SERPL-MCNC: 152 MG/DL
NRBC BLD AUTO-RTO: 0 /100 WBCS
PLATELET # BLD AUTO: 290 THOUSANDS/UL (ref 149–390)
PMV BLD AUTO: 9.7 FL (ref 8.9–12.7)
POTASSIUM SERPL-SCNC: 4 MMOL/L (ref 3.5–5.3)
PROT SERPL-MCNC: 7 G/DL (ref 6.4–8.4)
RBC # BLD AUTO: 4.82 MILLION/UL (ref 3.88–5.62)
SODIUM SERPL-SCNC: 137 MMOL/L (ref 135–147)
TRIGL SERPL-MCNC: 209 MG/DL
TSH SERPL DL<=0.05 MIU/L-ACNC: 0.98 UIU/ML (ref 0.45–4.5)
WBC # BLD AUTO: 6.06 THOUSAND/UL (ref 4.31–10.16)

## 2023-08-17 PROCEDURE — 80053 COMPREHEN METABOLIC PANEL: CPT

## 2023-08-17 PROCEDURE — 80178 ASSAY OF LITHIUM: CPT

## 2023-08-17 PROCEDURE — 85025 COMPLETE CBC W/AUTO DIFF WBC: CPT

## 2023-08-17 PROCEDURE — 84443 ASSAY THYROID STIM HORMONE: CPT

## 2023-08-17 PROCEDURE — 36415 COLL VENOUS BLD VENIPUNCTURE: CPT

## 2023-08-17 PROCEDURE — 80061 LIPID PANEL: CPT

## 2023-10-25 ENCOUNTER — OFFICE VISIT (OUTPATIENT)
Dept: PSYCHIATRY | Facility: CLINIC | Age: 48
End: 2023-10-25
Payer: COMMERCIAL

## 2023-10-25 DIAGNOSIS — F41.1 GENERALIZED ANXIETY DISORDER: ICD-10-CM

## 2023-10-25 DIAGNOSIS — F33.2 MAJOR DEPRESSIVE DISORDER, RECURRENT, SEVERE WITHOUT PSYCHOTIC FEATURES (HCC): Primary | ICD-10-CM

## 2023-10-25 DIAGNOSIS — F51.04 PSYCHOPHYSIOLOGICAL INSOMNIA: ICD-10-CM

## 2023-10-25 PROCEDURE — 99214 OFFICE O/P EST MOD 30 MIN: CPT | Performed by: PHYSICIAN ASSISTANT

## 2023-10-25 RX ORDER — BUSPIRONE HYDROCHLORIDE 10 MG/1
10 TABLET ORAL 3 TIMES DAILY
Qty: 90 TABLET | Refills: 2 | Status: SHIPPED | OUTPATIENT
Start: 2023-10-25

## 2023-10-25 RX ORDER — TRAZODONE HYDROCHLORIDE 50 MG/1
TABLET ORAL
Qty: 30 TABLET | Refills: 2 | Status: SHIPPED | OUTPATIENT
Start: 2023-10-25

## 2023-10-25 RX ORDER — LITHIUM CARBONATE 600 MG/1
600 CAPSULE ORAL
Qty: 30 CAPSULE | Refills: 2 | Status: SHIPPED | OUTPATIENT
Start: 2023-10-25 | End: 2024-01-23

## 2023-10-25 RX ORDER — DULOXETIN HYDROCHLORIDE 20 MG/1
40 CAPSULE, DELAYED RELEASE ORAL DAILY
Qty: 60 CAPSULE | Refills: 2 | Status: SHIPPED | OUTPATIENT
Start: 2023-10-25

## 2023-10-25 NOTE — PSYCH
This note was not shared with the patient due to reasonable likelihood of causing patient harm    PROGRESS NOTE        [facility]      Name and Date of Birth:  Terra Cardoso. 46 y.o. 1975    Date of Visit: 10/25/23    SUBJECTIVE:  Pineda Bello was seen for follow-up of major depression, generalized anxiety and for medication management. Continues with significant anxiety that is debilitating. States that he has decreased interest and motivation due to his anxiety. He has been continuing to work full-time and states that work has been very stressful and thinks that may be one of his triggers. States that he is considering other employment opportunities. States that outside of work though he has not been doing anything and his anxiety is even worse on the weekends. He is sleeping adequately at night and states trazodone has been very helpful. Has been taking medications as prescribed. Since her last visit he has been taking Latuda on a regular basis and has not noted an improvement. No adverse effects noted with his medications. Currently denies any suicidal intention but reports a death wish off and on which has been ongoing. No psychotic signs or symptoms. On a positive note he went to visit his son out in [place] and reports his son is doing well and he is very proud of him. Physically he is feeling well and no new medications or medical issues noted. Medication list reviewed and updated. He denies suicidal ideation, intent or plan at present, has no suicidal ideation, intent or plan at present. He denies any auditory hallucinations and visual hallucinations, denies any other delusional thinking, denies any delusional thinking. He denies any side effects from medications  .   HPI ROS Appetite Changes and Sleep: normal appetite, normal sleep    Review Of Systems:      Constitutional Negative   ENT Negative   Cardiovascular Negative Respiratory Negative   Gastrointestinal Negative   Genitourinary Negative   Musculoskeletal Negative   Integumentary Negative   Neurological Negative   Endocrine Negative   Other Symptoms Negative and None       Laboratory Results: No results found for this or any previous visit. Substance Abuse History:    Social History     Substance and Sexual Activity   Drug Use Yes    Types: Marijuana    Comment: previously used medical marijuana but not currently using       Family Psychiatric History:     No family history on file.     The following portions of the patient's history were reviewed and updated as appropriate: past family history, past medical history, past social history, past surgical history and problem list.    Social History     Socioeconomic History    Marital status: Single     Spouse name: Not on file    Number of children: Not on file    Years of education: Not on file    Highest education level: Not on file   Occupational History    Not on file   Tobacco Use    Smoking status: Former     Types: Cigarettes     Quit date:      Years since quittin.8    Smokeless tobacco: Never   Vaping Use    Vaping Use: Never used   Substance and Sexual Activity    Alcohol use: Not Currently     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    Drug use: Yes     Types: Marijuana     Comment: previously used medical marijuana but not currently using    Sexual activity: Not on file   Other Topics Concern    Not on file   Social History Narrative    Not on file     Social Determinants of Health     Financial Resource Strain: Not on file   Food Insecurity: Not on file   Transportation Needs: Not on file   Physical Activity: Not on file   Stress: Not on file   Social Connections: Not on file   Intimate Partner Violence: Not on file   Housing Stability: Not on file     Social History     Social History Narrative    Not on file        Social History       Tobacco History       Smoking Status  Former Quit Date   Smoking Tobacco Type  Cigarettes quit in 2004      Smokeless Tobacco Use  Never              Alcohol History       Alcohol Use Status  Not Currently Comment  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              Drug Use       Drug Use Status  Yes Types  Marijuana Comment  previously used medical marijuana but not currently using              Sexual Activity       Sexually Active  Not Asked              Activities of Daily Living    Not Asked                 Additional Substance Use Detail       Questions Responses    Problems Due to Past Use of Alcohol? Yes    Problems Due to Past Use of Substances? Yes    Substance Use Assessment Denies substance use within the past 12 months    Alcohol Use Frequency Denies use in past 12 months    Cannabis frequency Past regular use    Comment: Past rare use on 6/9/2020 Past rare use ->Past regular use on 7/24/2020     Heroin Frequency Denies use in past 12 months    Cannabis method Smoke    Comment: Smoke on 7/24/2020     1st Use of Alcohol age 13    Cannabis 1st Use medical marijuana for anxiety    Last Use of Alcohol & Amount sober 6.5 years    Longest Abstinence from Alcohol 6.5 years    Cocaine frequency Past regular use    Comment: Never used on 6/9/2020 Never used ->Past regular use on 7/24/2020     Crack Cocaine Frequency Prior dependence    Methamphetamine Frequency Denies use in past 12 months    Narcotic Frequency Denies use in past 12 months    Benzodiazepine Frequency Denies use in past 12 months    Amphetamine frequency Denies use in past 12 months    Barbituate Frequency Denies use use in past 12 months    Inhalant frequency Never used    Comment: Never used on 6/9/2020     Hallucinogen frequency Past regular use    Comment: Never used on 6/9/2020 Never used ->Past regular use on 7/24/2020     Ecstasy frequency Never used    Comment: Never used on 6/9/2020     Other drug frequency Never used    Comment: Never used on 6/9/2020     Opiate frequency Denies use in past 12 months    Not reviewed. OBJECTIVE:     Mental Status Evaluation:    Appearance age appropriate, casually dressed   Behavior  cooperative   Speech normal volume, normal pitch   Mood Depressed, anxious   Affect Congruent   Thought Processes logical   Associations intact associations   Thought Content normal   Perceptual Disturbances: none   Abnormal Thoughts  Risk Potential Suicidal ideation - None  Homicidal ideation - None  Potential for aggression - No   Orientation oriented to person, place, time/date and situation   Memory recent and remote memory grossly intact   Cosciousness alert and awake   Attention Span attention span and concentration are age appropriate   Intellect not formally assessed   Insight age appropriate    Judgement good    Muscle Strength and  Gait Steady gait   Language no difficulty naming common objects   Fund of Knowledge displays adequate knowledge of current events   Pain none   Pain Scale 0       Assessment/Plan:       Diagnoses and all orders for this visit:    Major depressive disorder, recurrent, severe without psychotic features (HCC)  -     cariprazine (Vraylar) 1.5 MG capsule; Take 1 capsule (1.5 mg total) by mouth daily  -     lithium 600 MG capsule; Take 1 capsule (600 mg total) by mouth daily at bedtime  -     DULoxetine (CYMBALTA) 20 mg capsule; Take 2 capsules (40 mg total) by mouth daily    Generalized anxiety disorder  -     busPIRone (BUSPAR) 10 mg tablet; Take 1 tablet (10 mg total) by mouth 3 (three) times a day  -     lithium 600 MG capsule;  Take 1 capsule (600 mg total) by mouth daily at bedtime    Psychophysiological insomnia  -     traZODone (DESYREL) 50 mg tablet; TAKE 1/2 TO 1 TABLET BY MOUTH AT BEDTIME AS NEEDED          Treatment Recommendations/Precautions:    Discontinue Latuda and trial of Vraylar 1.5 mg daily    Continue lithium 600 mg at bedtime  BuSpar 10 mg 3 times daily  Trazodone 50 mg 1/2-1 at bedtime as needed  Cymbalta 40 mg daily  We will follow-up in 2 months and he will call me sooner if any questions or concerns    Discussed potential for adverse effects of medication and he verbalized understanding and agreement with treatment plan    Risks/Benefits      Risks, Benefits And Possible Side Effects Of Medications:    Risks, benefits, and possible side effects of medications explained to patient and patient verbalizes understanding and agreement for treatment.     Controlled Medication Discussion:     Not applicable    Psychotherapy Provided:     Individual psychotherapy provided: No

## 2023-11-17 DIAGNOSIS — F41.1 GENERALIZED ANXIETY DISORDER: Chronic | ICD-10-CM

## 2023-11-17 RX ORDER — LORAZEPAM 0.5 MG/1
0.5 TABLET ORAL 2 TIMES DAILY PRN
Qty: 15 TABLET | Refills: 0 | Status: ON HOLD | OUTPATIENT
Start: 2023-11-17

## 2023-11-17 NOTE — TELEPHONE ENCOUNTER
Pt is requesting a past prescription for Ativan. Stating that he has been experiencing anxiety and had to leave work early today because he was having a panic attack.      Medication: ativan    [id number] , [id number]

## 2023-11-20 ENCOUNTER — HOSPITAL ENCOUNTER (EMERGENCY)
Facility: HOSPITAL | Age: 48
End: 2023-11-21
Attending: EMERGENCY MEDICINE
Payer: COMMERCIAL

## 2023-11-20 DIAGNOSIS — F41.9 ANXIETY: ICD-10-CM

## 2023-11-20 DIAGNOSIS — R45.851 DEPRESSION WITH SUICIDAL IDEATION: Primary | ICD-10-CM

## 2023-11-20 DIAGNOSIS — F32.A DEPRESSION WITH SUICIDAL IDEATION: Primary | ICD-10-CM

## 2023-11-20 LAB
ALBUMIN SERPL BCP-MCNC: 4.7 G/DL (ref 3.5–5)
ALP SERPL-CCNC: 42 U/L (ref 34–104)
ALT SERPL W P-5'-P-CCNC: 22 U/L (ref 7–52)
AMPHETAMINES SERPL QL SCN: NEGATIVE
ANION GAP SERPL CALCULATED.3IONS-SCNC: 5 MMOL/L
AST SERPL W P-5'-P-CCNC: 18 U/L (ref 13–39)
BARBITURATES UR QL: NEGATIVE
BASOPHILS # BLD AUTO: 0.04 THOUSANDS/ÂΜL (ref 0–0.1)
BASOPHILS NFR BLD AUTO: 1 % (ref 0–1)
BENZODIAZ UR QL: NEGATIVE
BILIRUB SERPL-MCNC: 0.41 MG/DL (ref 0.2–1)
BILIRUB UR QL STRIP: NEGATIVE
BUN SERPL-MCNC: 17 MG/DL (ref 5–25)
CALCIUM SERPL-MCNC: 10.2 MG/DL (ref 8.4–10.2)
CARDIAC TROPONIN I PNL SERPL HS: 3 NG/L
CHLORIDE SERPL-SCNC: 104 MMOL/L (ref 96–108)
CLARITY UR: CLEAR
CO2 SERPL-SCNC: 29 MMOL/L (ref 21–32)
COCAINE UR QL: NEGATIVE
COLOR UR: NORMAL
CREAT SERPL-MCNC: 0.92 MG/DL (ref 0.6–1.3)
EOSINOPHIL # BLD AUTO: 0.16 THOUSAND/ÂΜL (ref 0–0.61)
EOSINOPHIL NFR BLD AUTO: 2 % (ref 0–6)
ERYTHROCYTE [DISTWIDTH] IN BLOOD BY AUTOMATED COUNT: 12.2 % (ref 11.6–15.1)
ETHANOL EXG-MCNC: 0 MG/DL
GFR SERPL CREATININE-BSD FRML MDRD: 96 ML/MIN/1.73SQ M
GLUCOSE SERPL-MCNC: 86 MG/DL (ref 65–140)
GLUCOSE UR STRIP-MCNC: NEGATIVE MG/DL
HCT VFR BLD AUTO: 42.3 % (ref 36.5–49.3)
HGB BLD-MCNC: 14.3 G/DL (ref 12–17)
HGB UR QL STRIP.AUTO: NEGATIVE
IMM GRANULOCYTES # BLD AUTO: 0.04 THOUSAND/UL (ref 0–0.2)
IMM GRANULOCYTES NFR BLD AUTO: 1 % (ref 0–2)
KETONES UR STRIP-MCNC: NEGATIVE MG/DL
LEUKOCYTE ESTERASE UR QL STRIP: NEGATIVE
LITHIUM SERPL-SCNC: 0.3 MMOL/L (ref 0.6–1.2)
LYMPHOCYTES # BLD AUTO: 2.24 THOUSANDS/ÂΜL (ref 0.6–4.47)
LYMPHOCYTES NFR BLD AUTO: 26 % (ref 14–44)
MCH RBC QN AUTO: 30.1 PG (ref 26.8–34.3)
MCHC RBC AUTO-ENTMCNC: 33.8 G/DL (ref 31.4–37.4)
MCV RBC AUTO: 89 FL (ref 82–98)
MONOCYTES # BLD AUTO: 0.62 THOUSAND/ÂΜL (ref 0.17–1.22)
MONOCYTES NFR BLD AUTO: 7 % (ref 4–12)
NEUTROPHILS # BLD AUTO: 5.39 THOUSANDS/ÂΜL (ref 1.85–7.62)
NEUTS SEG NFR BLD AUTO: 63 % (ref 43–75)
NITRITE UR QL STRIP: NEGATIVE
NRBC BLD AUTO-RTO: 0 /100 WBCS
OPIATES UR QL SCN: NEGATIVE
OXYCODONE+OXYMORPHONE UR QL SCN: NEGATIVE
PCP UR QL: NEGATIVE
PH UR STRIP.AUTO: 5.5 [PH]
PLATELET # BLD AUTO: 302 THOUSANDS/UL (ref 149–390)
PMV BLD AUTO: 9.6 FL (ref 8.9–12.7)
POTASSIUM SERPL-SCNC: 4.4 MMOL/L (ref 3.5–5.3)
PROT SERPL-MCNC: 7.7 G/DL (ref 6.4–8.4)
PROT UR STRIP-MCNC: NEGATIVE MG/DL
RBC # BLD AUTO: 4.75 MILLION/UL (ref 3.88–5.62)
SODIUM SERPL-SCNC: 138 MMOL/L (ref 135–147)
SP GR UR STRIP.AUTO: 1.01 (ref 1–1.03)
THC UR QL: NEGATIVE
TSH SERPL DL<=0.05 MIU/L-ACNC: 3.52 UIU/ML (ref 0.45–4.5)
UROBILINOGEN UR STRIP-ACNC: <2 MG/DL
WBC # BLD AUTO: 8.49 THOUSAND/UL (ref 4.31–10.16)

## 2023-11-20 PROCEDURE — 85025 COMPLETE CBC W/AUTO DIFF WBC: CPT | Performed by: EMERGENCY MEDICINE

## 2023-11-20 PROCEDURE — 81003 URINALYSIS AUTO W/O SCOPE: CPT | Performed by: EMERGENCY MEDICINE

## 2023-11-20 PROCEDURE — 82075 ASSAY OF BREATH ETHANOL: CPT | Performed by: EMERGENCY MEDICINE

## 2023-11-20 PROCEDURE — 93005 ELECTROCARDIOGRAM TRACING: CPT

## 2023-11-20 PROCEDURE — 36415 COLL VENOUS BLD VENIPUNCTURE: CPT | Performed by: EMERGENCY MEDICINE

## 2023-11-20 PROCEDURE — 84443 ASSAY THYROID STIM HORMONE: CPT | Performed by: EMERGENCY MEDICINE

## 2023-11-20 PROCEDURE — 99285 EMERGENCY DEPT VISIT HI MDM: CPT | Performed by: EMERGENCY MEDICINE

## 2023-11-20 PROCEDURE — 99285 EMERGENCY DEPT VISIT HI MDM: CPT

## 2023-11-20 PROCEDURE — 80178 ASSAY OF LITHIUM: CPT | Performed by: EMERGENCY MEDICINE

## 2023-11-20 PROCEDURE — 84484 ASSAY OF TROPONIN QUANT: CPT | Performed by: EMERGENCY MEDICINE

## 2023-11-20 PROCEDURE — 80307 DRUG TEST PRSMV CHEM ANLYZR: CPT | Performed by: EMERGENCY MEDICINE

## 2023-11-20 PROCEDURE — 80053 COMPREHEN METABOLIC PANEL: CPT | Performed by: EMERGENCY MEDICINE

## 2023-11-20 RX ORDER — LANOLIN ALCOHOL/MO/W.PET/CERES
3 CREAM (GRAM) TOPICAL
Status: CANCELLED | OUTPATIENT
Start: 2023-11-20

## 2023-11-20 RX ORDER — LORAZEPAM 2 MG/ML
1 INJECTION INTRAMUSCULAR
Status: CANCELLED | OUTPATIENT
Start: 2023-11-20

## 2023-11-20 RX ORDER — HALOPERIDOL 5 MG/ML
5 INJECTION INTRAMUSCULAR
Status: CANCELLED | OUTPATIENT
Start: 2023-11-20

## 2023-11-20 RX ORDER — MAGNESIUM HYDROXIDE/ALUMINUM HYDROXICE/SIMETHICONE 120; 1200; 1200 MG/30ML; MG/30ML; MG/30ML
30 SUSPENSION ORAL EVERY 4 HOURS PRN
Status: CANCELLED | OUTPATIENT
Start: 2023-11-20

## 2023-11-20 RX ORDER — LORAZEPAM 0.5 MG/1
0.5 TABLET ORAL EVERY 8 HOURS PRN
Status: DISCONTINUED | OUTPATIENT
Start: 2023-11-20 | End: 2023-11-21 | Stop reason: HOSPADM

## 2023-11-20 RX ORDER — RISPERIDONE 0.25 MG/1
0.5 TABLET ORAL
Status: CANCELLED | OUTPATIENT
Start: 2023-11-20

## 2023-11-20 RX ORDER — TRAZODONE HYDROCHLORIDE 50 MG/1
50 TABLET ORAL
Status: DISCONTINUED | OUTPATIENT
Start: 2023-11-20 | End: 2023-11-21 | Stop reason: HOSPADM

## 2023-11-20 RX ORDER — BUSPIRONE HYDROCHLORIDE 10 MG/1
10 TABLET ORAL 3 TIMES DAILY
Status: DISCONTINUED | OUTPATIENT
Start: 2023-11-20 | End: 2023-11-21 | Stop reason: HOSPADM

## 2023-11-20 RX ORDER — DULOXETIN HYDROCHLORIDE 20 MG/1
40 CAPSULE, DELAYED RELEASE ORAL DAILY
Status: DISCONTINUED | OUTPATIENT
Start: 2023-11-21 | End: 2023-11-21 | Stop reason: HOSPADM

## 2023-11-20 RX ORDER — RISPERIDONE 0.25 MG/1
0.25 TABLET ORAL
Status: CANCELLED | OUTPATIENT
Start: 2023-11-20

## 2023-11-20 RX ORDER — LITHIUM CARBONATE 300 MG/1
600 CAPSULE ORAL
Status: DISCONTINUED | OUTPATIENT
Start: 2023-11-20 | End: 2023-11-21 | Stop reason: HOSPADM

## 2023-11-20 RX ORDER — ATORVASTATIN CALCIUM 40 MG/1
40 TABLET, FILM COATED ORAL
Status: DISCONTINUED | OUTPATIENT
Start: 2023-11-21 | End: 2023-11-21 | Stop reason: HOSPADM

## 2023-11-20 RX ORDER — RISPERIDONE 1 MG/1
1 TABLET ORAL
Status: CANCELLED | OUTPATIENT
Start: 2023-11-20

## 2023-11-20 RX ORDER — HYDROXYZINE HYDROCHLORIDE 25 MG/1
25 TABLET, FILM COATED ORAL
Status: CANCELLED | OUTPATIENT
Start: 2023-11-20

## 2023-11-20 RX ADMIN — LORAZEPAM 0.5 MG: 0.5 TABLET ORAL at 23:44

## 2023-11-20 RX ADMIN — TRAZODONE HYDROCHLORIDE 50 MG: 50 TABLET ORAL at 23:44

## 2023-11-20 RX ADMIN — BUSPIRONE HYDROCHLORIDE 10 MG: 10 TABLET ORAL at 23:44

## 2023-11-20 RX ADMIN — LITHIUM CARBONATE 600 MG: 300 CAPSULE, GELATIN COATED ORAL at 23:44

## 2023-11-20 NOTE — ED PROVIDER NOTES
History  Chief Complaint   Patient presents with    Psychiatric Evaluation     Pt reports having anxiety and feels "like I'm gonna explode". Pt states he has SI every day and that he would get on his motorcycle and run into someone to "end it". Pt denies hx of SI attempts. Admitted x4 for inpatient tx in the past.      59-year-old male with history of anxiety, major depressive disorder recurrent, severe, and hyperlipidemia complains of worsening anxiety and depression. Approximately 2 to 3 weeks ago he was started on Vraylar. That is because dysphoria. He feels more anxious and as if he is going to explode. He has restless legs. He has suicidal thoughts. He frequently has these thoughts but recently his new symptoms are causing him difficulties at work and he is very concerned about losing his job. He is very upset with this and has increased his suicidal ideation. He tell me that at times he wants to go out to his truck and shoot himself in the head although he does not have a gun. He told triage nurse that at times he thinks is driving his motorcycle fast into somebody to ended all. Patient also notes that he has had chest pain since Friday. He has had a feeling of lightheadedness as well. He has had prior episodes of chest pain that at his been thought to be noncardiac in the past.        Prior to Admission Medications   Prescriptions Last Dose Informant Patient Reported? Taking?    DULoxetine (CYMBALTA) 20 mg capsule   No No   Sig: Take 2 capsules (40 mg total) by mouth daily   LORazepam (ATIVAN) 0.5 mg tablet   No No   Sig: Take 1 tablet (0.5 mg total) by mouth 2 (two) times a day as needed (severe anxiety)   Multiple Vitamin (multivitamin) tablet   Yes No   Sig: Take 1 tablet by mouth daily   atorvastatin (LIPITOR) 40 mg tablet   Yes No   Sig: Take 20 mg by mouth daily    busPIRone (BUSPAR) 10 mg tablet   No No   Sig: Take 1 tablet (10 mg total) by mouth 3 (three) times a day   cariprazine (Vraylar) 1.5 MG capsule   No No   Sig: Take 1 capsule (1.5 mg total) by mouth daily   lithium 600 MG capsule   No No   Sig: Take 1 capsule (600 mg total) by mouth daily at bedtime   traZODone (DESYREL) 50 mg tablet   No No   Sig: TAKE 1/2 TO 1 TABLET BY MOUTH AT BEDTIME AS NEEDED      Facility-Administered Medications: None       Past Medical History:   Diagnosis Date    Anxiety     Chest pain     after eating- seen in ER several x - believes secondary to hernia    CPAP (continuous positive airway pressure) dependence     Depression     H. pylori infection     Hiatal hernia     Hyperlipidemia     Polysubstance dependence ([address]) 2020    PONV (postoperative nausea and vomiting)     Sleep apnea     Suicidal thoughts     Wears glasses        Past Surgical History:   Procedure Laterality Date    COLONOSCOPY      INGUINAL HERNIA REPAIR Right     OH LAPS RPR PARAESPHGL HRNA INCL FUNDPLSTY W/MESH N/A 2021    Procedure: REPAIR HERNIA PARAESOPHAGEAL LAPAROSCOPIC W ROBOTICS with TOUPET FUNDOPLICATION cruroplasty with mesh;  Surgeon: Parul Lovelace MD;  Location: [facility];  Service: General    SHOULDER SURGERY      left       History reviewed. No pertinent family history. I have reviewed and agree with the history as documented. E-Cigarette/Vaping    E-Cigarette Use Never User      E-Cigarette/Vaping Substances    Nicotine No     THC No     CBD No     Flavoring No     Other No     Unknown No      Social History     Tobacco Use    Smoking status: Former     Types: Cigarettes     Quit date:      Years since quittin.9    Smokeless tobacco: Never   Vaping Use    Vaping Use: Never used   Substance Use Topics    Alcohol use: Not Currently     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    Drug use: Yes     Types: Marijuana     Comment: previously used medical marijuana but not currently using       Review of Systems   Constitutional:  Positive for activity change and fatigue. Negative for fever.    Respiratory:  Negative for shortness of breath. Cardiovascular:  Positive for chest pain. Negative for palpitations and leg swelling. Gastrointestinal:  Negative for abdominal pain and blood in stool. Skin:  Negative for rash and wound. Neurological:  Positive for light-headedness. Negative for dizziness, syncope, speech difficulty and headaches. Psychiatric/Behavioral:  Positive for decreased concentration, dysphoric mood and suicidal ideas. Negative for hallucinations and self-injury. The patient is nervous/anxious. Physical Exam  Physical Exam  Vitals and nursing note reviewed. Constitutional:       General: He is not in acute distress. Appearance: He is well-developed and normal weight. He is not ill-appearing or diaphoretic. HENT:      Head: Normocephalic and atraumatic. Right Ear: External ear normal.      Left Ear: External ear normal.      Nose: Nose normal.   Eyes:      General: No scleral icterus. Conjunctiva/sclera: Conjunctivae normal.   Neck:      Vascular: No JVD. Cardiovascular:      Rate and Rhythm: Normal rate and regular rhythm. Pulses: Normal pulses. Heart sounds: Normal heart sounds. Pulmonary:      Effort: Pulmonary effort is normal. No respiratory distress. Abdominal:      Palpations: Abdomen is soft. There is no mass. Tenderness: There is no abdominal tenderness. Musculoskeletal:         General: No tenderness. Normal range of motion. Cervical back: Neck supple. No rigidity. Right lower leg: No edema. Left lower leg: No edema. Skin:     General: Skin is warm and dry. Capillary Refill: Capillary refill takes less than 2 seconds. Findings: No rash. Comments: Multiple tattoos   Neurological:      General: No focal deficit present. Mental Status: He is alert and oriented to person, place, and time. Mental status is at baseline. Cranial Nerves: No cranial nerve deficit.       Coordination: Coordination normal.      Deep Tendon Reflexes: Reflexes are normal and symmetric. Psychiatric:         Attention and Perception: Attention and perception normal.         Mood and Affect: Mood and affect normal.         Speech: Speech normal.         Behavior: Behavior normal. Behavior is cooperative. Thought Content: Thought content is not paranoid or delusional. Thought content includes suicidal ideation. Thought content does not include homicidal ideation. Thought content includes suicidal plan.          Cognition and Memory: Cognition and memory normal.         Vital Signs  ED Triage Vitals [11/20/23 1631]   Temperature Pulse Respirations Blood Pressure SpO2   98.3 °F (36.8 °C) 85 16 123/80 96 %      Temp Source Heart Rate Source Patient Position - Orthostatic VS BP Location FiO2 (%)   Temporal Monitor Sitting Left arm --      Pain Score       No Pain           Vitals:    11/20/23 1631 11/21/23 0412 11/21/23 1530   BP: 123/80 106/58 123/69   Pulse: 85 79 88   Patient Position - Orthostatic VS: Sitting Lying          Visual Acuity      ED Medications  Medications - No data to display    Diagnostic Studies  Results Reviewed       Procedure Component Value Units Date/Time    TSH, 3rd generation with Free T4 reflex [[id number]]  (Normal) Collected: 11/20/23 1731    Lab Status: Final result Specimen: Blood from Arm, Left Updated: 11/20/23 1814     TSH 3RD GENERATON 3.521 uIU/mL     HS Troponin 0hr (reflex protocol) [[id number]]  (Normal) Collected: 11/20/23 1731    Lab Status: Final result Specimen: Blood from Arm, Left Updated: 11/20/23 1807     hs TnI 0hr 3 ng/L     Rapid drug screen, urine [[id number]] Collected: 11/20/23 1734    Lab Status: Final result Specimen: Urine, Clean Catch Updated: 11/20/23 1802     Amph/Meth UR Negative     Barbiturate Ur Negative     Benzodiazepine Urine Negative     Cocaine Urine Negative     Methadone Urine --     Opiate Urine Negative     PCP Ur Negative     THC Urine Negative     Oxycodone Urine Negative Narrative:      Methadone test not performed, if required call laboratory. FOR MEDICAL PURPOSES ONLY. IF CONFIRMATION NEEDED PLEASE CONTACT THE LAB WITHIN 5 DAYS.     Drug Screen Cutoff Levels:  AMPHETAMINE/METHAMPHETAMINES  1000 ng/mL  BARBITURATES     200 ng/mL  BENZODIAZEPINES     200 ng/mL  COCAINE      300 ng/mL  METHADONE      300 ng/mL  OPIATES      300 ng/mL  PHENCYCLIDINE     25 ng/mL  THC       50 ng/mL  OXYCODONE      100 ng/mL    Lithium level [[id number]]  (Abnormal) Collected: 11/20/23 1731    Lab Status: Final result Specimen: Blood from Arm, Left Updated: 11/20/23 1800     Lithium Lvl 0.3 mmol/L     Comprehensive metabolic panel [[id number]] Collected: 11/20/23 1731    Lab Status: Final result Specimen: Blood from Arm, Left Updated: 11/20/23 1759     Sodium 138 mmol/L      Potassium 4.4 mmol/L      Chloride 104 mmol/L      CO2 29 mmol/L      ANION GAP 5 mmol/L      BUN 17 mg/dL      Creatinine 0.92 mg/dL      Glucose 86 mg/dL      Calcium 10.2 mg/dL      AST 18 U/L      ALT 22 U/L      Alkaline Phosphatase 42 U/L      Total Protein 7.7 g/dL      Albumin 4.7 g/dL      Total Bilirubin 0.41 mg/dL      eGFR 96 ml/min/1.73sq m     Narrative:      [place] guidelines for Chronic Kidney Disease (CKD):     Stage 1 with normal or high GFR (GFR > 90 mL/min/1.73 square meters)    Stage 2 Mild CKD (GFR = 60-89 mL/min/1.73 square meters)    Stage 3A Moderate CKD (GFR = 45-59 mL/min/1.73 square meters)    Stage 3B Moderate CKD (GFR = 30-44 mL/min/1.73 square meters)    Stage 4 Severe CKD (GFR = 15-29 mL/min/1.73 square meters)    Stage 5 End Stage CKD (GFR <15 mL/min/1.73 square meters)  Note: GFR calculation is accurate only with a steady state creatinine    UA (URINE) with reflex to Scope [[id number]] Collected: 11/20/23 1734    Lab Status: Final result Specimen: Urine, Clean Catch Updated: 11/20/23 1757     Color, UA Light Yellow     Clarity, UA Clear     Specific [place], UA 1.010 pH, UA 5.5     Leukocytes, UA Negative     Nitrite, UA Negative     Protein, UA Negative mg/dl      Glucose, UA Negative mg/dl      Ketones, UA Negative mg/dl      Urobilinogen, UA <2.0 mg/dl      Bilirubin, UA Negative     Occult Blood, UA Negative    CBC and differential [[id number]] Collected: 11/20/23 1731    Lab Status: Final result Specimen: Blood from Arm, Left Updated: 11/20/23 1743     WBC 8.49 Thousand/uL      RBC 4.75 Million/uL      Hemoglobin 14.3 g/dL      Hematocrit 42.3 %      MCV 89 fL      MCH 30.1 pg      MCHC 33.8 g/dL      RDW 12.2 %      MPV 9.6 fL      Platelets 414 Thousands/uL      nRBC 0 /100 WBCs      Neutrophils Relative 63 %      Immat GRANS % 1 %      Lymphocytes Relative 26 %      Monocytes Relative 7 %      Eosinophils Relative 2 %      Basophils Relative 1 %      Neutrophils Absolute 5.39 Thousands/µL      Immature Grans Absolute 0.04 Thousand/uL      Lymphocytes Absolute 2.24 Thousands/µL      Monocytes Absolute 0.62 Thousand/µL      Eosinophils Absolute 0.16 Thousand/µL      Basophils Absolute 0.04 Thousands/µL     POCT alcohol breath test [[id number]]  (Normal) Resulted: 11/20/23 1743    Lab Status: Final result Updated: 11/20/23 1743     EXTBreath Alcohol 0.00                   No orders to display              Procedures  ECG 12 Lead Documentation Only    Date/Time: 11/20/2023 5:36 PM    Performed by: Melany Rogers DO  Authorized by: Melany Rogers DO    ECG reviewed by me, the ED Provider: yes    Patient location:  ED  Previous ECG:     Previous ECG:  Unavailable    Comparison to cardiac monitor: No    Interpretation:     Interpretation: normal             ED Course                                             Medical Decision Making  78-year-old male with history of anxiety and major depression presents with increased depressive symptoms with suicidal ideation. He feels he may need inpatient [facility] treatment.   He feels he may be having reaction to his new medication, Alivia Lee. Patient also notes chest pain for last 3 days and lightheaded feeling. Will do testing. If medically cleared, will have crisis evaluation. Amount and/or Complexity of Data Reviewed  External Data Reviewed: notes. Labs: ordered. Risk  Decision regarding hospitalization. Disposition  Final diagnoses:   Depression with suicidal ideation   Anxiety     Time reflects when diagnosis was documented in both MDM as applicable and the Disposition within this note       Time User Action Codes Description Comment    11/20/2023  8:23 PM Kathleen Mathewsse.Nancy. A,  R45.851] Depression with suicidal ideation     11/20/2023  8:23 PM Kathleen Page [F41.9] Anxiety           ED Disposition       ED Disposition   Transfer to Behavioral [facility]    Condition   --    Date/Time   Mon Nov 20, 2023  9:13 PM    Comment   Patient medically cleared for transfer to behavioral health.              MD Documentation      Bebo Guzman Most Recent Value   Patient Condition The patient has been stabilized such that within reasonable medical probability, no material deterioration of the patient condition or the condition of the unborn child(reena) is likely to result from the transfer   Reason for Transfer Level of Care needed not available at this facility   Benefits of Transfer Specialized equipment and/or services available at the receiving facility (Include comment)________________________  [facility]]   Risks of Transfer Potential for delay in receiving treatment   Accepting Physician Dr Claudette Blakes Name, Jordyn Jefferson    (Name & Tel number) Roundtrip   Sending MD Dr Abraham Goodell, DO          RN Documentation      [address] Name, [address] [facility] [address] Jordyn Mendez    (Name & Tel number) Roundtrip          Follow-up Information None         Discharge Medication List as of 11/21/2023  3:41 PM        CONTINUE these medications which have NOT CHANGED    Details   atorvastatin (LIPITOR) 40 mg tablet Take 20 mg by mouth daily , Starting Fri 12/18/2020, Historical Med      busPIRone (BUSPAR) 10 mg tablet Take 1 tablet (10 mg total) by mouth 3 (three) times a day, Starting Wed 10/25/2023, Normal      cariprazine (Vraylar) 1.5 MG capsule Take 1 capsule (1.5 mg total) by mouth daily, Starting Wed 10/25/2023, Normal      DULoxetine (CYMBALTA) 20 mg capsule Take 2 capsules (40 mg total) by mouth daily, Starting Wed 10/25/2023, Normal      lithium 600 MG capsule Take 1 capsule (600 mg total) by mouth daily at bedtime, Starting Wed 10/25/2023, Until Tue 1/23/2024, Normal      LORazepam (ATIVAN) 0.5 mg tablet Take 1 tablet (0.5 mg total) by mouth 2 (two) times a day as needed (severe anxiety), Starting Fri 11/17/2023, Normal      Multiple Vitamin (multivitamin) tablet Take 1 tablet by mouth daily, Historical Med      traZODone (DESYREL) 50 mg tablet TAKE 1/2 TO 1 TABLET BY MOUTH AT BEDTIME AS NEEDED, Normal             No discharge procedures on file.     PDMP Review         Value Time User    PDMP Reviewed  Yes 11/17/2023  4:04 PM Artist Harada, PA-C            ED Provider  Electronically Signed by             Jasbir Matthews DO  11/22/23 1010

## 2023-11-21 ENCOUNTER — HOSPITAL ENCOUNTER (INPATIENT)
Facility: HOSPITAL | Age: 48
LOS: 6 days | Discharge: HOME | DRG: 885 | End: 2023-11-27
Attending: PSYCHIATRY & NEUROLOGY | Admitting: PSYCHIATRY & NEUROLOGY
Payer: COMMERCIAL

## 2023-11-21 VITALS
DIASTOLIC BLOOD PRESSURE: 69 MMHG | TEMPERATURE: 98.3 F | HEART RATE: 88 BPM | SYSTOLIC BLOOD PRESSURE: 123 MMHG | OXYGEN SATURATION: 96 % | RESPIRATION RATE: 16 BRPM

## 2023-11-21 DIAGNOSIS — F33.2 MAJOR DEPRESSIVE DISORDER, RECURRENT, SEVERE WITHOUT PSYCHOTIC FEATURES (HCC): Primary | Chronic | ICD-10-CM

## 2023-11-21 DIAGNOSIS — E78.2 MIXED HYPERLIPIDEMIA: ICD-10-CM

## 2023-11-21 DIAGNOSIS — F51.01 PRIMARY INSOMNIA: ICD-10-CM

## 2023-11-21 DIAGNOSIS — F41.1 GENERALIZED ANXIETY DISORDER: Chronic | ICD-10-CM

## 2023-11-21 DIAGNOSIS — F39 MOOD DISORDER (HCC): ICD-10-CM

## 2023-11-21 DIAGNOSIS — F32.A DEPRESSION WITH SUICIDAL IDEATION: ICD-10-CM

## 2023-11-21 DIAGNOSIS — E55.9 VITAMIN D DEFICIENCY: ICD-10-CM

## 2023-11-21 DIAGNOSIS — R45.851 DEPRESSION WITH SUICIDAL IDEATION: ICD-10-CM

## 2023-11-21 LAB
ATRIAL RATE: 78 BPM
P AXIS: 66 DEGREES
PR INTERVAL: 180 MS
QRS AXIS: 62 DEGREES
QRSD INTERVAL: 92 MS
QT INTERVAL: 384 MS
QTC INTERVAL: 437 MS
T WAVE AXIS: 52 DEGREES
VENTRICULAR RATE: 78 BPM

## 2023-11-21 PROCEDURE — 93010 ELECTROCARDIOGRAM REPORT: CPT | Performed by: INTERNAL MEDICINE

## 2023-11-21 RX ORDER — RISPERIDONE 1 MG/1
1 TABLET ORAL
Status: DISCONTINUED | OUTPATIENT
Start: 2023-11-21 | End: 2023-11-27 | Stop reason: HOSPADM

## 2023-11-21 RX ORDER — HYDROXYZINE 50 MG/1
50 TABLET, FILM COATED ORAL EVERY 6 HOURS PRN
Status: DISCONTINUED | OUTPATIENT
Start: 2023-11-21 | End: 2023-11-22

## 2023-11-21 RX ORDER — RISPERIDONE 0.5 MG/1
0.5 TABLET ORAL
Status: DISCONTINUED | OUTPATIENT
Start: 2023-11-21 | End: 2023-11-27 | Stop reason: HOSPADM

## 2023-11-21 RX ORDER — HALOPERIDOL 5 MG/ML
5 INJECTION INTRAMUSCULAR
Status: DISCONTINUED | OUTPATIENT
Start: 2023-11-21 | End: 2023-11-27 | Stop reason: HOSPADM

## 2023-11-21 RX ORDER — LANOLIN ALCOHOL/MO/W.PET/CERES
3 CREAM (GRAM) TOPICAL
Status: DISCONTINUED | OUTPATIENT
Start: 2023-11-21 | End: 2023-11-27 | Stop reason: HOSPADM

## 2023-11-21 RX ORDER — LITHIUM CARBONATE 300 MG/1
600 TABLET, FILM COATED, EXTENDED RELEASE ORAL DAILY
Status: DISCONTINUED | OUTPATIENT
Start: 2023-11-21 | End: 2023-11-23

## 2023-11-21 RX ORDER — BUSPIRONE HYDROCHLORIDE 10 MG/1
10 TABLET ORAL 3 TIMES DAILY
Status: DISCONTINUED | OUTPATIENT
Start: 2023-11-21 | End: 2023-11-23

## 2023-11-21 RX ORDER — HYDROXYZINE HYDROCHLORIDE 25 MG/1
25 TABLET, FILM COATED ORAL
Status: DISCONTINUED | OUTPATIENT
Start: 2023-11-21 | End: 2023-11-22

## 2023-11-21 RX ORDER — TRAZODONE HYDROCHLORIDE 50 MG/1
50 TABLET ORAL
Status: DISCONTINUED | OUTPATIENT
Start: 2023-11-21 | End: 2023-11-27 | Stop reason: HOSPADM

## 2023-11-21 RX ORDER — MAGNESIUM HYDROXIDE/ALUMINUM HYDROXICE/SIMETHICONE 120; 1200; 1200 MG/30ML; MG/30ML; MG/30ML
30 SUSPENSION ORAL EVERY 4 HOURS PRN
Status: DISCONTINUED | OUTPATIENT
Start: 2023-11-21 | End: 2023-11-27 | Stop reason: HOSPADM

## 2023-11-21 RX ORDER — RISPERIDONE 0.25 MG/1
0.25 TABLET ORAL
Status: DISCONTINUED | OUTPATIENT
Start: 2023-11-21 | End: 2023-11-27 | Stop reason: HOSPADM

## 2023-11-21 RX ORDER — LORAZEPAM 2 MG/ML
1 INJECTION INTRAMUSCULAR
Status: DISCONTINUED | OUTPATIENT
Start: 2023-11-21 | End: 2023-11-27 | Stop reason: HOSPADM

## 2023-11-21 RX ORDER — ATORVASTATIN CALCIUM 40 MG/1
40 TABLET, FILM COATED ORAL
Status: DISCONTINUED | OUTPATIENT
Start: 2023-11-21 | End: 2023-11-27 | Stop reason: HOSPADM

## 2023-11-21 RX ORDER — DULOXETIN HYDROCHLORIDE 20 MG/1
40 CAPSULE, DELAYED RELEASE ORAL DAILY
Status: DISCONTINUED | OUTPATIENT
Start: 2023-11-22 | End: 2023-11-22

## 2023-11-21 RX ADMIN — DULOXETINE HYDROCHLORIDE 40 MG: 20 CAPSULE, DELAYED RELEASE ORAL at 10:21

## 2023-11-21 RX ADMIN — Medication 3 MG: at 21:54

## 2023-11-21 RX ADMIN — TRAZODONE HYDROCHLORIDE 50 MG: 50 TABLET ORAL at 21:54

## 2023-11-21 RX ADMIN — ATORVASTATIN CALCIUM 40 MG: 40 TABLET, FILM COATED ORAL at 10:21

## 2023-11-21 RX ADMIN — BUSPIRONE HYDROCHLORIDE 10 MG: 10 TABLET ORAL at 10:21

## 2023-11-21 RX ADMIN — CARIPRAZINE 1.5 MG: 1.5 CAPSULE, GELATIN COATED ORAL at 10:21

## 2023-11-21 RX ADMIN — BUSPIRONE HYDROCHLORIDE 10 MG: 10 TABLET ORAL at 21:54

## 2023-11-21 NOTE — PLAN OF CARE
Problem: Ineffective Coping  Goal: Cooperates with admission process  Description: Interventions:   - Complete admission process  Outcome: Progressing  Goal: Identifies ineffective coping skills  Outcome: Progressing  Goal: Identifies healthy coping skills  Outcome: Progressing  Goal: Demonstrates healthy coping skills  Outcome: Progressing  Goal: Participates in unit activities  Description: Interventions:  - Provide therapeutic environment   - Provide required programming   - Redirect inappropriate behaviors   Outcome: Progressing  Goal: Patient/Family participate in treatment and DC plans  Description: Interventions:  - Provide therapeutic environment  Outcome: Progressing  Goal: Patient/Family verbalizes awareness of resources  Outcome: Progressing  Goal: Understands least restrictive measures  Description: Interventions:  - Utilize least restrictive behavior  Outcome: Progressing  Goal: Free from restraint events  Description: - Utilize least restrictive measures   - Provide behavioral interventions   - Redirect inappropriate behaviors   Outcome: Progressing     Problem: Risk for Self Injury/Neglect  Goal: Treatment Goal: Remain safe during length of stay, learn and adopt new coping skills, and be free of self-injurious ideation, impulses and acts at the time of discharge  Outcome: Progressing  Goal: Verbalize thoughts and feelings  Description: Interventions:  - Assess and re-assess patient's lethality and potential for self-injury  - Engage patient in 1:1 interactions, daily, for a minimum of 15 minutes  - Encourage patient to express feelings, fears, frustrations, hopes  - Establish rapport/trust with patient   Outcome: Progressing  Goal: Refrain from harming self  Description: Interventions:  - Monitor patient closely, per order  - Develop a trusting relationship  - Supervise medication ingestion, monitor effects and side effects   Outcome: Progressing  Goal: Attend and participate in unit activities, including therapeutic, recreational, and educational groups  Description: Interventions:  - Provide therapeutic and educational activities daily, encourage attendance and participation, and document same in the medical record  - Obtain collateral information, encourage visitation and family involvement in care   Outcome: Progressing  Goal: Recognize maladaptive responses and adopt new coping mechanisms  Outcome: Progressing  Goal: Complete daily ADLs, including personal hygiene independently, as able  Description: Interventions:  - Observe, teach, and assist patient with ADLS  - Monitor and promote a balance of rest/activity, with adequate nutrition and elimination  Outcome: Progressing     Problem: Depression  Goal: Treatment Goal: Demonstrate behavioral control of depressive symptoms, verbalize feelings of improved mood/affect, and adopt new coping skills prior to discharge  Outcome: Progressing  Goal: Verbalize thoughts and feelings  Description: Interventions:  - Assess and re-assess patient's level of risk   - Engage patient in 1:1 interactions, daily, for a minimum of 15 minutes   - Encourage patient to express feelings, fears, frustrations, hopes   Outcome: Progressing  Goal: Refrain from harming self  Description: Interventions:  - Monitor patient closely, per order   - Supervise medication ingestion, monitor effects and side effects   Outcome: Progressing  Goal: Refrain from isolation  Description: Interventions:  - Develop a trusting relationship   - Encourage socialization   Outcome: Progressing  Goal: Refrain from self-neglect  Outcome: Progressing  Goal: Attend and participate in unit activities, including therapeutic, recreational, and educational groups  Description: Interventions:  - Provide therapeutic and educational activities daily, encourage attendance and participation, and document same in the medical record   Outcome: Progressing  Goal: Complete daily ADLs, including personal hygiene independently, as able  Description: Interventions:  - Observe, teach, and assist patient with ADLS  -  Monitor and promote a balance of rest/activity, with adequate nutrition and elimination   Outcome: Progressing     Problem: Anxiety  Goal: Anxiety is at manageable level  Description: Interventions:  - Assess and monitor patient's anxiety level. - Monitor for signs and symptoms (heart palpitations, chest pain, shortness of breath, headaches, nausea, feeling jumpy, restlessness, irritable, apprehensive). - Collaborate with interdisciplinary team and initiate plan and interventions as ordered.   - [place] patient to unit/surroundings  - Explain treatment plan  - Encourage participation in care  - Encourage verbalization of concerns/fears  - Identify coping mechanisms  - Assist in developing anxiety-reducing skills  - Administer/offer alternative therapies  - Limit or eliminate stimulants  Outcome: Progressing

## 2023-11-21 NOTE — EMTALA/ACUTE CARE TRANSFER
[facility] EMERGENCY DEPARTMENT  [address] [address]  Dept: [phone]      AAJIN TRANSFER CONSENT    NAME Claudia Reed  1975                              MRN [id number]    I have been informed of my rights regarding examination, treatment, and transfer   by Dr. Lupillo Sanchez DO    Benefits: Specialized equipment and/or services available at the receiving facility (Include comment)________________________ ([address] [facility] [address])    Risks: Potential for delay in receiving treatment      Consent for Transfer:  I acknowledge that my medical condition has been evaluated and explained to me by the emergency department physician or other qualified medical person and/or my attending physician, who has recommended that I be transferred to the service of  Accepting Physician: Dr Corina Andrews at [address] [place] [address] [address] Name, [address] : [address], [facility] AFFILIATED WITH [address]. The above potential benefits of such transfer, the potential risks associated with such transfer, and the probable risks of not being transferred have been explained to me, and I fully understand them. The doctor has explained that, in my case, the benefits of transfer outweigh the risks. I agree to be transferred. I authorize the performance of emergency medical procedures and treatments upon me in both transit and upon arrival at the receiving facility. Additionally, I authorize the release of any and all medical records to the receiving facility and request they be transported with me, if possible. I understand that the safest mode of transportation during a medical emergency is an ambulance and that the Hospital advocates the use of this mode of transport.  Risks of traveling to the receiving facility by car, including absence of medical control, life sustaining equipment, such as oxygen, and medical personnel has been explained to me and I fully understand them. ([place] CORRECT BOX BELOW)  [X]  I consent to the stated transfer and to be transported by ambulance/helicopter. [  ]  I consent to the stated transfer, but refuse transportation by ambulance and accept full responsibility for my transportation by car. I understand the risks of non-ambulance transfers and I exonerate the Hospital and its staff from any deterioration in my condition that results from this refusal.    X___________________________________________    DATE  23  TIME________  Signature of patient or legally responsible individual signing on patient behalf           RELATIONSHIP TO PATIENT_________________________          Provider Certification    NAME Korey Botello.  1975                              MRN [id number]    A medical screening exam was performed on the above named patient. Based on the examination:    Condition Necessitating Transfer The primary encounter diagnosis was Depression with suicidal ideation. A diagnosis of Anxiety was also pertinent to this visit.     Patient Condition: The patient has been stabilized such that within reasonable medical probability, no material deterioration of the patient condition or the condition of the unborn child(reena) is likely to result from the transfer    Reason for Transfer: Level of Care needed not available at this facility    Transfer Requirements: [facility] [place] [address], [place], [place]   Space available and qualified personnel available for treatment as acknowledged by Wolf  Agreed to accept transfer and to provide appropriate medical treatment as acknowledged by       Dr Delmar Gonzalez  Appropriate medical records of the examination and treatment of the patient are provided at the time of transfer   [address] _______  Transfer will be performed by qualified personnel from    and appropriate transfer equipment as required, including the use of necessary and appropriate life support measures. Provider Certification: I have examined the patient and explained the following risks and benefits of being transferred/refusing transfer to the patient/family:         Based on these reasonable risks and benefits to the patient and/or the unborn child(reena), and based upon the information available at the time of the patient’s examination, I certify that the medical benefits reasonably to be expected from the provision of appropriate medical treatments at another medical facility outweigh the increasing risks, if any, to the individual’s medical condition, and in the case of labor to the unborn child, from effecting the transfer.     X____________________________________________ DATE 11/20/23        TIME_______      ORIGINAL - SEND TO MEDICAL RECORDS   COPY - SEND WITH PATIENT DURING TRANSFER

## 2023-11-21 NOTE — ED NOTES
Patient is accepted at [facility]. Patient is accepted by Kirsten Melgar per [address] is arranged with [employer]. Transportation is scheduled for 11/21/23 1400 CTS   Patient may go to the floor at 1200. Nurse report is to be called to [phone] prior to patient transfer.

## 2023-11-21 NOTE — PROGRESS NOTES
PT belongings    Remains with pt:  Jeans   Black t-shirt  Grey socks  Slip on shoes (grey)  Black underwear    Locked:  [place]  Black jacket   Black Bag     Security:  #[id number]  Black wallet   $001 cash  [place] card x2  Medical marijuana card   PA ID  [place] card  Debit card x3       Contraband:  #12  Red Iphone      No keys, no   PT claiming he has another 48, wallet reviewed by staff x2 and security.

## 2023-11-21 NOTE — ED NOTES
46 y.o male presented to the ED with SI with a plan. Pt stated he has been dealing with Anxiety for most of his life and and it has worsened. Pt stated he always had Suicidal thoughts of riding his motorcycle in front of a truck but stated he would never act on the thoughts due to leaving his children and dogs. Pt stated he has been seeing a Psychiatrist for many years and feels he starts on many medications and gets off medications and feels they are not working. Pt stated he just started on Vraylar about a month ago and feels his anxiety has worsened. Pt is having feelings of worthlessness and hopelessness and is affecting his job. Pt stated he wishes the pain of his anxiety will end. Pt feels his anxiety might get fired from his job. Pt currently has a Psychiatrist.  Pt was inpatient x 4 in 2020 for his anxiety and had ECT in 2020. Pt denies any HI and A/V hallucinations. Pt currently denies any legal and substance abuse issues. Pt stated he has had a past history of using drugs and alcohol but has not used since 2013. Pt stated he feels some of the drugs he used in the past might be causing him to have the anxiety. Pt what's to go inpatient and will sign a 201. Provider is in agreement with this plan.

## 2023-11-21 NOTE — ED NOTES
Insurance Authorization for admission:   Phone call placed to Santos Rasmussen. Completed on [employer]     TBD days approved. Level of care: inpatient mental health.   Review on tbd   Authorization # [id number]

## 2023-11-22 ENCOUNTER — TELEPHONE (OUTPATIENT)
Dept: PSYCHIATRY | Facility: CLINIC | Age: 48
End: 2023-11-22

## 2023-11-22 PROBLEM — K21.9 GERD (GASTROESOPHAGEAL REFLUX DISEASE): Status: ACTIVE | Noted: 2023-11-22

## 2023-11-22 LAB
25(OH)D3 SERPL-MCNC: 21.4 NG/ML (ref 30–100)
ANION GAP SERPL CALCULATED.3IONS-SCNC: 7 MMOL/L
BASOPHILS # BLD AUTO: 0.03 THOUSANDS/ÂΜL (ref 0–0.1)
BASOPHILS NFR BLD AUTO: 1 % (ref 0–1)
BUN SERPL-MCNC: 15 MG/DL (ref 5–25)
CALCIUM SERPL-MCNC: 9.6 MG/DL (ref 8.4–10.2)
CHLORIDE SERPL-SCNC: 103 MMOL/L (ref 96–108)
CHOLEST SERPL-MCNC: 187 MG/DL
CO2 SERPL-SCNC: 29 MMOL/L (ref 21–32)
CREAT SERPL-MCNC: 0.94 MG/DL (ref 0.6–1.3)
EOSINOPHIL # BLD AUTO: 0.14 THOUSAND/ÂΜL (ref 0–0.61)
EOSINOPHIL NFR BLD AUTO: 3 % (ref 0–6)
ERYTHROCYTE [DISTWIDTH] IN BLOOD BY AUTOMATED COUNT: 12.1 % (ref 11.6–15.1)
EST. AVERAGE GLUCOSE BLD GHB EST-MCNC: 111 MG/DL
FOLATE SERPL-MCNC: >22.3 NG/ML
GFR SERPL CREATININE-BSD FRML MDRD: 94 ML/MIN/1.73SQ M
GLUCOSE P FAST SERPL-MCNC: 112 MG/DL (ref 65–99)
GLUCOSE SERPL-MCNC: 112 MG/DL (ref 65–140)
HBA1C MFR BLD: 5.5 %
HCT VFR BLD AUTO: 45.8 % (ref 36.5–49.3)
HDLC SERPL-MCNC: 40 MG/DL
HGB BLD-MCNC: 15.2 G/DL (ref 12–17)
IMM GRANULOCYTES # BLD AUTO: 0.02 THOUSAND/UL (ref 0–0.2)
IMM GRANULOCYTES NFR BLD AUTO: 0 % (ref 0–2)
LDLC SERPL CALC-MCNC: 122 MG/DL (ref 0–100)
LYMPHOCYTES # BLD AUTO: 1.58 THOUSANDS/ÂΜL (ref 0.6–4.47)
LYMPHOCYTES NFR BLD AUTO: 28 % (ref 14–44)
MCH RBC QN AUTO: 30.3 PG (ref 26.8–34.3)
MCHC RBC AUTO-ENTMCNC: 33.2 G/DL (ref 31.4–37.4)
MCV RBC AUTO: 91 FL (ref 82–98)
MONOCYTES # BLD AUTO: 0.4 THOUSAND/ÂΜL (ref 0.17–1.22)
MONOCYTES NFR BLD AUTO: 7 % (ref 4–12)
NEUTROPHILS # BLD AUTO: 3.5 THOUSANDS/ÂΜL (ref 1.85–7.62)
NEUTS SEG NFR BLD AUTO: 61 % (ref 43–75)
NONHDLC SERPL-MCNC: 147 MG/DL
NRBC BLD AUTO-RTO: 0 /100 WBCS
PLATELET # BLD AUTO: 309 THOUSANDS/UL (ref 149–390)
PMV BLD AUTO: 9.5 FL (ref 8.9–12.7)
POTASSIUM SERPL-SCNC: 4.1 MMOL/L (ref 3.5–5.3)
RBC # BLD AUTO: 5.01 MILLION/UL (ref 3.88–5.62)
SODIUM SERPL-SCNC: 139 MMOL/L (ref 135–147)
TRIGL SERPL-MCNC: 126 MG/DL
VIT B12 SERPL-MCNC: 642 PG/ML (ref 180–914)
WBC # BLD AUTO: 5.67 THOUSAND/UL (ref 4.31–10.16)

## 2023-11-22 PROCEDURE — 99223 1ST HOSP IP/OBS HIGH 75: CPT | Performed by: PSYCHIATRY & NEUROLOGY

## 2023-11-22 PROCEDURE — 80048 BASIC METABOLIC PNL TOTAL CA: CPT

## 2023-11-22 PROCEDURE — 80061 LIPID PANEL: CPT

## 2023-11-22 PROCEDURE — 82746 ASSAY OF FOLIC ACID SERUM: CPT

## 2023-11-22 PROCEDURE — 82306 VITAMIN D 25 HYDROXY: CPT

## 2023-11-22 PROCEDURE — 85025 COMPLETE CBC W/AUTO DIFF WBC: CPT

## 2023-11-22 PROCEDURE — 82607 VITAMIN B-12: CPT

## 2023-11-22 PROCEDURE — 83036 HEMOGLOBIN GLYCOSYLATED A1C: CPT

## 2023-11-22 RX ORDER — ACETAMINOPHEN 325 MG/1
650 TABLET ORAL EVERY 4 HOURS PRN
Status: DISCONTINUED | OUTPATIENT
Start: 2023-11-22 | End: 2023-11-27 | Stop reason: HOSPADM

## 2023-11-22 RX ORDER — DULOXETIN HYDROCHLORIDE 60 MG/1
60 CAPSULE, DELAYED RELEASE ORAL DAILY
Status: DISCONTINUED | OUTPATIENT
Start: 2023-11-23 | End: 2023-11-27 | Stop reason: HOSPADM

## 2023-11-22 RX ORDER — HYDROXYZINE 50 MG/1
50 TABLET, FILM COATED ORAL
Status: DISCONTINUED | OUTPATIENT
Start: 2023-11-22 | End: 2023-11-27 | Stop reason: HOSPADM

## 2023-11-22 RX ORDER — ACETAMINOPHEN 325 MG/1
975 TABLET ORAL EVERY 6 HOURS PRN
Status: DISCONTINUED | OUTPATIENT
Start: 2023-11-22 | End: 2023-11-27 | Stop reason: HOSPADM

## 2023-11-22 RX ORDER — LORAZEPAM 0.5 MG/1
0.5 TABLET ORAL EVERY 8 HOURS PRN
Status: DISCONTINUED | OUTPATIENT
Start: 2023-11-22 | End: 2023-11-27 | Stop reason: HOSPADM

## 2023-11-22 RX ORDER — ACETAMINOPHEN 325 MG/1
650 TABLET ORAL EVERY 6 HOURS PRN
Status: DISCONTINUED | OUTPATIENT
Start: 2023-11-22 | End: 2023-11-27 | Stop reason: HOSPADM

## 2023-11-22 RX ORDER — POLYETHYLENE GLYCOL 3350 17 G/17G
17 POWDER, FOR SOLUTION ORAL DAILY PRN
Status: DISCONTINUED | OUTPATIENT
Start: 2023-11-22 | End: 2023-11-27 | Stop reason: HOSPADM

## 2023-11-22 RX ADMIN — HYDROXYZINE HYDROCHLORIDE 50 MG: 50 TABLET, FILM COATED ORAL at 08:04

## 2023-11-22 RX ADMIN — BUSPIRONE HYDROCHLORIDE 10 MG: 10 TABLET ORAL at 08:04

## 2023-11-22 RX ADMIN — ACETAMINOPHEN 975 MG: 325 TABLET ORAL at 14:05

## 2023-11-22 RX ADMIN — LITHIUM CARBONATE 600 MG: 300 TABLET, EXTENDED RELEASE ORAL at 08:04

## 2023-11-22 RX ADMIN — DULOXETINE HYDROCHLORIDE 40 MG: 20 CAPSULE, DELAYED RELEASE ORAL at 08:04

## 2023-11-22 RX ADMIN — BUSPIRONE HYDROCHLORIDE 10 MG: 10 TABLET ORAL at 21:36

## 2023-11-22 RX ADMIN — BUSPIRONE HYDROCHLORIDE 10 MG: 10 TABLET ORAL at 16:41

## 2023-11-22 RX ADMIN — ATORVASTATIN CALCIUM 40 MG: 40 TABLET, FILM COATED ORAL at 16:41

## 2023-11-22 RX ADMIN — Medication 3 MG: at 21:36

## 2023-11-22 RX ADMIN — TRAZODONE HYDROCHLORIDE 50 MG: 50 TABLET ORAL at 21:36

## 2023-11-22 RX ADMIN — CARIPRAZINE 1.5 MG: 1.5 CAPSULE, GELATIN COATED ORAL at 08:04

## 2023-11-22 NOTE — H&P
RamseyTm#  [id number] Asher Byrne  VYM:[id number]    [facility]:[id number]  Adm Date: 11/21/2023 1630  4:30 PM   ATT PHY: Alba Barrera, [address] [facility]         Chief Complaint: anxiety, depression, suicidal ideations      History of Presenting Illness: Rubio Mejia is a(n) 46y.o. year old male who is admitted to [address] [place] [address] on voluntary 201 commitment basis. Patient originally presented to [address] [place] [address] ED on 11/20/2023 for worsening anxiety and depression with suicidal ideations. Patient examined at bedside. Patient currently denies any complaints.     No Known Allergies    Current Facility-Administered Medications on File Prior to Encounter   Medication Dose Route Frequency Provider Last Rate Last Admin    [DISCONTINUED] atorvastatin (LIPITOR) tablet 40 mg  40 mg Oral After Breakfast Willeen Apo Falino, DO   40 mg at 11/21/23 1021    [DISCONTINUED] busPIRone (BUSPAR) tablet 10 mg  10 mg Oral TID Sherel Hang, DO   10 mg at 11/21/23 1021    [DISCONTINUED] cariprazine (VRAYLAR) capsule 1.5 mg  1.5 mg Oral Daily Willeen Apo Falino, DO   1.5 mg at 11/21/23 1021    [DISCONTINUED] DULoxetine (CYMBALTA) delayed release capsule 40 mg  40 mg Oral Daily Willeen Apo Falino, DO   40 mg at 11/21/23 1021    [DISCONTINUED] lithium carbonate capsule 600 mg  600 mg Oral HS Willeen Apo Falino, DO   600 mg at 11/20/23 2344    [DISCONTINUED] LORazepam (ATIVAN) tablet 0.5 mg  0.5 mg Oral Q8H PRN Willeen Apo Falino, DO   0.5 mg at 11/20/23 2344    [DISCONTINUED] traZODone (DESYREL) tablet 50 mg  50 mg Oral HS Willeen Apo Falino, DO   50 mg at 11/20/23 2344     Current Outpatient Medications on File Prior to Encounter   Medication Sig Dispense Refill    atorvastatin (LIPITOR) 40 mg tablet Take 20 mg by mouth daily       busPIRone (BUSPAR) 10 mg tablet Take 1 tablet (10 mg total) by mouth 3 (three) times a day 90 tablet 2    cariprazine (Vraylar) 1.5 MG capsule Take 1 capsule (1.5 mg total) by mouth daily 30 capsule 2    lithium 600 MG capsule Take 1 capsule (600 mg total) by mouth daily at bedtime 30 capsule 2    LORazepam (ATIVAN) 0.5 mg tablet Take 1 tablet (0.5 mg total) by mouth 2 (two) times a day as needed (severe anxiety) 15 tablet 0    Multiple Vitamin (multivitamin) tablet Take 1 tablet by mouth daily      traZODone (DESYREL) 50 mg tablet TAKE 1/2 TO 1 TABLET BY MOUTH AT BEDTIME AS NEEDED 30 tablet 2    DULoxetine (CYMBALTA) 20 mg capsule Take 2 capsules (40 mg total) by mouth daily 60 capsule 2     Active Ambulatory Problems     Diagnosis Date Noted    Generalized anxiety disorder 01/30/2018    Major depressive disorder, recurrent, severe without psychotic features ([address]) 06/09/2020    Other specified anxiety disorders 06/09/2020    Obstructive sleep apnea syndrome 04/09/2013    Mixed hyperlipidemia 05/21/2019    History of cocaine use 05/21/2019    History of alcohol abuse 05/21/2019    Herniated lumbar intervertebral disc 01/29/2013    Polysubstance dependence ([address]) 09/16/2020    Chronic low back pain 01/13/2021    Hiatal hernia without gangrene or obstruction 03/09/2021    Status post repair of paraesophageal diaphragmatic hernia 04/20/2021    Diarrhea 09/22/2021     Resolved Ambulatory Problems     Diagnosis Date Noted    Medical clearance for psychiatric admission 06/09/2020     Past Medical History:   Diagnosis Date    Anxiety     Chest pain     CPAP (continuous positive airway pressure) dependence     Depression     H. pylori infection     Hiatal hernia     Hyperlipidemia     PONV (postoperative nausea and vomiting)     Sleep apnea     Suicidal thoughts     Wears glasses      Past Surgical History:   Procedure Laterality Date    COLONOSCOPY      INGUINAL HERNIA REPAIR Right     NJ LAPS RPR PARAESPHGL HRNA INCL FUNDPLSTY W/MESH N/A 4/1/2021    Procedure: REPAIR HERNIA PARAESOPHAGEAL LAPAROSCOPIC W ROBOTICS with TOUPET FUNDOPLICATION cruroplasty with mesh;  Surgeon: Bella Rm MD;  Location: [facility];  Service: General    SHOULDER SURGERY      left     Social History:   Social History     Socioeconomic History    Marital status: Single     Spouse name: Not on file    Number of children: Not on file    Years of education: Not on file    Highest education level: Not on file   Occupational History    Not on file   Tobacco Use    Smoking status: Former     Types: Cigarettes     Quit date:      Years since quittin.9    Smokeless tobacco: Never   Vaping Use    Vaping Use: Never used   Substance and Sexual Activity    Alcohol use: Not Currently     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    Drug use: Yes     Types: Marijuana     Comment: previously used medical marijuana but not currently using    Sexual activity: Not on file   Other Topics Concern    Not on file   Social History Narrative    Not on file     Social Determinants of Health     Financial Resource Strain: Not on file   Food Insecurity: Not on file   Transportation Needs: Not on file   Physical Activity: Not on file   Stress: Not on file   Social Connections: Not on file   Intimate Partner Violence: Not on file   Housing Stability: Not on file     Family History: No family history on file. Review of Systems   Constitutional:  Negative for chills and fever. HENT:  Negative for ear pain and sore throat. Eyes:  Negative for pain and visual disturbance. Respiratory:  Negative for cough and shortness of breath. Cardiovascular:  Negative for chest pain and palpitations. Gastrointestinal:  Negative for abdominal pain, constipation, diarrhea, nausea and vomiting. Genitourinary:  Negative for difficulty urinating, dysuria and frequency. Musculoskeletal:  Positive for arthralgias and back pain. Skin:  Negative for color change and rash. Neurological:  Negative for dizziness and headaches. Psychiatric/Behavioral:  Positive for dysphoric mood and suicidal ideas.     All other systems reviewed and are negative. Physical Exam   Vitals: Blood pressure 111/56, pulse 72, temperature 97.7 °F (36.5 °C), temperature source Temporal, resp. rate 18, height 6' (1.829 m), weight 98.4 kg (217 lb), SpO2 95 %. ,Body mass index is 29.43 kg/m². Constitutional: Awake, alert, in no acute distress. Head: Normocephalic and atraumatic. Mouth/Throat: Oropharynx is clear and moist.    Eyes: Conjunctivae and EOM are normal.   Neck: Neck supple. No thyromegaly present. Cardiovascular: Normal rate, regular rhythm. Pulmonary/Chest: Effort normal and breath sounds normal.   Abdominal: Soft. Bowel sounds are normal. There is no tenderness. Neurological: No focal deficits. Musculoskeletal: Nontender spine. Skin: Skin is warm and dry. No edema. Gurmeet Woo is a(n) 46 y.o. male with MDD. 1.  Hyperlipidemia. Continue atorvastatin 40 mg nightly.  on 11/22/23 - states did not take statin for 1.5 weeks. 2.  VINCENZO. On CPAP at bedtime. 3.  Insomnia. Patient is on melatonin at bedtime. 4.  Chronic low back pain. Patient may take Tylenol as needed. 5.  Psych with MDD. This is being managed by the psych team.    Prognosis: Fair. Discharge Plan: In progress. Advanced Directives: I have discussed in detail with the patient the advanced directives. The patient does not have an appointed POA and does not have a living will. Patient's emergency contact is his mother, Deepak Alberts, whose number is [phone]. When discussing cardiac and pulmonary resuscitation efforts with the patient, the patient wishes to be full code. I have spent more than 50 minutes gathering data, doing physical examination, and discussing the advanced directives, which was witnessed by caring staff. The patient was discussed with Dr. Odalys Espino and he is in agreement with the above note.

## 2023-11-22 NOTE — PROGRESS NOTES
11/22/23 1300   Activity/Group Checklist   Group Admission/Discharge   Attendance Attended   Attendance Duration (min) 16-30   Interactions Interacted appropriately   Affect/Mood Appropriate   Goals Achieved Identified feelings; Identified triggers; Identified relapse prevention strategies; Discussed coping strategies; Identified resources and support systems; Able to listen to others; Able to engage in interactions; Able to reflect/comment on own behavior;Able to self-disclose; Able to recieve feedback     Patient agreeable to meet and complete his self assessment and relapse prevention plan with [facility]. Patient is overwhelmed, anxious and depressed which lead to impulsive thoughts of suicide.

## 2023-11-22 NOTE — H&P
Psychiatric Evaluation - Behavioral Health     Identification Data:Raman Whitaker. 46 y.o. male MRN: [id number]  Unit/Bed#Valent[facility] 200-02 Encounter: [id number]    Chief Complaint: depression, anxiety, and suicidal ideation    History of Present Illness     Kevin Reid is a 46 y.o. male with a history of depression, Generalized Anxiety Disorder, and substance use who was admitted to the inpatient adult psychiatric unit on a voluntary 201 commitment basis due to depression, anxiety, unstable mood, and suicidal ideation. He presented to ED voluntarily after struggling with severe anxiety and panic-like attack with increased suicidal ideation. He made statement that he wanted to get on his motorcycle and run into someone to end his life. UDS was negative. EKG with no acute changes. On evaluation in the inpatient psychiatric unit Tanika Buckley presents highly anxious, restless but able to be engaged appropriately in the interview. Patient admits he has been struggling anxiety throughout his life and for past several weeks, he has experienced high anxiety,  panic-like attacks, depressed mood, poor sleep and unable to function in his job. Admits that he is very distressed and frustrated due to inability to work that he has been having increased suicidal ideation. Patient admits to [facility] with plan to get into his truck and she shoot himself but does not have a gun. Denies any active plan or intent to hurt himself and he is able to contract for safety presently. He report several past psychiatric admission and currently she is in therapy session with . [facility]'s provider. States maybe Taylor Pac which was recently started is causing his anxiety. Although patient has history of substance use, he has been in remission for the past 10 years. Denies any acute withdrawal symptoms.  He is in agreement to be compliant with medication and treatment plan in the unit    Psychiatric Review Of Systems:    Sleep changes: decreased  Appetite changes:decreased  Weight changes: no  Energy: decreased  Interest/pleasure/: no  Anhedonia: no  Anxiety: yes  Deidre: history of mood swings  Guilt:  no  Hopeless:  no  Self injurious behavior/risky behavior: not recently  Suicidal ideation: yes, plan to shoot self  Homicidal ideation: no  Auditory hallucinations: no  Visual hallucinations: no  Delusional thinking: no  Eating disorder history: no  Obsessive/compulsive symptoms: no    Historical Information     Past Psychiatric History:     Past Inpatient Psychiatric Treatment:   Several past inpatient psychiatric admissions  Past Outpatient Psychiatric Treatment:    Has a therapist  Past Suicide Attempts: no  Past Violent Behavior: denies  Past Psychiatric Medication Trials: multiple psychiatric medication trials     Substance Abuse History:    Social History       Tobacco History       Smoking Status  Former Quit Date  2004 Smoking Tobacco Type  Cigarettes quit in 2004      Smokeless Tobacco Use  Never              Alcohol History       Alcohol Use Status  Not Currently Comment  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              Drug Use       Drug Use Status  Yes Types  Marijuana Comment  previously used medical marijuana but not currently using              Sexual Activity       Sexually Active  Not Asked              Activities of Daily Living    Not Asked                 Additional Substance Use Detail       Questions Responses    Problems Due to Past Use of Alcohol? Yes    Problems Due to Past Use of Substances?  Yes    Substance Use Assessment Denies substance use within the past 12 months    Alcohol Use Frequency Denies use in past 12 months    Cannabis frequency Past regular use    Comment: Past rare use on 6/9/2020 Past rare use ->Past regular use on 7/24/2020     Heroin Frequency Denies use in past 12 months    Cannabis method Smoke    Comment: Smoke on 7/24/2020     1st Use of Alcohol age 13    Cannabis 1st Use medical marijuana for anxiety    Last Use of Alcohol & Amount sober 6.5 years    Longest Abstinence from Alcohol 6.5 years    Cocaine frequency Past regular use    Comment: Never used on 6/9/2020 Never used ->Past regular use on 7/24/2020     Crack Cocaine Frequency Prior dependence    Methamphetamine Frequency Denies use in past 12 months    Narcotic Frequency Denies use in past 12 months    Benzodiazepine Frequency Denies use in past 12 months    Amphetamine frequency Denies use in past 12 months    Barbituate Frequency Denies use use in past 12 months    Inhalant frequency Never used    Comment: Never used on 6/9/2020     Hallucinogen frequency Past regular use    Comment: Never used on 6/9/2020 Never used ->Past regular use on 7/24/2020     Ecstasy frequency Never used    Comment: Never used on 6/9/2020     Other drug frequency Never used    Comment: Never used on 6/9/2020     Opiate frequency Denies use in past 12 months    Not reviewed. I have assessed this patient for substance use within the past 12 months    Alcohol use: denies current use  Recreational drug use:  Has medical MJ    Family Psychiatric History: denies    Social History:    Education: 12th grade  Marital History: single  Children: 2 adult children  Living Arrangement: lives alone  Occupational History:  works as a george  Functioning Relationships: good support system  Legal History: yes   History: None    Traumatic History:   Denies    Past Medical History:      Past Medical History:   Diagnosis Date    Anxiety     Chest pain     after eating- seen in ER several x - believes secondary to hernia    CPAP (continuous positive airway pressure) dependence     Depression     H. pylori infection     Hiatal hernia     Hyperlipidemia     Polysubstance dependence ([address]) 09/16/2020    PONV (postoperative nausea and vomiting)     Sleep apnea     Suicidal thoughts     Wears glasses      Past Surgical History:   Procedure Laterality Date    COLONOSCOPY      INGUINAL HERNIA REPAIR Right     ME LAPS RPR PARAESPHGL HRNA INCL FUNDPLSTY W/MESH N/A 4/1/2021    Procedure: REPAIR HERNIA PARAESOPHAGEAL LAPAROSCOPIC W ROBOTICS with TOUPET FUNDOPLICATION cruroplasty with mesh;  Surgeon: Parul Lovelace MD;  Location: [facility];  Service: General    SHOULDER SURGERY      left       Medical Review Of Systems:    Pertinent items are noted in HPI. Allergies:    No Known Allergies    Medications: All current active medications have been reviewed. OBJECTIVE:    Vital signs in last 24 hours:    Temp:  [97.2 °F (36.2 °C)-98.3 °F (36.8 °C)] 97.2 °F (36.2 °C)  HR:  [71-88] 71  Resp:  [16-18] 16  BP: (105-123)/(62-71) 118/71      Intake/Output Summary (Last 24 hours) at 11/22/2023 1213  Last data filed at 11/21/2023 2044  Gross per 24 hour   Intake --   Output 100 ml   Net -100 ml        Mental Status Evaluation:    Appearance:  age appropriate, tattooed   Behavior:  cooperative, restless   Speech:  normal rate and volume   Mood:  depressed, anxious   Affect:  mood-congruent   Language: naming objects   Thought Process:  goal directed   Associations: intact associations   Thought Content:  no overt delusions   Perceptual Disturbances: none   Risk Potential: Suicidal ideation - Yes, without plan  Homicidal ideation - None  Potential for aggression - Not at present   Sensorium:  oriented to person, place, and time/date   Memory:  recent and remote memory grossly intact   Consciousness:  alert and awake   Attention: attention span and concentration are age appropriate   Intellect: average   Fund of Knowledge: awareness of current events: yes   Insight:  limited   Judgment: fair   Muscle Strength Muscle Tone: normal  normal   Gait/Station: normal gait/station   Motor Activity: no abnormal movements       Laboratory Results:   I have personally reviewed all pertinent laboratory/tests results.   Most Recent Labs:   Lab Results   Component Value Date    WBC 5.67 11/22/2023    RBC 5.01 11/22/2023    HGB 15.2 11/22/2023    HCT 45.8 11/22/2023     11/22/2023    RDW 12.1 11/22/2023    NEUTROABS 3.50 11/22/2023    SODIUM 139 11/22/2023    K 4.1 11/22/2023     11/22/2023    CO2 29 11/22/2023    BUN 15 11/22/2023    CREATININE 0.94 11/22/2023    GLUC 112 11/22/2023    GLUF 112 (H) 11/22/2023    CALCIUM 9.6 11/22/2023    AST 18 11/20/2023    ALT 22 11/20/2023    ALKPHOS 42 11/20/2023    TP 7.7 11/20/2023    ALB 4.7 11/20/2023    TBILI 0.41 11/20/2023    CHOLESTEROL 187 11/22/2023    HDL 40 11/22/2023    TRIG 126 11/22/2023    LDLCALC 122 (H) 11/22/2023    NONHDLC 147 11/22/2023    LITHIUM 0.3 (L) 11/20/2023    [id number] 3.521 11/20/2023    FREET4 0.69 (L) 08/30/2021    T3FREE 2.69 07/26/2020    RPR Non-Reactive 10/24/2020    HGBA1C 5.1 02/22/2021     11/17/2020       Imaging Studies:   No results found. Code Status: Prior  Advance Directive and Living Will: <no information>    Assessment/Plan   Principal Problem:    Major depressive disorder, recurrent, severe without psychotic features ([address])  Active Problems:    Generalized anxiety disorder    Obstructive sleep apnea syndrome    Mixed hyperlipidemia    Herniated lumbar intervertebral disc    Chronic low back pain    GERD (gastroesophageal reflux disease)    Patient Strengths: cooperative, negotiates basic needs, patient is on a voluntary commitment     Patient Barriers: difficulty adapting, lack of social/family support, limited family ties    Treatment Plan:     Planned Treatment and Medication Changes: All current active medications have been reviewed  Encourage group therapy, milieu therapy and occupational therapy  Behavioral Health checks every 7 minutes  Cymbalta 60 mg po daily, Buspar 10 mg po tid  Lithium  mg po daily, Trazodone 50 mg po hs. Risks / Benefits of Treatment:    Risks, benefits, and possible side effects of medications explained to patient and patient verbalizes understanding and agreement for treatment.     Counseling / Coordination of Care:    Patient's presentation on admission and proposed treatment plan discussed with treatment team.  Diagnosis, medication changes and treatment plan reviewed with patient. Events leading to admission reviewed with patient.     Inpatient Psychiatric Certification:    Estimated length of stay: 7 midnights      Brenna Worrell MD 11/22/23

## 2023-11-22 NOTE — TREATMENT PLAN
TREATMENT PLAN REVIEW - [facility]. 46 y.o. 1975 male MRN: [id number]    [address] [address] Room / Bed: Velvet Austin/[facility] 503-41 Encounter: [id number]          Admit Date/Time:  11/21/2023  4:30 PM    Treatment Team: Attending Provider: Holly Montague MD; Patient Care Assistant: Saniya Ambriz;  Patient Care Assistant: Preet Gutierrez; Consulting Physician: Bradley Be MD; Recreational Therapist: Trang Marrero; Care Manager: Warner Isbell RN; Certified Nursing Assistant: Vishnu Quintero; Patient Care Assistant: Christi Florentino; Registered Nurse: Bryan Power RN; : Erik Avina MS; Certified Nursing Assistant: Leann Faye    Diagnosis: Principal Problem:    Major depressive disorder, recurrent, severe without psychotic features [facility])  Active Problems:    Generalized anxiety disorder    Obstructive sleep apnea syndrome    Mixed hyperlipidemia    Herniated lumbar intervertebral disc    Chronic low back pain    GERD (gastroesophageal reflux disease)      Patient Strengths/Assets: negotiates basic needs, patient is on a voluntary commitment    Patient Barriers/Limitations: difficulty adapting, limited family ties    Short Term Goals: decrease in depressive symptoms, decrease in anxiety symptoms, decrease in suicidal thoughts, ability to stay safe on the unit, improvement in reasoning ability, improvement in self care, sleep improvement, improvement in appetite, mood stabilization, increase in group attendance, acceptance of need for psychiatric treatment, acceptance of psychiatric medications    Long Term Goals: improvement in depression, improvement in anxiety, resolution of depressive symptoms, stabilization of mood, free of suicidal thoughts, improvement in reasoning ability, improved insight, acceptance of need for psychiatric medications, acceptance of need for psychiatric treatment, adequate self care, adequate sleep, adequate appetite, appropriate interaction with peers    Progress Towards Goals: starting psychiatric medications as prescribed    Recommended Treatment: medication management, patient medication education, group therapy, milieu therapy, continued Behavioral Health psychiatric evaluation/assessment process, medical follow up with medical team    Treatment Frequency: daily medication monitoring, group and milieu therapy daily, monitoring through interdisciplinary rounds, monitoring through weekly patient care conferences    Expected Discharge Date: 7 midnights    Discharge Plan: will be determined    Treatment Plan Created/Updated By: Jae Parikh MD

## 2023-11-22 NOTE — SOCIAL WORK
CM placed call to pt's psych at [facility] [phone]. CM spoke to [place] and notified of pt admission. CM will secure post d/c appt once d/c date is known. KELI placed call to pt's primary care Shauna Oneal [phone]. KELI was directed to leave a voice mail notifying provider of pt admission.

## 2023-11-22 NOTE — PROGRESS NOTES
11/22/23 1000   Team Meeting   Meeting Type Daily Rounds   Team Members Present   Team Members Present Physician;Nurse;;Occupational Therapist   Physician Team Member Dr. Nicholas Molina, MD Carly Mackey, [facility]   Nursing Team Member Maggie Wynn, SMITHA   Care Management Team Member Ashley Wilson, MSW   OT Team Member [place], [place]   Patient/Family Present   Patient Present No   Patient's Family Present No   New Admission, 201.  Endorses high anxiety and depression, PRN, pleasant, cooperative, isolates to room, reviewed meds, return home with psych f/u at [facility]

## 2023-11-22 NOTE — NURSING NOTE
Pt admitted to unit as 201 admission from [facility]. Pt experiencing increasing anxiety for approximately the last three weeks. Pt reports this episode coincided with beginning use of Vraylar. Pt reports having thoughts killing himself by riding his motorcycle in front of a truck. Pt also reports that he sold his guns because if he didn't he would use one on himself. Pt is calm and pleasant. Pt has past psychiatric his of in-patient and out=patient psychiatric care. Pt reports having ECT during previous admission.

## 2023-11-22 NOTE — SOCIAL WORK
Psycho Social    CM met with pt to obtain the following info     Current SI: denies  Current HI:denies  AVH:denies  Depression: high:  Anxiety: high   Strengths: cooperative, employed and financially secure, motivated for tx, pleasant   Stressors/Limitations: addition of responsibility at work  Coping skills:sleeps, walks, dog, rides mountain bike, hikes, projects in [address]    Past Hospitalizations:multiple since [facility], pt reports last one in 2020  Medication Compliance: complaint   SA/SI in last 12 months:pt reports passive thoughts when anxiety is high   HI/violence towards others in last 12 months:denies  Access to [facility]  Hx abuse/trauma:denies  Family HX Mental Health:denies  Family HX Suicide/Homicide:denies  Family HX Substance Abuse:denies  Family HX Dementia:denies  Substance Abuse:hx of AUD and WILEY, pt reports 10 yrs of sobriety, has Medical Marijuana card (denies use when anxiety is high)   Smoking Cessation:n/a   Legal Issues:dnies  Marital Status:single   Sexual Preference:male  Children:Mick 32 yrs old and Bita 24 yrs old, pt live with youngest son   Parents:, maintains relationship with both  Siblings:sister Estephanie 52 yrs old, lives in [address] [place] [address]: 2 dogs being cared for  Dispo/211:returns home   Education HX:12th grade   Type of Work:past hx in [employer], 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 Vitaliy Sandhu MD signed KRYSTLE  D&A:denies  Case Management: denies  Family Contact: declined conact with

## 2023-11-22 NOTE — NURSING NOTE
Pt is pleasant & cooperative. Pt c/o high anxiety & mild depression. Pt medicated for c/o increased anxiety @ 0804 with Atarax 50 mg po with moderate relief obtained. Pranay Kidd - 18 @ that time. Pt denies any hallucinations, suicidal or homicidal ideations. Q 7 min checks maintained to monitor pt's behavior & safety. Pt up ad ruben & gait is steady. Pt is compliant with medications.

## 2023-11-22 NOTE — PROGRESS NOTES
11/22/23 1130   Team Meeting   Meeting Type Tx Team Meeting   Team Members Present   Team Members Present Physician;Nurse;   Physician Team Member Dr. Bonita Guajardo MD   Nursing Team Member Reina Aleman RN   Care Management Team Member FLASH Sanchez   Patient/Family Present   Patient Present Yes   Patient's Family Present No   Tx team met with pt to review tx goals and plan. All in agreement and signed.

## 2023-11-22 NOTE — PLAN OF CARE
Problem: Ineffective Coping  Goal: Participates in unit activities  Description: Interventions:  - Provide therapeutic environment   - Provide required programming   - Redirect inappropriate behaviors   Outcome: Progressing     Problem: Depression  Goal: Attend and participate in unit activities, including therapeutic, recreational, and educational groups  Description: Interventions:  - Provide therapeutic and educational activities daily, encourage attendance and participation, and document same in the medical record   Outcome: Progressing

## 2023-11-23 PROCEDURE — 99232 SBSQ HOSP IP/OBS MODERATE 35: CPT | Performed by: PSYCHIATRY & NEUROLOGY

## 2023-11-23 RX ORDER — LITHIUM CARBONATE 450 MG
450 TABLET, EXTENDED RELEASE ORAL EVERY 12 HOURS SCHEDULED
Status: DISCONTINUED | OUTPATIENT
Start: 2023-11-23 | End: 2023-11-27 | Stop reason: HOSPADM

## 2023-11-23 RX ORDER — BUSPIRONE HYDROCHLORIDE 15 MG/1
15 TABLET ORAL 3 TIMES DAILY
Status: DISCONTINUED | OUTPATIENT
Start: 2023-11-23 | End: 2023-11-27 | Stop reason: HOSPADM

## 2023-11-23 RX ADMIN — BUSPIRONE HYDROCHLORIDE 15 MG: 15 TABLET ORAL at 15:37

## 2023-11-23 RX ADMIN — DULOXETINE HYDROCHLORIDE 60 MG: 60 CAPSULE, DELAYED RELEASE ORAL at 08:34

## 2023-11-23 RX ADMIN — LITHIUM CARBONATE 450 MG: 450 TABLET, EXTENDED RELEASE ORAL at 20:55

## 2023-11-23 RX ADMIN — ATORVASTATIN CALCIUM 40 MG: 40 TABLET, FILM COATED ORAL at 15:37

## 2023-11-23 RX ADMIN — BUSPIRONE HYDROCHLORIDE 10 MG: 10 TABLET ORAL at 08:34

## 2023-11-23 RX ADMIN — Medication 3 MG: at 20:55

## 2023-11-23 RX ADMIN — LITHIUM CARBONATE 600 MG: 300 TABLET, EXTENDED RELEASE ORAL at 08:34

## 2023-11-23 RX ADMIN — ACETAMINOPHEN 975 MG: 325 TABLET ORAL at 20:29

## 2023-11-23 RX ADMIN — BUSPIRONE HYDROCHLORIDE 15 MG: 15 TABLET ORAL at 20:54

## 2023-11-23 RX ADMIN — TRAZODONE HYDROCHLORIDE 50 MG: 50 TABLET ORAL at 20:55

## 2023-11-23 NOTE — NURSING NOTE
Patient has been visible on the unit. He is pleasant and cooperative. He stated he did not sleep well last night. He endorses moderate anxiety and depression. Denied SI,HI, or hallucinations. Medication compliant. Q 7 minute safety checks maintained.

## 2023-11-23 NOTE — PROGRESS NOTES
Progress Note - Behavioral Health     Kelley Xavieres. 46 y.o. male MRN: [id number]   Unit/Bed#: [facility] 200-02 Encounter: [id number]    Behavior over the last 24 hours: some improvement. Storm [place] was seen for continuing care. He appears less anxious with fair adjustment to the unit. States he could not sleep well last night due to lack of distilled water for his CPAP machine. Denies any current panic attack, suicidal ideation but has been ruminating about his job. He has been adherence to meds and denies any current side effects. Pt agreed to increase dose of Lithium. Lest level 0.3 on 11/20/2023. After discussing his treatment recommendation,  patient rescinded the 72-hour notice and agreed to be discharged on Monday 27/2023. Patient also verbalized feeling relieve that purified water will the available for his CPAP machine tonight. Sleep: slept off and on  Appetite: normal  Medication side effects: denies   ROS: no complaints, all other systems are negative    Mental Status Evaluation:    Appearance:  age appropriate   Behavior:  cooperative   Speech:  normal rate and volume   Mood:  anxious   Affect:  appropriate   Thought Process:  goal directed   Associations: intact associations   Thought Content:  no overt delusions   Perceptual Disturbances: denies auditory hallucinations when asked   Risk Potential: Suicidal ideation - None at present  Homicidal ideation - None  Potential for aggression - Not at present   Sensorium:  oriented to person, place, and time/date   Memory:  recent and remote memory grossly intact   Consciousness:  alert and awake   Attention: attention span and concentration are age appropriate   Insight:  fair   Judgment: fair   Gait/Station: normal gait/station   Motor Activity: no abnormal movements     Vital signs in last 24 hours:    Temp:  [97.7 °F (36.5 °C)-98 °F (36.7 °C)] 98 °F (36.7 °C)  HR:  [72-79] 79  Resp:  [16-18] 16  BP: (102-111)/(56-72) 102/67    Laboratory results:  I have personally reviewed all pertinent laboratory/tests results. Most Recent Labs:   Lab Results   Component Value Date    WBC 5.67 11/22/2023    RBC 5.01 11/22/2023    HGB 15.2 11/22/2023    HCT 45.8 11/22/2023     11/22/2023    RDW 12.1 11/22/2023    NEUTROABS 3.50 11/22/2023    SODIUM 139 11/22/2023    K 4.1 11/22/2023     11/22/2023    CO2 29 11/22/2023    BUN 15 11/22/2023    CREATININE 0.94 11/22/2023    GLUC 112 11/22/2023    GLUF 112 (H) 11/22/2023    CALCIUM 9.6 11/22/2023    AST 18 11/20/2023    ALT 22 11/20/2023    ALKPHOS 42 11/20/2023    TP 7.7 11/20/2023    ALB 4.7 11/20/2023    TBILI 0.41 11/20/2023    CHOLESTEROL 187 11/22/2023    HDL 40 11/22/2023    TRIG 126 11/22/2023    LDLCALC 122 (H) 11/22/2023    NONHDLC 147 11/22/2023    LITHIUM 0.3 (L) 11/20/2023    [id number] 3.521 11/20/2023    FREET4 0.69 (L) 08/30/2021    T3FREE 2.69 07/26/2020    RPR Non-Reactive 10/24/2020    HGBA1C 5.5 11/22/2023     11/22/2023       Progress Toward Goals: improving    Assessment/Plan   Principal Problem:    Major depressive disorder, recurrent, severe without psychotic features ([address])  Active Problems:    Generalized anxiety disorder    Obstructive sleep apnea syndrome    Mixed hyperlipidemia    Herniated lumbar intervertebral disc    Chronic low back pain    GERD (gastroesophageal reflux disease)    Recommended Treatment:     Planned medication and treatment changes:     All current active medications have been reviewed  Encourage group therapy, milieu therapy and occupational therapy  Behavioral Health checks every 7 minutes  Increase Lithium  mg po bid  Will check Lithium level on Monday am.      Current Facility-Administered Medications   Medication Dose Route Frequency Provider Last Rate    acetaminophen  650 mg Oral Q4H PRN Samira Tim PA-C      acetaminophen  650 mg Oral Q6H PRN Samira Tim PA-C      acetaminophen  975 mg Oral Q6H PRN Samira Tim PA-C aluminum-magnesium hydroxide-simethicone  30 mL Oral Q4H PRN Dayan Cloud MD      atorvastatin  40 mg Oral Daily With Anisha Eisenberg MD      busPIRone  15 mg Oral TID Lee Ann Gregg MD      DULoxetine  60 mg Oral Daily Lee Ann Gregg MD      haloperidol lactate  5 mg Intramuscular Q4H PRN Max 4/day Dayan Cloud MD      hydrOXYzine HCL  50 mg Oral Q6H PRN Max 4/day Lee Ann Gregg MD      lithium carbonate  450 mg Oral Q12H [address] Lee Ann Gregg MD      LORazepam  1 mg Intramuscular Q6H PRN Max 3/day Dayan Cloud MD      LORazepam  0.5 mg Oral Q8H PRN Lee Ann Gregg MD      melatonin  3 mg Oral HS Dayan Cloud MD      nicotine polacrilex  4 mg Oral Q2H PRN Dayan Cloud MD      polyethylene glycol  17 g Oral Daily PRN RADHA Hayden-SHARI      risperiDONE  0.25 mg Oral Q4H PRN Max 6/day Dayan Cloud MD      risperiDONE  0.5 mg Oral Q4H PRN Max 3/day Dayan Cloud MD      risperiDONE  1 mg Oral Q2H PRN Max 3/day Dayan Cloud MD      traZODone  50 mg Oral HS Dayan Cloud MD         Risks / Benefits of Treatment:    Risks, benefits, and possible side effects of medications explained to patient and patient verbalizes understanding and agreement for treatment. Counseling / Coordination of Care:    Patient's progress discussed with staff in treatment team meeting. Medications, treatment progress and treatment plan reviewed with patient. Supportive therapy provided to patient. Group attendance encouraged.     Danish Hobson MD 11/23/23

## 2023-11-23 NOTE — NURSING NOTE
Patient noted to be visible on the milieu. Pleasant and cooperative during interaction. Denied SI/HI/AVH. Endorsed moderate anxiety and mild depression. No prn's utilized. Compliant w/ hs med regimen. Q 7 min safety checks continuous and maintained.

## 2023-11-23 NOTE — PLAN OF CARE
Problem: Ineffective Coping  Goal: Cooperates with admission process  Description: Interventions:   - Complete admission process  Outcome: Progressing  Goal: Identifies ineffective coping skills  Outcome: Progressing     Problem: Risk for Self Injury/Neglect  Goal: Treatment Goal: Remain safe during length of stay, learn and adopt new coping skills, and be free of self-injurious ideation, impulses and acts at the time of discharge  Outcome: Progressing     Problem: Depression  Goal: Refrain from harming self  Description: Interventions:  - Monitor patient closely, per order   - Supervise medication ingestion, monitor effects and side effects   Outcome: Progressing

## 2023-11-24 PROCEDURE — 99232 SBSQ HOSP IP/OBS MODERATE 35: CPT

## 2023-11-24 RX ORDER — ERGOCALCIFEROL 1.25 MG/1
50000 CAPSULE ORAL WEEKLY
Status: DISCONTINUED | OUTPATIENT
Start: 2023-11-25 | End: 2023-11-27 | Stop reason: HOSPADM

## 2023-11-24 RX ORDER — MELATONIN
1000 DAILY
Status: DISCONTINUED | OUTPATIENT
Start: 2023-12-31 | End: 2023-11-27 | Stop reason: HOSPADM

## 2023-11-24 RX ADMIN — ATORVASTATIN CALCIUM 40 MG: 40 TABLET, FILM COATED ORAL at 15:58

## 2023-11-24 RX ADMIN — TRAZODONE HYDROCHLORIDE 50 MG: 50 TABLET ORAL at 21:38

## 2023-11-24 RX ADMIN — LITHIUM CARBONATE 450 MG: 450 TABLET, EXTENDED RELEASE ORAL at 21:39

## 2023-11-24 RX ADMIN — LITHIUM CARBONATE 450 MG: 450 TABLET, EXTENDED RELEASE ORAL at 08:11

## 2023-11-24 RX ADMIN — BUSPIRONE HYDROCHLORIDE 15 MG: 15 TABLET ORAL at 08:11

## 2023-11-24 RX ADMIN — Medication 3 MG: at 21:38

## 2023-11-24 RX ADMIN — BUSPIRONE HYDROCHLORIDE 15 MG: 15 TABLET ORAL at 15:58

## 2023-11-24 RX ADMIN — BUSPIRONE HYDROCHLORIDE 15 MG: 15 TABLET ORAL at 21:39

## 2023-11-24 RX ADMIN — DULOXETINE HYDROCHLORIDE 60 MG: 60 CAPSULE, DELAYED RELEASE ORAL at 08:11

## 2023-11-24 NOTE — NURSING NOTE
No acute behaviors overnight. No complaints voiced. C-PAP on and functioning without issue. No respiratory distress or change in medical condition. Observed sleeping during shift. Maintained on q 7 minute safety checks. Will continue to monitor.

## 2023-11-24 NOTE — SOCIAL WORK
11/24/23 930   Team Meeting   Meeting Type Daily Rounds   Team Members Present   Team Members Present Physician;Nurse;   Physician Team Member Dr. Maryhelen Ramus, MD Paz Epley, CRNP   Nursing Team Member Christos Santiago, SMITHA   Care Management Team Member Mariaelena Smith, MSW   Patient/Family Present   Patient Present No   Patient's Family Present No   Return home, f/u with [facility], d/c planned for Mon 11/27, slept well, rescinded 72 hr notice, social and visible in the community, endorses mild anxiety, denies SI/HO/AVH and depression, pleasant and cooperative

## 2023-11-24 NOTE — PLAN OF CARE
Problem: Risk for Self Injury/Neglect  Goal: Treatment Goal: Remain safe during length of stay, learn and adopt new coping skills, and be free of self-injurious ideation, impulses and acts at the time of discharge  Outcome: Progressing  Goal: Verbalize thoughts and feelings  Description: Interventions:  - Assess and re-assess patient's lethality and potential for self-injury  - Engage patient in 1:1 interactions, daily, for a minimum of 15 minutes  - Encourage patient to express feelings, fears, frustrations, hopes  - Establish rapport/trust with patient   Outcome: Progressing  Goal: Refrain from harming self  Description: Interventions:  - Monitor patient closely, per order  - Develop a trusting relationship  - Supervise medication ingestion, monitor effects and side effects   Outcome: Progressing  Goal: Attend and participate in unit activities, including therapeutic, recreational, and educational groups  Description: Interventions:  - Provide therapeutic and educational activities daily, encourage attendance and participation, and document same in the medical record  - Obtain collateral information, encourage visitation and family involvement in care   Outcome: Progressing  Goal: Recognize maladaptive responses and adopt new coping mechanisms  Outcome: Progressing  Goal: Complete daily ADLs, including personal hygiene independently, as able  Description: Interventions:  - Observe, teach, and assist patient with ADLS  - Monitor and promote a balance of rest/activity, with adequate nutrition and elimination  Outcome: Progressing     Problem: Depression  Goal: Treatment Goal: Demonstrate behavioral control of depressive symptoms, verbalize feelings of improved mood/affect, and adopt new coping skills prior to discharge  Outcome: Progressing  Goal: Verbalize thoughts and feelings  Description: Interventions:  - Assess and re-assess patient's level of risk   - Engage patient in 1:1 interactions, daily, for a minimum of 15 minutes   - Encourage patient to express feelings, fears, frustrations, hopes   Outcome: Progressing  Goal: Refrain from harming self  Description: Interventions:  - Monitor patient closely, per order   - Supervise medication ingestion, monitor effects and side effects   Outcome: Progressing  Goal: Refrain from isolation  Description: Interventions:  - Develop a trusting relationship   - Encourage socialization   Outcome: Progressing  Goal: Refrain from self-neglect  Outcome: Progressing  Goal: Attend and participate in unit activities, including therapeutic, recreational, and educational groups  Description: Interventions:  - Provide therapeutic and educational activities daily, encourage attendance and participation, and document same in the medical record   Outcome: Progressing  Goal: Complete daily ADLs, including personal hygiene independently, as able  Description: Interventions:  - Observe, teach, and assist patient with ADLS  -  Monitor and promote a balance of rest/activity, with adequate nutrition and elimination   Outcome: Progressing     Problem: Anxiety  Goal: Anxiety is at manageable level  Description: Interventions:  - Assess and monitor patient's anxiety level. - Monitor for signs and symptoms (heart palpitations, chest pain, shortness of breath, headaches, nausea, feeling jumpy, restlessness, irritable, apprehensive). - Collaborate with interdisciplinary team and initiate plan and interventions as ordered.   - [place] patient to unit/surroundings  - Explain treatment plan  - Encourage participation in care  - Encourage verbalization of concerns/fears  - Identify coping mechanisms  - Assist in developing anxiety-reducing skills  - Administer/offer alternative therapies  - Limit or eliminate stimulants  Outcome: Progressing

## 2023-11-24 NOTE — NURSING NOTE
Patient has been visible on the unit. He is social with peers. Endorsed a little anxiety. Denied depression,SI,HI, or hallucinations. He attended group. Medication compliant. He used his C-Pap last night and felt like he got a good night of sleep. Q 7 minute safety checks maintained.

## 2023-11-24 NOTE — SOCIAL WORK
CM confirmed with pt readiness for d/c on Mon 11/27. Pt confirmed that his mother Tatiana Cantrell will be picking him up at 11am.  Pt expressed interest in participating in partial hospitalization program at the [facility] location upon d/c and signed KRYSTLE. Pt declined [facility] location stating it was to close to home.  CM submitted referral.

## 2023-11-24 NOTE — DISCHARGE INSTR - OTHER ORDERS
You are being discharged to your home located at [address] [place] [address]. Phone: [phone]. Triggers you have identified during your hospitalization that led to your admission of a distressed mood include employment stressors and history of substance use. Coping skills you have identified during your hospitalization include walking and riding your bike. If you are unable to deal with your distressed mood alone please contact Rina Delacruz (Mother) at [phone] . If that is not effective and you continue to have a distressed mood, are overwhelmed, or in crisis, please contact (Crisis #) [address]: [phone] dial 911 or go to the nearest emergency center. [facility] Crisis Hotline: 139.238.7391  *[place] Alcohol Anonymous: [facility] Drug and Alcohol Commision 022) 9518-233 on [address] [facility] HELPLINE: [phone]/Website: www.guillermo.Exos  *Substance Abuse and [facility]) [employer], which is a confidential, free, 24-hour-a-day, 365-day-a-year, information service for individuals and family members facing mental health and/or substance use disorders. This service provides referrals to local treatment facilities, support groups, and community-based organizations. Callers can also order free publications and other information. Call 0-549.174.1409/Website: www.[facility].gov  *[facility] 2-1-1: This is a toll free, confidential, 24-hour-a-day service which connects you to a community  in your area who can help you find services and resources that are available to you locally and provide critical services that can improve and save lives. Call: 80  /Website: https://[employer]sims.net/     You declined drug & alcohol treatment and a case management referral at this time.      Blossom Ponce or Lena, our North and Laila, will be calling you after your discharge, on the phone number that you provided. They will be available as an additional support, if needed. If you wish to speak with one of them, you may contact Augusta Bernstein at [phone] or Nathaly Goldsmith at [phone]. [facility] [id number]  800 [facility]  [address]  [facility], [address]  [facility] [phone]  865 [facility] [phone]    Medicaid, SNAP, TANF, [address]:  If you or someone you know has a drug or alcohol problem, there is help:  [address]: [address]: [phone]  An assessment is the first step. In addition to those listed there are other programs available in the area but assessment is best to determine an appropriate level of care. If you DO NOT have Medical Assistance (MA) or [employer], an assessment can be scheduled at one of these providers:  8111 [facility]  315 [facility], [address] [place] [address]  [phone] [place] [address] AND CLINICS  1700 [facility],2 And 3 S Floors., [facility], [address] [place] [address]  [address] [place]  [place]. PING, [facility]  1900 [facility]  [address] [facility], [facility]   Step by [address] [facility], [address] [place] [address]  [address] [facility], [address] [place] [address]  [address] [facility], 350 [facility]  [phone]     If you [address], an assessment can be scheduled at one of these providers:  [place] on Alcohol & Drug Abuse  [facility], 630 [facility]  830 [facility]  315 [facility], [address] [place] [address]  150 [facility] D&A Intake Unit  [address]  1113 [facility], 1st Floor, Mikael FENG  [phone]  1 [facility], Paulette Patel, 2000 [facility] [phone]   [facility] 1700 [facility],2 And 3 S Floors., [facility], 350 [facility]  [phone]   [facility]. PING, [address] [facility]  [phone]   NET ([address])  90 [facility] 1801 [facility], HAOLandry NASHsheelalinda  [address]  [address], [facility]   Step by [address] [facility], 630 [facility]  2450 [address] [facility]., [facility], [address] [place] [address]  1002 [facility] [address] [facility], 350 [facility]  [phone]     If you [address] [facility], an assessment can be scheduled at one of these providers. Please contact these Providers to determine if they are in your network plan:  [facility] D&A Intake Unit  [address] 1113 [facility], 1st Floor, Landry FENGwiliammaru  [facility]  1700 [facility],2 And 3 S Floors., [facility], 350 [facility]  [phone]   223 [facility]. HAOCHARITY, [address] [facility] [address]  [phone]   NET ([address])  [address] [place] [address] 1801 [facility], HAOLandry NASHsheelalinda  [address]  [address] [place] [address] [address] [facility], 45[address] [facility] [address] [facility]  [facility] ([address] [facility], [address] [place] [address]) offers persons with a mental illness a safe, healing environment to explore their personal and vocational potential and receive support in achieving their goals. Instead of traditional therapy, the work of the house is the rehabilitation. As members contribute meaningful work to the house, they build confidence and a sense of purpose. Be a Member - It’s Free  Membership in the [facility] is open to any [facility] resident who is 25 or older and has a history of mental illness. Membership is free for life.     [facility] Guarantees  A right to have a place to come  A right to meaningful relationships  A right to meaningful work  A right to belong    [facility]  [address]  Hours: Monday - Friday: 8 a.m. - 4 p.m. With varying hours on holidays    [facility] ([address] [place] [address]) provides a stress-free atmosphere for persons 18 and older who have experienced mental health issues. What The [address] [employer]  Holiday gatherings  Recovery  Employment  Education  Community Resources  Empowerment  Helps participants achieve their goals  Enhances quality of life  Encourages leadership    The [address]  Hours: Monday through Friday: 4 p.m. - 9 p.m. Saturday: 2 p.m. - 8 p.m. Closed Sundays  Varying hours on holidays    Contact [phone]        [place] [address]   Phone: [phone]   Fax:  [phone]                               The [address] [place] [address] Acute Partial Hospitalization Program provides services for individuals 25 and older who are experiencing mental health issues. The program is open Monday through Friday with daily sessions taking place from 8:30 to 2:30. The goal of the program is to return the individual to their optimum level of functioning by providing the necessary tools to help cope with life events and their illness. The average length of stay is four weeks with length of time in program varying according to individual patient needs.      Our program offers:  Psychiatric evaluation and medication management by a board certified psychiatrist  Individualized treatment plans  Daily individual therapy  Group therapy   Family psychotherapy sessions  Aftercare scheduling  Transportation based on availability and location but scheduling of private transportation is encouraged  Hot lunch daily but individuals can prepare their own if desired. Please call [phone] to make a referral to our program today.

## 2023-11-24 NOTE — PROGRESS NOTES
Progress Note - Behavioral Health   [place] Cl[place]. 46 y.o. male MRN: [id number]  Unit/Bed#: Valentín Garcia 200-02 Encounter: [id number]    Assessment/Plan   Principal Problem:    Major depressive disorder, recurrent, severe without psychotic features ([address])  Active Problems:    Generalized anxiety disorder    Obstructive sleep apnea syndrome    Mixed hyperlipidemia    Herniated lumbar intervertebral disc    Chronic low back pain    GERD (gastroesophageal reflux disease)      Behavior over the last 24 hours:  unchanged  Sleep: normal  Appetite: normal  Medication side effects: No  ROS: no complaints and all other systems are negative    Subjective: Tanika Buckley was seen today for psychiatric follow-up. Patient calm, cooperative. He is visible on the unit social with peers. He remains less anxious and depressed. According to nursing staff patient compliant with his medication regimen. He continues to exhibit a controlled mood on the unit with no recent aggression agitation towards staff or peers. He denied any sleep disturbance, SI/HI/AVH. He did not appear to responding to internal stimuli.     Mental Status Evaluation:  Appearance:  age appropriate and casually dressed   Behavior:  Calm, cooperative   Speech:  normal pitch and normal volume   Mood:  anxious and depressed   Affect:  constricted   Thought Process:  goal directed   Associations: intact associations   Thought Content:  No overt delusions   Perceptual Disturbances: Denied AVH, did not appear internally preoccupied   Risk Potential: Suicidal Ideations none at present  Homicidal Ideations none at present  Potential for Aggression No   Sensorium:  person, place, and time/date   Memory:  recent and remote memory grossly intact   Consciousness:  alert and awake    Attention: attention span and concentration were age appropriate   Insight:  fair   Judgment: fair   Gait/Station: normal gait/station   Motor Activity: no abnormal movements     Progress Toward Goals: Unchanged. Patient remains less depressed and anxious. He continues to exhibit a controlled mood on the unit. He is compliant with his current psychotropic medication regimen. He denies side effects from current psychotropic medication regimen. Anticipated discharge on 11/27/2023. Recommended Treatment: Continue with group therapy, milieu therapy and occupational therapy. Risks, benefits and possible side effects of Medications:   Risks, benefits, and possible side effects of medications explained to patient and patient verbalizes understanding. Medications: all current active meds have been reviewed. Labs: I have personally reviewed all pertinent laboratory/tests results. Most Recent Labs:   Lab Results   Component Value Date    WBC 5.67 11/22/2023    RBC 5.01 11/22/2023    HGB 15.2 11/22/2023    HCT 45.8 11/22/2023     11/22/2023    RDW 12.1 11/22/2023    NEUTROABS 3.50 11/22/2023    SODIUM 139 11/22/2023    K 4.1 11/22/2023     11/22/2023    CO2 29 11/22/2023    BUN 15 11/22/2023    CREATININE 0.94 11/22/2023    GLUC 112 11/22/2023    GLUF 112 (H) 11/22/2023    CALCIUM 9.6 11/22/2023    AST 18 11/20/2023    ALT 22 11/20/2023    ALKPHOS 42 11/20/2023    TP 7.7 11/20/2023    ALB 4.7 11/20/2023    TBILI 0.41 11/20/2023    CHOLESTEROL 187 11/22/2023    HDL 40 11/22/2023    TRIG 126 11/22/2023    LDLCALC 122 (H) 11/22/2023    NONHDLC 147 11/22/2023    LITHIUM 0.3 (L) 11/20/2023    XZM7TDDHVVRL 3.521 11/20/2023    FREET4 0.69 (L) 08/30/2021    T3FREE 2.69 07/26/2020    RPR Non-Reactive 10/24/2020    HGBA1C 5.5 11/22/2023     11/22/2023       Counseling / Coordination of Care  Total floor / unit time spent today 25 minutes.

## 2023-11-24 NOTE — SOCIAL WORK
Pt scheduled for virtual med mgmt:     29 VIRTUAL VISIT with Artist Harada, PA-C  Friday Dec 29, 2023 3:30 PM (Arrive by 3:15 PM)

## 2023-11-24 NOTE — PROGRESS NOTES
Progress Note - Rubio Caban. 46 y.o. male MRN: [id number]    Unit/Bed#Mayur Elizondo 200-02 Encounter: [id number]        Subjective:   Patient seen and examined at bedside after reviewing the chart and discussing the case with the caring staff. Patient has no acute symptoms. Physical Exam   Vitals: Blood pressure 107/58, pulse 72, temperature 98.7 °F (37.1 °C), temperature source Temporal, resp. rate 16, height 6' (1.829 m), weight 98.4 kg (217 lb), SpO2 98 %. ,Body mass index is 29.43 kg/m². Constitutional: Patient appears well-developed. HEENT: PERR, EOMI, MMM. Cardiovascular: Normal rate and regular rhythm. Pulmonary/Chest: Effort normal and breath sounds normal.   Abdomen: Soft, + BS, NT. Assessment/Plan:  Rubio Caban. is a(n) 46 y.o. male with MDD. 1.  Hyperlipidemia. Continue atorvastatin 40 mg nightly.  on 11/22/23 - states did not take statin for 1.5 weeks. 2.  VINCENZO. On CPAP at bedtime. 3.  Insomnia. Patient is on melatonin at bedtime. 4.  Chronic low back pain. Patient may take Tylenol as needed. 5.  Vitamin D deficiency. Patient started on vitamin D2 50,000 units weekly for 6 weeks followed by vitamin D3 1000 units daily. The patient was discussed with Dr. Vanessa Miranda and he is in agreement with the above note.

## 2023-11-24 NOTE — PLAN OF CARE
Problem: Ineffective Coping  Goal: Participates in unit activities  Description: Interventions:  - Provide therapeutic environment   - Provide required programming   - Redirect inappropriate behaviors   Outcome: Progressing   Attended and actively particpated in both RT groups.

## 2023-11-24 NOTE — SOCIAL WORK
CM placed call to pt's primary care at [address]. CM spoke to [place]. Post d/c appt secured with Dr. Isai Thomas on 11/30 at 9:30am in-person.

## 2023-11-24 NOTE — NURSING NOTE
Social with peers and out in the community. No suicidal ideations. Rated anxiety moderate and denied depression. Compliant with medications and snacks. No aspiration risks noted. Fluids at bedside to promote hydration. Maintained on q 7 minute checks. Medication education given. Care Plan to be reviewed and amended. Will continue to monitor.

## 2023-11-25 PROCEDURE — 99232 SBSQ HOSP IP/OBS MODERATE 35: CPT | Performed by: NURSE PRACTITIONER

## 2023-11-25 RX ADMIN — TRAZODONE HYDROCHLORIDE 50 MG: 50 TABLET ORAL at 21:32

## 2023-11-25 RX ADMIN — DULOXETINE HYDROCHLORIDE 60 MG: 60 CAPSULE, DELAYED RELEASE ORAL at 08:19

## 2023-11-25 RX ADMIN — ATORVASTATIN CALCIUM 40 MG: 40 TABLET, FILM COATED ORAL at 16:55

## 2023-11-25 RX ADMIN — BUSPIRONE HYDROCHLORIDE 15 MG: 15 TABLET ORAL at 08:19

## 2023-11-25 RX ADMIN — LITHIUM CARBONATE 450 MG: 450 TABLET, EXTENDED RELEASE ORAL at 21:32

## 2023-11-25 RX ADMIN — ERGOCALCIFEROL 50000 UNITS: 1.25 CAPSULE, LIQUID FILLED ORAL at 08:19

## 2023-11-25 RX ADMIN — BUSPIRONE HYDROCHLORIDE 15 MG: 15 TABLET ORAL at 16:55

## 2023-11-25 RX ADMIN — Medication 3 MG: at 21:32

## 2023-11-25 RX ADMIN — LITHIUM CARBONATE 450 MG: 450 TABLET, EXTENDED RELEASE ORAL at 08:19

## 2023-11-25 RX ADMIN — BUSPIRONE HYDROCHLORIDE 15 MG: 15 TABLET ORAL at 21:32

## 2023-11-25 NOTE — NURSING NOTE
Sleep observed as sound during shift, no respiratory distress. C-PAP on all shift, functioning without issue. Continues on q 7 minute safety checks. Clutter free environment continued to prevent falls. Fluids maintained at beside to promote hydration. Will continue to monitor.

## 2023-11-25 NOTE — PLAN OF CARE
Problem: Risk for Self Injury/Neglect  Goal: Treatment Goal: Remain safe during length of stay, learn and adopt new coping skills, and be free of self-injurious ideation, impulses and acts at the time of discharge  Outcome: Progressing  Goal: Verbalize thoughts and feelings  Description: Interventions:  - Assess and re-assess patient's lethality and potential for self-injury  - Engage patient in 1:1 interactions, daily, for a minimum of 15 minutes  - Encourage patient to express feelings, fears, frustrations, hopes  - Establish rapport/trust with patient   Outcome: Progressing  Goal: Refrain from harming self  Description: Interventions:  - Monitor patient closely, per order  - Develop a trusting relationship  - Supervise medication ingestion, monitor effects and side effects   Outcome: Progressing  Goal: Attend and participate in unit activities, including therapeutic, recreational, and educational groups  Description: Interventions:  - Provide therapeutic and educational activities daily, encourage attendance and participation, and document same in the medical record  - Obtain collateral information, encourage visitation and family involvement in care   Outcome: Progressing  Goal: Recognize maladaptive responses and adopt new coping mechanisms  Outcome: Progressing  Goal: Complete daily ADLs, including personal hygiene independently, as able  Description: Interventions:  - Observe, teach, and assist patient with ADLS  - Monitor and promote a balance of rest/activity, with adequate nutrition and elimination  Outcome: Progressing     Problem: Depression  Goal: Treatment Goal: Demonstrate behavioral control of depressive symptoms, verbalize feelings of improved mood/affect, and adopt new coping skills prior to discharge  Outcome: Progressing  Goal: Verbalize thoughts and feelings  Description: Interventions:  - Assess and re-assess patient's level of risk   - Engage patient in 1:1 interactions, daily, for a minimum of 15 minutes   - Encourage patient to express feelings, fears, frustrations, hopes   Outcome: Progressing  Goal: Refrain from harming self  Description: Interventions:  - Monitor patient closely, per order   - Supervise medication ingestion, monitor effects and side effects   Outcome: Progressing  Goal: Refrain from isolation  Description: Interventions:  - Develop a trusting relationship   - Encourage socialization   Outcome: Progressing  Goal: Refrain from self-neglect  Outcome: Progressing  Goal: Attend and participate in unit activities, including therapeutic, recreational, and educational groups  Description: Interventions:  - Provide therapeutic and educational activities daily, encourage attendance and participation, and document same in the medical record   Outcome: Progressing  Goal: Complete daily ADLs, including personal hygiene independently, as able  Description: Interventions:  - Observe, teach, and assist patient with ADLS  -  Monitor and promote a balance of rest/activity, with adequate nutrition and elimination   Outcome: Progressing     Problem: Anxiety  Goal: Anxiety is at manageable level  Description: Interventions:  - Assess and monitor patient's anxiety level. - Monitor for signs and symptoms (heart palpitations, chest pain, shortness of breath, headaches, nausea, feeling jumpy, restlessness, irritable, apprehensive). - Collaborate with interdisciplinary team and initiate plan and interventions as ordered.   - [place] patient to unit/surroundings  - Explain treatment plan  - Encourage participation in care  - Encourage verbalization of concerns/fears  - Identify coping mechanisms  - Assist in developing anxiety-reducing skills  - Administer/offer alternative therapies  - Limit or eliminate stimulants  Outcome: Progressing

## 2023-11-25 NOTE — NURSING NOTE
Social with peers and out in the community. Denies anxiety, depression or suicidal ideations. Compliant with medications and snacks. C-PAP on and functioning. No aspiration risks noted. Fluids at bedside to promote hydration. Maintained on q 7 minute checks. Medication education given. Care Plan to be reviewed and amended. Will continue to monitor.

## 2023-11-25 NOTE — NURSING NOTE
Patient at the  [facility] and social. Compliant with morning and evening medications. No complaints of S/I,H/I and AH. Denies any pain at present. Positive for meals x3 and appetite is good. Maintain on q7 min safety checks. Will continue to monitor.

## 2023-11-25 NOTE — PROGRESS NOTES
Progress Note - Behavioral Health   Rhianna Arzola. 46 y.o. male MRN: [id number]  Unit/Bed#: Nelson Schmidt 200-02 Encounter: [id number]    Assessment/Plan   Principal Problem:    Major depressive disorder, recurrent, severe without psychotic features ([address])  Active Problems:    Generalized anxiety disorder    Obstructive sleep apnea syndrome    Mixed hyperlipidemia    Herniated lumbar intervertebral disc    Chronic low back pain    GERD (gastroesophageal reflux disease)      Behavior over the last 24 hours:  unchanged  Sleep: normal  Appetite: normal  Medication side effects: No  ROS: no complaints and all other systems are negative    Maryam Davidson has been visible and participatory in unit activities. Mood is controlled. Denies anxiety or depression. Verbalizes readiness for discharge and looking forward to attending PHP upon discharge. Maintaining safety and denies SI/HI/AVH. Presents as calm and cooperative with good eye contact. Mental Status Evaluation:  Appearance:  age appropriate, casually dressed, and tattooed   Behavior:  Cooperative, good eye contact   Speech:  normal pitch and normal volume   Mood:  "Good"   Affect:  mood-congruent   Thought Process:  goal directed and linear   Associations: intact associations   Thought Content:  No overt delusions or paranoia   Perceptual Disturbances: Denies AVH, did not appear internally preoccupied   Risk Potential: Suicidal Ideations none  Homicidal Ideations none  Potential for Aggression No   Sensorium:  person, place, time/date, and situation   Memory:  recent and remote memory grossly intact   Consciousness:  alert and awake    Attention: attention span and concentration were age appropriate   Insight:  fair   Judgment: fair   Gait/Station: normal gait/station and normal balance   Motor Activity: no abnormal movements     Progress Toward Goals: Maintaining mood control and reports resolution of anxiety and depression. Verbalizes readiness for discharge.   Will continue with current psychotropic regimen and obtain lithium level 11/27/2023. Anticipated discharge Monday, 11/27/2023, with [facility]. Recommended Treatment: Continue with group therapy, milieu therapy and occupational therapy. Risks, benefits and possible side effects of Medications:   Risks, benefits, and possible side effects of medications explained to patient and patient verbalizes understanding.       Medications: all current active meds have been reviewed, continue current psychiatric medications, and current meds:   Current Facility-Administered Medications   Medication Dose Route Frequency    acetaminophen (TYLENOL) tablet 650 mg  650 mg Oral Q4H PRN    acetaminophen (TYLENOL) tablet 650 mg  650 mg Oral Q6H PRN    acetaminophen (TYLENOL) tablet 975 mg  975 mg Oral Q6H PRN    aluminum-magnesium hydroxide-simethicone (MAALOX) oral suspension 30 mL  30 mL Oral Q4H PRN    atorvastatin (LIPITOR) tablet 40 mg  40 mg Oral Daily With Dinner    busPIRone (BUSPAR) tablet 15 mg  15 mg Oral TID    [START ON 12/31/2023] cholecalciferol (VITAMIN D3) tablet 1,000 Units  1,000 Units Oral Daily    DULoxetine (CYMBALTA) delayed release capsule 60 mg  60 mg Oral Daily    ergocalciferol (VITAMIN D2) capsule 50,000 Units  50,000 Units Oral Weekly    haloperidol lactate (HALDOL) injection 5 mg  5 mg Intramuscular Q4H PRN Max 4/day    hydrOXYzine HCL (ATARAX) tablet 50 mg  50 mg Oral Q6H PRN Max 4/day    lithium carbonate (LITHOBID) CR tablet 450 mg  450 mg Oral Q12H NELSON    LORazepam (ATIVAN) injection 1 mg  1 mg Intramuscular Q6H PRN Max 3/day    LORazepam (ATIVAN) tablet 0.5 mg  0.5 mg Oral Q8H PRN    melatonin tablet 3 mg  3 mg Oral HS    nicotine polacrilex (NICORETTE) gum 4 mg  4 mg Oral Q2H PRN    polyethylene glycol (MIRALAX) packet 17 g  17 g Oral Daily PRN    risperiDONE (RisperDAL) tablet 0.25 mg  0.25 mg Oral Q4H PRN Max 6/day    risperiDONE (RisperDAL) tablet 0.5 mg  0.5 mg Oral Q4H PRN Max 3/day    risperiDONE (RisperDAL) tablet 1 mg  1 mg Oral Q2H PRN Max 3/day    traZODone (DESYREL) tablet 50 mg  50 mg Oral HS   . Labs: I have personally reviewed all pertinent laboratory/tests results. CBC:   Lab Results   Component Value Date    WBC 5.67 11/22/2023    RBC 5.01 11/22/2023    HGB 15.2 11/22/2023    HCT 45.8 11/22/2023    MCV 91 11/22/2023     11/22/2023    MCH 30.3 11/22/2023    MCHC 33.2 11/22/2023    RDW 12.1 11/22/2023    MPV 9.5 11/22/2023    NEUTROABS 3.50 11/22/2023     CMP:   Lab Results   Component Value Date    SODIUM 139 11/22/2023    K 4.1 11/22/2023     11/22/2023    CO2 29 11/22/2023    AGAP 7 11/22/2023    BUN 15 11/22/2023    CREATININE 0.94 11/22/2023    GLUC 112 11/22/2023    GLUF 112 (H) 11/22/2023    CALCIUM 9.6 11/22/2023    AST 18 11/20/2023    ALT 22 11/20/2023    ALKPHOS 42 11/20/2023    TP 7.7 11/20/2023    ALB 4.7 11/20/2023    TBILI 0.41 11/20/2023    EGFR 94 11/22/2023     Lithium:   Lab Results   Component Value Date    LITHIUM 0.3 (L) 11/20/2023     Drug Screen:   Lab Results   Component Value Date    AMPMETHUR Negative 11/20/2023    BARBTUR Negative 11/20/2023    BDZUR Negative 11/20/2023    THCUR Negative 11/20/2023    COCAINEUR Negative 11/20/2023    METHADONEUR Negative 10/22/2020    OPIATEUR Negative 11/20/2023    PCPUR Negative 11/20/2023     EKG   Lab Results   Component Value Date    VENTRATE 78 11/20/2023    ATRIALRATE 78 11/20/2023    PRINT 180 11/20/2023    QRSDINT 92 11/20/2023    QTINT 384 11/20/2023    QTCINT 437 11/20/2023    PAXIS 66 11/20/2023    QRSAXIS 62 11/20/2023    TWAVEAXIS 52 11/20/2023       Counseling / Coordination of Care  Total floor / unit time spent today 20 minutes. Greater than 50% of total time was spent with the patient and / or family counseling and / or coordination of care.

## 2023-11-25 NOTE — PROGRESS NOTES
Progress Note - Adri Mohan. 46 y.o. male MRN: [id number]    Unit/Bed#Lori Solorzano 200-02 Encounter: [id number]        Subjective:   Patient seen and examined at bedside after reviewing the chart and discussing the case with the caring staff. Patient has no acute symptoms. Physical Exam   Vitals: Blood pressure 105/57, pulse 75, temperature 98 °F (36.7 °C), temperature source Temporal, resp. rate 18, height 6' (1.829 m), weight 98.4 kg (217 lb), SpO2 95 %. ,Body mass index is 29.43 kg/m². Constitutional: Patient appears well-developed. HEENT: PERR, EOMI, MMM. Cardiovascular: Normal rate and regular rhythm. Pulmonary/Chest: Effort normal and breath sounds normal.   Abdomen: Soft, + BS, NT. Assessment/Plan:  Adri Mohan. is a(n) 46 y.o. male with MDD. 1.  Hyperlipidemia. Continue atorvastatin 40 mg nightly.  on 11/22/23 - states did not take statin for 1.5 weeks. 2.  VINCENZO. On CPAP at bedtime. 3.  Insomnia. Patient is on melatonin at bedtime. 4.  Chronic low back pain. Patient may take Tylenol as needed. 5.  Vitamin D deficiency. Patient started on vitamin D2 50,000 units weekly for 6 weeks followed by vitamin D3 1000 units daily. The patient was discussed with Dr. Steve Miller and he is in agreement with the above note.

## 2023-11-26 PROBLEM — F41.8 OTHER SPECIFIED ANXIETY DISORDERS: Status: RESOLVED | Noted: 2020-06-09 | Resolved: 2023-11-26

## 2023-11-26 PROBLEM — F33.2 MAJOR DEPRESSIVE DISORDER, RECURRENT, SEVERE WITHOUT PSYCHOTIC FEATURES (HCC): Chronic | Status: RESOLVED | Noted: 2020-06-09 | Resolved: 2023-11-26

## 2023-11-26 PROBLEM — F41.1 GENERALIZED ANXIETY DISORDER: Chronic | Status: RESOLVED | Noted: 2018-01-30 | Resolved: 2023-11-26

## 2023-11-26 PROCEDURE — 99232 SBSQ HOSP IP/OBS MODERATE 35: CPT | Performed by: NURSE PRACTITIONER

## 2023-11-26 RX ORDER — TRAZODONE HYDROCHLORIDE 50 MG/1
50 TABLET ORAL
Qty: 30 TABLET | Refills: 0 | Status: SHIPPED | OUTPATIENT
Start: 2023-11-26 | End: 2023-12-05 | Stop reason: SDUPTHER

## 2023-11-26 RX ORDER — DULOXETIN HYDROCHLORIDE 60 MG/1
60 CAPSULE, DELAYED RELEASE ORAL DAILY
Qty: 30 CAPSULE | Refills: 0 | Status: SHIPPED | OUTPATIENT
Start: 2023-11-27 | End: 2023-12-05 | Stop reason: SDUPTHER

## 2023-11-26 RX ORDER — ERGOCALCIFEROL 1.25 MG/1
50000 CAPSULE ORAL WEEKLY
Qty: 5 CAPSULE | Refills: 0 | Status: SHIPPED | OUTPATIENT
Start: 2023-12-02 | End: 2023-12-31

## 2023-11-26 RX ORDER — BUSPIRONE HYDROCHLORIDE 15 MG/1
15 TABLET ORAL 3 TIMES DAILY
Qty: 90 TABLET | Refills: 0 | Status: SHIPPED | OUTPATIENT
Start: 2023-11-26 | End: 2023-12-05 | Stop reason: SDUPTHER

## 2023-11-26 RX ORDER — ATORVASTATIN CALCIUM 40 MG/1
40 TABLET, FILM COATED ORAL
Qty: 30 TABLET | Refills: 0 | Status: SHIPPED | OUTPATIENT
Start: 2023-11-26

## 2023-11-26 RX ORDER — LANOLIN ALCOHOL/MO/W.PET/CERES
3 CREAM (GRAM) TOPICAL
Qty: 30 TABLET | Refills: 0 | Status: SHIPPED | OUTPATIENT
Start: 2023-11-26

## 2023-11-26 RX ORDER — LITHIUM CARBONATE 450 MG
450 TABLET, EXTENDED RELEASE ORAL EVERY 12 HOURS SCHEDULED
Qty: 60 TABLET | Refills: 0 | Status: SHIPPED | OUTPATIENT
Start: 2023-11-26 | End: 2023-12-05 | Stop reason: SDUPTHER

## 2023-11-26 RX ORDER — MELATONIN
1000 DAILY
Qty: 30 TABLET | Refills: 0 | Status: SHIPPED | OUTPATIENT
Start: 2023-12-31

## 2023-11-26 RX ADMIN — Medication 3 MG: at 21:13

## 2023-11-26 RX ADMIN — TRAZODONE HYDROCHLORIDE 50 MG: 50 TABLET ORAL at 21:14

## 2023-11-26 RX ADMIN — DULOXETINE HYDROCHLORIDE 60 MG: 60 CAPSULE, DELAYED RELEASE ORAL at 08:39

## 2023-11-26 RX ADMIN — BUSPIRONE HYDROCHLORIDE 15 MG: 15 TABLET ORAL at 08:39

## 2023-11-26 RX ADMIN — LITHIUM CARBONATE 450 MG: 450 TABLET, EXTENDED RELEASE ORAL at 08:39

## 2023-11-26 RX ADMIN — BUSPIRONE HYDROCHLORIDE 15 MG: 15 TABLET ORAL at 17:35

## 2023-11-26 RX ADMIN — BUSPIRONE HYDROCHLORIDE 15 MG: 15 TABLET ORAL at 21:14

## 2023-11-26 RX ADMIN — LITHIUM CARBONATE 450 MG: 450 TABLET, EXTENDED RELEASE ORAL at 21:14

## 2023-11-26 RX ADMIN — ATORVASTATIN CALCIUM 40 MG: 40 TABLET, FILM COATED ORAL at 17:35

## 2023-11-26 NOTE — PLAN OF CARE
Problem: Risk for Self Injury/Neglect  Goal: Treatment Goal: Remain safe during length of stay, learn and adopt new coping skills, and be free of self-injurious ideation, impulses and acts at the time of discharge  Outcome: Progressing  Goal: Verbalize thoughts and feelings  Description: Interventions:  - Assess and re-assess patient's lethality and potential for self-injury  - Engage patient in 1:1 interactions, daily, for a minimum of 15 minutes  - Encourage patient to express feelings, fears, frustrations, hopes  - Establish rapport/trust with patient   Outcome: Progressing  Goal: Refrain from harming self  Description: Interventions:  - Monitor patient closely, per order  - Develop a trusting relationship  - Supervise medication ingestion, monitor effects and side effects   Outcome: Progressing  Goal: Attend and participate in unit activities, including therapeutic, recreational, and educational groups  Description: Interventions:  - Provide therapeutic and educational activities daily, encourage attendance and participation, and document same in the medical record  - Obtain collateral information, encourage visitation and family involvement in care   Outcome: Progressing  Goal: Recognize maladaptive responses and adopt new coping mechanisms  Outcome: Progressing  Goal: Complete daily ADLs, including personal hygiene independently, as able  Description: Interventions:  - Observe, teach, and assist patient with ADLS  - Monitor and promote a balance of rest/activity, with adequate nutrition and elimination  Outcome: Progressing     Problem: Depression  Goal: Treatment Goal: Demonstrate behavioral control of depressive symptoms, verbalize feelings of improved mood/affect, and adopt new coping skills prior to discharge  Outcome: Progressing  Goal: Verbalize thoughts and feelings  Description: Interventions:  - Assess and re-assess patient's level of risk   - Engage patient in 1:1 interactions, daily, for a minimum of 15 minutes   - Encourage patient to express feelings, fears, frustrations, hopes   Outcome: Progressing  Goal: Refrain from harming self  Description: Interventions:  - Monitor patient closely, per order   - Supervise medication ingestion, monitor effects and side effects   Outcome: Progressing  Goal: Refrain from isolation  Description: Interventions:  - Develop a trusting relationship   - Encourage socialization   Outcome: Progressing  Goal: Refrain from self-neglect  Outcome: Progressing  Goal: Attend and participate in unit activities, including therapeutic, recreational, and educational groups  Description: Interventions:  - Provide therapeutic and educational activities daily, encourage attendance and participation, and document same in the medical record   Outcome: Progressing  Goal: Complete daily ADLs, including personal hygiene independently, as able  Description: Interventions:  - Observe, teach, and assist patient with ADLS  -  Monitor and promote a balance of rest/activity, with adequate nutrition and elimination   Outcome: Progressing     Problem: Anxiety  Goal: Anxiety is at manageable level  Description: Interventions:  - Assess and monitor patient's anxiety level. - Monitor for signs and symptoms (heart palpitations, chest pain, shortness of breath, headaches, nausea, feeling jumpy, restlessness, irritable, apprehensive). - Collaborate with interdisciplinary team and initiate plan and interventions as ordered.   - [place] patient to unit/surroundings  - Explain treatment plan  - Encourage participation in care  - Encourage verbalization of concerns/fears  - Identify coping mechanisms  - Assist in developing anxiety-reducing skills  - Administer/offer alternative therapies  - Limit or eliminate stimulants  Outcome: Progressing

## 2023-11-26 NOTE — NURSING NOTE
Patient is visible at the  community milieu and social. Compliant with morning and  evening medications. No complaints of S/I,H/I and AH. Denies any pain at present. Positive for meals x3 and appetite is good. Maintain on q7 min safety checks. Will continue to monitor.

## 2023-11-26 NOTE — NURSING NOTE
C-Pap on and functioning. Slept well during most q 7 minute safety checks. No respiratory distress or changes in medical condition. Sleep has been sound and undisturbed. No suicidal ideations noted. Safety maintained via clutter-free environment, ID band, and given non-skid socks. Will continue to monitor.

## 2023-11-26 NOTE — PROGRESS NOTES
Progress Note - Amarjit Jolley. 46 y.o. male MRN: [id number]    Unit/Bed#Donna Pert 200-02 Encounter: [id number]        Subjective:   Patient seen and examined at bedside after reviewing the chart and discussing the case with the caring staff. Patient has no acute symptoms. Patient is a possible discharge for tomorrow, Monday 11/27/2023. Physical Exam   Vitals: Blood pressure 94/56, pulse 69, temperature 97.7 °F (36.5 °C), temperature source Temporal, resp. rate 16, height 6' (1.829 m), weight 98.4 kg (217 lb), SpO2 97 %. ,Body mass index is 29.43 kg/m². Constitutional: Patient appears well-developed. HEENT: PERR, EOMI, MMM. Cardiovascular: Normal rate and regular rhythm. Pulmonary/Chest: Effort normal and breath sounds normal.   Abdomen: Soft, + BS, NT. Assessment/Plan:  Amarjit Ramirez is a(n) 46 y.o. male with MDD. Medical clearance. Patient is medically cleared for discharge. All scripts were sent for the patient. 1.  Hyperlipidemia. Continue atorvastatin 40 mg nightly.  on 11/22/23 - states did not take statin for 1.5 weeks. 2.  VINCENZO. On CPAP at bedtime. 3.  Insomnia. Patient is on melatonin at bedtime. 4.  Chronic low back pain. Patient may take Tylenol as needed. 5.  Vitamin D deficiency. Patient started on vitamin D2 50,000 units weekly for 6 weeks followed by vitamin D3 1000 units daily. The patient was discussed with Dr. Tegan Mckeon and he is in agreement with the above note.

## 2023-11-26 NOTE — PROGRESS NOTES
Progress Note - Behavioral Health   [facility] [place]. 46 y.o. male MRN: [id number]  Unit/Bed#: Douglas Odom 200-02 Encounter: [id number]    Assessment/Plan   Principal Problem:    Major depressive disorder, recurrent, severe without psychotic features ([address])  Active Problems:    Generalized anxiety disorder    Obstructive sleep apnea syndrome    Mixed hyperlipidemia    Herniated lumbar intervertebral disc    Chronic low back pain    GERD (gastroesophageal reflux disease)      Behavior over the last 24 hours:  unchanged  Sleep: normal  Appetite: normal  Medication side effects: No  ROS: no complaints and all other systems are negative    [place] Milder remains calm and cooperative. Intermittently visible in the milieu and social with peers. Denies anxiety or depression. Thoughts are linear and forward thinking with no delusional content verbalized. Maintaining safety with no SI. Compliant with medications and meals and denies any sleep disturbance. Patient continues to express readiness for discharge and eagerness to start PHP.     Mental Status Evaluation:  Appearance:  age appropriate, casually dressed, and tattooed   Behavior:  Cooperative, calm, pleasant, good eye contact   Speech:  normal pitch and normal volume   Mood:  euthymic   Affect:  constricted and mood-congruent   Thought Process:  goal directed and linear   Associations: intact associations   Thought Content:  No overt delusions or paranoia   Perceptual Disturbances: Denies AVH, did not appear internally preoccupied   Risk Potential: Suicidal Ideations none  Homicidal Ideations none  Potential for Aggression No   Sensorium:  person, place, time/date, and situation   Memory:  recent and remote memory grossly intact   Consciousness:  alert and awake    Attention: attention span and concentration were age appropriate   Insight:  fair   Judgment: fair   Gait/Station: normal gait/station and normal balance   Motor Activity: no abnormal movements     Progress Toward Goals: Continues to maintain mood control and denies anxiety or depression. Verbalizes readiness for discharge and identifies adequate safety plan. No medication changes indicated today. Lithium level due tomorrow, 11/27/2023 at 6 AM.  Anticipated discharge tomorrow, 11/27/2023, with PHP follow-up. Recommended Treatment: Continue with group therapy, milieu therapy and occupational therapy. Risks, benefits and possible side effects of Medications:   Risks, benefits, and possible side effects of medications explained to patient and patient verbalizes understanding.       Medications: all current active meds have been reviewed, continue current psychiatric medications, and current meds:   Current Facility-Administered Medications   Medication Dose Route Frequency    acetaminophen (TYLENOL) tablet 650 mg  650 mg Oral Q4H PRN    acetaminophen (TYLENOL) tablet 650 mg  650 mg Oral Q6H PRN    acetaminophen (TYLENOL) tablet 975 mg  975 mg Oral Q6H PRN    aluminum-magnesium hydroxide-simethicone (MAALOX) oral suspension 30 mL  30 mL Oral Q4H PRN    atorvastatin (LIPITOR) tablet 40 mg  40 mg Oral Daily With Dinner    busPIRone (BUSPAR) tablet 15 mg  15 mg Oral TID    [START ON 12/31/2023] cholecalciferol (VITAMIN D3) tablet 1,000 Units  1,000 Units Oral Daily    DULoxetine (CYMBALTA) delayed release capsule 60 mg  60 mg Oral Daily    ergocalciferol (VITAMIN D2) capsule 50,000 Units  50,000 Units Oral Weekly    haloperidol lactate (HALDOL) injection 5 mg  5 mg Intramuscular Q4H PRN Max 4/day    hydrOXYzine HCL (ATARAX) tablet 50 mg  50 mg Oral Q6H PRN Max 4/day    lithium carbonate (LITHOBID) CR tablet 450 mg  450 mg Oral Q12H NELSON    LORazepam (ATIVAN) injection 1 mg  1 mg Intramuscular Q6H PRN Max 3/day    LORazepam (ATIVAN) tablet 0.5 mg  0.5 mg Oral Q8H PRN    melatonin tablet 3 mg  3 mg Oral HS    nicotine polacrilex (NICORETTE) gum 4 mg  4 mg Oral Q2H PRN    polyethylene glycol (MIRALAX) packet 17 g  17 g Oral Daily PRN    risperiDONE (RisperDAL) tablet 0.25 mg  0.25 mg Oral Q4H PRN Max 6/day    risperiDONE (RisperDAL) tablet 0.5 mg  0.5 mg Oral Q4H PRN Max 3/day    risperiDONE (RisperDAL) tablet 1 mg  1 mg Oral Q2H PRN Max 3/day    traZODone (DESYREL) tablet 50 mg  50 mg Oral HS   . Labs: I have personally reviewed all pertinent laboratory/tests results. CBC:   Lab Results   Component Value Date    WBC 5.67 11/22/2023    RBC 5.01 11/22/2023    HGB 15.2 11/22/2023    HCT 45.8 11/22/2023    MCV 91 11/22/2023     11/22/2023    MCH 30.3 11/22/2023    MCHC 33.2 11/22/2023    RDW 12.1 11/22/2023    MPV 9.5 11/22/2023    NEUTROABS 3.50 11/22/2023     CMP:   Lab Results   Component Value Date    SODIUM 139 11/22/2023    K 4.1 11/22/2023     11/22/2023    CO2 29 11/22/2023    AGAP 7 11/22/2023    BUN 15 11/22/2023    CREATININE 0.94 11/22/2023    GLUC 112 11/22/2023    GLUF 112 (H) 11/22/2023    CALCIUM 9.6 11/22/2023    AST 18 11/20/2023    ALT 22 11/20/2023    ALKPHOS 42 11/20/2023    TP 7.7 11/20/2023    ALB 4.7 11/20/2023    TBILI 0.41 11/20/2023    EGFR 94 11/22/2023     Lithium:   Lab Results   Component Value Date    LITHIUM 0.3 (L) 11/20/2023     EKG   Lab Results   Component Value Date    VENTRATE 78 11/20/2023    ATRIALRATE 78 11/20/2023    PRINT 180 11/20/2023    QRSDINT 92 11/20/2023    QTINT 384 11/20/2023    QTCINT 437 11/20/2023    PAXIS 66 11/20/2023    QRSAXIS 62 11/20/2023    TWAVEAXIS 52 11/20/2023       Counseling / Coordination of Care  Total floor / unit time spent today 20 minutes. Greater than 50% of total time was spent with the patient and / or family counseling and / or coordination of care.  A description of the counseling / coordination of care: Medication education, safety planning

## 2023-11-26 NOTE — BH TRANSITION RECORD
Contact Information: If you have any questions, concerns, pended studies, tests and/or procedures, or emergencies regarding your inpatient behavioral health visit. Please contact [facility] [phone] and ask to speak to a , nurse or physician. A contact is available 24 hours/ 7 days a week at this number. Summary of Procedures Performed During your Stay:  Below is a list of major procedures performed during your hospital stay and a summary of results:  - No major procedures performed. Pending Studies (From admission, onward)       Start     Ordered    11/27/23 0600  Lithium level  Morning draw         11/23/23 1329                  Please follow up on the above pending studies with your PCP and/or referring provider.

## 2023-11-26 NOTE — NURSING NOTE
Not social with peers. Out in the community. Disorganized, tangential, and irritable during shift. Periods of internal preoccupation. No suicidal ideations. Compliant with medications and snacks. No aspiration risks noted. Fluids at bedside to promote hydration. Maintained on q 7 minute checks. Medication education given. Care Plan to be reviewed and amended. Will continue to monitor.

## 2023-11-26 NOTE — DISCHARGE SUMMARY
Discharge Summary - [facility]. 46 y.o. male MRN: [id number]  Unit/Bed#: Cal Lam 722-04 Encounter: [id number]     Admission Date: 11/21/2023         Discharge Date: 11/27/2023    Attending Psychiatrist: Juanito Hanna MD    Reason for Admission/HPI: Major depressive disorder, single episode, unspecified [F32.9]  Major depressive disorder [F32.9]    According to H&P of Dr. Sandhya Melara    Patient is a 46 y.o. male presented with a history of depression, Generalized Anxiety Disorder, and substance use who was admitted to the inpatient adult psychiatric unit on a voluntary 201 commitment basis due to depression, anxiety, unstable mood, and suicidal ideation. He presented to ED voluntarily after struggling with severe anxiety and panic-like attack with increased suicidal ideation. He made statement that he wanted to get on his motorcycle and run into someone to end his life. UDS was negative. EKG with no acute changes. On evaluation in the inpatient psychiatric unit Bunny Montelongo presents highly anxious, restless but able to be engaged appropriately in the interview. Patient admits he has been struggling anxiety throughout his life and for past several weeks, he has experienced high anxiety,  panic-like attacks, depressed mood, poor sleep and unable to function in his job. Admits that he is very distressed and frustrated due to inability to work that he has been having increased suicidal ideation. Patient admits to [facility] with plan to get into his truck and she shoot himself but does not have a gun. Denies any active plan or intent to hurt himself and he is able to contract for safety presently. He report several past psychiatric admission and currently she is in therapy session with [facility]'s provider. States maybe Dainacedrick Nelson which was recently started is causing his anxiety. Although patient has history of substance use, he has been in remission for the past 10 years. Denies any acute withdrawal symptoms.  He is in agreement to be compliant with medication and treatment plan in the unit    Hospital Course: The patient was admitted to the inpatient psychiatric unit and started on every 7 minutes precautions. During the hospitalization the patient was attending individual therapy, group therapy, milieu therapy and occupational therapy. Psychiatric medications were titrated over the hospital stay. To address depressive symptoms, mood instability, anxiety symptoms, and insomnia the patient was started on antidepressant Cymbalta, mood stabilizer Lithium CR, anxiolytic medication Buspar, and hypnotic medication Trazodone and Melatonin. Medication doses were titrated during the hospital course. Prior to beginning of treatment medications risks and benefits and possible side effects including risk of kidney impairment related to treatment with Lithium, risk of suicidality and serotonin syndrome related to treatment with antidepressants, and risk of impaired next-day mental alertness, complex sleep-related behavior and dependence related to treatment with hypnotic medications were reviewed with the patient. The patient verbalized understanding and agreement for treatment. Patient's symptoms improved gradually over the hospital course. At the end of treatment the patient was doing well. Mood was stable at the time of discharge. The patient denied suicidal ideation, intent or plan at the time of discharge and denied homicidal ideation, intent or plan at the time of discharge. There was no overt psychosis at the time of discharge. Sleep and appetite were improved. The patient was tolerating medications and was not reporting any significant side effects at the time of discharge. Since the patient was doing well at the end of the hospitalization, treatment team felt that the patient could be safely discharged to outpatient care.      The outpatient follow up with [facility]  was arranged by the unit  upon discharge. Mental Status at time of Discharge:     Appearance:  age appropriate, casually dressed, and tattooed   Behavior:  Calm, cooperative   Speech:  normal pitch and normal volume   Mood:  improved   Affect:  mood-congruent   Thought Process:  goal directed   Thought Content:  No overt delusions   Perceptual Disturbances: Denied AVH, did not appear internally preoccupied   Risk Potential: none   Sensorium:  person, place, and time/date   Cognition:  recent and remote memory grossly intact   Consciousness:  alert and awake    Attention: attention span and concentration were age appropriate   Insight:  fair   Judgment: fair   Gait/Station: normal gait/station   Motor Activity: no abnormal movements     Admission Diagnosis:Major depressive disorder, single episode, unspecified [F32.9]  Major depressive disorder [F32.9]    Discharge Diagnosis:   Principal Problem (Resolved):     Major depressive disorder, recurrent, severe without psychotic features ([address])  Active Problems:    Obstructive sleep apnea syndrome    Mixed hyperlipidemia    Herniated lumbar intervertebral disc    Chronic low back pain    GERD (gastroesophageal reflux disease)  Resolved Problems:    Generalized anxiety disorder        Lab results:  Admission on 11/21/2023   Component Date Value    Sodium 11/22/2023 139     Potassium 11/22/2023 4.1     Chloride 11/22/2023 103     CO2 11/22/2023 29     ANION GAP 11/22/2023 7     BUN 11/22/2023 15     Creatinine 11/22/2023 0.94     Glucose 11/22/2023 112     Glucose, Fasting 11/22/2023 112 (H)     Calcium 11/22/2023 9.6     eGFR 11/22/2023 94     WBC 11/22/2023 5.67     RBC 11/22/2023 5.01     Hemoglobin 11/22/2023 15.2     Hematocrit 11/22/2023 45.8     MCV 11/22/2023 91     MCH 11/22/2023 30.3     MCHC 11/22/2023 33.2     RDW 11/22/2023 12.1     MPV 11/22/2023 9.5     Platelets 63/25/1468 309     nRBC 11/22/2023 0     Neutrophils Relative 11/22/2023 61     Immat GRANS % 11/22/2023 0     Lymphocytes Relative 11/22/2023 28     Monocytes Relative 11/22/2023 7     Eosinophils Relative 11/22/2023 3     Basophils Relative 11/22/2023 1     Neutrophils Absolute 11/22/2023 3.50     Immature Grans Absolute 11/22/2023 0.02     Lymphocytes Absolute 11/22/2023 1.58     Monocytes Absolute 11/22/2023 0.40     Eosinophils Absolute 11/22/2023 0.14     Basophils Absolute 11/22/2023 0.03     Folate 11/22/2023 >22.3     Cholesterol 11/22/2023 187     Triglycerides 11/22/2023 126     HDL, Direct 11/22/2023 40     LDL Calculated 11/22/2023 122 (H)     Non-HDL-Chol (CHOL-HDL) 11/22/2023 147     Vitamin B-12 11/22/2023 642     Vit D, 25-Hydroxy 11/22/2023 21.4 (L)     Hemoglobin A1C 11/22/2023 5.5     EAG 11/22/2023 111        Discharge Medications:  Current Discharge Medication List        START taking these medications    Details   lithium carbonate (LITHOBID) 450 mg CR tablet Take 1 tablet (450 mg total) by mouth every 12 (twelve) hours  Qty: 60 tablet, Refills: 0    Associated Diagnoses: Mood disorder (HCC)      melatonin 3 mg Take 1 tablet (3 mg total) by mouth daily at bedtime  Qty: 30 tablet, Refills: 0    Associated Diagnoses: Primary insomnia              Current Discharge Medication List        STOP taking these medications       cariprazine (Vraylar) 1.5 MG capsule Comments:   Reason for Stopping:         lithium 600 MG capsule Comments:   Reason for Stopping:         LORazepam (ATIVAN) 0.5 mg tablet Comments:   Reason for Stopping:                Current Discharge Medication List        CONTINUE these medications which have CHANGED    Details   busPIRone (BUSPAR) 15 mg tablet Take 1 tablet (15 mg total) by mouth 3 (three) times a day  Qty: 90 tablet, Refills: 0    Associated Diagnoses: Generalized anxiety disorder      DULoxetine (CYMBALTA) 60 mg delayed release capsule Take 1 capsule (60 mg total) by mouth daily Do not start before November 27, 2023.   Qty: 30 capsule, Refills: 0 Associated Diagnoses: Generalized anxiety disorder      traZODone (DESYREL) 50 mg tablet Take 1 tablet (50 mg total) by mouth daily at bedtime  Qty: 30 tablet, Refills: 0    Associated Diagnoses: Primary insomnia              Current Discharge Medication List        CONTINUE these medications which have NOT CHANGED    Details   atorvastatin (LIPITOR) 40 mg tablet Take 20 mg by mouth daily       Multiple Vitamin (multivitamin) tablet Take 1 tablet by mouth daily              Discharge instructions/Information to patient and family:   See after visit summary for information provided to patient and family. Provisions for Follow-Up Care:  See after visit summary for information related to follow-up care and any pertinent home health orders. Discharge Statement     I spent 30 minutes discharging the patient. This time was spent on the day of discharge. I had direct contact with the patient on the day of discharge. Additional documentation is required if more than 30 minutes were spent on discharge:    I reviewed with Hermilo Caal importance of compliance with medications and outpatient treatment after discharge. I discussed the medication regimen and possible side effects of the medications with Hermilo Caal prior to discharge. At the time of discharge he was tolerating psychiatric medications. I discussed outpatient follow up with Hermilo Caal. I reviewed with Hermilo Caal crisis plan and safety plan upon discharge.

## 2023-11-27 VITALS
OXYGEN SATURATION: 96 % | DIASTOLIC BLOOD PRESSURE: 63 MMHG | HEIGHT: 72 IN | BODY MASS INDEX: 29.39 KG/M2 | HEART RATE: 77 BPM | SYSTOLIC BLOOD PRESSURE: 105 MMHG | WEIGHT: 217 LBS | TEMPERATURE: 97.6 F | RESPIRATION RATE: 18 BRPM

## 2023-11-27 LAB — LITHIUM SERPL-SCNC: 0.6 MMOL/L (ref 0.6–1.2)

## 2023-11-27 PROCEDURE — 99238 HOSP IP/OBS DSCHRG MGMT 30/<: CPT | Performed by: PSYCHIATRY & NEUROLOGY

## 2023-11-27 PROCEDURE — 80178 ASSAY OF LITHIUM: CPT | Performed by: PSYCHIATRY & NEUROLOGY

## 2023-11-27 RX ADMIN — LITHIUM CARBONATE 450 MG: 450 TABLET, EXTENDED RELEASE ORAL at 09:34

## 2023-11-27 RX ADMIN — DULOXETINE HYDROCHLORIDE 60 MG: 60 CAPSULE, DELAYED RELEASE ORAL at 09:34

## 2023-11-27 RX ADMIN — BUSPIRONE HYDROCHLORIDE 15 MG: 15 TABLET ORAL at 09:34

## 2023-11-27 NOTE — NURSING NOTE
Patient is visible in milieu for breakfast. Pleasant and cooperative. Patient is compliant with medications. No acute behaviors noted. Patient is ready to leave. Q 7 min safety checks maintained. Fall precautions maintained. Monitoring continues.

## 2023-11-27 NOTE — BH TRANSITION RECORD
Contact Information: If you have any questions, concerns, pended studies, tests and/or procedures, or emergencies regarding your inpatient behavioral health visit. Please contact [facility] [phone] and ask to speak to a , nurse or physician. A contact is available 24 hours/ 7 days a week at this number. Summary of Procedures Performed During your Stay:  Below is a list of major procedures performed during your hospital stay and a summary of results:  - No major procedures performed. Pending Studies (From admission, onward)       Start     Ordered    11/27/23 0600  Lithium level  Morning draw         11/23/23 1329                  Please follow up on the above pending studies with your PCP and/or referring provider.

## 2023-11-27 NOTE — PROGRESS NOTES
Progress Note - Marlene Hamilton 46 y.o. male MRN: [id number]    Unit/Bed#Adri Talavera 200-02 Encounter: [id number]        Subjective:   Patient seen and examined at bedside after reviewing the chart and discussing the case with the caring staff. Patient has no acute symptoms. Patient is being discharged today. Patient is requesting his prescriptions. I reviewed and reconciled patient's problem list and medications. Physical Exam   Vitals: Blood pressure 105/63, pulse 77, temperature 97.6 °F (36.4 °C), temperature source Temporal, resp. rate 18, height 6' (1.829 m), weight 98.4 kg (217 lb), SpO2 96 %. ,Body mass index is 29.43 kg/m². Constitutional: Patient appears well-developed. HEENT: PERR, EOMI, MMM. Cardiovascular: Normal rate and regular rhythm. Pulmonary/Chest: Effort normal and breath sounds normal.   Abdomen: Soft, + BS, NT. Assessment/Plan:  Marlene Hamilton is a(n) 46 y.o. male with MDD. Medical clearance. Patient is medically cleared for discharge. All scripts were sent for the patient. 1.  Hyperlipidemia. Continue atorvastatin 40 mg nightly.  on 11/22/23 - states did not take statin for 1.5 weeks. 2.  VINCENZO. On CPAP at bedtime. 3.  Insomnia. Patient is on melatonin at bedtime. 4.  Chronic low back pain. Patient may take Tylenol as needed. 5.  Vitamin D deficiency. Patient started on vitamin D2 50,000 units weekly for 6 weeks followed by vitamin D3 1000 units daily.

## 2023-11-27 NOTE — PROGRESS NOTES
11/27/23 1118   Discharge Charting   Discharge Via Walking   Accompanied by: PCA   AVS Reviewed With & Written Instructions Given To: Patient   Prescription Given To: No Prescription Written;e-Prescribe     Reviewed AVS/discharge packet with patient. No further questions. Belongings and AVS/discharge packet given to patient upon discharge.

## 2023-11-27 NOTE — NURSING NOTE
Pt was withdrawn to his room this evening. Pt denied all psych symptoms. Pt was pleasant, cooperative, and med compliant. Pt states they believe they are ready for d/c. Will CTM. Continuous pt safety checks ongoing.

## 2023-11-27 NOTE — PROGRESS NOTES
Pt is leaving with the following items    Jeans   Black t-shirt  Grey socks  Slip on shoes (grey)  Black underwear  Belt  Hat  Black jacket   Black Bag   Black wallet   $236 cash  [place] card x2  Medical marijuana card   PA ID  [employer] card  Debit card x3   Red Iphone        No keys, no   Pt has signed and agreed to all above

## 2023-11-27 NOTE — PROGRESS NOTES
11/27/23 0317   Team Meeting   Meeting Type Daily Rounds   Team Members Present   Team Members Present Physician;Nurse;;Occupational Therapist   Physician Team Member Dr. Rupal Vega MD; Nicki Downing [place]   Nursing Team Member Vernell Valladares, SMITHA   Care Management Team Member FLASH Garza; Marline Gonzalez MS, [facility], [facility]   OT Team Member Sejaljoellen Gould, [place]   Patient/Family Present   Patient Present No   Patient's Family Present No   Will d/c to home today at 11am, will follow up with [facility] outpatient psych, referral made to  partial they will call pt direct to schedule when opening becomes available. Stable, denies all.

## 2023-11-28 ENCOUNTER — TELEPHONE (OUTPATIENT)
Dept: PSYCHIATRY | Facility: CLINIC | Age: 48
End: 2023-11-28

## 2023-11-28 NOTE — TELEPHONE ENCOUNTER
Pineda Bello signed esthela/dropped off disability forms.  Will forward to [facility] to review

## 2023-11-29 ENCOUNTER — OFFICE VISIT (OUTPATIENT)
Dept: PSYCHIATRY | Facility: CLINIC | Age: 48
End: 2023-11-29
Payer: COMMERCIAL

## 2023-11-29 ENCOUNTER — OFFICE VISIT (OUTPATIENT)
Dept: PSYCHOLOGY | Facility: CLINIC | Age: 48
End: 2023-11-29
Payer: COMMERCIAL

## 2023-11-29 VITALS
DIASTOLIC BLOOD PRESSURE: 74 MMHG | SYSTOLIC BLOOD PRESSURE: 112 MMHG | TEMPERATURE: 97.8 F | HEART RATE: 80 BPM | WEIGHT: 227 LBS | BODY MASS INDEX: 30.09 KG/M2 | RESPIRATION RATE: 18 BRPM | HEIGHT: 73 IN

## 2023-11-29 DIAGNOSIS — F41.1 GENERALIZED ANXIETY DISORDER: Primary | ICD-10-CM

## 2023-11-29 DIAGNOSIS — Z56.6 STRESSFUL JOB: ICD-10-CM

## 2023-11-29 DIAGNOSIS — F33.41 RECURRENT MAJOR DEPRESSION IN PARTIAL REMISSION (HCC): ICD-10-CM

## 2023-11-29 DIAGNOSIS — F33.41 RECURRENT MAJOR DEPRESSIVE DISORDER, IN PARTIAL REMISSION (HCC): ICD-10-CM

## 2023-11-29 PROCEDURE — 90792 PSYCH DIAG EVAL W/MED SRVCS: CPT | Performed by: STUDENT IN AN ORGANIZED HEALTH CARE EDUCATION/TRAINING PROGRAM

## 2023-11-29 PROCEDURE — G0410 GRP PSYCH PARTIAL HOSP 45-50: HCPCS

## 2023-11-29 PROCEDURE — G0176 OPPS/PHP;ACTIVITY THERAPY: HCPCS

## 2023-11-29 PROCEDURE — G0177 OPPS/PHP; TRAIN & EDUC SERV: HCPCS

## 2023-11-29 SDOH — HEALTH STABILITY - MENTAL HEALTH: OTHER PHYSICAL AND MENTAL STRAIN RELATED TO WORK: Z56.6

## 2023-11-29 NOTE — PSYCH
Subjective:     Patient ID: Adri Cooney is a 46 y.o. male. [facility] Clinical Progress Notes      Specialized Services Documentation  Therapist must complete separate progress note for each specific clinical activity in which the individual participated during the day. Group Psychotherapy - DBT [place]  [id number] Adri Cooney participated with prompts in a psychotherapy group focused on [address] [facility]. Group members followed the DBT [place] template in order to self-explore and recognize various domains of their wellness. Some of these domains include coping skills, strengths, values, supports, emotions, and behaviors. This writer led a group discussion on their feelings and reflections stemming from the completion of the DBT [place] exercise. This writer encouraged the group members to partake in group discussion and utilize supplemental materials inside and outside of the program.  Adri Cooney made some efforts towards progress goals which were displayed through note taking, participation in discussion, and engagement in topic. Joaquina Carrion participated when prompted to do so. Completed some parts of the activity.  Continue to run daily group psychotherapy to meet treatment needs and encourage Adri Cooney to practice skills outside of program.       Tx Plan Objective: n/a Therapist: Domingo Barrios

## 2023-11-29 NOTE — PSYCH
Subjective:     Patient ID: Mejia Carney is a 46 y.o. male. [facility] Clinical Progress Notes      Specialized Services Documentation  Therapist must complete separate progress note for each specific clinical activity in which the individual participated during the day. Allied Therapy [id number] Mejia Carney actively participated in music therapy group regarding mindfulness. The group participated in a name that tune exercise as an example of mindfulness. The group then read and discussed materials on mindfulness as well as areas they could use mindfulness in their current lives. The group then read and discussed a handout on a music mindfulness exercise. When prompted, Becky Moreno reported mindful communication as an area of mindfulness to work on outside of program. Some effort noted towards treatment goal. Continue AT to encourage the development and proactive use of mindfulness coping tools. Tx Plan Objective: 1.1,1.2,1.4, Therapist:  DEON Dorado MT-BC    Other Awaiting authorization information from IP unit. Was told that Arely Wesley would inform of authorization. Message sent via EPIC awaiting response. Case Management Note    DEON Dorado MT-BC    Current suicide risk : Low     [id number] Met with Mejia Cardenas. . Reviewed program and given on call number. he completed releases and OP providers/ PCP notified of admission and health care coordination form completed. Completed initial psycho-social evaluation and initial treatment goals discussed. Medications changes/added/denied? No    Treatment session number: 1    Individual Case Management Visit provided today?  Yes     [facility] follow up physician's orders: See Dr Tricia Saldaña' Note

## 2023-11-29 NOTE — TELEPHONE ENCOUNTER
CM outreached to Pt to discuss disability forms. What dates is he expected to be out? Message was left, requested a call back.

## 2023-11-29 NOTE — PSYCH
Initial Psychiatric Evaluation- Behavioral Health   [facility]  Tiffanie Patches. 46 y.o. male MRN: [id number]      11/29/23      Visit Time    Visit Start Time: 0830  Visit Stop Time: 0930  Total Visit Duration:  60 minutes    Assessment/Plan:    Problem List Items Addressed This Visit    None  Visit Diagnoses       Generalized anxiety disorder    -  Primary    Recurrent major depressive disorder, in partial remission ([address])        Stressful job                Reason for visit is   Chief Complaint   Patient presents with    Depression    Anxiety        Encounter provider Maci Kurtz DO      Recent Visits  No visits were found meeting these conditions. Showing recent visits within past 7 days and meeting all other requirements  Today's Visits  Date Type Provider Dept   11/29/23 Office Visit Maci Kurtz DO  [facility] [address] today's visits and meeting all other requirements  Future Appointments  No visits were found meeting these conditions. Showing future appointments within next 150 days and meeting all other requirements       HPI     Tiffanie Patches. is a 46 y.o. male with past psychiatric history of depression is admitted to [facility] referred by [address] [place] [address] inpatient unit. Patient was admitted from 11/22 to 11/27 and discharged on Lithium 450mg BID, Cymbalta 60mg daily, and Trazodone 50mg qHS. Preeti Trevor. reports he is feeling "pretty good". States he did not find the hospitalization that helpful due to peers causing issues at times, "acting immature", but medication changes helped. States medication changes included cymbalta increased, as well as buspar and lithium. States that he is no longer experiencing suicidal thoughts, which he admits was due to his anxiety. States he has been hospitalized several times in the past, often due to work.   States that while he enjoys his job as a , he has been taking on more more management roles. He admits that this has been stressful for him, as he feels he often spirals into "what if's "about a job. He states that he has found himself spending more more time focusing on work on the weekends, and feels that "I cannot handle if things do not go well ". 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 He states that shortly into this, the pandemic close their office and he was working at home, and felt inept. States that he was hospitalized 4 times in 2020 due to his anxiety. States that growing up, he had a good childhood even though his parents  when he was young. He concedes that his sister has reached out to him and told him that she had an eating disorder. He states that he did end up acting out and turned to alcohol and cocaine, as well as abusing other substances off and on. States that he did quit in his mid 25s and try Prozac to good benefit, but relapsed and used throughout his 35s. He states he has been sober now for 10 years, but is felt his anxiety has been difficult to control since then. He admits he feels it is due to "now experiencing raw emotions ". He states that he has tried therapy, but he still has difficulty identifying his anxiety and why he engages in this catastrophizing. He admits that he would like to learn more skills and tools in therapy. He states he does feel the medications have helped reduce his anxiety and he no longer has somatic symptoms of "feeling of burning in my belly ". States that he has support from his girlfriend and his mother. He states he is eating and sleeping well. 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 Psychosocial Stressors include stress with work.      Psychiatric Review Of Systems:    Appetite: no change  Adverse eating: no  Weight changes: no  Insomnia/sleeplessness: no  Fatigue/anergy: no  Anhedonia/lack of interest: no  Attention/concentration:  worse when anxious  Psychomotor agitation/retardation: no  Somatic symptoms: no  Anxiety/panic attack: worrying  Deidre/hypomania: no  Hopelessness/helplessness/worthlessness: no  Self-injurious behavior/high-risk behavior: no  Suicidal ideation: no  Homicidal ideation: no  Auditory hallucinations: no  Visual hallucinations: no  Other perceptual disturbances: no  Delusional thinking: no  Obsessive/compulsive symptoms: no    Review Of Systems:    Constitutional negative   ENT negative   Cardiovascular negative   Respiratory negative   Gastrointestinal negative   Genitourinary negative   Musculoskeletal negative   Integumentary negative   Neurological negative   Endocrine negative   Pain none   Pain Level    0/10   Other Symptoms none, all other systems are negative     Vitals:    11/29/23 0917   BP: 112/74   Pulse: 80   Resp: 18   Temp: 97.8 °F (36.6 °C)       Past Psychiatric History:     Previous inpatient psychiatric admissions: four times in 2020, then again most recently. Did have ECT in 2020. Previous inpatient/outpatient substance abuse rehabilitation: went to  in the past  Present/previous outpatient psychiatric treatment: yes, with Dr. Mague Epps and Sridevi Kirkland PA-C. Present/previous psychotherapy: in early 45s. History of suicidal attempts/gestures: none. History of violence/aggressive behaviors: none. Past Psychiatric Medication Trials: Started prozac when 20years old, would be "sick with worry". Tried Wellbutrin, not sure it helped. Was on latuda, sort of worked, and Garold Lina caused worsening anxiety. Was on gabapentin for some time.       Medications:     Current Outpatient Medications:     atorvastatin (LIPITOR) 40 mg tablet, Take 1 tablet (40 mg total) by mouth daily with dinner, Disp: 30 tablet, Rfl: 0    busPIRone (BUSPAR) 15 mg tablet, Take 1 tablet (15 mg total) by mouth 3 (three) times a day, Disp: 90 tablet, Rfl: 0    [START ON 12/31/2023] cholecalciferol (VITAMIN D3) 1,000 units tablet, Take 1 tablet (1,000 Units total) by mouth daily Do not start before December 31, 2023., Disp: 30 tablet, Rfl: 0    DULoxetine (CYMBALTA) 60 mg delayed release capsule, Take 1 capsule (60 mg total) by mouth daily Do not start before November 27, 2023., Disp: 30 capsule, Rfl: 0    [START ON 12/2/2023] ergocalciferol (VITAMIN D2) 50,000 units, Take 1 capsule (50,000 Units total) by mouth once a week for 5 doses Do not start before December 2, 2023., Disp: 5 capsule, Rfl: 0    lithium carbonate (LITHOBID) 450 mg CR tablet, Take 1 tablet (450 mg total) by mouth every 12 (twelve) hours, Disp: 60 tablet, Rfl: 0    melatonin 3 mg, Take 1 tablet (3 mg total) by mouth daily at bedtime, Disp: 30 tablet, Rfl: 0    Multiple Vitamin (multivitamin) tablet, Take 1 tablet by mouth daily, Disp: , Rfl:     traZODone (DESYREL) 50 mg tablet, Take 1 tablet (50 mg total) by mouth daily at bedtime, Disp: 30 tablet, Rfl: 0    Family Psychiatric History:     Family History   Problem Relation Age of Onset    No Known Problems Mother     No Known Problems Father     Eating disorder Sister      Denies any known family history of depression, anxiety, suicide attempts or completions, or bipolar disorder or schizophrenia. Social History:  Education: technical college  Learning Disabilities:  none  Marital history: co-habitating  Living arrangement, social support: The patient lives in home with significant other. Occupational History: works as a [employer]  Functioning Relationships: good support system.   Other Pertinent History: None  Access to guns/weapons: none    Social History     Substance and Sexual Activity   Drug Use Yes    Types: Marijuana    Comment: previously used medical marijuana but not currently using       Traumatic History:   Abuse:  none  Other Traumatic Events: denies    Substance Abuse History:  Has been sober for 10 years, drugs of choice were alcohol and cocaine; also tried other illicit substances. Has medical marijuana card, uses rarely. Use of Alcohol:  sober 10 years     Longest clean time: 10 years  History of IP/OP rehabilitation program: AA in the past  Smoking history: quit in 2004.    Use of Caffeine: coffee 1 cup /day    Past Medical History:   Diagnosis Date    Anxiety     Chest pain     after eating- seen in ER several x - believes secondary to hernia    CPAP (continuous positive airway pressure) dependence     Depression     H. pylori infection     Hiatal hernia     Hyperlipidemia     Polysubstance dependence ([address]) 09/16/2020    PONV (postoperative nausea and vomiting)     Sleep apnea     Suicidal thoughts     Wears glasses       Mental Status Evaluation:    Appearance age appropriate, casually dressed   Behavior pleasant, cooperative, less anxious   Speech normal rate, normal volume, normal pitch   Mood less anxious   Affect normal range and intensity, appropriate   Thought Processes organized, goal directed   Associations intact associations   Thought Content no overt delusions   Perceptual Disturbances: no auditory hallucinations, no visual hallucinations   Abnormal Thoughts  Risk Potential Suicidal ideation - None  Homicidal ideation - None  Potential for aggression - No   Orientation oriented to person, place, time/date, and situation   Memory recent and remote memory grossly intact   Consciousness alert and awake   Attention Span Concentration Span attention span and concentration are age appropriate   Intellect appears to be of average intelligence   Insight intact   Judgement intact   Muscle Strength and  Gait normal muscle strength and normal muscle tone, normal gait and normal balance   Motor Activity no abnormal movements   Language no difficulty naming common objects, no difficulty repeating a phrase, no difficulty writing a sentence   Bidstalk of Knowledge adequate knowledge of current events  adequate fund of knowledge regarding past history  adequate fund of knowledge regarding vocabulary            Laboratory Results: I have personally reviewed all pertinent laboratory/tests results. Assessment:    Diagnoses and all orders for this visit:    Generalized anxiety disorder    Recurrent major depressive disorder, in partial remission ([address])    Stressful job     Ben Green is a 70-year-old male who has a history of depression and anxiety, struggling with worsening anxiety symptoms recently. Seems to be doing well with medication adjustments made on inpatient unit. Has had difficulty being stabilized, but believes recent med adjustment to Liliana Flatter may have caused symptoms worsening. Seem to be doing well with med adjustments, but still anxious about work. Has difficulty catastrophizing a child. Would benefit from further psychotherapy and working on coping skills. He does not appear to be in acute danger to himself or others, and depression appears to be remitting. Plan:  Medication changes: No med changes at this time, continue with lithium 450 mg twice daily, Cymbalta 60 mg daily, and BuSpar 15 mg 3 times daily. Continue with trazodone 50 mg nightly, could consider possibly using mirtazapine at bedtime given concurrent use of SNRI. Could also possibly consider clonidine as patient has never tried this in the past for anxiety. Could also possibly consider gabapentin, but patient does not recall past response or why it was discontinued. Lithium level in 11/27 was normal at 0.6. Risks, benefits, and possible side effects of medications explained to patient and patient verbalizes understanding. Controlled Medication Discussion: Not applicable    Patient advised to call 911 if feeling suicidal or homicidal before acting out on their thoughts and they expressed understanding.   [facility] Physician's Orders     Admit to: Partial Hospitalization, 5 x per week, for 10 days. Vital signs daily for three days and then as needed. Diet Regular. Group Psychotherapy 5 x per week. Wellness Group 5 x per week. Individual Therapy as needed  Family Therapy as needed  Diagnosis:   1. Generalized anxiety disorder        2.  Recurrent major depressive disorder, in partial remission ([address])        3. Stressful job          This note was not shared with the patient due to this is a psychotherapy note      “I certify that the continuation of Partial Hospitalization services is medically necessary to improve and/or maintain the patient’s condition and functional level, and to prevent relapse or hospitalization, and that this could not be done at a less intensive level of care.”     Constantine Poe, DO

## 2023-11-29 NOTE — PSYCH
Assessment/Plan:      There are no diagnoses linked to this encounter. Subjective:     Patient ID: Patricia Rogers is a 46 y.o. male. HPI:   Per Dr Dana Jose: Patricia Rogers is a 46 y.o. male with past psychiatric history of depression is [facility] to [facility] referred by [address] [place] [address] inpatient unit. Patient was admitted from 11/22 to 11/27 and discharged on Lithium 450mg BID, Cymbalta 60mg daily, and Trazodone 50mg qHS. Rosa Rogers reports he is feeling "pretty good". States he did not find the hospitalization that helpful due to peers causing issues at times, "acting immature", but medication changes helped. States medication changes included cymbalta increased, as well as buspar and lithium. States that he is no longer experiencing suicidal thoughts, which he admits was due to his anxiety. States he has been hospitalized several times in the past, often due to work. States that while he enjoys his job as a , he has been taking on more more management roles. He admits that this has been stressful for him, as he feels he often spirals into "what if's "about a job. He states that he has found himself spending more more time focusing on work on the weekends, and feels that "I cannot handle if things do not go well ". 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 He states that shortly into this, the pandemic close their office and he was working at home, and felt inept. States that he was hospitalized 4 times in 2020 due to his anxiety. States that growing up, he had a good childhood even though his parents  when he was young. He concedes that his sister has reached out to him and told him that she had an eating disorder. He states that he did end up acting out and turned to alcohol and cocaine, as well as abusing other substances off and on.   States that he did quit in his mid 20s and try Prozac to good benefit, but relapsed and used throughout his 35s. He states he has been sober now for 10 years, but is felt his anxiety has been difficult to control since then. He admits he feels it is due to "now experiencing raw emotions ". He states that he has tried therapy, but he still has difficulty identifying his anxiety and why he engages in this catastrophizing. He admits that he would like to learn more skills and tools in therapy. He states he does feel the medications have helped reduce his anxiety and he no longer has somatic symptoms of "feeling of burning in my belly ". States that he has support from his girlfriend and his mother. He states he is eating and sleeping well. 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 Psychosocial Stressors include stress with work. Per this writer: Rubio Mee. Reports feelings of worsening anxiety as evidenced by shortness of breath, catastrophising, racing heart, anxious thoughts, racing thoughts, and fixation on small mistakes. He stated that his only identifiable stressor is his job, which he holds a high position in and feels like he is incapable of meeting the high expectations that he sets for himself. He stated that he previously had been hospitalized 4 times in 2020 as well as a PHP stay the same year due to similar concerns. He stated that he has a good support system in his girlfriend, mother, and sons. He stated that he previously abused alcohol and drugs, primarily cocaine, but has been sober for 10 years. He stated that prior to his recent IP hospitalization he was experiencing passive SI but this has since subsided. He states that he has been doing well since his discharge but would like to gain skills to manage his anxiety.  He stated that he has seen OP therapists in the past but does not feel like they were effective. Per Rhianna Arzola. : "I want to gain skills to help deal with my anxiety"    Reason for evaluation and partial hospitalization as an alternative to inpatient hospitalization PHP is medically necessary to prevent rehospitalization as Rhianna Arzola. transitions from IP to OP level of care. Milieu therapy to monitor for medication needs, provide wellness tools education and offer opportunity to share and connect to others. Group therapy, case management, psychiatric medication management, family contact and UR as indicated. ELOS 10 treatment days. Previous Psychiatric/psychological treatment/year: 4 hospitalizations in  as well as a PHP stay in the same year. Has been seeing an OP medication management for at least 12 years. Current Psychiatrist/Therapist:   Psychiatry  ANNETTA Acosta [facility], [address]  [address] [facility]: [phone]  F: [phone]    Therapy  Needs    Outpatient and/or Partial and Other Community Resources Used (CTT, ICM, VNA): n/a      Problem Assessment:     SOCIAL/VOCATION:  Family Constellation (include parents, relationship with each and pertinent Psych/Medical History):     No family history on file. Mother: Good relationship, good support, sees often  Spouse: gf of 6 years, best support   Father: good relationship,  from mother, sees often  Children: 2 sons, age 32 & 24, oldest lies [place] youngest lives with him   Siblin sister, good relationship, lives in [place]       Who is the person you relate to best : his girlfriend. he lives with his son.    Legal Guardian (for individuals under 25): n/a  Family Factors impacting discharge planning (for individuals under 18): n/a    Domestic Violence: No past history of domestic violence, There is not suspected domestic violence, and There is no history of child abuse    Additional Comments related to family/relationships/peer support: has "a lot of friends". School or Work History (strengths/limitations/needs): works as a  del cid    Her highest grade level achieved was [employer] history includes none    Financial status includes stable    LEISURE ASSESSMENT (Include past and present hobbies/interests and level of involvement (Ex: Group/Club Affiliations): enjoys motorcycles and mountain biking  Her primary language is [place]. Preferred language is [place]. Ethnic considerations are none. Religions affiliations and level of involvement none . FUNCTIONAL STATUS: There has been a recent change in the patient's ability to do the following: n/a    Level of Assistance Needed/By Whom?: n/a    Marcie Gregory learns best by  reading, listening, demonstration, and picture    SUBSTANCE ABUSE ASSESSMENT: past substance abuse    Do you currently smoke? NoOffered smoking cessation? No    Substance/Route/Age/Amount/Frequency/Last Use:   Cigarette - no  Alcohol - no, 10 years sober  Marijuana - occasionally, last used 2 weeks ago, has medical card  Other - none, previous abuse of cocaine and other substances, 10 years sober  Caffeine - around 2 cups of coffee daily     DETOX HISTORY: n/a    Previous detox/rehab treatment: n/a    HEALTH ASSESSMENT: no referral to PCP needed    Primary Care Physician: Ceci Gabriel MD  If None on file providers offered:n/a  Date of Last Physical: within a year. if not within the last year, one has been recommended:n/a    NUTRITION SCREENING:  Do you have any food allergies: No   Weight loss or gain of 10 pounds or more in the last 3 months: No  Decrease in appetite and/or food intake: No  Dental issues impacting nutrition: No  Binging or restricting patterns: No  Past treatment for an eating disorder: No  Level of nutrition needs: Yes = 1 point;  No = 0   0  none (0)- low (1-3) - moderate (4) - severe (5)   Action plan if moderate to severe: Referral to:N\A      LEGAL: none    Risk Assessment:   The following ratings are based on my observation of this patient over the last 35 minutes    Risk of Harm to Self:   Demographic risk factors include , never  or  status, and male  Historical Risk Factors include chronic psychiatric problems and substance abuse or dependence  Recent Specific Risk Factors include passive death wishes, worries about finances or work, and diagnosis of depression     Risk of Harm to Others:   Demographic Risk Factors include male  Historical Risk Factors include n/a  Recent Specific Risk Factors include n/a    Access to Weapons:   Eboni Akbar has access to the following weapons: none.  The following steps have been taken to ensure weapons are properly secured: none    Based on the above information, the client presents the following risk of harm to self or others:  low    The following interventions are recommended:   no intervention changes    Notes regarding this Risk Assessment: crisis and peer/warm lines given    Psychiatric Review Of Systems:     Appetite: no change  Adverse eating: no  Weight changes: no  Insomnia/sleeplessness: no  Fatigue/anergy: no  Anhedonia/lack of interest: no  Attention/concentration:  worse when anxious  Psychomotor agitation/retardation: no  Somatic symptoms: no  Anxiety/panic attack: worrying  Deidre/hypomania: no  Hopelessness/helplessness/worthlessness: no  Self-injurious behavior/high-risk behavior: no  Suicidal ideation: no  Homicidal ideation: no  Auditory hallucinations: no  Visual hallucinations: no  Other perceptual disturbances: no  Delusional thinking: no  Obsessive/compulsive symptoms: no     Review Of Systems:     Constitutional negative   ENT negative   Cardiovascular negative   Respiratory negative   Gastrointestinal negative   Genitourinary negative   Musculoskeletal negative   Integumentary negative   Neurological negative   Endocrine negative   Pain none   Pain Level    0/10   Other Symptoms none, all other systems are negative Mental Status Evaluation:     Appearance age appropriate, casually dressed   Behavior pleasant, cooperative, less anxious   Speech normal rate, normal volume, normal pitch   Mood less anxious   Affect normal range and intensity, appropriate   Thought Processes organized, goal directed   Associations intact associations   Thought Content no overt delusions   Perceptual Disturbances: no auditory hallucinations, no visual hallucinations   Abnormal Thoughts  Risk Potential Suicidal ideation - None  Homicidal ideation - None  Potential for aggression - No   Orientation oriented to person, place, time/date, and situation   Memory recent and remote memory grossly intact   Consciousness alert and awake   Attention Span Concentration Span attention span and concentration are age appropriate   Intellect appears to be of average intelligence   Insight intact   Judgement intact   Muscle Strength and  Gait normal muscle strength and normal muscle tone, normal gait and normal balance   Motor Activity no abnormal movements   Language no difficulty naming common objects, no difficulty repeating a phrase, no difficulty writing a sentence   Fund of Knowledge adequate knowledge of current events  adequate fund of knowledge regarding past history  adequate fund of knowledge regarding vocabulary         DSM:   No diagnosis found. Plan: Admit to [facility]. Group therapy, case management, medication management, UR and family contact as indicated.   ELOS 10 treatment days  Refer to OP psychiatry and therapy    Anticipated aftercare plan: Discharge to OP therapy and psychiatry

## 2023-11-29 NOTE — PSYCH
Subjective:     Patient ID: Lionel Mills. is a 46 y.o. male. [facility] Clinical Progress Notes      Specialized Services Documentation  Therapist must complete separate progress note for each specific clinical activity in which the individual participated during the day. Group Psychotherapy  This group was facilitated in a private office. ([id number]) Lionel Mills quietly attended group on the 5 Key Facets of Recovery and Wellness. Group members were educated on the background of the 5 Key Facets of Recovery and Wellness exercise. First, members answered the following questions:  What does wellness mean to you? My definition of recovery is. .. Today, I will support myself by. .. Members then focused the following aspects:   [address] [facility] and [address], Education, Advocacy, Responsibility, [address] can educate myself by. .. Name some self advocating opportunities  I take personal responsibility by. .. My supporters are. .. Little effort towards progress displayed through participation and engagement in topic. Group members were encouraged to use these questions to continue on their journey to wellness.     Treatment Plan Objectives: 1.1, 1.2  Therapist: Zbigniew HAY RN

## 2023-11-29 NOTE — TELEPHONE ENCOUNTER
Pt returned writer's call. González Francois was out of work starting on 11/20 and is expected to return on 12/18. González Francois did state that it may be sooner than that as he does not feel that he will need 2 1/2 weeks of PHP. Writer stated that a letter can be written to release González Francois back to work earlier if needed. Forms emailed to provider and [facility], Pt is requesting a call when they are ready for .

## 2023-11-29 NOTE — PSYCH
Subjective:     Patient ID: Chavez Napoles is a 46 y.o. male. [facility] Clinical Progress Notes      Specialized Services Documentation  Therapist must complete separate progress note for each specific clinical activity in which the individual participated during the day. Education Therapy   [id number] Chavez Napoles Was not present due to intake. Heidi1 N Mariusz Melchor engaged throughout the treatment day. Was engaged in learning related to Illness, Medication, Aftercare, and Wellness Tools. Staff utilized Verbal, Written, A/V, and Demonstration teaching methods. Chavez Napoles shared area of learning and set a goal for outside of program to work on paperwork.       Tx Plan Objective: 1.1,1.2,1.4 Therapist:  FLASH Hunt, KAMALA

## 2023-11-29 NOTE — BH TREATMENT PLAN
Assessment/Plan:      There are no diagnoses linked to this encounter. Subjective:     Patient ID: Rubio Mejia is a 46 y.o. male. [facility] Treatment Plan   AREAS OF NEED: feelings of worsening anxiety as evidenced by shortness of breath, catastrophising, racing heart, anxious thoughts, racing thoughts, and fixation on small mistakes. He stated that his only identifiable stressor is his job, which he holds a high position in and feels like he is incapable of meeting the high expectations that he sets for himself. Date Initiated: 11/29/23    Strengths: "I am a hard worker and responsible     LONG TERM GOAL:   Date Initiated: 11/29/23  1.0 I will identify 3 signs that my anxiety has improved since starting program.   Target Date: 12/27/23  Completion Date:       SHORT TERM OBJECTIVES:     Date Initiated: 11/29/23  1.1 I will identify 3 coping skills that help to address my anxiety and practice at least one daily. Revision Date:   Target Date: 12/8/23  Completion Date:     Date Initiated: 11/29/23  1.2 I will identify 3 mindfulness skills that help to focus my racing thoughts and practice at least 1 daily. Revision Date:   Target Date: 12/8/23  Completion Date:     Date Initiated: 11/29/23  1.3 I will take medication as prescribed and share questions and concerns if they arise. Revision Date:  Target Date: 12/8/23  Completion Date:      Date Initiated: 11/29/23  1.4 I will identify 3 ways my supports can assist in my recovery and agree to staff/support contact as indicated.     Revision Date:  Target Date: 12/8/23  Completion Date:           7 DAY REVISION:    Date Initiated:  Revision Date:   Target Date:   Completion Date:      PSYCHIATRY:  Date Initiated: 11/29/23  Medication Management and Education       Revision Date:   The person(s) responsible for carrying out the plan is Amrit Childers DO; Debby Kessler PA-C    NURSING/SYMPTOM EDUCATION:  Date Initiated: 11/29/23  1.1, 1.2. 1.3, 1.4 This RN and/or Therapist will provide wellness/symptoms and skill education groups three to five days weekly to educate Tommy Gallol. on signs and symptoms of diagnoses, skills to manage, and medication questions that will be addressed by the treatment team.    Revision date: The person(s) responsible for carrying out the plan is Bj Fenton RN    PSYCHOLOGY:   Date Initiated: 11/29/23       1.1, 1.2, 1.4 Provide psychotherapy group 5 times per week to allow opportunity for Derral Duel.  to explore stressors and ways of coping. Revision Date:   The person(s) responsible for carrying out the plan is FLASH Solis, MATTIW; [facility]    ALLIED THERAPY:   Date Initiated: 11/29/23  1.1,1.2 [address] [place] [address]. in AT group 3-5 times per week to encourage development and use of wellness tools to decrease symptoms and promote recovery through meaningful activity. Revision Date:   The person(s) responsible for carrying out the plan is GARY Jernigan; DEON Solis MT-BC    CASE MANAGEMENT:   Date Initiated: 11/29/23      1.0 This  will meet with Tommy Gallol.  3-4 times weekly to assess treatment progress, discharge planning, connection to community supports and UR as indicated. Revision Date:   The person(s) responsible for carrying out the plan is DEON Solis MT-BC      TREATMENT REVIEW/COMMENTS:     DISCHARGE CRITERIA:Identify 3 signs of progress and complete relapse prevention plan. DISCHARGE PLAN: Discharge to OP therapy and psychiatry  Estimated Length of Stay:10 treatment days           Diagnosis and Treatment Plan explained to Maurice Sing relates understanding diagnosis and is agreeable to Treatment Plan. CLIENT COMMENTS / Please share your thoughts, feelings, need and/or experiences regarding your treatment plan with Staff. Please see follow up note with comments.       Signatures can be found on [facility] Treatment plan consent form.

## 2023-11-30 ENCOUNTER — OFFICE VISIT (OUTPATIENT)
Dept: PSYCHOLOGY | Facility: CLINIC | Age: 48
End: 2023-11-30
Payer: COMMERCIAL

## 2023-11-30 DIAGNOSIS — F33.41 RECURRENT MAJOR DEPRESSION IN PARTIAL REMISSION (HCC): ICD-10-CM

## 2023-11-30 DIAGNOSIS — F41.1 GENERALIZED ANXIETY DISORDER: Primary | ICD-10-CM

## 2023-11-30 DIAGNOSIS — Z56.6 STRESSFUL JOB: ICD-10-CM

## 2023-11-30 PROCEDURE — G0177 OPPS/PHP; TRAIN & EDUC SERV: HCPCS

## 2023-11-30 PROCEDURE — G0176 OPPS/PHP;ACTIVITY THERAPY: HCPCS

## 2023-11-30 PROCEDURE — G0410 GRP PSYCH PARTIAL HOSP 45-50: HCPCS

## 2023-11-30 SDOH — HEALTH STABILITY - MENTAL HEALTH: OTHER PHYSICAL AND MENTAL STRAIN RELATED TO WORK: Z56.6

## 2023-11-30 NOTE — PSYCH
Subjective:     Patient ID: Jacquie Hunter is a 46 y.o. male. [facility] Clinical Progress Notes      Specialized Services Documentation  Therapist must complete separate progress note for each specific clinical activity in which the individual participated during the day. Group Psychotherapy - Cognitive Defusion    [id number] Jacquie Pederson participated with prompts in a psychotherapy group focused on Cognitive Defusion. Today, members focused on learning about defusion and how to integrate its techniques within their daily lives. Members practiced specific techniques and discussed:  Fusion vs. Defusion   Defusion strategies and techniques  Defusion exercise  Benefits of defusion   Supplemental materials/worksheets  Group members shared pieces of information from these sections and identified ways they could practice/integrate these skills in their life. This writer encouraged the group members to use the supplemental materials inside and outside of the program. Jacquie Hunter made some efforts towards progress goals which were displayed through participation and engagement in topic. Jacquie Pederson. Shared that defusion isn't very relevant for him as he doesn't struggle with persistent negative thoughts. Continue to run daily group psychotherapy to meet treatment needs and encourage Jacquie Hunter to practice skills outside of program.      Tx Plan Objective: 1.1,1.2,1.4 Therapist: [address]     Education Therapy   [id number] Jacquie Pederson. actively shared in morning assessment and goal review. Presented as Receptive related to readiness to learn. Jacquie Pederson. did complete goal from last treatment day identifying a gain of responsibility. did not present with any barriers to learning. Heidi1 N Mariusz Melchor engaged throughout the treatment day. Was engaged in learning related to Illness, Medication, Aftercare, and Wellness Tools.  Staff utilized Verbal, Written, A/V, and Demonstration teaching methods. Desiree Alcazar. shared area of learning and set a goal for outside of program to go to his appointment.       Tx Plan Objective: 1.1,1.2,1.4, Therapist: LUAN [facility]

## 2023-11-30 NOTE — PSYCH
Subjective:     Patient ID: Claudia Reed is a 46 y.o. male. [facility] Clinical Progress Notes      Specialized Services Documentation  Therapist must complete separate progress note for each specific clinical activity in which the individual participated during the day. Allied Therapy [id number] This group was facilitated virtually in a private office using HIPAA Compliant and Approved [employer] Teams. Claudia Reed actively participated in music therapy group regarding the locus of control. The group listened to and discussed the song "Bisi Cerda" focusing on the perspective of the aguirre. The group discussed the concept of control and where they feel they struggle with control in their life. The group then read and discussed the "locus of control" handout. The group then worked on the "what can I control" worksheet, identifying what in their life is/is not in their control. Fabi Rajan shared that he has the least control over his co-workers actions. Some effort noted towards treatment goal. Continue AT to encourage the development of an internal locus of control. Tx plan: 1.1,1.2,1.4   Therapist: DEON Mejia, MT-BC    Kurt Borrero Six contacted regarding pre-certification. Awaiting response. Case Management Note    DEON Mejia, MT-BC    Current suicide risk : Low     [id number] Met with Fabi Rajan for case management. Treatment plan reviewed and singed. He stated that he has been doing well. He stated that he talked to his boss about his concerns with work and was able to come up with a plan for finding a new position that carries less responsibility. He stated that he feels he has already heard everything in groups before but wanted to stick it out through next week so that he gives the program an honest try. Medications changes/added/denied? No    Treatment session number: 2    Individual Case Management Visit provided today?  Yes     [facility] follow up physician's orders: None at this time.

## 2023-11-30 NOTE — PSYCH
Subjective:     Patient ID: Venice Sweeney is a 46 y.o. male. [facility] Clinical Progress Notes      Specialized Services Documentation  Therapist must complete separate progress note for each specific clinical activity in which the individual participated during the day. Group Psychotherapy Life Skills  ([id number])  Venice Sweeney actively engaged in group focused on anxiety and stress solutions which was facilitated virtually in a private office using HIPAA Compliant and Approved [employer] Teams. Marcie Gregory consented to the use of tele-video modality of treatment and was virtually present and hybrid for group psychotherapy today. Group members practiced CBT and Mindfulness strategies to manage stress by completing short-activities drawn from "The Anxiety and Stress Solution Deck". Group members got to choose a card from one of the four focused areas of CBT: challenge your thoughts, change your behaviors, clarify your feelings, and create calmness. Marcie Gregory did not choose a card from the  pile but practiced the tools listed on the cards picked. Group members were encouraged to provide feedback on the exercise. Marcie Jonestz continues to make progress towards goals through verbal participation in group; to accomplish long term goals continue to utilize skills learned in programming. Continue with psychotherapy to educate and encourage use of wellness tools.  Tx Plan Objective: 1.1,1.2 Therapist: Deborah Mcpherson

## 2023-12-01 ENCOUNTER — OFFICE VISIT (OUTPATIENT)
Dept: PSYCHOLOGY | Facility: CLINIC | Age: 48
End: 2023-12-01
Payer: COMMERCIAL

## 2023-12-01 DIAGNOSIS — F33.41 RECURRENT MAJOR DEPRESSIVE DISORDER IN PARTIAL REMISSION (HCC): ICD-10-CM

## 2023-12-01 DIAGNOSIS — F41.1 GAD (GENERALIZED ANXIETY DISORDER): Primary | ICD-10-CM

## 2023-12-01 DIAGNOSIS — Z56.6 STRESSFUL JOB: ICD-10-CM

## 2023-12-01 PROCEDURE — G0410 GRP PSYCH PARTIAL HOSP 45-50: HCPCS

## 2023-12-01 PROCEDURE — G0176 OPPS/PHP;ACTIVITY THERAPY: HCPCS

## 2023-12-01 PROCEDURE — G0177 OPPS/PHP; TRAIN & EDUC SERV: HCPCS

## 2023-12-01 SDOH — HEALTH STABILITY - MENTAL HEALTH: OTHER PHYSICAL AND MENTAL STRAIN RELATED TO WORK: Z56.6

## 2023-12-01 NOTE — PSYCH
Subjective:     Patient ID: Desiree Alcazar. is a 46 y.o. male. [facility] Clinical Progress Notes      Specialized Services Documentation  Therapist must complete separate progress note for each specific clinical activity in which the individual participated during the day. Other Received an in-basket message from Leyda that he would soon provide the authorization for Nathan Smith Case Management Note    Berwyn Fothergill, [facility], [facility]    Current suicide risk : Low     No case management requested or scheduled. Aware of next 1:1    Medications changes/added/denied? No    Treatment session number: 3    Individual Case Management Visit provided today?  No    [facility] follow up physician's orders: None at this time

## 2023-12-01 NOTE — PSYCH
Subjective:     Patient ID: Mariam Dunn is a 46 y.o. male. [facility] Clinical Progress Notes      Specialized Services Documentation  Therapist must complete separate progress note for each specific clinical activity in which the individual participated during the day. Group Psychotherapy  This group was facilitated in a private office using HIPAA Compliant and Approved [employer] Teams. Mariam Dunn consented to the use of tele-video modality of treatment as an in person group member and another group member virtually. ([id number]) Mariam Dunn actively  engaged in psychoeducational group about medication management, types of antidepressants/side effects, and medication tips. Group came up with strategies that helped them remember to take their medications and developed ideas to make it easier in the future. The group talked about understanding the purpose, dose, type, timing and side effects of their medications. Teaching on emergency situations and who to go to for help was brought up as well. Group was encouraged to ask questions in an open forum at the end of group. Good progress displayed through engagement in topic.   Treatment Plan Objective 1.1, 1.2, 1.3, 1.4 Therapist: Shimon HAY RN

## 2023-12-01 NOTE — PSYCH
Subjective:     Patient ID: Venice Sweeney is a 46 y.o. male. [facility] Clinical Progress Notes      Specialized Services Documentation  Therapist must complete separate progress note for each specific clinical activity in which the individual participated during the day. Group Psychotherapy Life Skills ([id number]) Venice Sweeney actively engaged in group focused on values based behaviors which was facilitated virtually in a private office using HIPAA Compliant and Approved [employer] Teams. Marcie Gregory consented to the use of tele-video modality of treatment and was physically present for psychotherapy group that was hybrid today. During the activity group members thought about each of their top three values and created a goal and action steps to take towards achieving that value-based behavior. Marcie Gregory stated one of their top values and their goal and action plan towards achieving that value-based behavior. We discussed personal situations where they wanted to act and react with their values and not their emotions. Group members discussed one of their values and their intentions to respond to a situation. They shared the steps they would take to turn their intentions into action. Marcie Gregory continues to make progress towards goals through verbal participation in group; to accomplish long term goals continue to utilize skills learned in programming. Continue with psychotherapy to educate and encourage use of wellness tools.  Tx Plan Objective: 1.1,1.2 Therapist: Deborah Mcpherson

## 2023-12-01 NOTE — PSYCH
Subjective:     Patient ID: Matty Bowles is a 46 y.o. male. [facility] Clinical Progress Notes      Specialized Services Documentation  Therapist must complete separate progress note for each specific clinical activity in which the individual participated during the day. Group Psychotherapy - Wellness Assessment    [id number] Matty Chun participated with prompts in a psychotherapy group focused on  the Wellness Assessment. The group engaged in the wellness assessment, which evaluates progress on several different areas of wellness/wellbeing: physical, emotional, cognitive, vocational, social and spiritual. Clients rated their progress and discussed areas that need work. By completing and discussing areas of progress, goals and challenges, members are connected and reminded that, in their mental health struggle, they are not alone. Topics of discussion revolved around setting goals, coping with change, methods to achieve goals. Members also engaged in a mindfulness nature exercise. Matty Bowles continues to make progress towards goals through participation in group activity and personal disclosures. Matty Bowles Shared he would like to challenge and reframe thoughts Continue to run daily group psychotherapy to meet treatment needs and encourage Matty Bowles to practice skills outside of program.      Tx Plan Objective: 1.1,1.2,1.4  Therapist: [address]      Education Therapy   [phone] Matty Bowles actively shared in morning assessment and goal review. Presented as Receptive related to readiness to learn. Matty Bowles did complete goal from last treatment day identifying a gain of responsibility. did not present with any barriers to learning. [address] Mel[address] engaged throughout the treatment day. Was engaged in learning related to Illness, Medication, Aftercare, and Wellness Tools.  Staff utilized Verbal, Written, A/V, and Demonstration teaching methods. Kelley Armendariz. shared area of learning and set a goal for outside of program to remain active.       Tx Plan Objective: 1.1,1.2,1.4, Therapist: Moe Mohan, [address] [facility]

## 2023-12-04 ENCOUNTER — OFFICE VISIT (OUTPATIENT)
Dept: PSYCHOLOGY | Facility: CLINIC | Age: 48
End: 2023-12-04
Payer: COMMERCIAL

## 2023-12-04 DIAGNOSIS — F41.1 GAD (GENERALIZED ANXIETY DISORDER): ICD-10-CM

## 2023-12-04 DIAGNOSIS — F33.41 RECURRENT MAJOR DEPRESSIVE DISORDER IN PARTIAL REMISSION (HCC): Primary | ICD-10-CM

## 2023-12-04 DIAGNOSIS — Z56.6 STRESSFUL JOB: ICD-10-CM

## 2023-12-04 PROCEDURE — G0410 GRP PSYCH PARTIAL HOSP 45-50: HCPCS

## 2023-12-04 PROCEDURE — G0176 OPPS/PHP;ACTIVITY THERAPY: HCPCS

## 2023-12-04 PROCEDURE — G0177 OPPS/PHP; TRAIN & EDUC SERV: HCPCS

## 2023-12-04 SDOH — HEALTH STABILITY - MENTAL HEALTH: OTHER PHYSICAL AND MENTAL STRAIN RELATED TO WORK: Z56.6

## 2023-12-04 NOTE — PSYCH
Group Psychotherapy    This group was facilitated in a private office.  (12:30-13:30) Amarjit Ramirez actively  engaged in psychoeducational group about unconventional coping strategies. The skills introduced help to maintain and regulate emotions and create healthy non-conventional coping skills. Group discovered strategies that help them to manage emotional stress and create unconventional coping strategies that will help emotional regulation on a short term basis. The group talked about understanding the purpose, and meaning of emotional regulation and the importance of emotional expression, regulation , and recognition, and how it affects themselves and others. Teaching on the skill process of unconventional coping strategies and DBT self-care, members of the group are able to manage short term situations with varied direct tangible coping stratiges. Group was encouraged to ask questions in an open forum at the end of group. Positive progress displayed through engagement in topic. Amarjit Ramirez will continue to engage in psychotherapy to encourage positive self realization. Treatment Plan Objective 1.1, 1.2, 1.3, 1.4 Therapist: Kailey Jamil.

## 2023-12-04 NOTE — PSYCH
Subjective:     Patient ID: Rashi Sagastume is a 46 y.o. male. [facility] Clinical Progress Notes      Specialized Services Documentation  Therapist must complete separate progress note for each specific clinical activity in which the individual participated during the day. Group Psychotherapy Life Skills ([id number]) Rashi Sagastume actively engaged in group focused on strengths mapping. which was facilitated virtually in a private office using HIPAA Compliant and Approved [employer] Teams. Rashi Sagastume consented to the use of tele-video modality of treatment and was physically present in hybrid group psychotherapy today. Group members were instructed to think of a positive experience that they had because they made it positive. Group members shared their experience and identified the things that they did to make it successful. Rashi Sagastume shared the positive experience of driving down to [place] in [place] on his motorcycle and identified the six strengths that they used to make it successful . The group discussed why knowing their strengths was important. Group members discussed how they could apply their strengths to a current stressor. Rashi Sagastume continues to make progress towards goals through verbal participation in group; to accomplish long term goals continue to utilize skills learned in programming. Continue with psychotherapy to educate and encourage use of wellness tools.  Tx Plan Objective: 1.1,1.2 Therapist: Anupam Ruiz

## 2023-12-04 NOTE — PSYCH
Subjective:     Patient ID: Kyra Moran is a 46 y.o. male. [facility] Clinical Progress Notes      Specialized Services Documentation  Therapist must complete separate progress note for each specific clinical activity in which the individual participated during the day. Group psychotherapy - Self Care    [id number] Kyra Gibbs participated actively in a psychotherapy group focused on self-care. Members reviewed and discussed the importance of self-care, the components of self-care, and the domains of self-care. This writer facilitated a discussion on the types of self-care activities members currently use, the barriers to self care, and which ones they would like to use in the future. Members evaluated their self-care activities and which they use most frequently through a self care assessment. This  then provided members with a self care menu in which each patient could develop a comprehensive structured outline of their self care activities. Members were then encouraged to share the activities they would like to improve or integrate in the future. This writer encouraged members to use the materials outside of programming. Kyra Moran identified kayaking as a self care activity they currently use. Kyra Moran made good efforts towards progress goals which were displayed through participation, notetaking, and engagement in topic. Continue to run daily group psychotherapy to meet treatment needs and encourage Kyra GibbsLuisa to practice skills outside of program.      Tx Plan Objective:1.1,1.2,1.4  Therapist: LU[facility] Su[facility]rez [facility]     Education Therapy   [phone] Raleighjoellen BernieLuisa actively shared in morning assessment and goal review. Presented as Receptive related to readiness to learn. Kyra GibbsLuisa did complete goal from last treatment day identifying a gain of responsibility. did not present with any barriers to learning.      [facility] . engaged throughout the treatment day. Was engaged in learning related to Illness, Medication, Aftercare, and Wellness Tools. Staff utilized Verbal, Written, A/V, and Demonstration teaching methods. Mariam Peña. shared area of learning and set a goal for outside of program to complete paperwork.       Tx Plan Objective: 1.1,1.2,1.4, Therapist: Jaida Oakley, [address]

## 2023-12-04 NOTE — PSYCH
Subjective:     Patient ID: Babs Lomax is a 46 y.o. male. [facility] Clinical Progress Notes      Specialized Services Documentation  Therapist must complete separate progress note for each specific clinical activity in which the individual participated during the day. Other Per Brittany Lomax: pre-cert acquired from [facility], "30 days were approved starting 11/29 and lcd is 1/4. The Alicia Stephen is [phone]. Concurrent review is to be called to [phone]."    Case Management Note    Toshia Perkins, MMT, [facility]    Current suicide risk : Low     [phone] Met with Raphael Jose for case management. He stated that he is doing well and has been able to manage his anxiety. He stated that he still feels ready for discharge this Friday 12/8. He stated that he planned to reach out to an OP therapy provider this week but also requested provider lists from this writer. Medications changes/added/denied? No    Treatment session number: 4    Individual Case Management Visit provided today?  Yes     [facility] follow up physician's orders: None at this time

## 2023-12-05 ENCOUNTER — OFFICE VISIT (OUTPATIENT)
Dept: PSYCHIATRY | Facility: CLINIC | Age: 48
End: 2023-12-05
Payer: COMMERCIAL

## 2023-12-05 ENCOUNTER — OFFICE VISIT (OUTPATIENT)
Dept: PSYCHOLOGY | Facility: CLINIC | Age: 48
End: 2023-12-05
Payer: COMMERCIAL

## 2023-12-05 DIAGNOSIS — F33.41 RECURRENT MAJOR DEPRESSIVE DISORDER IN PARTIAL REMISSION (HCC): Primary | ICD-10-CM

## 2023-12-05 DIAGNOSIS — F39 MOOD DISORDER (HCC): ICD-10-CM

## 2023-12-05 DIAGNOSIS — F41.1 GAD (GENERALIZED ANXIETY DISORDER): ICD-10-CM

## 2023-12-05 DIAGNOSIS — F51.01 PRIMARY INSOMNIA: ICD-10-CM

## 2023-12-05 DIAGNOSIS — F41.1 GAD (GENERALIZED ANXIETY DISORDER): Primary | ICD-10-CM

## 2023-12-05 DIAGNOSIS — Z56.6 STRESSFUL JOB: ICD-10-CM

## 2023-12-05 DIAGNOSIS — F33.41 RECURRENT MAJOR DEPRESSIVE DISORDER IN PARTIAL REMISSION (HCC): ICD-10-CM

## 2023-12-05 DIAGNOSIS — F41.1 GENERALIZED ANXIETY DISORDER: Chronic | ICD-10-CM

## 2023-12-05 PROCEDURE — 99213 OFFICE O/P EST LOW 20 MIN: CPT | Performed by: PHYSICIAN ASSISTANT

## 2023-12-05 PROCEDURE — G0177 OPPS/PHP; TRAIN & EDUC SERV: HCPCS

## 2023-12-05 PROCEDURE — G0410 GRP PSYCH PARTIAL HOSP 45-50: HCPCS

## 2023-12-05 PROCEDURE — G0176 OPPS/PHP;ACTIVITY THERAPY: HCPCS

## 2023-12-05 RX ORDER — BUSPIRONE HYDROCHLORIDE 15 MG/1
15 TABLET ORAL 3 TIMES DAILY
Qty: 90 TABLET | Refills: 0 | Status: SHIPPED | OUTPATIENT
Start: 2023-12-05

## 2023-12-05 RX ORDER — LITHIUM CARBONATE 450 MG
450 TABLET, EXTENDED RELEASE ORAL EVERY 12 HOURS SCHEDULED
Qty: 60 TABLET | Refills: 0 | Status: SHIPPED | OUTPATIENT
Start: 2023-12-05

## 2023-12-05 RX ORDER — TRAZODONE HYDROCHLORIDE 50 MG/1
50 TABLET ORAL
Qty: 30 TABLET | Refills: 0 | Status: SHIPPED | OUTPATIENT
Start: 2023-12-05

## 2023-12-05 RX ORDER — DULOXETIN HYDROCHLORIDE 60 MG/1
60 CAPSULE, DELAYED RELEASE ORAL DAILY
Qty: 30 CAPSULE | Refills: 0 | Status: SHIPPED | OUTPATIENT
Start: 2023-12-05

## 2023-12-05 SDOH — HEALTH STABILITY - MENTAL HEALTH: OTHER PHYSICAL AND MENTAL STRAIN RELATED TO WORK: Z56.6

## 2023-12-05 NOTE — PSYCH
Subjective:     Patient ID: Tommy Macias. 46 y.o. [facility] Clinical Progress Notes      Specialized Services Documentation  Therapist must complete separate progress note for each specific clinical activity in which the individual participated during the day. Education Therapy     [id number] Tommy Gallol.  actively shared in morning assessment and goal review. Presented as Receptive related to readiness to learn. Tommy Duel. did complete goal from last treatment day identifying gaining responsibility. did not present with any barriers to learning. [address] engaged throughout the treatment day. Was engaged in learning related to Illness, Medication, Aftercare and Wellness Tools. Staff utilized verbal, written, and demonstration teaching methods. Tommy Duel. shared area of learning and set a goal for outside of program to go grocery shopping. Tx Plan Objective: 1.1, 1.2, 1.4, Therapist: Fanny Hassan. Psychotherapy Group    This group was facilitated in a private office. ([phone])Raman Shultz. actively engaged in psychoeducational group about the PLEASE skill. The skill helps to maintain and regulate emotional expression/regulation. Group discovered strategies that help them to take care of physical and emotional well being on a short term and long term basis. The group talked about understanding the purpose, and meaning of self care in regards to physical, intellectual, and emotional expression, regulation , and recognition, and how it affects themselves and others. . Teaching on the emphasis of self monitoring and self-care, who the group can go to for help was brought up as well. Group was encouraged to ask questions in an open forum at the end of group. Positive progress displayed through engagement in topic. Positive progress displayed through engagement in topic.  Pt will continue to engage in psychotherapy to encourage self realization in treatment. Treatment Plan Objective 1.1, 1.2, 1.3, 1.4 Therapist: Geneva Yusuf.

## 2023-12-05 NOTE — PSYCH
Progress Note - Behavioral Health   [facility], [facility]  Rhianna Arzola. 46 y.o. male MRN: [id number]   This note was not shared with the patient due to reasonable likelihood of causing patient harm    Progress at partial program: some improvement. Chart reviewed and discussed n treatment team.  Seen by Dr Alonso Sandoval 11/29 at which time meds unchanged. Maryam Davidson notes feeling better. 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 Is hoping this will address his anxiety at work. Is looking for ind therapist. Denies tremor, nausea, vomiting, diarrhea, gait changes. Is not taking any NSAIDS.      ROS:   As above otherwise negative    Active Ambulatory Problems     Diagnosis Date Noted    Obstructive sleep apnea syndrome 04/09/2013    Mixed hyperlipidemia 05/21/2019    History of cocaine use 05/21/2019    History of alcohol abuse 05/21/2019    Herniated lumbar intervertebral disc 01/29/2013    Polysubstance dependence ([address]) 09/16/2020    Chronic low back pain 01/13/2021    Hiatal hernia without gangrene or obstruction 03/09/2021    Status post repair of paraesophageal diaphragmatic hernia 04/20/2021    Diarrhea 09/22/2021    GERD (gastroesophageal reflux disease) 11/22/2023    Stressful job 12/01/2023    Recurrent major depressive disorder in partial remission ([address]) 12/01/2023    AMANDA (generalized anxiety disorder) 12/04/2023     Resolved Ambulatory Problems     Diagnosis Date Noted    Generalized anxiety disorder 01/30/2018    Medical clearance for psychiatric admission 06/09/2020    Major depressive disorder, recurrent, severe without psychotic features ([address]) 06/09/2020    Other specified anxiety disorders 06/09/2020     Past Medical History:   Diagnosis Date    Anxiety     Chest pain     CPAP (continuous positive airway pressure) dependence     Depression     H. pylori infection     Hiatal hernia     Hyperlipidemia     PONV (postoperative nausea and vomiting)     Sleep apnea     Suicidal thoughts     Wears glasses      Family History   Problem Relation Age of Onset    No Known Problems Mother     No Known Problems Father     Eating disorder Sister        No Known Allergies       Mental Status Evaluation:    Appearance:  looks younger than stated age   Behavior:  cooperative   Speech:  normal rate and volume   Mood:  mildly anxious   Affect:  mood-congruent   Thought Process:  goal directed   Associations: intact associations   Thought Content:  normal   Perceptual Disturbances: none   Risk Potential: Suicidal ideation - None  Homicidal ideation - None   Sensorium:  oriented to person, place, and time/date   Memory:  recent and remote memory grossly intact   Consciousness:  alert and awake   Attention: attention span and concentration are age appropriate   Insight:  intact   Judgment: intact   Gait/Station: normal gait/station   Motor Activity: no abnormal movements       Laboratory results: I have personally reviewed all pertinent laboratory/tests results. 11/27/23: lithium 0.6    Assessment   Diagnoses and all orders for this visit:    Recurrent major depressive disorder in partial remission (HCC)    Generalized anxiety disorder  -     busPIRone (BUSPAR) 15 mg tablet; Take 1 tablet (15 mg total) by mouth 3 (three) times a day  -     DULoxetine (CYMBALTA) 60 mg delayed release capsule; Take 1 capsule (60 mg total) by mouth daily    Mood disorder (HCC)  -     lithium carbonate (LITHOBID) 450 mg CR tablet; Take 1 tablet (450 mg total) by mouth every 12 (twelve) hours    Primary insomnia  -     traZODone (DESYREL) 50 mg tablet;  Take 1 tablet (50 mg total) by mouth daily at bedtime    AMANDA (generalized anxiety disorder)       Current Outpatient Medications   Medication Sig Dispense Refill    busPIRone (BUSPAR) 15 mg tablet Take 1 tablet (15 mg total) by mouth 3 (three) times a day 90 tablet 0    DULoxetine (CYMBALTA) 60 mg delayed release capsule Take 1 capsule (60 mg total) by mouth daily 30 capsule 0    lithium carbonate (LITHOBID) 450 mg CR tablet Take 1 tablet (450 mg total) by mouth every 12 (twelve) hours 60 tablet 0    traZODone (DESYREL) 50 mg tablet Take 1 tablet (50 mg total) by mouth daily at bedtime 30 tablet 0    atorvastatin (LIPITOR) 40 mg tablet Take 1 tablet (40 mg total) by mouth daily with dinner 30 tablet 0    [START ON 12/31/2023] cholecalciferol (VITAMIN D3) 1,000 units tablet Take 1 tablet (1,000 Units total) by mouth daily Do not start before December 31, 2023. 30 tablet 0    ergocalciferol (VITAMIN D2) 50,000 units Take 1 capsule (50,000 Units total) by mouth once a week for 5 doses Do not start before December 2, 2023. 5 capsule 0    melatonin 3 mg Take 1 tablet (3 mg total) by mouth daily at bedtime 30 tablet 0    Multiple Vitamin (multivitamin) tablet Take 1 tablet by mouth daily       No current facility-administered medications for this visit. Plan    Planned medication and treatment changes:  No med change. Cont lithobid 450 mg bid, trazodone 50 mg q bedtime, buspar 15 mg tid, cymbalta 60 mg/d. Twana Closs for d/c 12/8 and has f/u with Luis Antonio Constantino Pa-C on 12/29. All current active medications have been reviewed. Continue treatment with group therapy and partial program      Risks / Benefits of Treatment:    Risks, benefits, and possible side effects of medications explained to patient and patient verbalizes understanding and agrees to medications. Counseling / Coordination of Care:    Patient's progress discussed with staff in treatment team meeting. Medications, treatment progress and treatment plan reviewed with patient.     Donna Hicks PA-C 12/05/23

## 2023-12-05 NOTE — PSYCH
Subjective:     Patient ID: Venice Cotton. is a 46 y.o. male. [facility] Clinical Progress Notes      Specialized Services Documentation  Therapist must complete separate progress note for each specific clinical activity in which the individual participated during the day. Allied Therapy [id number] Venice Cotton participated in music therapy group on destructive vs. healthy behaviors. The group began with a melissa discussion on the song "Hurt" and the concept of self-destructive actions. The group then engaged in a discussion on self-destructive actions in their lives and where they hoped to grow. The group then read and discussed the handout Building New Habits and discussed how they could implement the concepts in to building healthy habits in their lives. The group then participated in a melissa replacement for the song "Flowers" identifying healthy habits they could use to support themselves. Marcie Gregory did not share when presented the opportunity. No obserable effort noted towards treatment goal. Continue AT to encourage the formation of healthy habits. Tx Plan Objective: 1.1,1.2,.1.4, Therapist:  DEON Miranda MT-BC    Case Management Note    DEON Miranda MT-BC    Current suicide risk : Low     No case management requested or scheduled. Aware of next 1:1    Medications changes/added/denied? No    Treatment session number: 5    Individual Case Management Visit provided today? No    [facility] follow up physician's orders: None at this time.

## 2023-12-05 NOTE — PSYCH
Subjective:     Patient ID: Beba Nicole is a 46 y.o. male. [facility] Clinical Progress Notes      Specialized Services Documentation  Therapist must complete separate progress note for each specific clinical activity in which the individual participated during the day. Group Psychotherapy - Self Esteem  [id number] Beba Nicole participated actively in a psychotherapy group focused on self-esteem. This writer facilitated a discussion on self esteem with the group members. Following this, the group viewed a video on self esteem and discussed it. This writer then led group members through a guided activity to create a self esteem book they can utilize to build their self esteem. Beba Nicole made good efforts towards progress goals which were displayed through participation and engagement in topic. Beba Nicole shared positive trait "honest".  Continue to run daily group psychotherapy to meet treatment needs and encourage Beba Nicole to practice skills outside of program.    Tx Plan Objective: 1.1,1.2,1.4 Therapist: [address]

## 2023-12-06 ENCOUNTER — OFFICE VISIT (OUTPATIENT)
Dept: PSYCHOLOGY | Facility: CLINIC | Age: 48
End: 2023-12-06
Payer: COMMERCIAL

## 2023-12-06 DIAGNOSIS — F33.41 RECURRENT MAJOR DEPRESSIVE DISORDER IN PARTIAL REMISSION (HCC): ICD-10-CM

## 2023-12-06 DIAGNOSIS — F41.1 GAD (GENERALIZED ANXIETY DISORDER): Primary | ICD-10-CM

## 2023-12-06 DIAGNOSIS — Z56.6 STRESSFUL JOB: ICD-10-CM

## 2023-12-06 PROCEDURE — G0176 OPPS/PHP;ACTIVITY THERAPY: HCPCS

## 2023-12-06 PROCEDURE — G0177 OPPS/PHP; TRAIN & EDUC SERV: HCPCS

## 2023-12-06 PROCEDURE — G0410 GRP PSYCH PARTIAL HOSP 45-50: HCPCS

## 2023-12-06 SDOH — HEALTH STABILITY - MENTAL HEALTH: OTHER PHYSICAL AND MENTAL STRAIN RELATED TO WORK: Z56.6

## 2023-12-06 NOTE — PSYCH
Subjective:     Patient ID: Kyra Gibbs. is a 46 y.o. male. [facility] Clinical Progress Notes      Specialized Services Documentation  Therapist must complete separate progress note for each specific clinical activity in which the individual participated during the day. Allied Therapy [id number] Kyra Gibbs participated in music therapy group regarding healthy music listening. The group discussed their current use of music for self care. The group read and discussed a handout on healthy music listening. The group learned about the Iso Principle and how it can be applied to music listening for self care. The group then created individualized playlists for mood modulation using the above skills. Loretta Pringle did not share when presented the opportunity. Minimum effort noted towards treatment goal. Continue AT to encourage the development and proactive use of mood modulating techniques. Tx Plan Objective: 1.1,1.2,1.4, Therapist:  DEON Hines MT-BC    Education Therapy   [id number] Raleighjoellen Gibbs. actively shared in morning assessment and goal review. Presented as Receptive related to readiness to learn. Kyra Gibbs. did complete goal from last treatment day identifying gaining responsibility and support. did not present with any barriers to learning. [address] engaged throughout the treatment day. Was engaged in learning related to Illness and Wellness Tools. Staff utilized Verbal, Written, A/V, and Demonstration teaching methods. Kyra Moran shared area of learning and set a goal for outside of program to pay bills and do some paperwork. Tx Plan Objective: 1.0, Therapist:  DEON Hines MT-BC      Case Management Note    DEON Hines MT-BC    Current suicide risk : Low     No case management requested or scheduled. Aware of next 1:1    Medications changes/added/denied?  No    Treatment session number: 6    Individual Case Management Visit provided today? No    [facility] follow up physician's orders: None at this time.

## 2023-12-06 NOTE — PSYCH
Subjective:     Patient ID: Korey Veloz is a 46 y.o. male. [facility] Clinical Progress Notes      Specialized Services Documentation  Therapist must complete separate progress note for each specific clinical activity in which the individual participated during the day. Group Psychotherapy - Assertive Communication  [id number] Korey Botello participated quietly in a psychotherapy group focused on communication styles.  facilitated a discussion on the types of communication and which members frequently engage in. Each member had the opportunity to practice using assertive communication skills during group time. Group members were encouraged to continue practicing these skills outside of group. Korey Veloz made some efforts towards progress goals which were displayed through participation, notetaking, and engagement in topic. Arpita Baileyolga reflected that he is still struggling with being assertive.   Continue to run daily group psychotherapy to meet treatment needs and encourage Korey Veloz to practice skills outside of program.      Tx Plan Objective:1.1,1.2,1.4 Therapist: [place], [address]

## 2023-12-06 NOTE — PSYCH
Subjective:     Patient ID: Terra Torres is a 46 y.o. male. [facility] Clinical Progress Notes      Specialized Services Documentation  Therapist must complete separate progress note for each specific clinical activity in which the individual participated during the day. Group Psychotherapy Life Skills ([id number]) Terra Torres actively engaged in group focused on the drama triangle. The group learned about the drama triangle and the roles of the victim, rescuer, and persecutor. Pineda Bello identified having been in one of the roles. During the activity group members learned how to stop the drama triangle from continuing. Terra Torres  identified ways that they would stop the drama triangle from occuring. Pineda Bello continues to make progress towards goals through verbal participation in group; to accomplish long term goals continue to utilize skills learned in programming. Continue with psychotherapy to educate and encourage use of wellness tools.  Tx Plan Objective: 1.1,1.2 Therapist: Jackie Mckeon

## 2023-12-07 ENCOUNTER — OFFICE VISIT (OUTPATIENT)
Dept: PSYCHOLOGY | Facility: CLINIC | Age: 48
End: 2023-12-07
Payer: COMMERCIAL

## 2023-12-07 DIAGNOSIS — Z56.6 STRESSFUL JOB: ICD-10-CM

## 2023-12-07 DIAGNOSIS — F33.41 RECURRENT MAJOR DEPRESSIVE DISORDER IN PARTIAL REMISSION (HCC): ICD-10-CM

## 2023-12-07 DIAGNOSIS — F41.1 GAD (GENERALIZED ANXIETY DISORDER): Primary | ICD-10-CM

## 2023-12-07 PROCEDURE — G0177 OPPS/PHP; TRAIN & EDUC SERV: HCPCS

## 2023-12-07 PROCEDURE — G0410 GRP PSYCH PARTIAL HOSP 45-50: HCPCS

## 2023-12-07 PROCEDURE — G0176 OPPS/PHP;ACTIVITY THERAPY: HCPCS

## 2023-12-07 SDOH — HEALTH STABILITY - MENTAL HEALTH: OTHER PHYSICAL AND MENTAL STRAIN RELATED TO WORK: Z56.6

## 2023-12-07 NOTE — PSYCH
Subjective:     Patient ID: Angela Becerra is a 46 y.o. male. [facility] Discharge Summary:   Admission Date: 10/29/23  Patient was referred by [facility] inpatient  Discharge Date: 12/8/23  Was this a routine discharge? yes   Diagnosis: Axis I:   1. AMANDA (generalized anxiety disorder)        2. Recurrent major depressive disorder in partial remission ([address])        3. Stressful job           Treating Physician: Dr Masha Valverde  Treatment Complications: No apparent treatment complications. Presenting Need: Per Dr Masha Valverde: Angela Becerra is a 46 y.o. male with past psychiatric history of depression is admitted to [facility] referred by [address] [place] [address] inpatient unit. Patient was admitted from 11/22 to 11/27 and discharged on Lithium 450mg BID, Cymbalta 60mg daily, and Trazodone 50mg qHS. Lakshmi Hart. reports he is feeling "pretty good". States he did not find the hospitalization that helpful due to peers causing issues at times, "acting immature", but medication changes helped. States medication changes included cymbalta increased, as well as buspar and lithium. States that he is no longer experiencing suicidal thoughts, which he admits was due to his anxiety. States he has been hospitalized several times in the past, often due to work. States that while he enjoys his job as a , he has been taking on more more management roles. He admits that this has been stressful for him, as he feels he often spirals into "what if's "about a job. He states that he has found himself spending more more time focusing on work on the weekends, and feels that "I cannot handle if things do not go well ". 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 He states that shortly into this, the pandemic close their office and he was working at home, and felt inept. States that he was hospitalized 4 times in 2020 due to his anxiety. States that growing up, he had a good childhood even though his parents  when he was young. He concedes that his sister has reached out to him and told him that she had an eating disorder. He states that he did end up acting out and turned to alcohol and cocaine, as well as abusing other substances off and on. States that he did quit in his mid 25s and try Prozac to good benefit, but relapsed and used throughout his 35s. He states he has been sober now for 10 years, but is felt his anxiety has been difficult to control since then. He admits he feels it is due to "now experiencing raw emotions ". He states that he has tried therapy, but he still has difficulty identifying his anxiety and why he engages in this catastrophizing. He admits that he would like to learn more skills and tools in therapy. He states he does feel the medications have helped reduce his anxiety and he no longer has somatic symptoms of "feeling of burning in my belly ". States that he has support from his girlfriend and his mother. He states he is eating and sleeping well. 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 Psychosocial Stressors include stress with work. Per this writer: Tori Lobo. Reports feelings of worsening anxiety as evidenced by shortness of breath, catastrophising, racing heart, anxious thoughts, racing thoughts, and fixation on small mistakes. He stated that his only identifiable stressor is his job, which he holds a high position in and feels like he is incapable of meeting the high expectations that he sets for himself. He stated that he previously had been hospitalized 4 times in 2020 as well as a PHP stay the same year due to similar concerns. He stated that he has a good support system in his girlfriend, mother, and sons.  He stated that he previously abused alcohol and drugs, primarily cocaine, but has been sober for 10 years. He stated that prior to his recent IP hospitalization he was experiencing passive SI but this has since subsided. He states that he has been doing well since his discharge but would like to gain skills to manage his anxiety. He stated that he has seen OP therapists in the past but does not feel like they were effective. Per Dejah Clear. : "I want to gain skills to help deal with my anxiety"    Course of treatment includes:    group counseling, medication management, individual case management, allied therapy, psychoeducation, and psychiatric evaluation    Treatment Progress: Dejah Parada. attended 8 [facility] treatment days in which he challenged negative thoughts, engaging in healthy coping strategies and learned ways to manage his  anxiety and depression. He was an active participant in program and in individual work. He actively worked on suggestions and practiced coping skills. He was more aware of triggers and behaviors. He was open to learning about his diagnosis distress tolerance skills, thought stopping techniques, breathing techniques, mindfulness, meditation, and tapping . He was able to identify personal issues and able to problem solve options. He shared improvement through feeling less depressed and anxious, and stated that he feels like he gained greater control over his emotions . He identified an increase in motivation and ADL's. He enjoys using the coping skills of physical exercise, meditation/guided imagery , and deep breathing. PHQ-9 11 on intake and 3 on discharge. Denied SI, HI, and psychosis. Aftercare providers to receive summary.      Aftercare recommendations include:   Psychiatry 12/29/23  Magnus Rosado PA-C  [facility], [address]  [address] [facility]: [phone]  F: [phone]     Therapy  Awaiting follow up information    Discharge Medications include:  Current Outpatient Medications: atorvastatin (LIPITOR) 40 mg tablet, Take 1 tablet (40 mg total) by mouth daily with dinner, Disp: 30 tablet, Rfl: 0    busPIRone (BUSPAR) 15 mg tablet, Take 1 tablet (15 mg total) by mouth 3 (three) times a day, Disp: 90 tablet, Rfl: 0    [START ON 12/31/2023] cholecalciferol (VITAMIN D3) 1,000 units tablet, Take 1 tablet (1,000 Units total) by mouth daily Do not start before December 31, 2023., Disp: 30 tablet, Rfl: 0    DULoxetine (CYMBALTA) 60 mg delayed release capsule, Take 1 capsule (60 mg total) by mouth daily, Disp: 30 capsule, Rfl: 0    ergocalciferol (VITAMIN D2) 50,000 units, Take 1 capsule (50,000 Units total) by mouth once a week for 5 doses Do not start before December 2, 2023., Disp: 5 capsule, Rfl: 0    lithium carbonate (LITHOBID) 450 mg CR tablet, Take 1 tablet (450 mg total) by mouth every 12 (twelve) hours, Disp: 60 tablet, Rfl: 0    melatonin 3 mg, Take 1 tablet (3 mg total) by mouth daily at bedtime, Disp: 30 tablet, Rfl: 0    Multiple Vitamin (multivitamin) tablet, Take 1 tablet by mouth daily, Disp: , Rfl:     traZODone (DESYREL) 50 mg tablet, Take 1 tablet (50 mg total) by mouth daily at bedtime, Disp: 30 tablet, Rfl: 0

## 2023-12-07 NOTE — PSYCH
Subjective:     Patient ID: Papa Arguello is a 46 y.o. male. [facility] Clinical Progress Notes      Specialized Services Documentation  Therapist must complete separate progress note for each specific clinical activity in which the individual participated during the day. Group Psychotherapy Life Skills ([id number]) Papa Arguello actively engaged in group focused on the empowerment triangle. The group learned about the empowerment triangle and the roles of the creator, , and challenger. The group examined how to shift their mindset from their role(s) in the drama triangle to their role(s) in the Parker's. Tiffanie discussed how they would shift their mindset from their role in the drama triangle to their role in the . Participants shared what makes them feel empowered. Kavon Bucks continues to make progress towards goals through verbal participation in group; to accomplish long term goals continue to utilize skills learned in programming. Continue with psychotherapy to educate and encourage use of wellness tools.  Tx Plan Objective: 1.1,1.2 Therapist: Jacques Bangura

## 2023-12-07 NOTE — BH TREATMENT PLAN
Assessment/Plan:      Encounter Diagnoses   AMANDA (generalized anxiety disorder) Yes   Recurrent major depressive disorder in partial remission ([address])    Stressful job          Subjective:      Patient ID: Kandi Lane is a 46 y.o. male. [facility] Treatment Plan   AREAS OF NEED: feelings of worsening anxiety as evidenced by shortness of breath, catastrophising, racing heart, anxious thoughts, racing thoughts, and fixation on small mistakes. He stated that his only identifiable stressor is his job, which he holds a high position in and feels like he is incapable of meeting the high expectations that he sets for himself. Date Initiated: 11/29/23     Strengths: "I am a hard worker and responsible        LONG TERM GOAL:   Date Initiated: 11/29/23  1.0 I will identify 3 signs that my anxiety has improved since starting program.   Target Date: 12/27/23  Completion Date:         SHORT TERM OBJECTIVES:      Date Initiated: 11/29/23  1.1 I will identify 3 coping skills that help to address my anxiety and practice at least one daily. Revision Date: 12/07/23, continue current objective  Target Date: 12/8/23  Completion Date:      Date Initiated: 11/29/23  1.2 I will identify 3 mindfulness skills that help to focus my racing thoughts and practice at least 1 daily. Revision Date: 12/07/23, continue current objective  Target Date: 12/8/23  Completion Date:      Date Initiated: 11/29/23  1.3 I will take medication as prescribed and share questions and concerns if they arise. Revision Date:12/07/23, continue current objective  Target Date: 12/8/23  Completion Date:       Date Initiated: 11/29/23  1.4 I will identify 3 ways my supports can assist in my recovery and agree to staff/support contact as indicated.     Revision Date:12/07/23, continue current objective  Target Date: 12/8/23  Completion Date:             7 DAY REVISION:   1.5 I will reach out to OP therapy providers and attempt to schedule an appointment for after discharge. Date Initiated: 12/07/23  Revision Date:   Target Date: 12/8/23  Completion Date:        PSYCHIATRY:  Date Initiated: 11/29/23  Medication Management and Education       Revision Date: 12/07/23  The person(s) responsible for carrying out the plan is Jeff Penn DO; Yolonda Rubinstein, PA-C    NURSING/SYMPTOM EDUCATION:  Date Initiated: 11/29/23  1.1, 1.2. 1.3, 1.4 This RN and/or Therapist will provide wellness/symptoms and skill education groups three to five days weekly to educate Mardelle Lolling. on signs and symptoms of diagnoses, skills to manage, and medication questions that will be addressed by the treatment team.    Revision date: 12/07/23  This RN and/or Therapist will continue to provide wellness/symptoms and skill education groups three to five days weekly to educate Mardelle Lolling. on signs and symptoms of diagnoses, skills to manage, and medication questions that will be addressed by the treatment team.  The person(s) responsible for carrying out the plan is Kulwinder De Dios RN    PSYCHOLOGY:   Date Initiated: 11/29/23       1.1, 1.2, 1.4 Provide psychotherapy group 5 times per week to allow opportunity for Mardelle Lolling.  to explore stressors and ways of coping. Revision Date: 12/07/23  Continue to provide psychotherapy group 5 times per week to allow opportunity for Mardelle Lolling.  to explore stressors and ways of coping. The person(s) responsible for carrying out the plan is FLASH Zhou, KAMALA; Domingo Lam    ALLIED THERAPY:   Date Initiated: 11/29/23  1.1,1.2 [address] [place] [address]. in AT group 3-5 times per week to encourage development and use of wellness tools to decrease symptoms and promote recovery through meaningful activity. Revision Date: 12/07/23  Continue to engage Marayaan Campoing.  in AT group 3-5 times per week to encourage development and use of wellness tools to decrease symptoms and promote recovery through meaningful activity. The person(s) responsible for carrying out the plan is GARY Briggs; DEON Rodriguez MT-BC    CASE MANAGEMENT:   Date Initiated: 11/29/23      1.0 This  will meet with Jacquie Hunter  3-4 times weekly to assess treatment progress, discharge planning, connection to community supports and UR as indicated. Revision Date: 12/07/23  This  will continue to meet with Jacquie Hunter  3-4 times weekly to assess treatment progress, discharge planning, connection to community supports and UR as indicated. The person(s) responsible for carrying out the plan is DEON Rodriguez MT-BC         TREATMENT REVIEW/COMMENTS:      DISCHARGE CRITERIA:Identify 3 signs of progress and complete relapse prevention plan. DISCHARGE PLAN: Discharge to OP therapy and psychiatry  Estimated Length of Stay:10 treatment days               Diagnosis and Treatment Plan explained to Maddi Pagan relates understanding diagnosis and is agreeable to Treatment Plan. CLIENT COMMENTS / Please share your thoughts, feelings, need and/or experiences regarding your treatment plan with Staff. Please see follow up note with comments. Signatures can be found on [facility] Treatment plan consent form.

## 2023-12-07 NOTE — PSYCH
Subjective:     Patient ID: Angela Becerra is a 46 y.o. male. [facility] Clinical Progress Notes      Specialized Services Documentation  Therapist must complete separate progress note for each specific clinical activity in which the individual participated during the day. Case Management Note    Savanna Hearn, [facility], [facility]    Current suicide risk : Low     [id number] Met with Jenny Kuhn for case management. He stated that he was doing well and was looking forward to discharge tomorrow. He stated he has reached out to OP therapy providers but has not been able to secure an appointment. Provider list given. Treatment plan reviewed and signed. Medications changes/added/denied? No    Treatment session number: 7    Individual Case Management Visit provided today? Yes     [facility] follow up physician's orders: None at this time.

## 2023-12-07 NOTE — BH CRISIS PLAN
Client Name: Claudia Kendall. Client YOB: 1975  : 1975    Treatment Team (include name and contact information):     Psychiatry  ANNETTA Elizondo, [address]  [address] [facility]: [phone]  F: [address] [facility] Provider  Reba Sneed MD  [facility] [address]      Type of Plan   * Child plans (children 15 yo and younger) must be completed and signed by the child's legal guardian   * Plans for all individuals 15 yo and above must be signed by the client. Plan Type: adolescent/adult (15 and over) Initial      My Personal Strengths are (in the client's own words):  "Hard worker, funny, and honest"  The stressors and triggers that may put me at risk are:  Anxiety, anger management, job stress    Coping skills I can use to keep myself calm and safe:  Physical activity, meditate, call a friend    Coping skills/supports I can use to maintain abstinence from substance use:   Not Applicable    The people that provide me with help and support: (Include name, contact, and how they can help)   Support person #1: Ann Pittman     * Phone number: in cell phone    * How can they help me? Assure me I will be ok   Support person #2:Wendy    * Phone number: in cell phone    * How can they help me? Make me aware of my actions     Support person #3: Estephanie    * Phone number: in cell phone    * How can they help me?  Listen to my feelings    In the past, the following has helped me in times of crisis:    Physical activity, therapy, call a friend      If it is an emergency and you need immediate help, call     If there is a possibility of danger to yourself or others, call the following crisis hotline resources:     Adult Crisis Numbers  Suicide Prevention Hotline - Dial   [facility]: [address]: [address] [facility] [address]: [address] [facility] [address]: [address]: [address]: [address]: [address]: [phone] (daytime). [phone] (after hours, weekends, holidays)     Child/Adolescent Crisis Numbers   [facility]: [address]: [phone]   [facility]: [phone]   formerly [facility]: [phone]    Please note: Some [facility] do not have a separate number for Child/Adolescent specific crisis. If your county is not listed under Child/Adolescent, please call the adult number for your county     National Talk to Text Line   [employer] - [phone]    In the event your feelings become unmanageable, and you cannot reach your support system, you will call 911 immediately or go to the nearest hospital emergency room.

## 2023-12-07 NOTE — PSYCH
Subjective:     Patient ID: Tommy Gibbons is a 46 y.o. male. [facility] Clinical Progress Notes      Specialized Services Documentation  Therapist must complete separate progress note for each specific clinical activity in which the individual participated during the day. Allied Therapy [id number] Tommy Macias actively participated in music therapy group regarding time management. The group listened to and discussed the song "Time" and how it relates to their philosophy of time management. The group participated in a discussion about time management in there lives and where they could use improvement. The group read and discussed handouts about tips for time management, time wasters, and daily/weekly schedules. The group then discussed the pomodoro method of time management and created a music playlist to as a method of tracking the 25 minute module. González Francois identified the pomodoro method as a way to improve time management in their life. Some effort noted towards treatment goal. Continue AT to encourage the development and proactive use of time management skills. Tx Plan Objective: 1.1,1.2,1.4, Therapist:  DEON Solis MT-BC    Education Therapy   [id number] Tommy Gibbons actively shared in morning assessment and goal review. Presented as Receptive related to readiness to learn. Tommy Gibbons did complete goal from last treatment day identifying gaining responsibility. did not present with any barriers to learning. Tx Plan Objective: 1.0, Therapist:  DEON Solis MT-BC    Other 5667 Left message on [address] line ([phone]) informing of discharge. Case Management Note    DEON Solis MT-BC    Current suicide risk : Low     8383-5655 Met with Tommy Gibbons . Reviewed relapse prevention plan, aftercare plan, and medication list (copies provided).  Tommy Gibbons shared improvement through better awareness of his emotions, improved use of coping skills, and was able to reconsider the priorities in his life, particularly related to his work ambitions. Denied SI, HI, and psychosis. Aftercare providers to receive summary. Medications changes/added/denied? No    Treatment session number: 8    Individual Case Management Visit provided today?  Yes     [facility] follow up physician's orders: Discharge to OP therapy and psychiatry

## 2023-12-07 NOTE — PSYCH
Subjective:     Patient ID: Adri Mohan. is a 46 y.o. male. [facility] Clinical Progress Notes      Specialized Services Documentation  Therapist must complete separate progress note for each specific clinical activity in which the individual participated during the day. Group Psychotherapy  This group was facilitated in a private office. .  ([id number]) Adri Mohan. actively shared in psychoeducational group which focused on nutrition to improve physical and mental wellbeing. Topics included:  How our diet affects our mental health  Mental health benefits of healthy eating  Barriers to eating healthy  Ways to overcome barriers  Macro and Micro nutrients   Appropriate serving sizes for all food groups as well as benefits to eating for health       The group then viewed a DI talk video titled "The Brain Intervention at the End of our [facility]" by Dr Aysha Simmons. They then discussed ideas on how to improve on their nutrition. Some progress toward goal noted. Continue psychoeducation to educate on the importance of making nutrition a priority. Tx Plan Objectives: 1.1,1.2    Therapist: Deven HAY        Education Therapy   [id number] Adri Mohan. actively shared in morning assessment and goal review. Presented as Receptive related to readiness to learn. Adri Mohan. did complete goal from last treatment day identifying gaining responsibility. did not present with any barriers to learning. [address] engaged throughout the treatment day. Was engaged in learning related to Illness, Medication, Aftercare, and Wellness Tools. Staff utilized Verbal, Written, A/V, and Demonstration teaching methods. Adri Mohan. shared area of learning and set a goal for outside of program to clean house.       Tx Plan Objective: 1.1,1.2,1.4 Therapist:  Deven HAY RN

## 2023-12-07 NOTE — PSYCH
Assessment/Plan:      Encounter Diagnoses             AMANDA (generalized anxiety disorder)   Yes             Recurrent major depressive disorder in partial remission ([address])                   Stressful job               Subjective:      Patient ID: Kyra Gibbs. is a 46 y.o. male. [facility] Treatment Plan   AREAS OF NEED: feelings of worsening anxiety as evidenced by shortness of breath, catastrophising, racing heart, anxious thoughts, racing thoughts, and fixation on small mistakes. He stated that his only identifiable stressor is his job, which he holds a high position in and feels like he is incapable of meeting the high expectations that he sets for himself. Date Initiated: 11/29/23     Strengths: "I am a hard worker and responsible        LONG TERM GOAL:   Date Initiated: 11/29/23  1.0 I will identify 3 signs that my anxiety has improved since starting program.   Target Date: 12/27/23  Completion Date: 12/8/23        SHORT TERM OBJECTIVES:      Date Initiated: 11/29/23  1.1 I will identify 3 coping skills that help to address my anxiety and practice at least one daily. Revision Date: 12/07/23, continue current objective  Target Date: 12/8/23  Completion Date: 12/8/23     Date Initiated: 11/29/23  1.2 I will identify 3 mindfulness skills that help to focus my racing thoughts and practice at least 1 daily. Revision Date: 12/07/23, continue current objective  Target Date: 12/8/23  Completion Date: 12/8/23     Date Initiated: 11/29/23  1.3 I will take medication as prescribed and share questions and concerns if they arise. Revision Date:12/07/23, continue current objective  Target Date: 12/8/23  Completion Date:  12/8/23     Date Initiated: 11/29/23  1.4 I will identify 3 ways my supports can assist in my recovery and agree to staff/support contact as indicated.     Revision Date:12/07/23, continue current objective  Target Date: 12/8/23  Completion Date:12/8/23             7 DAY REVISION:   1.5 I will reach out to OP therapy providers and attempt to schedule an appointment for after discharge. Date Initiated: 12/07/23  Revision Date:   Target Date: 12/8/23  Completion Date:12/8/23        PSYCHIATRY:  Date Initiated: 11/29/23  Medication Management and Education       Revision Date: 12/07/23  Completion Date:12/8/23  The person(s) responsible for carrying out the plan is Luis Dillon DO; Miguel Johnson PA-C     NURSING/SYMPTOM EDUCATION:  Date Initiated: 11/29/23  1.1, 1.2. 1.3, 1.4 This RN and/or Therapist will provide wellness/symptoms and skill education groups three to five days weekly to educate Camarnulfo Mckeon. on signs and symptoms of diagnoses, skills to manage, and medication questions that will be addressed by the treatment team.    Revision date: 12/07/23  This RN and/or Therapist will continue to provide wellness/symptoms and skill education groups three to five days weekly to educate Chip Mckeon. on signs and symptoms of diagnoses, skills to manage, and medication questions that will be addressed by the treatment team.  Completion Date:12/8/23  The person(s) responsible for carrying out the plan is Lisbeth Montes RN     PSYCHOLOGY:   Date Initiated: 11/29/23       1.1, 1.2, 1.4 Provide psychotherapy group 5 times per week to allow opportunity for Chip Alarcon  to explore stressors and ways of coping. Revision Date: 12/07/23  Continue to provide psychotherapy group 5 times per week to allow opportunity for Chip Alarcon  to explore stressors and ways of coping. Completion Date:12/8/23  The person(s) responsible for carrying out the plan is FLASH Kincaid, LSW; Glorious Olszewski, [address]     ALLIED THERAPY:   Date Initiated: 11/29/23  1.1,1.2 [address] [facility]. in AT group 3-5 times per week to encourage development and use of wellness tools to decrease symptoms and promote recovery through meaningful activity.   Revision Date: 12/07/23  Continue to engage Wes Sanders Javon Gonzalez. in AT group 3-5 times per week to encourage development and use of wellness tools to decrease symptoms and promote recovery through meaningful activity. Completion Date:12/8/23  The person(s) responsible for carrying out the plan is GARY Briggs; DEON Rodriguez [facility]     CASE MANAGEMENT:   Date Initiated: 11/29/23      1.0 This  will meet with Jacquie Hunter  3-4 times weekly to assess treatment progress, discharge planning, connection to community supports and UR as indicated. Revision Date: 12/07/23  This  will continue to meet with Jacquie Hunter  3-4 times weekly to assess treatment progress, discharge planning, connection to community supports and UR as indicated. Completion Date:12/8/23  The person(s) responsible for carrying out the plan is DEON Rodriguez MT-BC         TREATMENT REVIEW/COMMENTS:      DISCHARGE CRITERIA:Identify 3 signs of progress and complete relapse prevention plan. DISCHARGE PLAN: Discharge to OP therapy and psychiatry  Estimated Length of Stay:10 treatment days               Diagnosis and Treatment Plan explained to Maddi Pagan relates understanding diagnosis and is agreeable to Treatment Plan. CLIENT COMMENTS / Please share your thoughts, feelings, need and/or experiences regarding your treatment plan with Staff. Please see follow up note with comments. Signatures can be found on [facility] Treatment plan consent form.

## 2023-12-07 NOTE — PSYCH
Subjective:     Patient ID: Kelley Campos is a 46 y.o. male. [facility] Clinical Progress Notes      Specialized Services Documentation  Therapist must complete separate progress note for each specific clinical activity in which the individual participated during the day. Group Psychotherapy - Assertive Communication  [id number] Kelley Armendariz participated quietly in a psychotherapy group focused on communication styles.  facilitated a discussion on the types of communication and which members frequently engage in. Each member had the opportunity to practice using assertive communication skills during group time. Group members were encouraged to continue practicing these skills outside of group. Kelley Campos made some efforts towards progress goals which were displayed through participation, notetaking, and engagement in topic. Storm Burnette appeared to be engaged in the discussion.  Continue to run daily group psychotherapy to meet treatment needs and encourage Kelley Campos to practice skills outside of program.      Tx Plan Objective:1.1,1.2,1.4 Therapist: LUAN Crain [facility]

## 2023-12-07 NOTE — PSYCH
[facility] Discharge Instructions:   Disposition: home  Address: [address] [place] [id number]. Diagnosis:  Encounter Diagnoses   Name Primary? AMANDA (generalized anxiety disorder) Yes    Recurrent major depressive disorder in partial remission ([address])     Stressful job      Allergies (Drug/Food): No Known Allergies  Activity: n/a  Diet:no recommendations  Smoking Cessation:not a smoker   Diagnostic/Laboratory Orders: none  Vaccines: If you received a vaccine, please notify your family physician on your next visit. For more information, please call [phone]. Follow-up appointments/Referrals:   [facility]  [facility] [address]  [address] [facility]: [phone]  F: [phone]    Therapy  Awaiting follow up information     ICM/CTT:no  [facility] [phone]. Intake/Referral/Evaluation (Non-Emergency) *NON INSURED FOR FUNDING: [facility] ([place]: [phone], Jessica Wick: [phone], [place]: [phone] and [place]: [phone]. Crisis Intervention (Emergency) [place] Service: [facility] ([place]: [phone], [place]: [phone], [address] [place] [address]: [phone], [facility]): [phone], Vicky Muhammad: [phone] and C/M/P: [phone]. _________________________________  National Crisis Intervention Hotline: 8-183.376.2854. National Suicide Crisis Hotline: 0-578.189.6861. I, the undersigned, have received and understand the above instructions.         Patient/Rep Signature: __________________________________       Date/Time: ______________         Physician Signature: ____________________________________      Date/Time: ______________               Signature: ________________________________       Date/Time: ______________

## 2023-12-08 ENCOUNTER — OFFICE VISIT (OUTPATIENT)
Dept: PSYCHOLOGY | Facility: CLINIC | Age: 48
End: 2023-12-08
Payer: COMMERCIAL

## 2023-12-08 DIAGNOSIS — Z56.6 STRESSFUL JOB: ICD-10-CM

## 2023-12-08 DIAGNOSIS — F33.41 RECURRENT MAJOR DEPRESSIVE DISORDER IN PARTIAL REMISSION (HCC): ICD-10-CM

## 2023-12-08 DIAGNOSIS — F41.1 GAD (GENERALIZED ANXIETY DISORDER): Primary | ICD-10-CM

## 2023-12-08 PROCEDURE — G0177 OPPS/PHP; TRAIN & EDUC SERV: HCPCS

## 2023-12-08 PROCEDURE — G0176 OPPS/PHP;ACTIVITY THERAPY: HCPCS

## 2023-12-08 PROCEDURE — G0410 GRP PSYCH PARTIAL HOSP 45-50: HCPCS

## 2023-12-08 SDOH — HEALTH STABILITY - MENTAL HEALTH: OTHER PHYSICAL AND MENTAL STRAIN RELATED TO WORK: Z56.6

## 2023-12-08 NOTE — PSYCH
Subjective:     Patient ID: Lionel Lima is a 46 y.o. male. [facility] Clinical Progress Notes      Specialized Services Documentation  Therapist must complete separate progress note for each specific clinical activity in which the individual participated during the day. Group Psychotherapy - Wellness Assessment    [id number] Lionel Lima participated actively in a psychotherapy group focused on  the Wellness Assessment. The group engaged in the wellness assessment, which evaluates progress on several different areas of wellness/wellbeing: physical, emotional, cognitive, vocational, social and spiritual. Clients rated their progress and discussed areas that need work. By completing and discussing areas of progress, goals and challenges, members are connected and reminded that, in their mental health struggle, they are not alone. Topics of discussion revolved around setting goals, coping with change, methods to achieve goals. Members also engaged in a mindfulness nature exercise. Lionel Lima continues to make progress towards goals through participation in group activity and personal disclosures. Lionel Lima Reflected on some coping skills he could use. Ynig Nielson to be discharged at the end of the treatment day.     Tx Plan Objective: 1.1,1.2,1.4  Therapist: [address]

## 2023-12-08 NOTE — PSYCH
Subjective:     Patient ID: Goyo Jade is a 46 y.o. male. [facility] Clinical Progress Notes      Specialized Services Documentation  Therapist must complete separate progress note for each specific clinical activity in which the individual participated during the day. Group Psychotherapy  This group was facilitated in a private office. ([id number]) Goyo Jade actively participated in psychoeducation group this afternoon which focused on sleep hygiene. Group then identified sleep hygiene habits that are currently effective and habits they wish to incorporate into their night time routine to promote sleep hygiene. This writer explained the importance of quality sleep in relation to wellness. Sleep diary and additional tips on sleep hygiene were discussed. Then, a video titled "How to Improve Your Sleep" was viewed. Good progress toward goal observed. Continue psychoeducation group to increase awareness of good sleeping habits to promote wellness. Tx Plan Objectives: 1.1, 1.2      Leonie HAY RN      Education Therapy   This case management session was facilitated virtually in a private office using HIPAA Compliant and Approved Microsoft Teams. Goyo Jade consented to the use of tele-video modality of treatment. [address] engaged throughout the treatment day. Was engaged in learning related to Illness, Medication, Aftercare, and Wellness Tools. Staff utilized Verbal, Written, A/V, and Demonstration teaching methods. Goyo Jade shared area of learning and set a goal for outside of program to renew gym membership.       Tx Plan Objective: 1.1,1.2,1.4 Therapist:  Galen HAY RN

## 2023-12-11 ENCOUNTER — APPOINTMENT (OUTPATIENT)
Dept: PSYCHOLOGY | Facility: CLINIC | Age: 48
End: 2023-12-11
Payer: COMMERCIAL

## 2023-12-12 ENCOUNTER — APPOINTMENT (OUTPATIENT)
Dept: PSYCHOLOGY | Facility: CLINIC | Age: 48
End: 2023-12-12
Payer: COMMERCIAL

## 2023-12-12 ENCOUNTER — HOSPITAL ENCOUNTER (OUTPATIENT)
Dept: RADIOLOGY | Facility: HOSPITAL | Age: 48
Discharge: HOME | End: 2023-12-12
Payer: COMMERCIAL

## 2023-12-12 DIAGNOSIS — M54.16 LUMBAR RADICULOPATHY: ICD-10-CM

## 2023-12-12 PROCEDURE — 72100 X-RAY EXAM L-S SPINE 2/3 VWS: CPT

## 2023-12-13 ENCOUNTER — APPOINTMENT (OUTPATIENT)
Dept: PSYCHOLOGY | Facility: CLINIC | Age: 48
End: 2023-12-13
Payer: COMMERCIAL

## 2023-12-27 ENCOUNTER — DOCUMENTATION (OUTPATIENT)
Dept: PSYCHIATRY | Facility: CLINIC | Age: 48
End: 2023-12-27

## 2023-12-27 NOTE — PSYCH
ANNETTA spoke to Raman re: return to work certificate.  Raman states that he has been waiting for work to call him back in and they just did so last week to return 12/26.  Raman says he is doing well.  No side effects to meds inc nausea, vomiting, unsteadiness.  Plans to attend f/u with Svitlana Kirkland Pa-C on 12/29.  Raman asked that form be emailed to him and he will send to co.  Will return to work without restrictions.

## 2023-12-29 ENCOUNTER — TELEMEDICINE (OUTPATIENT)
Dept: PSYCHIATRY | Facility: CLINIC | Age: 48
End: 2023-12-29
Payer: COMMERCIAL

## 2023-12-29 DIAGNOSIS — F41.1 GENERALIZED ANXIETY DISORDER: Primary | ICD-10-CM

## 2023-12-29 DIAGNOSIS — F33.2 MAJOR DEPRESSIVE DISORDER, RECURRENT, SEVERE WITHOUT PSYCHOTIC FEATURES (HCC): ICD-10-CM

## 2023-12-29 DIAGNOSIS — F51.01 PRIMARY INSOMNIA: ICD-10-CM

## 2023-12-29 DIAGNOSIS — F39 MOOD DISORDER (HCC): ICD-10-CM

## 2023-12-29 PROCEDURE — 99214 OFFICE O/P EST MOD 30 MIN: CPT | Performed by: PHYSICIAN ASSISTANT

## 2023-12-29 PROCEDURE — 90833 PSYTX W PT W E/M 30 MIN: CPT | Performed by: PHYSICIAN ASSISTANT

## 2023-12-29 RX ORDER — DULOXETIN HYDROCHLORIDE 60 MG/1
60 CAPSULE, DELAYED RELEASE ORAL DAILY
Qty: 30 CAPSULE | Refills: 2 | Status: SHIPPED | OUTPATIENT
Start: 2023-12-29

## 2023-12-29 RX ORDER — BUSPIRONE HYDROCHLORIDE 15 MG/1
15 TABLET ORAL 3 TIMES DAILY
Qty: 90 TABLET | Refills: 2 | Status: SHIPPED | OUTPATIENT
Start: 2023-12-29

## 2023-12-29 RX ORDER — LITHIUM CARBONATE 450 MG
450 TABLET, EXTENDED RELEASE ORAL EVERY 12 HOURS SCHEDULED
Qty: 60 TABLET | Refills: 2 | Status: SHIPPED | OUTPATIENT
Start: 2023-12-29

## 2023-12-29 RX ORDER — TRAZODONE HYDROCHLORIDE 50 MG/1
50 TABLET ORAL
Qty: 30 TABLET | Refills: 2 | Status: SHIPPED | OUTPATIENT
Start: 2023-12-29

## 2023-12-29 NOTE — PSYCH
This note was not shared with the patient due to reasonable likelihood of causing patient harm    Virtual Regular Visit    Verification of patient location:    Patient is located at Home in the following state in which I hold an active license [place]      Assessment/Plan:    Problem List Items Addressed This Visit    None  Visit Diagnoses       Generalized anxiety disorder    -  Primary    Relevant Medications    lithium carbonate (LITHOBID) 450 mg CR tablet    busPIRone (BUSPAR) 15 mg tablet    DULoxetine (CYMBALTA) 60 mg delayed release capsule    traZODone (DESYREL) 50 mg tablet    Major depressive disorder, recurrent, severe without psychotic features (HCC)        Relevant Medications    lithium carbonate (LITHOBID) 450 mg CR tablet    busPIRone (BUSPAR) 15 mg tablet    DULoxetine (CYMBALTA) 60 mg delayed release capsule    traZODone (DESYREL) 50 mg tablet    Mood disorder (HCC)        Relevant Medications    lithium carbonate (LITHOBID) 450 mg CR tablet    busPIRone (BUSPAR) 15 mg tablet    DULoxetine (CYMBALTA) 60 mg delayed release capsule    traZODone (DESYREL) 50 mg tablet    Primary insomnia        Relevant Medications    traZODone (DESYREL) 50 mg tablet            Goals addressed in session: Goal 1          Reason for visit is   Chief Complaint   Patient presents with    Follow-up    Medication Management    Virtual Regular Visit          Encounter provider Jannet Kirkland PA-C    Provider located at [address] [address]      Recent Visits  No visits were found meeting these conditions.  Showing recent visits within past 7 days and meeting all other requirements  Today's Visits  Date Type Provider Dept   12/29/23 Telemedicine Jannet Kirkland PA-C  [facility]   Showing today's visits and meeting all other requirements  Future Appointments  No visits were found meeting these  conditions.  Showing future appointments within next 150 days and meeting all other requirements       The patient was identified by name and date of birth. Raman Mustafa Jr. was informed that this is a telemedicine visit and that the visit is being conducted throughthe Epic Embedded platform. He agrees to proceed..  My office door was closed. No one else was in the room.  He acknowledged consent and understanding of privacy and security of the video platform. The patient has agreed to participate and understands they can discontinue the visit at any time.    Patient is aware this is a billable service.     Subjective  Raman Mustafa Jr. is a 48 y.o. male with MDD and anxiety .      HPI   Raman was seen for follow up and for medication management.  He was hospitalized at [facility] on Thanksgiving.  After his discharge he followed up with the partial program in [place].  He completed the program.  He went back to work this week.  Unfortunately states he continues to feel quite depressed and anxious.  States that he worries about everything.  He has been on multiple medications with subtherapeutic effects.  Most recently we trialed the Vraylar which caused an increased anxiety and shakiness.  In the hospital his lithium was increased to 450 mg twice a day and his last level was 0.8.  He currently denies any suicidal ideation or intention but reports continued death wish.  States that at work he will be in a position now that is slightly less stressful.  Sleep is fair.  Appetite is adequate.  Struggles with ongoing negative thoughts and guilt feelings which we discussed.  Supportive psychotherapy provided  CBT provided on reframing negative thoughts and questioning the validity of his thoughts.  Encouraged to write his thoughts down when he is able to and rate the validity on scale of 1-10.  He is frustrated with lack of response to medications.  Discussed TMS and he feels as though he has a  "\"glimmer of hope\" when we discussed that.  Past Medical History:   Diagnosis Date    Anxiety     Chest pain     after eating- seen in ER several x - believes secondary to hernia    CPAP (continuous positive airway pressure) dependence     Depression     H. pylori infection     Hiatal hernia     Hyperlipidemia     Polysubstance dependence (HCC) 09/16/2020    PONV (postoperative nausea and vomiting)     Sleep apnea     Suicidal thoughts     Wears glasses        Past Surgical History:   Procedure Laterality Date    COLONOSCOPY      INGUINAL HERNIA REPAIR Right     DE LAPS RPR PARAESPHGL HRNA INCL FUNDPLSTY W/MESH N/A 4/1/2021    Procedure: REPAIR HERNIA PARAESOPHAGEAL LAPAROSCOPIC W ROBOTICS with TOUPET FUNDOPLICATION cruroplasty with mesh;  Surgeon: Alonzo Kaplan MD;  Location: [facility];  Service: General    SHOULDER SURGERY      left       Current Outpatient Medications   Medication Sig Dispense Refill    busPIRone (BUSPAR) 15 mg tablet Take 1 tablet (15 mg total) by mouth 3 (three) times a day 90 tablet 2    DULoxetine (CYMBALTA) 60 mg delayed release capsule Take 1 capsule (60 mg total) by mouth daily 30 capsule 2    lithium carbonate (LITHOBID) 450 mg CR tablet Take 1 tablet (450 mg total) by mouth every 12 (twelve) hours 60 tablet 2    traZODone (DESYREL) 50 mg tablet Take 1 tablet (50 mg total) by mouth daily at bedtime 30 tablet 2    atorvastatin (LIPITOR) 40 mg tablet Take 1 tablet (40 mg total) by mouth daily with dinner 30 tablet 0    [START ON 12/31/2023] cholecalciferol (VITAMIN D3) 1,000 units tablet Take 1 tablet (1,000 Units total) by mouth daily Do not start before December 31, 2023. 30 tablet 0    ergocalciferol (VITAMIN D2) 50,000 units Take 1 capsule (50,000 Units total) by mouth once a week for 5 doses Do not start before December 2, 2023. 5 capsule 0    melatonin 3 mg Take 1 tablet (3 mg total) by mouth daily at bedtime 30 tablet 0    Multiple Vitamin (multivitamin) tablet Take 1 tablet by " mouth daily       No current facility-administered medications for this visit.        No Known Allergies    Review of Systems  As noted in HPI  Video Exam    There were no vitals filed for this visit.    Physical Exam   Mental status exam:  Speech clear, coherent, normal rate and volume  Adequate hygiene, good eye contact  Affect is currently appropriate, mood is anxious, depressed  Obsessive thoughts and ruminations at times  Linear and coherent thought process  No suicidal or homicidal ideation, positive death wish  No psychotic signs or symptoms noted, no hallucinations and no delusions were voiced  Cognition appears intact  Insight judgment appears intact    Medications and treatment plan as follows:    Lithium 450 mg every 12 hours  Cymbalta 60 mg daily  BuSpar 15 mg 3 times daily  Trazodone 50 mg nightly as needed  Lithium level 0.6 on 11/27  He is going to look into TMS treatment since he has been on multiple medications with subtherapeutic or adverse effects  He had been evaluated for TMS treatment 3 years ago but at that time his depression was not found to be severe enough    He will follow-up with me on Monday, March 4, 2024 at 4 PM in person in the office and call sooner if any questions or concerns  Visit Time    Visit Start Time: 332  Visit Stop Time: 405  Total Visit Duration:  33 minutes

## 2024-03-04 ENCOUNTER — TELEMEDICINE (OUTPATIENT)
Dept: PSYCHIATRY | Facility: CLINIC | Age: 49
End: 2024-03-04

## 2024-03-04 DIAGNOSIS — F51.04 PSYCHOPHYSIOLOGICAL INSOMNIA: ICD-10-CM

## 2024-03-04 DIAGNOSIS — F33.2 MAJOR DEPRESSIVE DISORDER, RECURRENT, SEVERE WITHOUT PSYCHOTIC FEATURES (HCC): Primary | ICD-10-CM

## 2024-03-04 DIAGNOSIS — F41.1 GENERALIZED ANXIETY DISORDER: ICD-10-CM

## 2024-03-04 RX ORDER — TRAZODONE HYDROCHLORIDE 50 MG/1
50 TABLET ORAL
Qty: 30 TABLET | Refills: 2 | Status: SHIPPED | OUTPATIENT
Start: 2024-03-04

## 2024-03-04 RX ORDER — DULOXETIN HYDROCHLORIDE 60 MG/1
60 CAPSULE, DELAYED RELEASE ORAL DAILY
Qty: 30 CAPSULE | Refills: 2 | Status: SHIPPED | OUTPATIENT
Start: 2024-03-04

## 2024-03-04 RX ORDER — BUSPIRONE HYDROCHLORIDE 15 MG/1
15 TABLET ORAL 3 TIMES DAILY
Qty: 90 TABLET | Refills: 2 | Status: SHIPPED | OUTPATIENT
Start: 2024-03-04

## 2024-03-04 RX ORDER — LITHIUM CARBONATE 450 MG
450 TABLET, EXTENDED RELEASE ORAL EVERY 12 HOURS SCHEDULED
Qty: 60 TABLET | Refills: 2 | Status: SHIPPED | OUTPATIENT
Start: 2024-03-04

## 2024-03-04 NOTE — PSYCH
This note was not shared with the patient due to reasonable likelihood of causing patient harm    Virtual Regular Visit    Verification of patient location:    Patient is located at Home in the following state in which I hold an active license [place]      Assessment/Plan:    Problem List Items Addressed This Visit    None  Visit Diagnoses       Major depressive disorder, recurrent, severe without psychotic features (HCC)    -  Primary    Relevant Medications    traZODone (DESYREL) 50 mg tablet    lithium carbonate (LITHOBID) 450 mg CR tablet    busPIRone (BUSPAR) 15 mg tablet    DULoxetine (CYMBALTA) 60 mg delayed release capsule    Other Relevant Orders    Lithium level    Generalized anxiety disorder        Relevant Medications    traZODone (DESYREL) 50 mg tablet    lithium carbonate (LITHOBID) 450 mg CR tablet    busPIRone (BUSPAR) 15 mg tablet    DULoxetine (CYMBALTA) 60 mg delayed release capsule    Other Relevant Orders    Lithium level    Psychophysiological insomnia        Relevant Medications    traZODone (DESYREL) 50 mg tablet    lithium carbonate (LITHOBID) 450 mg CR tablet    busPIRone (BUSPAR) 15 mg tablet    DULoxetine (CYMBALTA) 60 mg delayed release capsule            Goals addressed in session: Goal 1          Reason for visit is   Chief Complaint   Patient presents with    Medication Management    Follow-up    Virtual Regular Visit          Encounter provider Jannet Kirkland PA-C    Provider located at [facility] [address] [address]      Recent Visits  No visits were found meeting these conditions.  Showing recent visits within past 7 days and meeting all other requirements  Today's Visits  Date Type Provider Dept   03/04/24 Telemedicine Jannet Kirkland PA-C  [facility]   Showing today's visits and meeting all other requirements  Future Appointments  No visits were found meeting these  conditions.  Showing future appointments within next 150 days and meeting all other requirements       The patient was identified by name and date of birth. Raman Mustafa Jr. was informed that this is a telemedicine visit and that the visit is being conducted throughthe Epic Embedded platform. He agrees to proceed..  My office door was closed. No one else was in the room.  He acknowledged consent and understanding of privacy and security of the video platform. The patient has agreed to participate and understands they can discontinue the visit at any time.    Patient is aware this is a billable service.     Subjective  Raman Mustafa Jr. is a 48 y.o. male with history of major depression and anxiety.      HPI   Raman was seen for follow-up and for medication management.  States that overall since his last visit he has been feeling better with his moods.  States that he has been quite busy at work which has been a positive thing.  States that his current position is less stressful and he is doing well there.  Reports that he is sleeping well at night.  Appetite is good.  Physically he is feeling well.  We had discussed TMS at his last visit but states that he has been doing better with his mood so we will hold off on this at this point.  No adverse effects noted with his medications.  States that outside of work he has not been doing much.  He does continue to go to breakfast with his family every weekend though.  Also spending time with his significant other.  No new medications or new medical issues noted.  Medication list reviewed and updated.  Past Medical History:   Diagnosis Date    Anxiety     Chest pain     after eating- seen in ER several x - believes secondary to hernia    CPAP (continuous positive airway pressure) dependence     Depression     H. pylori infection     Hiatal hernia     Hyperlipidemia     Polysubstance dependence (HCC) 09/16/2020    PONV (postoperative nausea and vomiting)     Sleep apnea      Suicidal thoughts     Wears glasses        Past Surgical History:   Procedure Laterality Date    COLONOSCOPY      INGUINAL HERNIA REPAIR Right     DE LAPS RPR PARAESPHGL HRNA INCL FUNDPLSTY W/MESH N/A 4/1/2021    Procedure: REPAIR HERNIA PARAESOPHAGEAL LAPAROSCOPIC W ROBOTICS with TOUPET FUNDOPLICATION cruroplasty with mesh;  Surgeon: Alonzo Kaplan MD;  Location: [facility];  Service: General    SHOULDER SURGERY      left       Current Outpatient Medications   Medication Sig Dispense Refill    busPIRone (BUSPAR) 15 mg tablet Take 1 tablet (15 mg total) by mouth 3 (three) times a day 90 tablet 2    DULoxetine (CYMBALTA) 60 mg delayed release capsule Take 1 capsule (60 mg total) by mouth daily 30 capsule 2    lithium carbonate (LITHOBID) 450 mg CR tablet Take 1 tablet (450 mg total) by mouth every 12 (twelve) hours 60 tablet 2    traZODone (DESYREL) 50 mg tablet Take 1 tablet (50 mg total) by mouth daily at bedtime 30 tablet 2    atorvastatin (LIPITOR) 40 mg tablet Take 1 tablet (40 mg total) by mouth daily with dinner 30 tablet 0    cholecalciferol (VITAMIN D3) 1,000 units tablet Take 1 tablet (1,000 Units total) by mouth daily Do not start before December 31, 2023. 30 tablet 0    ergocalciferol (VITAMIN D2) 50,000 units Take 1 capsule (50,000 Units total) by mouth once a week for 5 doses Do not start before December 2, 2023. 5 capsule 0    melatonin 3 mg Take 1 tablet (3 mg total) by mouth daily at bedtime 30 tablet 0    Multiple Vitamin (multivitamin) tablet Take 1 tablet by mouth daily       No current facility-administered medications for this visit.        No Known Allergies    Review of Systems  As noted in HPI  Video Exam    There were no vitals filed for this visit.    Physical Exam   Mental status exam:  Speech clear, coherent, normal rate and volume  Adequate hygiene, good eye contact  Affect is currently appropriate  Mood is euthymic  Currently denies suicidal or homicidal ideation  No psychotic signs  or symptoms noted, and no hallucinations and no delusions voiced  cognition is intact  Insight and judgment is intact    Medications and treatment plan as follows:  Lithium 450 mg every 12 hours  Cymbalta 60 mg daily  BuSpar 15 mg 3 times daily  Trazodone 50 mg nightly as needed    Will repeat lithium level prior to next visit, last level November 27 0.6    We will follow-up on Monday, Charmaine 3, 2024 at 4 PM in person      Visit Time    Visit Start Time: 354  Visit Stop Time: 412  Total Visit Duration:  18 minutes and additional time reviewing chart, charting, medications

## 2024-04-16 NOTE — PSYCH
Chart and labs reviewed for length of stay. PO intake has been fair/good per review of flowsheets. Weight relatively stable from that documented in 2020. Possible discharge tomorrow per charting. No nutrition intervention indicated at this time. RD available via consult. Will continue to follow per protocol.     Subjective:     Patient ID: Nel Hale is a 50 y o  male  [facility] Clinical Progress Notes      Specialized Services Documentation  Therapist must complete separate progress note for each specific clinical activity in which the individual participated during the day  Group Psychotherapy [id number]  This group was facilitated virtually in a private office using [employer] and Approved [employer] Teams  Nel Hale consented to the use of tele-video modality of treatment  Psychotherapy group focused on mindfulness  Rhianna Grigsby explored what mindfulness is, the benefits of practicing mindfulness, and different mindfulness techniques  Rhianna Grigsby then participated in a 15 minutes progressive muscle relaxation  Rhianna Bon listened attentively during the group discussion and participated in the progressive muscle relaxation  He did not participate in the discussion  Slow progress toward goals  Rhianna Grigsby is encouraged to make progress towards goals and objectives through group participation and will continue to attend psychotherapy group  Tx Plan Objective: 1 5, Therapist:  Hiwot Gutierres LCSW    Education Therapy   This group was facilitated virtually in a private office using HIPPA Compliant and Approved [employer] Teams  Nel Hale consented to the use of tele-video modality of treatment  Time:  7073-3440  Previous goal met: Yes   Readiness to Learning: Receptive  Barriers to Learning: None  Learning Assessment  Time: 2570-0052  Education Completed: Illness and Wellness Tools  Teaching Method: Verbal, Written and A/V  Shared Area of Learning: Yes   Goal Set: pay bills  Tx Plan Objective: 1 4, Therapist:  Hiwot Gutierres LCSW      Case Management Note    Oswald Kussmaul Stubits, LCSW    Current suicide risk : Low     Today is not a scheduled CM meeting with Rhianna Grigsby  Additionally, Rhianna Grigsby did not request to meet with this writer  Medications changes/added/denied?  No    Treatment session number: 9    Individual Case Management Visit provided today?  No    [facility] follow up physician's orders: none at this time

## 2024-05-04 ENCOUNTER — HOSPITAL ENCOUNTER (EMERGENCY)
Facility: HOSPITAL | Age: 49
Discharge: HOME | End: 2024-05-04
Attending: EMERGENCY MEDICINE
Payer: COMMERCIAL

## 2024-05-04 ENCOUNTER — APPOINTMENT (EMERGENCY)
Dept: CT IMAGING | Facility: HOSPITAL | Age: 49
End: 2024-05-04
Payer: COMMERCIAL

## 2024-05-04 ENCOUNTER — APPOINTMENT (EMERGENCY)
Dept: RADIOLOGY | Facility: HOSPITAL | Age: 49
End: 2024-05-04
Payer: COMMERCIAL

## 2024-05-04 VITALS
WEIGHT: 228.4 LBS | DIASTOLIC BLOOD PRESSURE: 67 MMHG | TEMPERATURE: 97.9 F | RESPIRATION RATE: 18 BRPM | SYSTOLIC BLOOD PRESSURE: 112 MMHG | BODY MASS INDEX: 30.13 KG/M2 | HEART RATE: 66 BPM | OXYGEN SATURATION: 98 %

## 2024-05-04 DIAGNOSIS — R07.9 CHEST PAIN, UNSPECIFIED: Primary | ICD-10-CM

## 2024-05-04 DIAGNOSIS — R19.7 DIARRHEA: ICD-10-CM

## 2024-05-04 DIAGNOSIS — R10.9 ABDOMINAL PAIN: ICD-10-CM

## 2024-05-04 LAB
ALBUMIN SERPL BCP-MCNC: 4.5 G/DL (ref 3.5–5)
ALP SERPL-CCNC: 46 U/L (ref 34–104)
ALT SERPL W P-5'-P-CCNC: 34 U/L (ref 7–52)
ANION GAP SERPL CALCULATED.3IONS-SCNC: 3 MMOL/L (ref 4–13)
AST SERPL W P-5'-P-CCNC: 22 U/L (ref 13–39)
BASOPHILS # BLD AUTO: 0.04 THOUSANDS/ÂΜL (ref 0–0.1)
BASOPHILS NFR BLD AUTO: 1 % (ref 0–1)
BILIRUB SERPL-MCNC: 0.47 MG/DL (ref 0.2–1)
BILIRUB UR QL STRIP: NEGATIVE
BUN SERPL-MCNC: 14 MG/DL (ref 5–25)
CALCIUM SERPL-MCNC: 10 MG/DL (ref 8.4–10.2)
CARDIAC TROPONIN I PNL SERPL HS: 3 NG/L
CHLORIDE SERPL-SCNC: 106 MMOL/L (ref 96–108)
CLARITY UR: CLEAR
CO2 SERPL-SCNC: 29 MMOL/L (ref 21–32)
COLOR UR: YELLOW
CREAT SERPL-MCNC: 0.93 MG/DL (ref 0.6–1.3)
EOSINOPHIL # BLD AUTO: 0.16 THOUSAND/ÂΜL (ref 0–0.61)
EOSINOPHIL NFR BLD AUTO: 2 % (ref 0–6)
ERYTHROCYTE [DISTWIDTH] IN BLOOD BY AUTOMATED COUNT: 12.3 % (ref 11.6–15.1)
GFR SERPL CREATININE-BSD FRML MDRD: 95 ML/MIN/1.73SQ M
GLUCOSE SERPL-MCNC: 67 MG/DL (ref 65–140)
GLUCOSE UR STRIP-MCNC: NEGATIVE MG/DL
HCT VFR BLD AUTO: 43 % (ref 36.5–49.3)
HGB BLD-MCNC: 14.9 G/DL (ref 12–17)
HGB UR QL STRIP.AUTO: NEGATIVE
IMM GRANULOCYTES # BLD AUTO: 0.03 THOUSAND/UL (ref 0–0.2)
IMM GRANULOCYTES NFR BLD AUTO: 0 % (ref 0–2)
KETONES UR STRIP-MCNC: NEGATIVE MG/DL
LEUKOCYTE ESTERASE UR QL STRIP: NEGATIVE
LIPASE SERPL-CCNC: 37 U/L (ref 11–82)
LYMPHOCYTES # BLD AUTO: 1.46 THOUSANDS/ÂΜL (ref 0.6–4.47)
LYMPHOCYTES NFR BLD AUTO: 21 % (ref 14–44)
MCH RBC QN AUTO: 30.8 PG (ref 26.8–34.3)
MCHC RBC AUTO-ENTMCNC: 34.7 G/DL (ref 31.4–37.4)
MCV RBC AUTO: 89 FL (ref 82–98)
MONOCYTES # BLD AUTO: 0.46 THOUSAND/ÂΜL (ref 0.17–1.22)
MONOCYTES NFR BLD AUTO: 7 % (ref 4–12)
NEUTROPHILS # BLD AUTO: 4.94 THOUSANDS/ÂΜL (ref 1.85–7.62)
NEUTS SEG NFR BLD AUTO: 69 % (ref 43–75)
NITRITE UR QL STRIP: NEGATIVE
NRBC BLD AUTO-RTO: 0 /100 WBCS
PH UR STRIP.AUTO: 5 [PH] (ref 4.5–8)
PLATELET # BLD AUTO: 284 THOUSANDS/UL (ref 149–390)
PMV BLD AUTO: 9.6 FL (ref 8.9–12.7)
POTASSIUM SERPL-SCNC: 3.9 MMOL/L (ref 3.5–5.3)
PROT SERPL-MCNC: 7.2 G/DL (ref 6.4–8.4)
PROT UR STRIP-MCNC: NEGATIVE MG/DL
RBC # BLD AUTO: 4.84 MILLION/UL (ref 3.88–5.62)
SODIUM SERPL-SCNC: 138 MMOL/L (ref 135–147)
SP GR UR STRIP.AUTO: <=1.005 (ref 1–1.03)
UROBILINOGEN UR QL STRIP.AUTO: 0.2 E.U./DL
WBC # BLD AUTO: 7.09 THOUSAND/UL (ref 4.31–10.16)

## 2024-05-04 PROCEDURE — 74176 CT ABD & PELVIS W/O CONTRAST: CPT

## 2024-05-04 PROCEDURE — 83690 ASSAY OF LIPASE: CPT | Performed by: EMERGENCY MEDICINE

## 2024-05-04 PROCEDURE — 84484 ASSAY OF TROPONIN QUANT: CPT | Performed by: EMERGENCY MEDICINE

## 2024-05-04 PROCEDURE — 93005 ELECTROCARDIOGRAM TRACING: CPT

## 2024-05-04 PROCEDURE — 81003 URINALYSIS AUTO W/O SCOPE: CPT

## 2024-05-04 PROCEDURE — 80053 COMPREHEN METABOLIC PANEL: CPT | Performed by: EMERGENCY MEDICINE

## 2024-05-04 PROCEDURE — 96360 HYDRATION IV INFUSION INIT: CPT

## 2024-05-04 PROCEDURE — 96361 HYDRATE IV INFUSION ADD-ON: CPT

## 2024-05-04 PROCEDURE — 99285 EMERGENCY DEPT VISIT HI MDM: CPT

## 2024-05-04 PROCEDURE — 71046 X-RAY EXAM CHEST 2 VIEWS: CPT

## 2024-05-04 PROCEDURE — 36415 COLL VENOUS BLD VENIPUNCTURE: CPT | Performed by: EMERGENCY MEDICINE

## 2024-05-04 PROCEDURE — 85025 COMPLETE CBC W/AUTO DIFF WBC: CPT | Performed by: EMERGENCY MEDICINE

## 2024-05-04 PROCEDURE — 99285 EMERGENCY DEPT VISIT HI MDM: CPT | Performed by: EMERGENCY MEDICINE

## 2024-05-04 RX ORDER — MAGNESIUM HYDROXIDE/ALUMINUM HYDROXICE/SIMETHICONE 120; 1200; 1200 MG/30ML; MG/30ML; MG/30ML
30 SUSPENSION ORAL ONCE
Status: COMPLETED | OUTPATIENT
Start: 2024-05-04 | End: 2024-05-04

## 2024-05-04 RX ORDER — FAMOTIDINE 20 MG/1
20 TABLET, FILM COATED ORAL ONCE
Status: COMPLETED | OUTPATIENT
Start: 2024-05-04 | End: 2024-05-04

## 2024-05-04 RX ORDER — DICYCLOMINE HCL 20 MG
20 TABLET ORAL ONCE
Status: COMPLETED | OUTPATIENT
Start: 2024-05-04 | End: 2024-05-04

## 2024-05-04 RX ORDER — OMEPRAZOLE 20 MG/1
20 CAPSULE, DELAYED RELEASE ORAL 2 TIMES DAILY
Qty: 28 CAPSULE | Refills: 0 | Status: SHIPPED | OUTPATIENT
Start: 2024-05-04 | End: 2024-05-09 | Stop reason: SDUPTHER

## 2024-05-04 RX ORDER — DICYCLOMINE HCL 20 MG
20 TABLET ORAL EVERY 6 HOURS PRN
Qty: 20 TABLET | Refills: 0 | Status: SHIPPED | OUTPATIENT
Start: 2024-05-04 | End: 2024-05-09 | Stop reason: SDUPTHER

## 2024-05-04 RX ADMIN — DICYCLOMINE HYDROCHLORIDE 20 MG: 20 TABLET ORAL at 15:24

## 2024-05-04 RX ADMIN — ALUMINUM HYDROXIDE, MAGNESIUM HYDROXIDE, DIMETHICONE 30 ML: 400; 400; 40 SUSPENSION ORAL at 15:23

## 2024-05-04 RX ADMIN — FAMOTIDINE 20 MG: 20 TABLET, FILM COATED ORAL at 15:24

## 2024-05-04 RX ADMIN — SODIUM CHLORIDE 1000 ML: 0.9 INJECTION, SOLUTION INTRAVENOUS at 15:28

## 2024-05-04 NOTE — ED PROVIDER NOTES
History  Chief Complaint   Patient presents with    Chest Pain     Patient reports L sided chest discomfort for several days. Loose stool for 2 weeks, dizziness, generalized weakness more recently.      Raman is a 47 yo male here in the emergency department for evaluation of multiple complaints.  He says that for the past 2 weeks or so he has had loose, nonbloody bowel movements.  He gets intermittent cramping in his abdomen.  Also over the past several days he has had some pain in his chest which she compares to his symptoms that he had previously with hiatal hernia.  Reports that he did have surgery to repair the hiatal hernia previously.  Denies significant shortness of breath.  No fevers.  He has been tolerating p.o. and urinating normally.  No rashes.          Prior to Admission Medications   Prescriptions Last Dose Informant Patient Reported? Taking?   DULoxetine (CYMBALTA) 60 mg delayed release capsule   No No   Sig: Take 1 capsule (60 mg total) by mouth daily   Multiple Vitamin (multivitamin) tablet   Yes No   Sig: Take 1 tablet by mouth daily   atorvastatin (LIPITOR) 40 mg tablet   No No   Sig: Take 1 tablet (40 mg total) by mouth daily with dinner   busPIRone (BUSPAR) 15 mg tablet   No No   Sig: Take 1 tablet (15 mg total) by mouth 3 (three) times a day   cholecalciferol (VITAMIN D3) 1,000 units tablet   No No   Sig: Take 1 tablet (1,000 Units total) by mouth daily Do not start before December 31, 2023.   ergocalciferol (VITAMIN D2) 50,000 units   No No   Sig: Take 1 capsule (50,000 Units total) by mouth once a week for 5 doses Do not start before December 2, 2023.   lithium carbonate (LITHOBID) 450 mg CR tablet   No No   Sig: Take 1 tablet (450 mg total) by mouth every 12 (twelve) hours   melatonin 3 mg   No No   Sig: Take 1 tablet (3 mg total) by mouth daily at bedtime   traZODone (DESYREL) 50 mg tablet   No No   Sig: Take 1 tablet (50 mg total) by mouth daily at bedtime      Facility-Administered  Medications: None       Past Medical History:   Diagnosis Date    Anxiety     Chest pain     after eating- seen in ER several x - believes secondary to hernia    CPAP (continuous positive airway pressure) dependence     Depression     H. pylori infection     Hiatal hernia     Hyperlipidemia     Polysubstance dependence (HCC) 2020    PONV (postoperative nausea and vomiting)     Sleep apnea     Suicidal thoughts     Wears glasses        Past Surgical History:   Procedure Laterality Date    COLONOSCOPY      INGUINAL HERNIA REPAIR Right     HI LAPS RPR PARAESPHGL HRNA INCL FUNDPLSTY W/MESH N/A 2021    Procedure: REPAIR HERNIA PARAESOPHAGEAL LAPAROSCOPIC W ROBOTICS with TOUPET FUNDOPLICATION cruroplasty with mesh;  Surgeon: Alonzo Kaplan MD;  Location: [facility];  Service: General    SHOULDER SURGERY      left       Family History   Problem Relation Age of Onset    No Known Problems Mother     No Known Problems Father     Eating disorder Sister      I have reviewed and agree with the history as documented.    E-Cigarette/Vaping    E-Cigarette Use Never User      E-Cigarette/Vaping Substances    Nicotine No     THC No     CBD No     Flavoring No     Other No     Unknown No      Social History     Tobacco Use    Smoking status: Former     Current packs/day: 0.00     Types: Cigarettes     Quit date:      Years since quittin.3    Smokeless tobacco: Never   Vaping Use    Vaping status: Never Used   Substance Use Topics    Alcohol use: Not Currently     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    Drug use: Yes     Types: Marijuana     Comment: previously used medical marijuana but not currently using       Review of Systems   Constitutional:  Negative for chills and fever.   HENT:  Negative for sore throat.    Eyes:  Negative for visual disturbance.   Respiratory:  Positive for chest tightness. Negative for cough and shortness of breath.    Cardiovascular:  Positive for chest pain. Negative for palpitations.    Gastrointestinal:  Positive for abdominal pain and diarrhea. Negative for blood in stool, nausea and vomiting.   Genitourinary:  Negative for dysuria and hematuria.   Musculoskeletal:  Negative for arthralgias and back pain.   Skin:  Negative for color change and rash.   Neurological:  Negative for syncope.   All other systems reviewed and are negative.      Physical Exam  Physical Exam  Vitals and nursing note reviewed.   Constitutional:       General: He is not in acute distress.     Appearance: He is well-developed.   HENT:      Head: Normocephalic and atraumatic.   Eyes:      Conjunctiva/sclera: Conjunctivae normal.   Cardiovascular:      Rate and Rhythm: Normal rate and regular rhythm.      Heart sounds: No murmur heard.  Pulmonary:      Effort: Pulmonary effort is normal. No respiratory distress.      Breath sounds: Normal breath sounds.   Abdominal:      Palpations: Abdomen is soft.      Tenderness: There is no abdominal tenderness.   Musculoskeletal:         General: No swelling. Normal range of motion.      Cervical back: Neck supple.      Right lower leg: No edema.      Left lower leg: No edema.   Skin:     General: Skin is warm and dry.      Capillary Refill: Capillary refill takes less than 2 seconds.   Neurological:      General: No focal deficit present.      Mental Status: He is alert and oriented to person, place, and time.   Psychiatric:         Mood and Affect: Mood normal.         Vital Signs  ED Triage Vitals [05/04/24 1317]   Temperature Pulse Respirations Blood Pressure SpO2   97.9 °F (36.6 °C) 72 18 134/87 97 %      Temp Source Heart Rate Source Patient Position - Orthostatic VS BP Location FiO2 (%)   Oral Monitor Sitting Right arm --      Pain Score       3           Vitals:    05/04/24 1317 05/04/24 1543 05/04/24 1719   BP: 134/87 121/66 112/67   Pulse: 72 70 66   Patient Position - Orthostatic VS: Sitting Sitting Sitting         Visual Acuity      ED Medications  Medications   sodium  chloride 0.9 % bolus 1,000 mL (0 mL Intravenous Stopped 5/4/24 1719)   aluminum-magnesium hydroxide-simethicone (MAALOX) oral suspension 30 mL (30 mL Oral Given 5/4/24 1523)   famotidine (PEPCID) tablet 20 mg (20 mg Oral Given 5/4/24 1524)   dicyclomine (BENTYL) tablet 20 mg (20 mg Oral Given 5/4/24 1524)       Diagnostic Studies  Results Reviewed       Procedure Component Value Units Date/Time    HS Troponin I 4hr [[id number]]     Lab Status: No result Specimen: Blood     HS Troponin 0hr (reflex protocol) [[id number]]  (Normal) Collected: 05/04/24 1528    Lab Status: Final result Specimen: Blood from Arm, Right Updated: 05/04/24 1556     hs TnI 0hr 3 ng/L     HS Troponin I 2hr [[id number]]     Lab Status: No result Specimen: Blood     Lipase [[id number]]  (Normal) Collected: 05/04/24 1528    Lab Status: Final result Specimen: Blood from Arm, Right Updated: 05/04/24 1553     Lipase 37 u/L     Narrative:      Specimen Lipemic.    Comprehensive metabolic panel [[id number]]  (Abnormal) Collected: 05/04/24 1528    Lab Status: Final result Specimen: Blood from Arm, Right Updated: 05/04/24 1553     Sodium 138 mmol/L      Potassium 3.9 mmol/L      Chloride 106 mmol/L      CO2 29 mmol/L      ANION GAP 3 mmol/L      BUN 14 mg/dL      Creatinine 0.93 mg/dL      Glucose 67 mg/dL      Calcium 10.0 mg/dL      AST 22 U/L      ALT 34 U/L      Alkaline Phosphatase 46 U/L      Total Protein 7.2 g/dL      Albumin 4.5 g/dL      Total Bilirubin 0.47 mg/dL      eGFR 95 ml/min/1.73sq m     Narrative:      Specimen Lipemic.  National Kid[employer] Disease Foundation guidelines for Chronic Kidney Disease (CKD):     Stage 1 with normal or high GFR (GFR > 90 mL/min/1.73 square meters)    Stage 2 Mild CKD (GFR = 60-89 mL/min/1.73 square meters)    Stage 3A Moderate CKD (GFR = 45-59 mL/min/1.73 square meters)    Stage 3B Moderate CKD (GFR = 30-44 mL/min/1.73 square meters)    Stage 4 Severe CKD (GFR = 15-29 mL/min/1.73 square meters)    Stage 5 End Stage  CKD (GFR <15 mL/min/1.73 square meters)  Note: GFR calculation is accurate only with a steady state creatinine    Urine Macroscopic, POC [[id number]] Collected: 05/04/24 1549    Lab Status: Final result Specimen: Urine Updated: 05/04/24 1550     Color, UA Yellow     Clarity, UA Clear     pH, UA 5.0     Leukocytes, UA Negative     Nitrite, UA Negative     Protein, UA Negative mg/dl      Glucose, UA Negative mg/dl      Ketones, UA Negative mg/dl      Urobilinogen, UA 0.2 E.U./dl      Bilirubin, UA Negative     Occult Blood, UA Negative     Specific Gravity, UA <=1.005    Narrative:      CLINITEK RESULT    CBC and differential [[id number]] Collected: 05/04/24 1528    Lab Status: Final result Specimen: Blood from Arm, Right Updated: 05/04/24 1534     WBC 7.09 Thousand/uL      RBC 4.84 Million/uL      Hemoglobin 14.9 g/dL      Hematocrit 43.0 %      MCV 89 fL      MCH 30.8 pg      MCHC 34.7 g/dL      RDW 12.3 %      MPV 9.6 fL      Platelets 284 Thousands/uL      nRBC 0 /100 WBCs      Segmented % 69 %      Immature Grans % 0 %      Lymphocytes % 21 %      Monocytes % 7 %      Eosinophils Relative 2 %      Basophils Relative 1 %      Absolute Neutrophils 4.94 Thousands/µL      Absolute Immature Grans 0.03 Thousand/uL      Absolute Lymphocytes 1.46 Thousands/µL      Absolute Monocytes 0.46 Thousand/µL      Eosinophils Absolute 0.16 Thousand/µL      Basophils Absolute 0.04 Thousands/µL                    CT abdomen pelvis wo contrast   Final Result by Rito Tucker MD (05/04 1706)      No acute findings in the abdomen or pelvis within the limits of unenhanced technique.      Workstation performed: [id number]         XR chest 2 views    (Results Pending)              Procedures  Procedures         ED Course                               SBIRT 22yo+      Flowsheet Row Most Recent Value   Initial Alcohol Screen: US AUDIT-C     1. How often do you have a drink containing alcohol? 0 Filed at: 05/04/2024 1613   2. How many drinks  containing alcohol do you have on a typical day you are drinking?  0 Filed at: 05/04/2024 1613   3a. Male UNDER 65: How often do you have five or more drinks on one occasion? 0 Filed at: 05/04/2024 1613   Audit-C Score 0 Filed at: 05/04/2024 1613   [place]: How many times in the past year have you...    Used an illegal drug or used a prescription medication for non-medical reasons? Never Filed at: 05/04/2024 1613                      Medical Decision Making  48-year-old male here complaining of intermittent cramping abdominal pain, diarrhea, chest discomfort over 2 weeks or more.  Laboratory studies today were performed to rule out biliary obstructive disease, pancreatitis, significant leukocytosis or electrolyte abnormalities and were reassuring.  There is no evidence of cardiac ischemia on EKG or troponin.  Chest x-ray was negative for pneumonia or pneumothorax.  CT scan of the abdomen and pelvis showed no acute pathology.  Patient has a reassuring physical exam and reassuring vital signs.  At this point we will plan for discharge with outpatient follow-up.  I will refer him to gastroenterology for his diarrhea and abdominal cramping.  We discussed return precautions.    Amount and/or Complexity of Data Reviewed  Labs: ordered. Decision-making details documented in ED Course.  Radiology: ordered. Decision-making details documented in ED Course.  ECG/medicine tests: ordered and independent interpretation performed. Decision-making details documented in ED Course.     Details: Normal sinus rhythm at rate of 69, normal axis, normal intervals, no significant ST elevations or depressions to suggest cardiac ischemia.  Normal EKG.    Risk  OTC drugs.  Prescription drug management.             Disposition  Final diagnoses:   Chest pain, unspecified   Diarrhea   Abdominal pain     Time reflects when diagnosis was documented in both MDM as applicable and the Disposition within this note       Time User Action Codes Description  Comment    5/4/2024  5:22 PM Alonzo Leon Add [R07.9] Chest pain, unspecified     5/4/2024  5:22 PM lAonzo Leon Add [R19.7] Diarrhea     5/4/2024  5:23 PM Edward Alonzo CHAUDHARY Add [R10.9] Abdominal pain           ED Disposition       ED Disposition   Discharge    Condition   Stable    Date/Time   Sat May 4, 2024 1722    Comment   Raman Mustafa Jr. discharge to home/self care.                   Follow-up Information       Follow up With Specialties Details Why Contact Info    Reese Manning MD Family Medicine   [address] [place] [phone]              Patient's Medications   Discharge Prescriptions    DICYCLOMINE (BENTYL) 20 MG TABLET    Take 1 tablet (20 mg total) by mouth every 6 (six) hours as needed (abd pain/cramping or diarrhea)       Start Date: 5/4/2024  End Date: --       Order Dose: 20 mg       Quantity: 20 tablet    Refills: 0    OMEPRAZOLE (PRILOSEC) 20 MG DELAYED RELEASE CAPSULE    Take 1 capsule (20 mg total) by mouth 2 (two) times a day       Start Date: 5/4/2024  End Date: --       Order Dose: 20 mg       Quantity: 28 capsule    Refills: 0           PDMP Review         Value Time User    PDMP Reviewed  Yes 11/26/2023 10:56 AM BELEN Cisneros            ED Provider  Electronically Signed by             Alonzo Leon MD  05/04/24 [id number]

## 2024-05-04 NOTE — Clinical Note
Raman Mustafa was seen and treated in our emergency department on 5/4/2024.                Diagnosis:     Raman  may return to work on return date.    He may return on this date: 05/07/2024         If you have any questions or concerns, please don't hesitate to call.      Alonzo Leon MD    ______________________________           _______________          _______________  Hospital Representative                              Date                                Time

## 2024-05-06 LAB
ATRIAL RATE: 69 BPM
P AXIS: 45 DEGREES
PR INTERVAL: 178 MS
QRS AXIS: 50 DEGREES
QRSD INTERVAL: 100 MS
QT INTERVAL: 412 MS
QTC INTERVAL: 441 MS
T WAVE AXIS: 35 DEGREES
VENTRICULAR RATE: 69 BPM

## 2024-05-06 PROCEDURE — 93010 ELECTROCARDIOGRAM REPORT: CPT | Performed by: INTERNAL MEDICINE

## 2024-05-09 ENCOUNTER — APPOINTMENT (OUTPATIENT)
Dept: LAB | Facility: HOSPITAL | Age: 49
End: 2024-05-09
Payer: COMMERCIAL

## 2024-05-09 ENCOUNTER — OFFICE VISIT (OUTPATIENT)
Dept: GASTROENTEROLOGY | Facility: CLINIC | Age: 49
End: 2024-05-09
Payer: COMMERCIAL

## 2024-05-09 VITALS
BODY MASS INDEX: 29.9 KG/M2 | WEIGHT: 225.6 LBS | SYSTOLIC BLOOD PRESSURE: 126 MMHG | TEMPERATURE: 96.6 F | HEIGHT: 73 IN | DIASTOLIC BLOOD PRESSURE: 84 MMHG

## 2024-05-09 DIAGNOSIS — R10.9 ABDOMINAL PAIN: ICD-10-CM

## 2024-05-09 DIAGNOSIS — Z86.010 PERSONAL HISTORY OF COLONIC POLYPS: ICD-10-CM

## 2024-05-09 DIAGNOSIS — Z98.890 STATUS POST REPAIR OF PARAESOPHAGEAL DIAPHRAGMATIC HERNIA: ICD-10-CM

## 2024-05-09 DIAGNOSIS — R19.7 DIARRHEA: ICD-10-CM

## 2024-05-09 DIAGNOSIS — R19.7 DIARRHEA: Primary | ICD-10-CM

## 2024-05-09 DIAGNOSIS — Z87.19 STATUS POST REPAIR OF PARAESOPHAGEAL DIAPHRAGMATIC HERNIA: ICD-10-CM

## 2024-05-09 LAB
CRP SERPL QL: <1 MG/L
GLIADIN PEPTIDE IGA SER-ACNC: 0.2 U/ML
GLIADIN PEPTIDE IGA SER-ACNC: NEGATIVE
GLIADIN PEPTIDE IGG SER-ACNC: <0.4 U/ML
GLIADIN PEPTIDE IGG SER-ACNC: NEGATIVE
IGA SERPL-MCNC: 222 MG/DL (ref 66–433)
TTG IGA SER-ACNC: <0.5 U/ML
TTG IGA SER-ACNC: NEGATIVE
TTG IGG SER-ACNC: <0.8 U/ML
TTG IGG SER-ACNC: NEGATIVE

## 2024-05-09 PROCEDURE — 87209 SMEAR COMPLEX STAIN: CPT | Performed by: PHYSICIAN ASSISTANT

## 2024-05-09 PROCEDURE — 87493 C DIFF AMPLIFIED PROBE: CPT

## 2024-05-09 PROCEDURE — 87505 NFCT AGENT DETECTION GI: CPT | Performed by: PHYSICIAN ASSISTANT

## 2024-05-09 PROCEDURE — 87329 GIARDIA AG IA: CPT | Performed by: PHYSICIAN ASSISTANT

## 2024-05-09 PROCEDURE — 99214 OFFICE O/P EST MOD 30 MIN: CPT | Performed by: PHYSICIAN ASSISTANT

## 2024-05-09 PROCEDURE — 87177 OVA AND PARASITES SMEARS: CPT | Performed by: PHYSICIAN ASSISTANT

## 2024-05-09 PROCEDURE — 86140 C-REACTIVE PROTEIN: CPT | Performed by: PHYSICIAN ASSISTANT

## 2024-05-09 PROCEDURE — 86258 DGP ANTIBODY EACH IG CLASS: CPT

## 2024-05-09 PROCEDURE — 82784 ASSAY IGA/IGD/IGG/IGM EACH: CPT

## 2024-05-09 PROCEDURE — 86364 TISS TRNSGLTMNASE EA IG CLAS: CPT

## 2024-05-09 PROCEDURE — 83993 ASSAY FOR CALPROTECTIN FECAL: CPT | Performed by: PHYSICIAN ASSISTANT

## 2024-05-09 PROCEDURE — 36415 COLL VENOUS BLD VENIPUNCTURE: CPT

## 2024-05-09 RX ORDER — OMEPRAZOLE 20 MG/1
20 CAPSULE, DELAYED RELEASE ORAL 2 TIMES DAILY
Qty: 60 CAPSULE | Refills: 3 | Status: SHIPPED | OUTPATIENT
Start: 2024-05-09

## 2024-05-09 RX ORDER — DICYCLOMINE HCL 20 MG
20 TABLET ORAL EVERY 6 HOURS PRN
Qty: 30 TABLET | Refills: 2 | Status: SHIPPED | OUTPATIENT
Start: 2024-05-09

## 2024-05-09 NOTE — PROGRESS NOTES
[facility] - Outpatient Follow-up Note  Raman Mustafa Jr. 48 y.o. male MRN: [id number]  Encounter: [id number]          ASSESSMENT AND PLAN:      1. Diarrhea  2. Abdominal pain  Patient reports non-if symptoms persist and bloody diarrhea and abdominal pain for the past 3 weeks. Work-up in the ED negative including blood work and CT scan. Plan for stool studies to rule out infection, assess for inflammatory bowel disease, celiac disease. I will call patient with results and plan moving forward.  If symptoms persist and stool studies are negative, would consider repeat colonoscopy. Continue dicyclomine as needed and omeprazole twice daily for abdominal pain. Recommend avoiding dairy, coffee, citrus, spicy foods.    - Ambulatory Referral to Gastroenterology  - Clostridium difficile toxin by PCR with EIA; Future  - Stool Enteric Bacterial Panel by PCR  - Ova and parasite examination  - Celiac Disease Panel; Future  - C-reactive protein  - Calprotectin,Fecal  - Giardia antigen  - omeprazole (PriLOSEC) 20 mg delayed release capsule; Take 1 capsule (20 mg total) by mouth 2 (two) times a day  Dispense: 60 capsule; Refill: 3  - dicyclomine (BENTYL) 20 mg tablet; Take 1 tablet (20 mg total) by mouth every 6 (six) hours as needed (abd pain/cramping or diarrhea)  Dispense: 30 tablet; Refill: 2    3. Status post repair of paraesophageal diaphragmatic hernia  He underwent hernia repair in 2021. Recent CT noted small hiatal hernia, otherwise no significant abnormality in the lower chest. Due to his generalized abdominal pain, I agree with continuing omeprazole 20 mg twice daily for now. If symptoms do not improve, we can plan for repeat EGD.    4. Personal history of colonic polyps  He is due for colonoscopy in 2026 due to personal history of tubular adenoma.    Follow-up in 1 month  ______________________________________________________________________    SUBJECTIVE: 48-year-old male referred for  diarrhea and abdominal pain.     Patient is new to me but he previously saw my partners in 2020/2021 for abdominal pain and constipation. He underwent EGD and colonoscopy in January 2021. EGD showed normal-appearing esophagus, 7 cm herniation of both GE junction and stomach, gastritis, and duodenitis. Gastric biopsies positive for H. pylori. He was treated with antibiotics and eradication was confirmed by stool antigen 6 months later.  Colonoscopy showed 1 small polyp in the descending colon, otherwise no mucosal abnormalities and TI appeared normal. He was advised repeat colonoscopy in 5 years due to personal history of tubular adenoma.    He then underwent hiatal hernia repair.    He then followed up with my partner in September 2021 and was evaluated for diarrhea. He had negative stool testing including fecal calprotectin, C. difficile, stool enteric panel, Giardia.    He was lost to follow-up afterward. Patient states that he was feeling well for the past few years up until about 3 weeks ago. He states he ate a large meal and afterward developed sharp pain in his chest and upper abdomen. This lasted about 24 hours. Then he developed diarrhea, which she has had since. He describes loose/watery diarrhea at least 4 times daily.  He has urgency after eating and generalized sharp crampy abdominal pain and also pain radiating into his chest. He denies blood in the stool.  He denies nausea and vomiting. He denies heartburn. No trouble swallowing. He denies recent antibiotics. No known fevers or chills. He does not take NSAIDs.    He was in the ED on 5/4 with complaints of abdominal cramping, diarrhea, chest pain. Labs including CBC, CMP, lipase, troponin, UA negative. CT A/P without contrast showed small hiatal hernia, postsurgical changes at the right inguinal region, avascular necrosis of bilateral femoral heads without subarticular surface collapse. Otherwise no acute findings.  Chest x-ray negative. EKG  normal.      REVIEW OF SYSTEMS IS OTHERWISE NEGATIVE.      Historical Information   Past Medical History:   Diagnosis Date    Anxiety     Chest pain     after eating- seen in ER several x - believes secondary to hernia    CPAP (continuous positive airway pressure) dependence     Depression     H. pylori infection     Hiatal hernia     Hyperlipidemia     Polysubstance dependence (HCC) 2020    PONV (postoperative nausea and vomiting)     Sleep apnea     Suicidal thoughts     Wears glasses      Past Surgical History:   Procedure Laterality Date    COLONOSCOPY      INGUINAL HERNIA REPAIR Right     OH LAPS RPR PARAESPHGL HRNA INCL FUNDPLSTY W/MESH N/A 2021    Procedure: REPAIR HERNIA PARAESOPHAGEAL LAPAROSCOPIC W ROBOTICS with TOUPET FUNDOPLICATION cruroplasty with mesh;  Surgeon: Alonzo Kaplan MD;  Location: [facility];  Service: General    SHOULDER SURGERY      left     Social History   Social History     Substance and Sexual Activity   Alcohol Use Not Currently    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     Social History     Substance and Sexual Activity   Drug Use Yes    Types: Marijuana    Comment: previously used medical marijuana but not currently using     Social History     Tobacco Use   Smoking Status Former    Current packs/day: 0.00    Types: Cigarettes    Quit date:     Years since quittin.3   Smokeless Tobacco Never     Family History   Problem Relation Age of Onset    No Known Problems Mother     No Known Problems Father     Eating disorder Sister        Meds/Allergies       Current Outpatient Medications:     atorvastatin (LIPITOR) 40 mg tablet    busPIRone (BUSPAR) 15 mg tablet    cholecalciferol (VITAMIN D3) 1,000 units tablet    dicyclomine (BENTYL) 20 mg tablet    DULoxetine (CYMBALTA) 60 mg delayed release capsule    ergocalciferol (VITAMIN D2) 50,000 units    lithium carbonate (LITHOBID) 450 mg CR tablet    melatonin 3 mg    Multiple Vitamin (multivitamin) tablet    omeprazole (PriLOSEC) 20 mg  delayed release capsule    traZODone (DESYREL) 50 mg tablet    No Known Allergies        Objective     There were no vitals taken for this visit. There is no height or weight on file to calculate BMI.      PHYSICAL EXAM:      General Appearance:   Alert, cooperative, no distress   HEENT:   Normocephalic, atraumatic, anicteric.     Neck:  Supple, symmetrical, trachea midline   Lungs:   Clear to auscultation bilaterally; no rales, rhonchi or wheezing; respirations unlabored    Heart::   Regular rate and rhythm; no murmur, rub, or gallop.   Abdomen:   Soft, non-tender, non-distended; normal bowel sounds; no masses, no organomegaly    Genitalia:   Deferred    Rectal:   Deferred    Extremities:  No cyanosis, clubbing or edema    Pulses:  2+ and symmetric    Skin:  No jaundice, rashes, or lesions    Lymph nodes:  No palpable cervical lymphadenopathy        Lab Results:   No visits with results within 1 Day(s) from this visit.   Latest known visit with results is:   Admission on 05/04/2024, Discharged on 05/04/2024   Component Date Value    Ventricular Rate 05/04/2024 69     Atrial Rate 05/04/2024 69     DE Interval 05/04/2024 178     QRSD Interval 05/04/2024 100     QT Interval 05/04/2024 412     QTC Interval 05/04/2024 441     P Axis 05/04/2024 45     QRS Axis 05/04/2024 50     T Wave Axis 05/04/2024 35     WBC 05/04/2024 7.09     RBC 05/04/2024 4.84     Hemoglobin 05/04/2024 14.9     Hematocrit 05/04/2024 43.0     MCV 05/04/2024 89     MCH 05/04/2024 30.8     MCHC 05/04/2024 34.7     RDW 05/04/2024 12.3     MPV 05/04/2024 9.6     Platelets 05/04/2024 284     nRBC 05/04/2024 0     Segmented % 05/04/2024 69     Immature Grans % 05/04/2024 0     Lymphocytes % 05/04/2024 21     Monocytes % 05/04/2024 7     Eosinophils Relative 05/04/2024 2     Basophils Relative 05/04/2024 1     Absolute Neutrophils 05/04/2024 4.94     Absolute Immature Grans 05/04/2024 0.03     Absolute Lymphocytes 05/04/2024 1.46     Absolute Monocytes  05/04/2024 0.46     Eosinophils Absolute 05/04/2024 0.16     Basophils Absolute 05/04/2024 0.04     hs TnI 0hr 05/04/2024 3     Lipase 05/04/2024 37     Sodium 05/04/2024 138     Potassium 05/04/2024 3.9     Chloride 05/04/2024 106     CO2 05/04/2024 29     ANION GAP 05/04/2024 3 (L)     BUN 05/04/2024 14     Creatinine 05/04/2024 0.93     Glucose 05/04/2024 67     Calcium 05/04/2024 10.0     AST 05/04/2024 22     ALT 05/04/2024 34     Alkaline Phosphatase 05/04/2024 46     Total Protein 05/04/2024 7.2     Albumin 05/04/2024 4.5     Total Bilirubin 05/04/2024 0.47     eGFR 05/04/2024 95     Color, UA 05/04/2024 Yellow     Clarity, UA 05/04/2024 Clear     pH, UA 05/04/2024 5.0     Leukocytes, UA 05/04/2024 Negative     Nitrite, UA 05/04/2024 Negative     Protein, UA 05/04/2024 Negative     Glucose, UA 05/04/2024 Negative     Ketones, UA 05/04/2024 Negative     Urobilinogen, UA 05/04/2024 0.2     Bilirubin, UA 05/04/2024 Negative     Occult Blood, UA 05/04/2024 Negative     Specific Gravity, UA 05/04/2024 <=1.005          Radiology Results:   XR chest 2 views    Result Date: 5/4/2024  Narrative: XR CHEST PA & LATERAL INDICATION: cp. COMPARISON: 6/23/2022 FINDINGS: Clear lungs. No pneumothorax or pleural effusion. Normal cardiomediastinal silhouette. Bones are unremarkable for age. Normal upper abdomen.     Impression: No acute cardiopulmonary disease. Workstation performed: [id number]     CT abdomen pelvis wo contrast    Result Date: 5/4/2024  Narrative: CT ABDOMEN AND PELVIS WITHOUT IV CONTRAST INDICATION: abdominal pain, vomiting. COMPARISON: None. TECHNIQUE: CT examination of the abdomen and pelvis was performed without intravenous contrast. Multiplanar 2D reformatted images were created from the source data. This examination, like all CT scans performed in the [facility], was performed utilizing techniques to minimize radiation dose exposure, including the use of iterative reconstruction and  automated exposure control. Radiation dose length product (DLP) for this visit: 716 mGy-cm Enteric Contrast: Not administered. FINDINGS: ABDOMEN LOWER CHEST: Small hiatal hernia. No other clinically significant abnormality in the visualized lower chest. LIVER/BILIARY TREE: Unremarkable. GALLBLADDER: No calcified gallstones. No pericholecystic inflammatory change. SPLEEN: Unremarkable. PANCREAS: Unremarkable. ADRENAL GLANDS: Unremarkable. KIDNEYS/URETERS: Unremarkable. No hydronephrosis. STOMACH AND BOWEL: Unremarkable. APPENDIX: No findings to suggest appendicitis. ABDOMINOPELVIC CAVITY: No ascites. No pneumoperitoneum. No lymphadenopathy. VESSELS: Unremarkable for patient's age. PELVIS REPRODUCTIVE ORGANS: Unremarkable for patient's age. . URINARY BLADDER: Unremarkable. ABDOMINAL WALL/INGUINAL REGIONS: Postsurgical changes at the right inguinal region BONES: No acute fracture or suspicious osseous lesion. Avascular necrosis of the bilateral femoral heads without subarticular surface collapse     Impression: No acute findings in the abdomen or pelvis within the limits of unenhanced technique. Workstation performed: [id number]

## 2024-05-10 ENCOUNTER — TELEPHONE (OUTPATIENT)
Age: 49
End: 2024-05-10

## 2024-05-10 LAB
C COLI+JEJUNI TUF STL QL NAA+PROBE: NEGATIVE
C DIFF TOX GENS STL QL NAA+PROBE: NEGATIVE
EC STX1+STX2 GENES STL QL NAA+PROBE: NEGATIVE
SALMONELLA SP SPAO STL QL NAA+PROBE: NEGATIVE
SHIGELLA SP+EIEC IPAH STL QL NAA+PROBE: NEGATIVE

## 2024-05-10 NOTE — TELEPHONE ENCOUNTER
Patients GI provider: RADHA Macdonald    Number to return call: [phone]    Reason for call: Pt returned call in regard to lab results.    Scheduled procedure/appointment date if applicable: 9/25/2024

## 2024-05-13 LAB — G LAMBLIA AG STL QL IA: NEGATIVE

## 2024-05-14 NOTE — TELEPHONE ENCOUNTER
Spoke with pt results given per provider of stool studies, advised still waiting for Calprotectin to come back. He has been taking Imodium with no change.

## 2024-05-14 NOTE — TELEPHONE ENCOUNTER
Patients GI provider:  RADHA KHAN    Number to return call: [phone]     Reason for call: Pt returning call for results. Please return patients phone call.    Scheduled procedure/appointment date if applicable: 09/25/2024

## 2024-05-18 LAB — CALPROTECTIN STL-MCNT: 16 UG/G (ref 0–120)

## 2024-05-28 ENCOUNTER — TELEPHONE (OUTPATIENT)
Dept: GASTROENTEROLOGY | Facility: CLINIC | Age: 49
End: 2024-05-28

## 2024-05-28 NOTE — TELEPHONE ENCOUNTER
I left a voice message to inform patient of the results and provided the office number with any questions or concerns, thank you

## 2024-06-03 ENCOUNTER — OFFICE VISIT (OUTPATIENT)
Dept: PSYCHIATRY | Facility: CLINIC | Age: 49
End: 2024-06-03
Payer: COMMERCIAL

## 2024-06-03 DIAGNOSIS — Z51.81 ENCOUNTER FOR LITHIUM MONITORING: ICD-10-CM

## 2024-06-03 DIAGNOSIS — Z79.899 ENCOUNTER FOR LITHIUM MONITORING: ICD-10-CM

## 2024-06-03 DIAGNOSIS — F33.2 MAJOR DEPRESSIVE DISORDER, RECURRENT, SEVERE WITHOUT PSYCHOTIC FEATURES (HCC): ICD-10-CM

## 2024-06-03 DIAGNOSIS — F41.1 GENERALIZED ANXIETY DISORDER: Primary | ICD-10-CM

## 2024-06-03 DIAGNOSIS — F51.04 PSYCHOPHYSIOLOGICAL INSOMNIA: ICD-10-CM

## 2024-06-03 PROCEDURE — 99214 OFFICE O/P EST MOD 30 MIN: CPT | Performed by: PHYSICIAN ASSISTANT

## 2024-06-03 RX ORDER — DULOXETIN HYDROCHLORIDE 60 MG/1
60 CAPSULE, DELAYED RELEASE ORAL DAILY
Qty: 30 CAPSULE | Refills: 2 | Status: SHIPPED | OUTPATIENT
Start: 2024-06-03

## 2024-06-03 RX ORDER — LITHIUM CARBONATE 450 MG
TABLET, EXTENDED RELEASE ORAL
Qty: 60 TABLET | Refills: 2 | Status: SHIPPED | OUTPATIENT
Start: 2024-06-03

## 2024-06-03 RX ORDER — TRAZODONE HYDROCHLORIDE 50 MG/1
50 TABLET ORAL
Qty: 30 TABLET | Refills: 2 | Status: SHIPPED | OUTPATIENT
Start: 2024-06-03

## 2024-06-03 RX ORDER — BUSPIRONE HYDROCHLORIDE 15 MG/1
15 TABLET ORAL 3 TIMES DAILY
Qty: 90 TABLET | Refills: 2 | Status: SHIPPED | OUTPATIENT
Start: 2024-06-03

## 2024-06-03 NOTE — PSYCH
This note was not shared with the patient due to reasonable likelihood of causing patient harm  Time start 353  End 422    PROGRESS NOTE        [facility]      Name and Date of Birth:  Raman Mustafa Jr. 48 y.o. 1975    Date of Visit: 06/03/24    SUBJECTIVE:  Raman was seen for follow-up of major depression, anxiety and for medication management.  States that over the past couple months he has been doing fairly well.  States that work has been quite slow so he has had more time off.  States that he has been keeping busy doing things around the house.  Motivation and interest has been increased.  He is sleeping well at night.  Reports that his depression has been stable.  Has not had any thoughts of self-harm.  Anxiety has been more manageable.  States that he is having some increased diaphoresis that he has noted in the mornings.  States that he has noted this more since his lithium was increased about 6 months ago.  Was seen in the emergency department about a month ago due to chest pain which was due to gastroesophageal issues.  States that this has resolved.  Physically he has been doing well.  Relationship with significant other has been stable.  States that he feels as though he has been more accepting of situations which has been helping with his lower stress and stable moods.      He denies suicidal ideation, intent or plan at present, has no suicidal ideation, intent or plan at present.    He denies any auditory hallucinations and visual hallucinations, denies any other delusional thinking, denies any delusional thinking.    He denies any side effects from medications  .  HPI ROS Appetite Changes and Sleep: normal appetite, normal sleep    Review Of Systems:      Constitutional Negative   ENT Negative   Cardiovascular Negative   Respiratory Negative   Gastrointestinal Negative   Genitourinary Negative   Musculoskeletal Negative   Integumentary Negative    Neurological Negative   Endocrine Negative   Other Symptoms Negative and None       Laboratory Results: No results found for this or any previous visit.    Substance Abuse History:    Social History     Substance and Sexual Activity   Drug Use Yes    Types: Marijuana    Comment: previously used medical marijuana but not currently using       Family Psychiatric History:     Family History   Problem Relation Age of Onset    No Known Problems Mother     No Known Problems Father     Eating disorder Sister        The following portions of the patient's history were reviewed and updated as appropriate: past family history, past medical history, past social history, past surgical history and problem list.    Social History     Socioeconomic History    Marital status: Single     Spouse name: Not on file    Number of children: Not on file    Years of education: Not on file    Highest education level: Not on file   Occupational History    Not on file   Tobacco Use    Smoking status: Former     Current packs/day: 0.00     Types: Cigarettes     Quit date:      Years since quittin.4    Smokeless tobacco: Never   Vaping Use    Vaping status: Never Used   Substance and Sexual Activity    Alcohol use: Not Currently     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    Drug use: Yes     Types: Marijuana     Comment: previously used medical marijuana but not currently using    Sexual activity: Not on file   Other Topics Concern    Not on file   Social History Narrative    Not on file     Social Determinants of Health     Financial Resource Strain: Not on file   Food Insecurity: Not on file   Transportation Needs: Not on file   Physical Activity: Not on file   Stress: Not on file   Social Connections: Not on file   Intimate Partner Violence: Not on file   Housing Stability: Not on file     Social History     Social History Narrative    Not on file        Social History       Tobacco History       Smoking Status  Former Quit Date  2004 Smoking  Tobacco Type  Cigarettes quit in 2004   Pack Year History     Packs/Day From To Years    0 2004  20.4      Smokeless Tobacco Use  Never              Alcohol History       Alcohol Use Status  Not Currently Comment  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              Drug Use       Drug Use Status  Yes Types  Marijuana Comment  previously used medical marijuana but not currently using              Sexual Activity       Sexually Active  Not Asked              Activities of Daily Living    Not Asked                 Additional Substance Use Detail       Questions Responses    Problems Due to Past Use of Alcohol? Yes    Problems Due to Past Use of Substances? Yes    Substance Use Assessment Denies substance use within the past 12 months    Alcohol Use Frequency Denies use in past 12 months    Cannabis frequency Past regular use    Comment: Past rare use on 6/9/2020 Past rare use ->Past regular use on 7/24/2020     Heroin Frequency Denies use in past 12 months    Cannabis method Smoke    Comment: Smoke on 7/24/2020     1st Use of Alcohol age 10    Cannabis 1st Use medical marijuana for anxiety    Last Use of Alcohol & Amount sober 6.5 years    Longest Abstinence from Alcohol 6.5 years    Cocaine frequency Past regular use    Comment: Never used on 6/9/2020 Never used ->Past regular use on 7/24/2020     Crack Cocaine Frequency Prior dependence    Methamphetamine Frequency Denies use in past 12 months    Narcotic Frequency Denies use in past 12 months    Benzodiazepine Frequency Denies use in past 12 months    Amphetamine frequency Denies use in past 12 months    Barbituate Frequency Denies use use in past 12 months    Inhalant frequency Never used    Comment: Never used on 6/9/2020     Hallucinogen frequency Past regular use    Comment: Never used on 6/9/2020 Never used ->Past regular use on 7/24/2020     Ecstasy frequency Never used    Comment: Never used on 6/9/2020     Other drug frequency Never used    Comment: Never used on 6/9/2020     Opiate  frequency Denies use in past 12 months    Not reviewed.              OBJECTIVE:     Mental Status Evaluation:    Appearance age appropriate, casually dressed   Behavior pleasant, cooperative   Speech normal volume, normal pitch   Mood Euthymic   Affect Congruent   Thought Processes logical   Associations intact associations   Thought Content normal   Perceptual Disturbances: none   Abnormal Thoughts  Risk Potential Suicidal ideation - None  Homicidal ideation - None  Potential for aggression - No   Orientation oriented to person, place, time/date and situation   Memory recent and remote memory grossly intact   Cosciousness alert and awake   Attention Span attention span and concentration are age appropriate   Intellect Not formally assessed   Insight age appropriate    Judgement good    Muscle Strength and  Gait Steady gait   Language no difficulty naming common objects   Fund of Knowledge displays adequate knowledge of current events   Pain none   Pain Scale 0       Assessment/Plan:       Diagnoses and all orders for this visit:    Generalized anxiety disorder  -     Lithium level; Future  -     DULoxetine (CYMBALTA) 60 mg delayed release capsule; Take 1 capsule (60 mg total) by mouth daily  -     busPIRone (BUSPAR) 15 mg tablet; Take 1 tablet (15 mg total) by mouth 3 (three) times a day    Major depressive disorder, recurrent, severe without psychotic features (HCC)  -     lithium carbonate (LITHOBID) 450 mg CR tablet; 2 po qhs  -     Lithium level; Future    Psychophysiological insomnia  -     traZODone (DESYREL) 50 mg tablet; Take 1 tablet (50 mg total) by mouth daily at bedtime    Encounter for lithium monitoring  -     Lithium level; Future          Treatment Recommendations/Precautions:  Lithium 450 mg every 12 hours changed to taking 2 at night  We will repeat lithium level  Lab slip was given  Cymbalta 60 mg daily  BuSpar 15 mg 3 times daily  Trazodone 50 mg nightly    Continue current medications and  follow-up in 3 months he will call sooner if any questions or concerns  He is aware of after on-call service and [phone] crisis line  Risks/Benefits      Risks, Benefits And Possible Side Effects Of Medications:    Risks, benefits, and possible side effects of medications explained to patient and patient verbalizes understanding and agreement for treatment.    Controlled Medication Discussion:     Not applicable    Psychotherapy Provided:     Individual psychotherapy provided: No

## 2024-06-03 NOTE — BH TREATMENT PLAN
"TREATMENT PLAN (Medication Management Only)        [facility]    Name and Date of Birth:  Raman Mustafa Jr. 48 y.o. 1975  Date of Treatment Plan: Charmaine 3, 2024  Diagnosis/Diagnoses:    1. Generalized anxiety disorder    2. Major depressive disorder, recurrent, severe without psychotic features (HCC)    3. Psychophysiological insomnia    4. Encounter for lithium monitoring      Strengths/Personal Resources for Self-Care: \"girlfriend, family\".  Area/Areas of need (in own words): \"physical health\"  1. Long Term Goal: maintain control of depression.  Target Date:6 months - 12/3/2024  Person/Persons responsible for completion of goal: Raman  2.  Short Term Objective (s) - How will we reach this goal?:   A. Provider new recommended medication/dosage changes and/or continue medication(s): continue current medications as prescribed and take lithium at hs  B. Markle level  C. N/A.  Target Date:6 months - 12/3/2024  Person/Persons Responsible for Completion of Goal: Raman  Progress Towards Goals: stable  Treatment Modality: medication management every 6 months  Review due 180 days from date of this plan: 6 months - 12/3/2024  Expected length of service: maintenance  My Physician/PA/NP and I have developed this plan together and I agree to work on the goals and objectives. I understand the treatment goals that were developed for my treatment.      "

## 2024-06-07 ENCOUNTER — APPOINTMENT (OUTPATIENT)
Dept: LAB | Facility: HOSPITAL | Age: 49
End: 2024-06-07
Payer: COMMERCIAL

## 2024-06-07 DIAGNOSIS — F41.1 GENERALIZED ANXIETY DISORDER: ICD-10-CM

## 2024-06-07 DIAGNOSIS — F33.2 MAJOR DEPRESSIVE DISORDER, RECURRENT, SEVERE WITHOUT PSYCHOTIC FEATURES (HCC): ICD-10-CM

## 2024-06-07 DIAGNOSIS — Z51.81 ENCOUNTER FOR LITHIUM MONITORING: ICD-10-CM

## 2024-06-07 DIAGNOSIS — Z79.899 ENCOUNTER FOR LITHIUM MONITORING: ICD-10-CM

## 2024-06-07 LAB — LITHIUM SERPL-SCNC: 0.55 MMOL/L (ref 0.6–1.2)

## 2024-06-07 PROCEDURE — 36415 COLL VENOUS BLD VENIPUNCTURE: CPT

## 2024-06-07 PROCEDURE — 80178 ASSAY OF LITHIUM: CPT

## 2024-09-08 ENCOUNTER — APPOINTMENT (OUTPATIENT)
Dept: LAB | Facility: HOSPITAL | Age: 49
End: 2024-09-08
Payer: COMMERCIAL

## 2024-09-08 DIAGNOSIS — E78.5 HYPERLIPIDEMIA, UNSPECIFIED HYPERLIPIDEMIA TYPE: ICD-10-CM

## 2024-09-08 DIAGNOSIS — F33.1 MAJOR DEPRESSIVE DISORDER, RECURRENT EPISODE, MODERATE (HCC): ICD-10-CM

## 2024-09-08 DIAGNOSIS — E78.2 MIXED HYPERLIPIDEMIA: ICD-10-CM

## 2024-09-08 DIAGNOSIS — R53.83 OTHER FATIGUE: ICD-10-CM

## 2024-09-08 DIAGNOSIS — Z51.81 ENCOUNTER FOR THERAPEUTIC DRUG MONITORING: ICD-10-CM

## 2024-09-08 DIAGNOSIS — R73.01 IMPAIRED FASTING GLUCOSE: ICD-10-CM

## 2024-09-08 DIAGNOSIS — R06.83 SNORING: ICD-10-CM

## 2024-09-08 LAB
25(OH)D3 SERPL-MCNC: 23.8 NG/ML (ref 30–100)
ALBUMIN SERPL BCG-MCNC: 4.3 G/DL (ref 3.5–5)
ALP SERPL-CCNC: 46 U/L (ref 34–104)
ALT SERPL W P-5'-P-CCNC: 32 U/L (ref 7–52)
ANION GAP SERPL CALCULATED.3IONS-SCNC: 5 MMOL/L (ref 4–13)
AST SERPL W P-5'-P-CCNC: 19 U/L (ref 13–39)
BASOPHILS # BLD AUTO: 0.03 THOUSANDS/ÂΜL (ref 0–0.1)
BASOPHILS NFR BLD AUTO: 1 % (ref 0–1)
BILIRUB SERPL-MCNC: 0.48 MG/DL (ref 0.2–1)
BUN SERPL-MCNC: 13 MG/DL (ref 5–25)
CALCIUM SERPL-MCNC: 9.7 MG/DL (ref 8.4–10.2)
CHLORIDE SERPL-SCNC: 106 MMOL/L (ref 96–108)
CHOLEST SERPL-MCNC: 249 MG/DL
CO2 SERPL-SCNC: 26 MMOL/L (ref 21–32)
CREAT SERPL-MCNC: 1 MG/DL (ref 0.6–1.3)
EOSINOPHIL # BLD AUTO: 0.18 THOUSAND/ÂΜL (ref 0–0.61)
EOSINOPHIL NFR BLD AUTO: 3 % (ref 0–6)
ERYTHROCYTE [DISTWIDTH] IN BLOOD BY AUTOMATED COUNT: 12.4 % (ref 11.6–15.1)
EST. AVERAGE GLUCOSE BLD GHB EST-MCNC: 103 MG/DL
GFR SERPL CREATININE-BSD FRML MDRD: 86 ML/MIN/1.73SQ M
GLUCOSE P FAST SERPL-MCNC: 109 MG/DL (ref 65–99)
HBA1C MFR BLD: 5.2 %
HCT VFR BLD AUTO: 42.8 % (ref 36.5–49.3)
HDLC SERPL-MCNC: 50 MG/DL
HGB BLD-MCNC: 14.6 G/DL (ref 12–17)
IMM GRANULOCYTES # BLD AUTO: 0.03 THOUSAND/UL (ref 0–0.2)
IMM GRANULOCYTES NFR BLD AUTO: 1 % (ref 0–2)
LDLC SERPL CALC-MCNC: 161 MG/DL (ref 0–100)
LITHIUM SERPL-SCNC: 0.61 MMOL/L (ref 0.6–1.2)
LYMPHOCYTES # BLD AUTO: 1.31 THOUSANDS/ÂΜL (ref 0.6–4.47)
LYMPHOCYTES NFR BLD AUTO: 24 % (ref 14–44)
MCH RBC QN AUTO: 30 PG (ref 26.8–34.3)
MCHC RBC AUTO-ENTMCNC: 34.1 G/DL (ref 31.4–37.4)
MCV RBC AUTO: 88 FL (ref 82–98)
MONOCYTES # BLD AUTO: 0.45 THOUSAND/ÂΜL (ref 0.17–1.22)
MONOCYTES NFR BLD AUTO: 8 % (ref 4–12)
NEUTROPHILS # BLD AUTO: 3.41 THOUSANDS/ÂΜL (ref 1.85–7.62)
NEUTS SEG NFR BLD AUTO: 63 % (ref 43–75)
NONHDLC SERPL-MCNC: 199 MG/DL
NRBC BLD AUTO-RTO: 0 /100 WBCS
PLATELET # BLD AUTO: 289 THOUSANDS/UL (ref 149–390)
PMV BLD AUTO: 9.6 FL (ref 8.9–12.7)
POTASSIUM SERPL-SCNC: 4.2 MMOL/L (ref 3.5–5.3)
PROT SERPL-MCNC: 7 G/DL (ref 6.4–8.4)
RBC # BLD AUTO: 4.87 MILLION/UL (ref 3.88–5.62)
SODIUM SERPL-SCNC: 137 MMOL/L (ref 135–147)
T4 FREE SERPL-MCNC: 0.65 NG/DL (ref 0.61–1.12)
TRIGL SERPL-MCNC: 191 MG/DL
TSH SERPL DL<=0.05 MIU/L-ACNC: 0.92 UIU/ML (ref 0.45–4.5)
WBC # BLD AUTO: 5.41 THOUSAND/UL (ref 4.31–10.16)

## 2024-09-08 PROCEDURE — 84443 ASSAY THYROID STIM HORMONE: CPT

## 2024-09-08 PROCEDURE — 36415 COLL VENOUS BLD VENIPUNCTURE: CPT

## 2024-09-08 PROCEDURE — 80061 LIPID PANEL: CPT

## 2024-09-08 PROCEDURE — 84439 ASSAY OF FREE THYROXINE: CPT

## 2024-09-08 PROCEDURE — 80178 ASSAY OF LITHIUM: CPT

## 2024-09-08 PROCEDURE — 80053 COMPREHEN METABOLIC PANEL: CPT

## 2024-09-08 PROCEDURE — 85025 COMPLETE CBC W/AUTO DIFF WBC: CPT

## 2024-09-08 PROCEDURE — 83036 HEMOGLOBIN GLYCOSYLATED A1C: CPT

## 2024-09-08 PROCEDURE — 86618 LYME DISEASE ANTIBODY: CPT

## 2024-09-08 PROCEDURE — 82306 VITAMIN D 25 HYDROXY: CPT

## 2024-09-09 LAB — B BURGDOR IGG+IGM SER QL IA: NEGATIVE

## 2024-09-11 ENCOUNTER — APPOINTMENT (OUTPATIENT)
Dept: LAB | Facility: HOSPITAL | Age: 49
End: 2024-09-11
Payer: COMMERCIAL

## 2024-09-11 PROCEDURE — 84403 ASSAY OF TOTAL TESTOSTERONE: CPT

## 2024-09-11 PROCEDURE — 36415 COLL VENOUS BLD VENIPUNCTURE: CPT

## 2024-09-11 PROCEDURE — 84402 ASSAY OF FREE TESTOSTERONE: CPT

## 2024-09-13 LAB
TESTOST FREE SERPL-MCNC: 23 PG/ML (ref 7.2–24)
TESTOST SERPL-MCNC: 491 NG/DL (ref 264–916)

## 2024-09-20 DIAGNOSIS — F33.2 MAJOR DEPRESSIVE DISORDER, RECURRENT, SEVERE WITHOUT PSYCHOTIC FEATURES (HCC): ICD-10-CM

## 2024-09-20 DIAGNOSIS — F41.1 GENERALIZED ANXIETY DISORDER: ICD-10-CM

## 2024-10-01 ENCOUNTER — TELEPHONE (OUTPATIENT)
Age: 49
End: 2024-10-01

## 2024-10-01 NOTE — TELEPHONE ENCOUNTER
Patient called in to schedule an appointment for medication management.   Patient is now scheduled for 10/03/24 @4

## 2024-10-01 NOTE — TELEPHONE ENCOUNTER
Pt called refill line and stated that he is out of the medication and needs it called in. Pt will call and set up a follow up appointment.

## 2024-10-02 RX ORDER — LITHIUM CARBONATE 450 MG
TABLET, EXTENDED RELEASE ORAL
Qty: 60 TABLET | Refills: 2 | Status: SHIPPED | OUTPATIENT
Start: 2024-10-02

## 2024-10-02 RX ORDER — DULOXETIN HYDROCHLORIDE 60 MG/1
60 CAPSULE, DELAYED RELEASE ORAL DAILY
Qty: 30 CAPSULE | Refills: 2 | Status: SHIPPED | OUTPATIENT
Start: 2024-10-02

## 2024-10-03 ENCOUNTER — OFFICE VISIT (OUTPATIENT)
Dept: PSYCHIATRY | Facility: CLINIC | Age: 49
End: 2024-10-03
Payer: COMMERCIAL

## 2024-10-03 DIAGNOSIS — Z51.81 ENCOUNTER FOR LITHIUM MONITORING: ICD-10-CM

## 2024-10-03 DIAGNOSIS — F33.2 MAJOR DEPRESSIVE DISORDER, RECURRENT, SEVERE WITHOUT PSYCHOTIC FEATURES (HCC): Primary | ICD-10-CM

## 2024-10-03 DIAGNOSIS — F41.1 GENERALIZED ANXIETY DISORDER: ICD-10-CM

## 2024-10-03 DIAGNOSIS — F51.04 PSYCHOPHYSIOLOGICAL INSOMNIA: ICD-10-CM

## 2024-10-03 DIAGNOSIS — Z79.899 ENCOUNTER FOR LITHIUM MONITORING: ICD-10-CM

## 2024-10-03 PROCEDURE — 99214 OFFICE O/P EST MOD 30 MIN: CPT | Performed by: PHYSICIAN ASSISTANT

## 2024-10-03 RX ORDER — BUSPIRONE HYDROCHLORIDE 15 MG/1
15 TABLET ORAL 2 TIMES DAILY
Qty: 60 TABLET | Refills: 2 | Status: SHIPPED | OUTPATIENT
Start: 2024-10-03

## 2024-10-03 RX ORDER — TRAZODONE HYDROCHLORIDE 50 MG/1
50 TABLET, FILM COATED ORAL
Start: 2024-10-03

## 2024-10-03 NOTE — PSYCH
MEDICATION MANAGEMENT NOTE        [facility]      Name and Date of Birth:  Raman Mustafa Jr. 49 y.o. 1975 MRN: [id number]    Insurance: Payor: [employer] / Plan: [employer] / Product Type: Blue Fee for Service /     Date of Visit: October 3, 2024    Reason for Visit:   Chief Complaint   Patient presents with    Follow-up    Medication Management       Assessment & Plan  Major depressive disorder, recurrent, severe without psychotic features (HCC)  Continue lithium 450 mg 2 at bedtime  Cymbalta 60 mg daily  BuSpar 15 mg 3 times daily       Generalized anxiety disorder  Continue BuSpar 15 mg 3 times daily and Cymbalta 60 mg daily  Orders:    busPIRone (BUSPAR) 15 mg tablet; Take 1 tablet (15 mg total) by mouth 2 (two) times a day    Psychophysiological insomnia  Continue trazodone 50 mg nightly  Orders:    traZODone (DESYREL) 50 mg tablet; Take 1 tablet (50 mg total) by mouth daily at bedtime as needed for sleep    Encounter for lithium monitoring  Lithium level 0.61 on 9/8/2024           He will follow-up in 3 to 4 months and call me sooner if any questions or concerns  Aware of after-hours on-call service and 988 crisis line  - Psychoeducation provided to the patient and benefits, potential risks and side effects discussed; importance of compliance with the psychiatric treatment reiterated, and the patient verbalized understanding of the matter  - RTC in 12-16 weeks  - The patient was educated about 24 hour and weekend coverage for urgent situations accessed by calling [facility] main practice number  - Patient was educated to call National Suicide Prevention Lifeline (7-899-598-GQPA [1444]) for behavioral crisis at anytime or 911 for any safety concerns, or go to nearest ER if the symptoms become overwhelming or unmanageable.    Risks/benefits/alternativies to treatment discussed, including a myriad of potential adverse  medication side effects, to which Raman voiced understanding and consented fully to treatment.  Also, patient is amenable to calling/contacting the outpatient office including this writer if any acute adverse effects of their medication regimen arise in addition to any comments or concerns pertaining to their psychiatric management.      Medications Risks/Benefits      Risks, Benefits And Possible Side Effects Of Medications:    Risks, benefits, and possible side effects of medications explained to Raman and he verbalizes understanding and agreement for treatment.    Controlled Medication Discussion:     Not applicable         Subjective      Raman Mustafa Jr. is a 49 y.o. male, visited for Follow-up and Medication Management, who was personally seen and evaluated today at the [facility] outpatient clinic for follow-up and medication management. Completes psychiatric assessment without difficulty.     At previous outpatient psychiatric appointment with this writer, he was maintained on combination of lithium, Cymbalta, BuSpar and trazodone.   He denies any current adverse medication side effects.      Overall, Raman overall has been doing fairly well since his last visit.  States that his moods have been stable.  No significant depressive signs or symptoms.  No thoughts of self-harm.  Anxiety has been manageable with medications.  States that he has been sleeping well at night and has not been recently needing trazodone so we will change to as needed.  Also states that he had difficulty remembering afternoon dose of BuSpar so has been taking 15 mg in the morning and 15 mg at night.  Feels as though he has been doing well with this so we will continue that same dosage.  Continue with lithium as ordered level was within normal limits.  Physically he is doing well and no new medications or medical issues were noted.  Has a good support system with friends and family.              Review Of  Systems:  Pertinent items are noted in HPI; all others are negative; no recent changes in medications or health status reported.    PHQ-2/9 Depression Screening                 Historical Information    Past Psychiatric History Update:   - No inpatient psychiatric admission since last encounter  - No SA or SIB since last encounter  - No incidence of violent behavior since last encounter    Past Trauma History Update:   - No new onset of abuse or traumatic events since last encounter     Past Medical History:    Past Medical History:   Diagnosis Date    Anxiety     Chest pain     after eating- seen in ER several x - believes secondary to hernia    CPAP (continuous positive airway pressure) dependence     Depression     H. pylori infection     Hiatal hernia     Hyperlipidemia     Polysubstance dependence (HCC) 09/16/2020    PONV (postoperative nausea and vomiting)     Sleep apnea     Suicidal thoughts     Wears glasses         Past Surgical History:   Procedure Laterality Date    COLONOSCOPY      INGUINAL HERNIA REPAIR Right     TX LAPS RPR PARAESPHGL HRNA INCL FUNDPLSTY W/MESH N/A 4/1/2021    Procedure: REPAIR HERNIA PARAESOPHAGEAL LAPAROSCOPIC W ROBOTICS with TOUPET FUNDOPLICATION cruroplasty with mesh;  Surgeon: Alonzo Kaplan MD;  Location: [facility] OR;  Service: General    SHOULDER SURGERY      left     No Known Allergies    Substance Abuse History:    Social History     Substance and Sexual Activity   Alcohol Use Not Currently    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     Social History     Substance and Sexual Activity   Drug Use Yes    Types: Marijuana    Comment: previously used medical marijuana but not currently using       Social History:    Social History     Socioeconomic History    Marital status: Single     Spouse name: Not on file    Number of children: Not on file    Years of education: Not on file    Highest education level: Not on file   Occupational History    Not on file   Tobacco Use    Smoking status: Former      Current packs/day: 0.00     Types: Cigarettes     Quit date:      Years since quittin.7    Smokeless tobacco: Never   Vaping Use    Vaping status: Never Used   Substance and Sexual Activity    Alcohol use: Not Currently     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    Drug use: Yes     Types: Marijuana     Comment: previously used medical marijuana but not currently using    Sexual activity: Not on file   Other Topics Concern    Not on file   Social History Narrative    Not on file     Social Determinants of Health     Financial Resource Strain: Not on file   Food Insecurity: Not on file   Transportation Needs: Not on file   Physical Activity: Not on file   Stress: Not on file   Social Connections: Not on file   Intimate Partner Violence: Not on file   Housing Stability: Not on file       Family Psychiatric History:     Family History   Problem Relation Age of Onset    No Known Problems Mother     No Known Problems Father     Eating disorder Sister        History Review: The following portions of the patient's history were reviewed and updated as appropriate: allergies, current medications, past family history, past medical history, past social history, past surgical history and problem list.           Objective      Vital signs in last 24 hours:  There were no vitals filed for this visit.    Mental Status Evaluation:    Appearance age appropriate, casually dressed, tattooed   Behavior cooperative, calm   Speech normal rate, normal volume, normal pitch   Mood euthymic   Affect normal range and intensity, appropriate   Thought Processes organized, goal directed   Associations intact associations   Thought Content no overt delusions   Perceptual Disturbances: no auditory hallucinations, no visual hallucinations   Abnormal Thoughts  Risk Potential Suicidal ideation - None  Homicidal ideation - None  Potential for aggression - No   Orientation person, place, time/date, and situation   Memory recent and remote memory grossly  intact   Consciousness alert and awake   Attention Span Concentration Span attention span and concentration are age appropriate   Intellect Not formally assessed   Insight intact   Judgement intact   Muscle Strength and  Gait normal muscle strength and normal muscle tone, normal gait and normal balance   Motor activity no abnormal movements   Language no difficulty naming common objects   Fund of Knowledge adequate knowledge of current events   Pain none   Pain Scale 0            Current Outpatient Medications   Medication Sig Dispense Refill    busPIRone (BUSPAR) 15 mg tablet Take 1 tablet (15 mg total) by mouth 2 (two) times a day 60 tablet 2    traZODone (DESYREL) 50 mg tablet Take 1 tablet (50 mg total) by mouth daily at bedtime as needed for sleep      atorvastatin (LIPITOR) 40 mg tablet Take 1 tablet (40 mg total) by mouth daily with dinner 30 tablet 0    cholecalciferol (VITAMIN D3) 1,000 units tablet Take 1 tablet (1,000 Units total) by mouth daily Do not start before December 31, 2023. 30 tablet 0    dicyclomine (BENTYL) 20 mg tablet Take 1 tablet (20 mg total) by mouth every 6 (six) hours as needed (abd pain/cramping or diarrhea) 30 tablet 2    DULoxetine (CYMBALTA) 60 mg delayed release capsule TAKE 1 CAPSULE BY MOUTH EVERY DAY 30 capsule 2    ergocalciferol (VITAMIN D2) 50,000 units Take 1 capsule (50,000 Units total) by mouth once a week for 5 doses Do not start before December 2, 2023. 5 capsule 0    lithium carbonate (LITHOBID) 450 mg CR tablet TAKE 2 BY MOUTH AT BEDTIME 60 tablet 2    melatonin 3 mg Take 1 tablet (3 mg total) by mouth daily at bedtime (Patient not taking: Reported on 5/9/2024) 30 tablet 0    Multiple Vitamin (multivitamin) tablet Take 1 tablet by mouth daily      omeprazole (PriLOSEC) 20 mg delayed release capsule Take 1 capsule (20 mg total) by mouth 2 (two) times a day 60 capsule 3     No current facility-administered medications for this visit.         Psychotherapy Provided:      Individual psychotherapy provided: No   Psychoeducation provided to the patient and was educated about the importance of compliance with the medications and psychiatric treatment  Supportive psychotherapy provided to the patient      Visit Time    Visit Start Time: 358  Visit Stop Time: 422  Total Visit Duration:  24 minutes    Jannet Kirkland PA-C 10/03/24    This note was completed in part utilizing Dragon dictation Software. Grammatical, translation, syntax errors, random word insertions, spelling mistakes, and incomplete sentences may be an occasional consequence of this system secondary to software limitations with voice recognition, ambient noise, and hardware issues. If you have any questions or concerns about the content, text, or information contained within the body of this dictation, please contact the provider for clarification.

## 2025-01-05 DIAGNOSIS — F33.2 MAJOR DEPRESSIVE DISORDER, RECURRENT, SEVERE WITHOUT PSYCHOTIC FEATURES (HCC): ICD-10-CM

## 2025-01-06 RX ORDER — LITHIUM CARBONATE 450 MG
TABLET, EXTENDED RELEASE ORAL
Qty: 60 TABLET | Refills: 1 | Status: SHIPPED | OUTPATIENT
Start: 2025-01-06

## 2025-01-09 DIAGNOSIS — F41.1 GENERALIZED ANXIETY DISORDER: ICD-10-CM

## 2025-01-09 RX ORDER — DULOXETIN HYDROCHLORIDE 60 MG/1
60 CAPSULE, DELAYED RELEASE ORAL DAILY
Qty: 30 CAPSULE | Refills: 2 | Status: SHIPPED | OUTPATIENT
Start: 2025-01-09

## 2025-02-03 ENCOUNTER — OFFICE VISIT (OUTPATIENT)
Dept: PSYCHIATRY | Facility: CLINIC | Age: 50
End: 2025-02-03
Payer: COMMERCIAL

## 2025-02-03 DIAGNOSIS — Z79.899 ENCOUNTER FOR LITHIUM MONITORING: ICD-10-CM

## 2025-02-03 DIAGNOSIS — F51.04 PSYCHOPHYSIOLOGICAL INSOMNIA: ICD-10-CM

## 2025-02-03 DIAGNOSIS — F41.1 GENERALIZED ANXIETY DISORDER: ICD-10-CM

## 2025-02-03 DIAGNOSIS — F33.2 MAJOR DEPRESSIVE DISORDER, RECURRENT, SEVERE WITHOUT PSYCHOTIC FEATURES (HCC): Primary | ICD-10-CM

## 2025-02-03 DIAGNOSIS — Z51.81 ENCOUNTER FOR LITHIUM MONITORING: ICD-10-CM

## 2025-02-03 PROCEDURE — 99214 OFFICE O/P EST MOD 30 MIN: CPT | Performed by: PHYSICIAN ASSISTANT

## 2025-02-03 RX ORDER — LITHIUM CARBONATE 450 MG
TABLET, EXTENDED RELEASE ORAL
Qty: 60 TABLET | Refills: 2 | Status: SHIPPED | OUTPATIENT
Start: 2025-02-03

## 2025-02-03 RX ORDER — DULOXETIN HYDROCHLORIDE 60 MG/1
60 CAPSULE, DELAYED RELEASE ORAL DAILY
Qty: 30 CAPSULE | Refills: 2 | Status: SHIPPED | OUTPATIENT
Start: 2025-02-03

## 2025-02-03 RX ORDER — BUSPIRONE HYDROCHLORIDE 15 MG/1
15 TABLET ORAL 2 TIMES DAILY
Qty: 60 TABLET | Refills: 2 | Status: SHIPPED | OUTPATIENT
Start: 2025-02-03

## 2025-02-03 NOTE — BH TREATMENT PLAN
"TREATMENT PLAN (Medication Management Only)        [facility]    Name and Date of Birth:  Raman Mustafa Jr. 49 y.o. 1975  Date of Treatment Plan: February 3, 2025  Diagnosis/Diagnoses:    1. Major depressive disorder, recurrent, severe without psychotic features (HCC)    2. Generalized anxiety disorder    3. Psychophysiological insomnia    4. Encounter for lithium monitoring      Strengths/Personal Resources for Self-Care: supportive family and friends  Area/Areas of need (in own words): \"physical health\"  1. Long Term Goal: maintain mood stability.  Target Date:6 months - 8/3/2025  Person/Persons responsible for completion of goal: Raman  2.  Short Term Objective (s) - How will we reach this goal?:   A. Provider new recommended medication/dosage changes and/or continue medication(s): continue current medications as prescribed.  B.  Check lithium .  C. N/A.  Target Date:6 months - 8/3/2025  Person/Persons Responsible for Completion of Goal: Raman  Progress Towards Goals: stable  Treatment Modality: medication management every 6 months  Review due 180 days from date of this plan: 6 months - 8/3/2025  Expected length of service: ongoing treatment  My Physician/PA/NP and I have developed this plan together and I agree to work on the goals and objectives. I understand the treatment goals that were developed for my treatment.      " <-- Click to add NO significant Past Surgical History

## 2025-03-10 NOTE — PSYCH
MEDICATION MANAGEMENT NOTE        [facility]      Name and Date of Birth:  Raman Mustafa Jr. 49 y.o. 1975 MRN: [id number]    Insurance: Payor: [employer] / Plan: HIGHMARK / Product Type: Blue Fee for Service /     Date of Visit: February 3, 2025    Reason for Visit:   Chief Complaint   Patient presents with    Medication Management    Follow-up       Assessment & Plan  Generalized anxiety disorder  Cymbalta 60 mg daily  BuSpar 15 mg 2 times daily  Orders:    busPIRone (BUSPAR) 15 mg tablet; Take 1 tablet (15 mg total) by mouth 2 (two) times a day    DULoxetine (CYMBALTA) 60 mg delayed release capsule; Take 1 capsule (60 mg total) by mouth daily    Major depressive disorder, recurrent, severe without psychotic features (HCC)  Cymbalta 60 mg daily  BuSpar 15 mg 2 times daily  Lithium 4 and 50 mg 2 at night  Orders:    Lithium level; Future    lithium carbonate (LITHOBID) 450 mg CR tablet; TAKE 2 BY MOUTH AT BEDTIME    Psychophysiological insomnia  Trazodone 50 mg nightly as needed       Encounter for lithium monitoring  Lithium level 0.61 on 9/8/2024  Orders:    Lithium level; Future        This note was not shared with the patient due to reasonable likelihood of causing patient harm        Risks/benefits/alternativies to treatment discussed, including potential adverse medication side effects, to which Raman voiced understanding and consented fully to treatment.  Also, patient is amenable to calling/contacting the outpatient office including this writer if any acute adverse effects of their medication regimen arise in addition to any comments or concerns pertaining to their psychiatric management.      Medications Risks/Benefits      Risks, Benefits And Possible Side Effects Of Medications:    Risks, benefits, and possible side effects of medications explained to Raman and he verbalizes understanding and agreement for treatment.    Controlled Medication  Sitter at bedside   Discussion:     Not applicable         Subjective      Raman Mustafa Jr. is a 49 y.o. male, visited for Medication Management and Follow-up, who was personally seen and evaluated today at the [facility] outpatient clinic for follow-up and medication management. Completes psychiatric assessment without difficulty.     At previous outpatient psychiatric appointment with this writer, BuSpar was decreased to twice a day and he was maintained on same other medications.   He denies any current adverse medication side effects.      Overall, Raman appears to be at his baseline.  States that he was having some increased anxiety and depression due to relationship struggles.  He has dated a person off and on for the past 6 years.  This person is  a family and at times has good [place] for periods of time.  States that she did this again the month of December.  They were talking again but have not talked now in a week.  States he feels as though the relationship is over.  He does appear to be accepting of this although he struggles some days.  He has been getting out and went on a couple dates.  Overall has adequate interest motivation.  Work has been stable.  Sleeping and eating adequately.  Physically no new medications or medical issues were noted.  Medications as prescribed and feels as though he is managing.      Review Of Systems:  Pertinent items are noted in HPI; all others are negative; no recent changes in medications or health status reported.    PHQ-2/9 Depression Screening                 Historical Information    Past Psychiatric History Update:   - No inpatient psychiatric admission since last encounter  - No SA or SIB since last encounter  - No incidence of violent behavior since last encounter    Past Trauma History Update:   - No new onset of abuse or traumatic events since last encounter     Past Medical History:    Past Medical History:   Diagnosis Date    Anxiety     Chest pain      after eating- seen in ER several x - believes secondary to hernia    CPAP (continuous positive airway pressure) dependence     Depression     H. pylori infection     Hiatal hernia     Hyperlipidemia     Polysubstance dependence (HCC) 2020    PONV (postoperative nausea and vomiting)     Sleep apnea     Suicidal thoughts     Wears glasses         Past Surgical History:   Procedure Laterality Date    COLONOSCOPY      INGUINAL HERNIA REPAIR Right     MS LAPS RPR PARAESPHGL HRNA INCL FUNDPLSTY W/MESH N/A 2021    Procedure: REPAIR HERNIA PARAESOPHAGEAL LAPAROSCOPIC W ROBOTICS with TOUPET FUNDOPLICATION cruroplasty with mesh;  Surgeon: Alonzo Kaplan MD;  Location: [facility];  Service: General    SHOULDER SURGERY      left     No Known Allergies    Substance Abuse History:    Social History     Substance and Sexual Activity   Alcohol Use Not Currently    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     Social History     Substance and Sexual Activity   Drug Use Yes    Types: Marijuana    Comment: previously used medical marijuana but not currently using       Social History:    Social History     Socioeconomic History    Marital status: Single     Spouse name: Not on file    Number of children: Not on file    Years of education: Not on file    Highest education level: Not on file   Occupational History    Not on file   Tobacco Use    Smoking status: Former     Current packs/day: 0.00     Types: Cigarettes     Quit date:      Years since quittin.1    Smokeless tobacco: Never   Vaping Use    Vaping status: Never Used   Substance and Sexual Activity    Alcohol use: Not Currently     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    Drug use: Yes     Types: Marijuana     Comment: previously used medical marijuana but not currently using    Sexual activity: Not on file   Other Topics Concern    Not on file   Social History Narrative    Not on file     Social Drivers of Health     Financial Resource Strain: Not on file   Food Insecurity: Not on file    Transportation Needs: Not on file   Physical Activity: Not on file   Stress: Not on file   Social Connections: Not on file   Intimate Partner Violence: Not on file   Housing Stability: Not on file       Family Psychiatric History:     Family History   Problem Relation Age of Onset    No Known Problems Mother     No Known Problems Father     Eating disorder Sister        History Review: The following portions of the patient's history were reviewed and updated as appropriate: allergies, current medications, past family history, past medical history, past social history, past surgical history and problem list.           Objective      Vital signs in last 24 hours:  There were no vitals filed for this visit.    Mental Status Evaluation:    Appearance age appropriate, casually dressed, tattooed   Behavior cooperative, calm   Speech normal rate, normal volume, normal pitch   Mood euthymic   Affect normal range and intensity, appropriate   Thought Processes organized, goal directed   Associations intact associations   Thought Content no overt delusions   Perceptual Disturbances: no auditory hallucinations, no visual hallucinations   Abnormal Thoughts  Risk Potential Suicidal ideation - None  Homicidal ideation - None  Potential for aggression - No   Orientation oriented to: person, place, time/date, and situation   Memory recent and remote memory grossly intact   Consciousness alert and awake   Attention Span Concentration Span attention span and concentration are age appropriate   Intellect not formally assessed   Insight Intact   Judgement intact   Muscle Strength and  Gait normal muscle strength and normal muscle tone, normal gait and normal balance   Motor activity no abnormal movements   Language no difficulty naming common objects   Fund of Knowledge adequate knowledge of current events   Pain none   Pain Scale 0             Current Outpatient Medications   Medication Sig Dispense Refill    atorvastatin (LIPITOR)  40 mg tablet Take 1 tablet (40 mg total) by mouth daily with dinner 30 tablet 0    busPIRone (BUSPAR) 15 mg tablet Take 1 tablet (15 mg total) by mouth 2 (two) times a day 60 tablet 2    cholecalciferol (VITAMIN D3) 1,000 units tablet Take 1 tablet (1,000 Units total) by mouth daily Do not start before December 31, 2023. 30 tablet 0    dicyclomine (BENTYL) 20 mg tablet Take 1 tablet (20 mg total) by mouth every 6 (six) hours as needed (abd pain/cramping or diarrhea) 30 tablet 2    DULoxetine (CYMBALTA) 60 mg delayed release capsule TAKE 1 CAPSULE BY MOUTH EVERY DAY 30 capsule 2    ergocalciferol (VITAMIN D2) 50,000 units Take 1 capsule (50,000 Units total) by mouth once a week for 5 doses Do not start before December 2, 2023. 5 capsule 0    lithium carbonate (LITHOBID) 450 mg CR tablet TAKE 2 BY MOUTH AT BEDTIME 60 tablet 1    melatonin 3 mg Take 1 tablet (3 mg total) by mouth daily at bedtime (Patient not taking: Reported on 5/9/2024) 30 tablet 0    Multiple Vitamin (multivitamin) tablet Take 1 tablet by mouth daily      omeprazole (PriLOSEC) 20 mg delayed release capsule Take 1 capsule (20 mg total) by mouth 2 (two) times a day 60 capsule 3    traZODone (DESYREL) 50 mg tablet Take 1 tablet (50 mg total) by mouth daily at bedtime as needed for sleep       No current facility-administered medications for this visit.         Psychotherapy Provided:     Individual psychotherapy provided: No         Visit Time    Visit Start Time: 355   Visit Stop Time: 430  Total Visit Duration:  35 minutes    Jannet Kirkland PA-C 02/03/25    This note was completed in part utilizing Dragon dictation Software. Grammatical, translation, syntax errors, random word insertions, spelling mistakes, and incomplete sentences may be an occasional consequence of this system secondary to software limitations with voice recognition, ambient noise, and hardware issues. If you have any questions or concerns about the content, text, or  information contained within the body of this dictation, please contact the provider for clarification.

## 2025-04-18 ENCOUNTER — APPOINTMENT (OUTPATIENT)
Dept: LAB | Facility: HOSPITAL | Age: 50
End: 2025-04-18
Payer: COMMERCIAL

## 2025-04-18 DIAGNOSIS — E78.2 MIXED HYPERLIPIDEMIA: ICD-10-CM

## 2025-04-18 DIAGNOSIS — Z51.81 ENCOUNTER FOR LITHIUM MONITORING: ICD-10-CM

## 2025-04-18 DIAGNOSIS — Z79.899 ENCOUNTER FOR LITHIUM MONITORING: ICD-10-CM

## 2025-04-18 DIAGNOSIS — F33.2 MAJOR DEPRESSIVE DISORDER, RECURRENT, SEVERE WITHOUT PSYCHOTIC FEATURES (HCC): ICD-10-CM

## 2025-04-18 LAB
ALBUMIN SERPL BCG-MCNC: 4.5 G/DL (ref 3.5–5)
ALP SERPL-CCNC: 53 U/L (ref 34–104)
ALT SERPL W P-5'-P-CCNC: 44 U/L (ref 7–52)
ANION GAP SERPL CALCULATED.3IONS-SCNC: 3 MMOL/L (ref 4–13)
AST SERPL W P-5'-P-CCNC: 26 U/L (ref 13–39)
BILIRUB SERPL-MCNC: 0.78 MG/DL (ref 0.2–1)
BUN SERPL-MCNC: 18 MG/DL (ref 5–25)
CALCIUM SERPL-MCNC: 9.8 MG/DL (ref 8.4–10.2)
CHLORIDE SERPL-SCNC: 104 MMOL/L (ref 96–108)
CHOLEST SERPL-MCNC: 183 MG/DL (ref ?–200)
CO2 SERPL-SCNC: 30 MMOL/L (ref 21–32)
CREAT SERPL-MCNC: 1 MG/DL (ref 0.6–1.3)
GFR SERPL CREATININE-BSD FRML MDRD: 86 ML/MIN/1.73SQ M
GLUCOSE P FAST SERPL-MCNC: 111 MG/DL (ref 65–99)
HDLC SERPL-MCNC: 42 MG/DL
LDLC SERPL CALC-MCNC: 115 MG/DL (ref 0–100)
LITHIUM SERPL-SCNC: 0.73 MMOL/L (ref 0.6–1.2)
NONHDLC SERPL-MCNC: 141 MG/DL
POTASSIUM SERPL-SCNC: 4.3 MMOL/L (ref 3.5–5.3)
PROT SERPL-MCNC: 7.4 G/DL (ref 6.4–8.4)
SODIUM SERPL-SCNC: 137 MMOL/L (ref 135–147)
TRIGL SERPL-MCNC: 132 MG/DL (ref ?–150)

## 2025-04-18 PROCEDURE — 80053 COMPREHEN METABOLIC PANEL: CPT

## 2025-04-18 PROCEDURE — 80061 LIPID PANEL: CPT

## 2025-04-18 PROCEDURE — 36415 COLL VENOUS BLD VENIPUNCTURE: CPT

## 2025-04-18 PROCEDURE — 80178 ASSAY OF LITHIUM: CPT

## 2025-05-02 DIAGNOSIS — F33.2 MAJOR DEPRESSIVE DISORDER, RECURRENT, SEVERE WITHOUT PSYCHOTIC FEATURES (HCC): ICD-10-CM

## 2025-05-02 RX ORDER — LITHIUM CARBONATE 450 MG
900 TABLET, EXTENDED RELEASE ORAL
Qty: 60 TABLET | Refills: 2 | Status: SHIPPED | OUTPATIENT
Start: 2025-05-02

## 2025-05-11 DIAGNOSIS — F41.1 GENERALIZED ANXIETY DISORDER: ICD-10-CM

## 2025-05-12 RX ORDER — BUSPIRONE HYDROCHLORIDE 15 MG/1
15 TABLET ORAL 2 TIMES DAILY
Qty: 60 TABLET | Refills: 2 | Status: SHIPPED | OUTPATIENT
Start: 2025-05-12

## 2025-05-12 RX ORDER — DULOXETIN HYDROCHLORIDE 60 MG/1
60 CAPSULE, DELAYED RELEASE ORAL DAILY
Qty: 30 CAPSULE | Refills: 2 | Status: SHIPPED | OUTPATIENT
Start: 2025-05-12

## 2025-06-02 ENCOUNTER — TELEPHONE (OUTPATIENT)
Age: 50
End: 2025-06-02

## 2025-06-02 ENCOUNTER — TELEMEDICINE (OUTPATIENT)
Dept: PSYCHIATRY | Facility: CLINIC | Age: 50
End: 2025-06-02
Payer: COMMERCIAL

## 2025-06-02 DIAGNOSIS — Z51.81 ENCOUNTER FOR LITHIUM MONITORING: ICD-10-CM

## 2025-06-02 DIAGNOSIS — F33.2 MAJOR DEPRESSIVE DISORDER, RECURRENT, SEVERE WITHOUT PSYCHOTIC FEATURES (HCC): Primary | ICD-10-CM

## 2025-06-02 DIAGNOSIS — Z79.899 ENCOUNTER FOR LITHIUM MONITORING: ICD-10-CM

## 2025-06-02 DIAGNOSIS — F41.1 GENERALIZED ANXIETY DISORDER: ICD-10-CM

## 2025-06-02 DIAGNOSIS — F51.04 PSYCHOPHYSIOLOGICAL INSOMNIA: ICD-10-CM

## 2025-06-02 PROCEDURE — 99214 OFFICE O/P EST MOD 30 MIN: CPT | Performed by: PHYSICIAN ASSISTANT

## 2025-06-02 RX ORDER — ARIPIPRAZOLE 2 MG/1
2 TABLET ORAL
Qty: 30 TABLET | Refills: 1 | Status: SHIPPED | OUTPATIENT
Start: 2025-06-02

## 2025-06-02 NOTE — PSYCH
Virtual Regular Visit    Patient is located at Home in the following state in which I hold an active license [place].    Assessment & Plan  Major depressive disorder, recurrent, severe without psychotic features (HCC)  Not at baseline  Add Abilify 2 mg at bedtime  Continue with other medications as follows  Cymbalta 60 mg daily  BuSpar 15 mg twice daily  Lithium 450 mg 2 at night  Orders:    ARIPiprazole (ABILIFY) 2 mg tablet; Take 1 tablet (2 mg total) by mouth daily at bedtime    Generalized anxiety disorder  Cymbalta 60 mg daily  BuSpar 15 mg twice daily  Lithium 450 mg 2 at night  Orders:    ARIPiprazole (ABILIFY) 2 mg tablet; Take 1 tablet (2 mg total) by mouth daily at bedtime    Psychophysiological insomnia  Trazodone 50 mg nightly as needed, has not needed and is sleeping well so we will discontinue       Encounter for lithium monitoring  Lithium level 0.73 on 4/18/2025  GFR 86 and creatinine 1.0             Follow-up in 1 month and he will call me sooner if any questions or concerns    Reason for visit is   Chief Complaint   Patient presents with    Follow-up    Medication Management        Visit Time  Visit Start Time: 400  Visit Stop Time: 425  Total Visit Duration: 25 minutes    Encounter provider: Jannet Kirkland PA-C  Provider located at [facility] [address] [place] [address] [phone]    Recent Visits  No visits were found meeting these conditions.  Showing recent visits within past 7 days and meeting all other requirements  Today's Visits  Date Type Provider Dept   06/02/25 Telemedicine Jannet Kirkland PA-C  [facility]   Showing today's visits and meeting all other requirements  Future Appointments  No visits were found meeting these conditions.  Showing future appointments within next 150 days and meeting all other requirements   Administrative Statements   Encounter provider Jannet Kirkland  ANNETTA    The Patient is located at Home and in the following state in which I hold an active license [place].    The patient was identified by name and date of birth. Raman Mustafa Jr. was informed that this is a telemedicine visit and that the visit is being conducted through the Epic Embedded platform. He agrees to proceed..  My office door was closed. No one else was in the room.  He acknowledged consent and understanding of privacy and security of the video platform. The patient has agreed to participate and understands they can discontinue the visit at any time.    I have spent a total time of 25 minutes in caring for this patient on the day of the visit/encounter including Risks and benefits of tx options, Instructions for management, Patient and family education, Importance of tx compliance, Risk factor reductions, Impressions, Counseling / Coordination of care, Documenting in the medical record, Reviewing/placing orders in the medical record (including tests, medications, and/or procedures), and Obtaining or reviewing history  , not including the time spent for establishing the audio/video connection.      MEDICATION MANAGEMENT NOTE        [facility]      Name and Date of Birth:  Raman Mustafa Jr. 49 y.o. 1975 MRN: [id number]    Date of Visit: June 2, 2025          Subjective        Raman Mustafa Jr. is a 49 y.o. male, visited for Follow-up and Medication Management, who was virtually seen and evaluated today at the [facility] outpatient clinic for follow-up and medication management. Completes psychiatric assessment without difficulty.     At previous outpatient psychiatric appointment with this writer, maintained on same medications.   He denies any current adverse medication side effects.      Raman appears more irritable, anxious and depressed.  States that he has a decreased impatience lately.  States that little things that  normally would not bother him such as extraneous noises have been causing him increased aggravation.  States that he handles it by internalizing things and having racing thoughts.  He has not had any physical outburst.  He is sleeping very well at night and has not needed trazodone.  States that he is motivated and wants to do things but has absolute new interest.  States that he goes to work and comes home.  Work has been going okay.  Only thing he does once a week is good for breakfast with his family members.  Denies any suicidal or homicidal ideation.  Has been taking medications as prescribed.  Discussed addition of medication or changing meds and he is in agreement.  He has never been on Abilify so we will start 2 mg daily.  Discussed all potential adverse effects including increased glucose, cholesterol, movement disorder as he is in agreement with trial of medication.  This time will continue with other medication.  May consider reduction and discontinuation of BuSpar if he is doing well with Abilify.          Review Of Systems:  Pertinent items are noted in HPI; all others are negative; no recent changes in medications or health status reported.    PHQ-2/9 Depression Screening                 Historical Information        Past Medical History:    Past Medical History[1]     Past Surgical History[2]  Allergies[3]    Substance Abuse History:    Social History     Substance and Sexual Activity   Alcohol Use Not Currently    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     Social History     Substance and Sexual Activity   Drug Use Yes    Types: Marijuana    Comment: previously used medical marijuana but not currently using       Social History:    Social History     Socioeconomic History    Marital status: Single     Spouse name: Not on file    Number of children: Not on file    Years of education: Not on file    Highest education level: Not on file   Occupational History    Not on file   Tobacco Use    Smoking status: Former      Current packs/day: 0.00     Types: Cigarettes     Quit date:      Years since quittin.4    Smokeless tobacco: Never   Vaping Use    Vaping status: Never Used   Substance and Sexual Activity    Alcohol use: Not Currently     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    Drug use: Yes     Types: Marijuana     Comment: previously used medical marijuana but not currently using    Sexual activity: Not on file   Other Topics Concern    Not on file   Social History Narrative    Not on file     Social Drivers of Health     Financial Resource Strain: Not on file   Food Insecurity: Not on file   Transportation Needs: Not on file   Physical Activity: Not on file   Stress: Not on file   Social Connections: Not on file   Intimate Partner Violence: Not on file   Housing Stability: Not on file       Family Psychiatric History:     Family History[4]    History Review: The following portions of the patient's history were reviewed and updated as appropriate: allergies, current medications, past family history, past medical history, past social history, past surgical history and problem list.           Objective      Vital signs in last 24 hours:  There were no vitals filed for this visit.    Mental Status Evaluation:    Appearance age appropriate, casually dressed, dressed appropriately   Behavior cooperative, appears anxious, irritable   Speech normal rate, normal volume, normal pitch   Mood irritable   Affect mood-congruent   Thought Processes organized, logical, coherent, goal directed   Associations intact associations   Thought Content no overt delusions   Perceptual Disturbances: no auditory hallucinations, no visual hallucinations   Abnormal Thoughts  Risk Potential Suicidal ideation - None  Homicidal ideation - None  Potential for aggression - No   Orientation oriented to: person, place, time/date, and situation   Memory recent and remote memory grossly intact   Consciousness alert and awake   Attention Span Concentration Span attention  span and concentration are age appropriate   Intellect appears to be of average intelligence   Insight intact   Judgement intact   Muscle Strength and  Gait unable to assess today due to virtual visit   Motor activity unable to assess today due to virtual visit   Language no difficulty naming common objects, no difficulty repeating a phrase   Fund of Knowledge adequate knowledge of current events  adequate fund of knowledge regarding past history  adequate fund of knowledge regarding vocabulary    Pain none   Pain Scale 0     Laboratory Results: I have personally reviewed all pertinent laboratory/tests results  CMP:   Lab Results   Component Value Date    SODIUM 137 04/18/2025    K 4.3 04/18/2025     04/18/2025    CO2 30 04/18/2025    AGAP 3 (L) 04/18/2025    BUN 18 04/18/2025    CREATININE 1.00 04/18/2025    GLUC 67 05/04/2024    GLUF 111 (H) 04/18/2025    CALCIUM 9.8 04/18/2025    AST 26 04/18/2025    ALT 44 04/18/2025    ALKPHOS 53 04/18/2025    TP 7.4 04/18/2025    ALB 4.5 04/18/2025    TBILI 0.78 04/18/2025    EGFR 86 04/18/2025     Lipid Profile:   Lab Results   Component Value Date    CHOLESTEROL 183 04/18/2025    HDL 42 04/18/2025    TRIG 132 04/18/2025    LDLCALC 115 (H) 04/18/2025    NONHDLC 141 04/18/2025     Hemoglobin A1C:   Lab Results   Component Value Date    HGBA1C 5.5 05/05/2025     05/05/2025     Lithium:   Lab Results   Component Value Date    LITHIUM 0.73 04/18/2025               Current Outpatient Medications   Medication Sig Dispense Refill    ARIPiprazole (ABILIFY) 2 mg tablet Take 1 tablet (2 mg total) by mouth daily at bedtime 30 tablet 1    atorvastatin (LIPITOR) 40 mg tablet Take 1 tablet (40 mg total) by mouth daily with dinner 30 tablet 0    busPIRone (BUSPAR) 15 mg tablet TAKE 1 TABLET BY MOUTH TWICE A DAY 60 tablet 2    cholecalciferol (VITAMIN D3) 1,000 units tablet Take 1 tablet (1,000 Units total) by mouth daily Do not start before December 31, 2023. 30 tablet 0     dicyclomine (BENTYL) 20 mg tablet Take 1 tablet (20 mg total) by mouth every 6 (six) hours as needed (abd pain/cramping or diarrhea) 30 tablet 2    DULoxetine (CYMBALTA) 60 mg delayed release capsule TAKE 1 CAPSULE BY MOUTH EVERY DAY 30 capsule 2    ergocalciferol (VITAMIN D2) 50,000 units Take 1 capsule (50,000 Units total) by mouth once a week for 5 doses Do not start before December 2, 2023. 5 capsule 0    lithium carbonate (LITHOBID) 450 mg CR tablet TAKE 2 TABLETS BY MOUTH AT BEDTIME 60 tablet 2    Multiple Vitamin (multivitamin) tablet Take 1 tablet by mouth daily      omeprazole (PriLOSEC) 20 mg delayed release capsule Take 1 capsule (20 mg total) by mouth 2 (two) times a day 60 capsule 3     No current facility-administered medications for this visit.         Medications Risks/Benefits      Risks, Benefits And Possible Side Effects Of Medications:    Risks, benefits, and possible side effects of medications explained to Raman and he verbalizes understanding and agreement for treatment.    Controlled Medication Discussion:     Not applicable    Psychotherapy Provided:     Individual psychotherapy provided: No       Treatment Plan:    Completed and signed during the session: Not applicable - Treatment Plan not due at this session    Note Share    This note was not shared with the patient due to reasonable likelihood of causing patient harm    Jannet Kirkland PA-C 06/02/25    This note was completed in part utilizing Dragon dictation Software. Grammatical, translation, syntax errors, random word insertions, spelling mistakes, and incomplete sentences may be an occasional consequence of this system secondary to software limitations with voice recognition, ambient noise, and hardware issues. If you have any questions or concerns about the content, text, or information contained within the body of this dictation, please contact the provider for clarification.          [1]   Past Medical History:  Diagnosis  Date    Anxiety     Chest pain     after eating- seen in ER several x - believes secondary to hernia    CPAP (continuous positive airway pressure) dependence     Depression     H. pylori infection     Hiatal hernia     Hyperlipidemia     Polysubstance dependence (HCC) 09/16/2020    PONV (postoperative nausea and vomiting)     Sleep apnea     Suicidal thoughts     Wears glasses    [2]   Past Surgical History:  Procedure Laterality Date    COLONOSCOPY      INGUINAL HERNIA REPAIR Right     SD LAPS RPR PARAESPHGL HRNA INCL FUNDPLSTY W/MESH N/A 4/1/2021    Procedure: REPAIR HERNIA PARAESOPHAGEAL LAPAROSCOPIC W ROBOTICS with TOUPET FUNDOPLICATION cruroplasty with mesh;  Surgeon: Alonzo Kaplan MD;  Location: [facility];  Service: General    SHOULDER SURGERY      left   [3] No Known Allergies  [4]   Family History  Problem Relation Name Age of Onset    No Known Problems Mother      No Known Problems Father      Eating disorder Sister

## 2025-06-02 NOTE — TELEPHONE ENCOUNTER
Pt called to get his appt for 6/2/25 at 4pm switched to a virtual visit due to patient requested.  Writer switched appt.

## 2025-06-04 DIAGNOSIS — F41.1 GENERALIZED ANXIETY DISORDER: ICD-10-CM

## 2025-06-04 DIAGNOSIS — F33.2 MAJOR DEPRESSIVE DISORDER, RECURRENT, SEVERE WITHOUT PSYCHOTIC FEATURES (HCC): ICD-10-CM

## 2025-06-04 RX ORDER — DULOXETIN HYDROCHLORIDE 60 MG/1
60 CAPSULE, DELAYED RELEASE ORAL DAILY
Qty: 90 CAPSULE | Refills: 1 | OUTPATIENT
Start: 2025-06-04

## 2025-06-04 RX ORDER — BUSPIRONE HYDROCHLORIDE 15 MG/1
15 TABLET ORAL 2 TIMES DAILY
Qty: 180 TABLET | Refills: 1 | OUTPATIENT
Start: 2025-06-04

## 2025-06-04 RX ORDER — LITHIUM CARBONATE 450 MG
900 TABLET, EXTENDED RELEASE ORAL
Qty: 180 TABLET | Refills: 1 | OUTPATIENT
Start: 2025-06-04

## 2025-06-06 ENCOUNTER — TELEPHONE (OUTPATIENT)
Age: 50
End: 2025-06-06

## 2025-06-06 NOTE — TELEPHONE ENCOUNTER
Patient called in he is having doctor fax over script for sleep study to [phone]  Advised once received we will review then can schedule

## 2025-06-17 ENCOUNTER — TRANSCRIBE ORDERS (OUTPATIENT)
Dept: SLEEP CENTER | Facility: CLINIC | Age: 50
End: 2025-06-17

## 2025-06-17 DIAGNOSIS — R40.0 HAS DAYTIME DROWSINESS: Primary | ICD-10-CM

## 2025-06-24 DIAGNOSIS — F41.1 GENERALIZED ANXIETY DISORDER: ICD-10-CM

## 2025-06-24 DIAGNOSIS — F33.2 MAJOR DEPRESSIVE DISORDER, RECURRENT, SEVERE WITHOUT PSYCHOTIC FEATURES (HCC): ICD-10-CM

## 2025-06-25 RX ORDER — ARIPIPRAZOLE 2 MG/1
2 TABLET ORAL
Qty: 90 TABLET | Refills: 1 | Status: SHIPPED | OUTPATIENT
Start: 2025-06-25

## 2025-06-29 ENCOUNTER — OFFICE VISIT (OUTPATIENT)
Dept: URGENT CARE | Facility: MEDICAL CENTER | Age: 50
End: 2025-06-29
Payer: COMMERCIAL

## 2025-06-29 VITALS
HEART RATE: 78 BPM | RESPIRATION RATE: 18 BRPM | OXYGEN SATURATION: 100 % | DIASTOLIC BLOOD PRESSURE: 68 MMHG | TEMPERATURE: 98.9 F | SYSTOLIC BLOOD PRESSURE: 124 MMHG

## 2025-06-29 DIAGNOSIS — F41.1 GENERALIZED ANXIETY DISORDER: ICD-10-CM

## 2025-06-29 DIAGNOSIS — U07.1 COVID-19 VIRUS INFECTION: Primary | ICD-10-CM

## 2025-06-29 DIAGNOSIS — F33.2 MAJOR DEPRESSIVE DISORDER, RECURRENT, SEVERE WITHOUT PSYCHOTIC FEATURES (HCC): ICD-10-CM

## 2025-06-29 PROCEDURE — 99213 OFFICE O/P EST LOW 20 MIN: CPT | Performed by: NURSE PRACTITIONER

## 2025-06-29 RX ORDER — BENZONATATE 200 MG/1
200 CAPSULE ORAL 3 TIMES DAILY PRN
Qty: 20 CAPSULE | Refills: 0 | Status: SHIPPED | OUTPATIENT
Start: 2025-06-29

## 2025-06-29 RX ORDER — ALBUTEROL SULFATE 90 UG/1
2 INHALANT RESPIRATORY (INHALATION) EVERY 6 HOURS PRN
Qty: 8.5 G | Refills: 0 | Status: SHIPPED | OUTPATIENT
Start: 2025-06-29

## 2025-06-29 RX ORDER — PREDNISONE 20 MG/1
40 TABLET ORAL DAILY
Qty: 10 TABLET | Refills: 0 | Status: SHIPPED | OUTPATIENT
Start: 2025-06-29 | End: 2025-07-04

## 2025-06-29 NOTE — PROGRESS NOTES
[facility]        NAME: Raman Mustafa Jr. is a 49 y.o. male  : 1975    MRN: [id number]  DATE: 2025  TIME: 11:40 AM    Assessment and Plan   COVID-19 virus infection [U07.1]  1. COVID-19 virus infection  benzonatate (TESSALON) 200 MG capsule    predniSONE 20 mg tablet    albuterol (ProAir HFA) 90 mcg/act inhaler        Patient in NAD and VSS upon exam. Discussed results of exam in office, will treat cough with tessalon, prednisone and albuterol inhaler. CXR not indicated at this time. Discussed supportive care and return precautions. Note for work/school given as needed.      Patient Instructions       Follow up with PCP in 3-5 days.  Proceed to  ER if symptoms worsen.    If tests have been performed at [facility], our office will contact you with results if changes need to be made to the care plan discussed with you at the visit.  You can review your full results on [facility].    Chief Complaint     Chief Complaint   Patient presents with   • COVID-19     Patient tested positive for Covid on Friday. He c/o nasal congestion, headache, body aches,  and fatigue x 4 days          History of Present Illness       Started: 3-4 days ago  Positive: fatigue, HA, body aches, congestion, subjective fever, painful cough  Feels overall improving  Denies CP, SOB, trouble breathing  Treatment: OTC cold medicine  Tested positive for covid at home 3 days ago        Review of Systems   Review of Systems   Constitutional:  Positive for fatigue and fever.   HENT:  Positive for congestion.    Respiratory:  Positive for cough.    Musculoskeletal:  Positive for myalgias.   Neurological:  Positive for headaches.         Current Medications     Current Medications[1]    Current Allergies     Allergies as of 2025   • (No Known Allergies)            The following portions of the patient's history were reviewed and updated as appropriate: allergies, current medications, past family history, past medical  history, past social history, past surgical history and problem list.     Past Medical History[2]    Past Surgical History[3]    Family History[4]      Medications have been verified.        Objective   /68   Pulse 78   Temp 98.9 °F (37.2 °C)   Resp 18   SpO2 100%   No LMP for male patient.       Physical Exam     Physical Exam  Constitutional:       General: He is not in acute distress.     Appearance: Normal appearance. He is not ill-appearing.   HENT:      Head: Normocephalic and atraumatic.      Right Ear: Hearing, tympanic membrane, ear canal and external ear normal.      Left Ear: Hearing, tympanic membrane, ear canal and external ear normal.      Nose: Congestion present.      Right Sinus: No maxillary sinus tenderness or frontal sinus tenderness.      Left Sinus: No maxillary sinus tenderness or frontal sinus tenderness.      Mouth/Throat:      Lips: Pink.      Mouth: Mucous membranes are moist.      Pharynx: Oropharynx is clear.     Cardiovascular:      Rate and Rhythm: Normal rate and regular rhythm.   Pulmonary:      Effort: Pulmonary effort is normal.      Breath sounds: Normal breath sounds.      Comments: Dry cough on exam    Musculoskeletal:         General: Normal range of motion.   Lymphadenopathy:      Cervical: No cervical adenopathy.     Skin:     General: Skin is warm and dry.     Neurological:      Mental Status: He is alert and oriented to person, place, and time.                          [1]    Current Outpatient Medications:   •  albuterol (ProAir HFA) 90 mcg/act inhaler, Inhale 2 puffs every 6 (six) hours as needed for shortness of breath, Disp: 8.5 g, Rfl: 0  •  benzonatate (TESSALON) 200 MG capsule, Take 1 capsule (200 mg total) by mouth 3 (three) times a day as needed for cough, Disp: 20 capsule, Rfl: 0  •  predniSONE 20 mg tablet, Take 2 tablets (40 mg total) by mouth daily for 5 days, Disp: 10 tablet, Rfl: 0  •  ARIPiprazole (ABILIFY) 2 mg tablet, TAKE 1 TABLET BY MOUTH DAILY  AT BEDTIME, Disp: 90 tablet, Rfl: 1  •  atorvastatin (LIPITOR) 40 mg tablet, Take 1 tablet (40 mg total) by mouth daily with dinner, Disp: 30 tablet, Rfl: 0  •  busPIRone (BUSPAR) 15 mg tablet, TAKE 1 TABLET BY MOUTH TWICE A DAY, Disp: 60 tablet, Rfl: 2  •  cholecalciferol (VITAMIN D3) 1,000 units tablet, Take 1 tablet (1,000 Units total) by mouth daily Do not start before December 31, 2023., Disp: 30 tablet, Rfl: 0  •  dicyclomine (BENTYL) 20 mg tablet, Take 1 tablet (20 mg total) by mouth every 6 (six) hours as needed (abd pain/cramping or diarrhea), Disp: 30 tablet, Rfl: 2  •  DULoxetine (CYMBALTA) 60 mg delayed release capsule, TAKE 1 CAPSULE BY MOUTH EVERY DAY, Disp: 30 capsule, Rfl: 2  •  ergocalciferol (VITAMIN D2) 50,000 units, Take 1 capsule (50,000 Units total) by mouth once a week for 5 doses Do not start before December 2, 2023., Disp: 5 capsule, Rfl: 0  •  lithium carbonate (LITHOBID) 450 mg CR tablet, TAKE 2 TABLETS BY MOUTH AT BEDTIME, Disp: 60 tablet, Rfl: 2  •  Multiple Vitamin (multivitamin) tablet, Take 1 tablet by mouth daily, Disp: , Rfl:   •  omeprazole (PriLOSEC) 20 mg delayed release capsule, Take 1 capsule (20 mg total) by mouth 2 (two) times a day, Disp: 60 capsule, Rfl: 3[2]  Past Medical History:  Diagnosis Date   • Anxiety    • Chest pain     after eating- seen in ER several x - believes secondary to hernia   • CPAP (continuous positive airway pressure) dependence    • Depression    • H. pylori infection    • Hiatal hernia    • Hyperlipidemia    • Polysubstance dependence (HCC) 09/16/2020   • PONV (postoperative nausea and vomiting)    • Sleep apnea    • Suicidal thoughts    • Wears glasses    [3]  Past Surgical History:  Procedure Laterality Date   • COLONOSCOPY     • INGUINAL HERNIA REPAIR Right    • ND LAPS RPR PARAESPHGL HRNA INCL FUNDPLSTY W/MESH N/A 4/1/2021    Procedure: REPAIR HERNIA PARAESOPHAGEAL LAPAROSCOPIC W ROBOTICS with TOUPET FUNDOPLICATION cruroplasty with mesh;   Surgeon: Alonzo Kaplan MD;  Location: [facility];  Service: General   • SHOULDER SURGERY      left   [4]  Family History  Problem Relation Name Age of Onset   • No Known Problems Mother     • No Known Problems Father     • Eating disorder Sister

## 2025-06-29 NOTE — LETTER
June 29, 2025     Patient: Raman Mustafa Jr.   YOB: 1975   Date of Visit: 6/29/2025       To Whom It May Concern:    It is my medical opinion that Raman Mustafa may return to work on 07/01/2025.    If you have any questions or concerns, please don't hesitate to call.         Sincerely,        BELEN Flores    CC: No Recipients

## 2025-06-30 RX ORDER — LITHIUM CARBONATE 450 MG
900 TABLET, EXTENDED RELEASE ORAL
Qty: 180 TABLET | Refills: 1 | OUTPATIENT
Start: 2025-06-30

## 2025-06-30 RX ORDER — DULOXETIN HYDROCHLORIDE 60 MG/1
60 CAPSULE, DELAYED RELEASE ORAL DAILY
Qty: 90 CAPSULE | Refills: 1 | OUTPATIENT
Start: 2025-06-30

## 2025-06-30 RX ORDER — BUSPIRONE HYDROCHLORIDE 15 MG/1
15 TABLET ORAL 2 TIMES DAILY
Qty: 180 TABLET | Refills: 1 | OUTPATIENT
Start: 2025-06-30

## 2025-07-03 ENCOUNTER — OFFICE VISIT (OUTPATIENT)
Dept: PSYCHIATRY | Facility: CLINIC | Age: 50
End: 2025-07-03

## 2025-07-03 DIAGNOSIS — Z91.199 NO-SHOW FOR APPOINTMENT: Primary | ICD-10-CM

## 2025-07-03 NOTE — PSYCH
No Call. No Show. No Charge    Raman Mustafa Jr. no showed 07/03/25 appointment , staff called and left message to reschedule appointment     Treatment Plan not due at this session.

## 2025-07-17 ENCOUNTER — OFFICE VISIT (OUTPATIENT)
Dept: PSYCHIATRY | Facility: CLINIC | Age: 50
End: 2025-07-17
Payer: COMMERCIAL

## 2025-07-17 DIAGNOSIS — Z79.899 ENCOUNTER FOR LITHIUM MONITORING: ICD-10-CM

## 2025-07-17 DIAGNOSIS — F41.1 GENERALIZED ANXIETY DISORDER: ICD-10-CM

## 2025-07-17 DIAGNOSIS — Z51.81 ENCOUNTER FOR LITHIUM MONITORING: ICD-10-CM

## 2025-07-17 DIAGNOSIS — F33.2 MAJOR DEPRESSIVE DISORDER, RECURRENT, SEVERE WITHOUT PSYCHOTIC FEATURES (HCC): Primary | ICD-10-CM

## 2025-07-17 DIAGNOSIS — G25.71 AKATHISIA: ICD-10-CM

## 2025-07-17 DIAGNOSIS — F51.04 PSYCHOPHYSIOLOGICAL INSOMNIA: ICD-10-CM

## 2025-07-17 PROCEDURE — 99214 OFFICE O/P EST MOD 30 MIN: CPT | Performed by: PHYSICIAN ASSISTANT

## 2025-07-17 RX ORDER — ARIPIPRAZOLE 2 MG/1
4 TABLET ORAL DAILY
Start: 2025-07-17 | End: 2025-07-28 | Stop reason: SINTOL

## 2025-07-17 RX ORDER — MIRTAZAPINE 15 MG/1
15 TABLET, FILM COATED ORAL
Qty: 30 TABLET | Refills: 1 | Status: SHIPPED | OUTPATIENT
Start: 2025-07-17 | End: 2025-07-28

## 2025-07-17 NOTE — PSYCH
MEDICATION MANAGEMENT NOTE    Name: Raman Mustafa Jr.      : 1975      MRN: [id number]  Encounter Provider: Jannet Kirkland PA-C  Encounter Date: 2025   Encounter department: [facility]    Insurance: Payor: [employer] / Plan: [employer] / Product Type: Blue Fee for Service /      Reason for Visit:   Chief Complaint   Patient presents with    Follow-up    Medication Management   :  Assessment & Plan  Major depressive disorder, recurrent, severe without psychotic features (HCC)  Not at baseline  Change Abilify to 2mg 2 po qam  Add mirtazapine 15 mg at bedtime for akathisia  Continue with other medications as follows  Cymbalta 60 mg daily  BuSpar 15 mg twice daily  Lithium 450 mg 2 at night  Orders:    ARIPiprazole (ABILIFY) 2 mg tablet; Take 2 tablets (4 mg total) by mouth daily    Psychophysiological insomnia  And mirtazapine 15 mg at bedtime       Encounter for lithium monitoring  Lithium level 0.73 on 2025  GFR 86 and creatinine 1.0       Generalized anxiety disorder  Cymbalta 60 mg daily  BuSpar 15 mg twice daily  Lithium 450 mg 2 at night  Orders:    ARIPiprazole (ABILIFY) 2 mg tablet; Take 2 tablets (4 mg total) by mouth daily    Akathisia  Mirtazapine 15 mg nightly  Orders:    mirtazapine (REMERON) 15 mg tablet; Take 1 tablet (15 mg total) by mouth daily at bedtime    Follow up in about 1 month and he will call sooner if any questions or concerns    Treatment Recommendations:    Educated about diagnosis and treatment modalities. Verbalizes understanding and agreement with the treatment plan.  Discussed self monitoring of symptoms, and symptom monitoring tools.  Discussed medications and if treatment adjustment was needed or desired.  Aware of 24 hour and weekend coverage for urgent situations accessed by calling [facility] main practice number  I am scheduling this patient out for greater than 3 months: No    Medications  Risks/Benefits:      Risks, Benefits And Possible Side Effects Of Medications:    Risks, benefits, and possible side effects of medications explained to Raman and he (or legal representative) verbalizes understanding and agreement for treatment.    Controlled Medication Discussion:     Not applicable      History of Present Illness     Feel sjiitery   Panicky anxious uncomfortable      Review Of Systems: A review of systems is obtained and is negative except for the pertinent positives listed in HPI/Subjective above.      Current Rating Scores:     Not Applicable    Areas of Improvement: reviewed in HPI/Subjective Section and reviewed in Assessment and Plan Section      Past Medical History[1]  Past Surgical History[2]  Allergies: Allergies[3]    Current Outpatient Medications   Medication Instructions    albuterol (ProAir HFA) 90 mcg/act inhaler 2 puffs, Inhalation, Every 6 hours PRN    ARIPiprazole (ABILIFY) 2 mg, Oral, Daily at bedtime    atorvastatin (LIPITOR) 40 mg, Oral, Daily with dinner    benzonatate (TESSALON) 200 mg, Oral, 3 times daily PRN    busPIRone (BUSPAR) 15 mg, Oral, 2 times daily    cholecalciferol (VITAMIN D3) 1,000 Units, Oral, Daily    dicyclomine (BENTYL) 20 mg, Oral, Every 6 hours PRN    DULoxetine (CYMBALTA) 60 mg, Oral, Daily    ergocalciferol (VITAMIN D2) 50,000 Units, Oral, Weekly    lithium carbonate (LITHOBID) 900 mg, Oral, Daily at bedtime    Multiple Vitamin (multivitamin) tablet 1 tablet, Oral, Daily    omeprazole (PRILOSEC) 20 mg, Oral, 2 times daily        Substance Abuse History:    Tobacco, Alcohol and Drug Use History     Tobacco Use    Smoking status: Former     Current packs/day: 0.00     Types: Cigarettes     Quit date:      Years since quittin.5    Smokeless tobacco: Never   Vaping Use    Vaping status: Never Used   Substance Use Topics    Alcohol use: Not Currently     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    Drug use: Yes     Types: Marijuana     Comment: previously used medical  marijuana but not currently using     Alcohol Use: Not At Risk (6/8/2021)    AUDIT-C     Frequency of Alcohol Consumption: Never     Frequency of Binge Drinking: Never       Social History:    Social History     Socioeconomic History    Marital status: Single     Spouse name: Not on file    Number of children: Not on file    Years of education: Not on file    Highest education level: Not on file   Occupational History    Not on file   Other Topics Concern    Not on file   Social History Narrative    Not on file        Family Psychiatric History:     Family History[4]    Medical History Reviewed by provider this encounter:  Tobacco  Allergies  Meds  Problems  Med Hx  Surg Hx  Fam Hx          Objective   There were no vitals taken for this visit.     Mental Status Evaluation:    Appearance age appropriate, casually dressed, dressed appropriately   Behavior pleasant, cooperative   Speech normal rate, normal volume, normal pitch, spontaneous   Mood anxious   Affect normal range and intensity, appropriate, mood-congruent   Thought Processes organized, logical, coherent, goal directed   Thought Content no overt delusions, ruminations   Perceptual Disturbances: no auditory hallucinations, no visual hallucinations   Abnormal Thoughts  Risk Potential Suicidal ideation - None  Homicidal ideation - None  Potential for aggression - No   Orientation oriented to person, place, time/date, and situation   Memory recent and remote memory grossly intact   Consciousness alert and awake   Attention Span Concentration Span attention span and concentration are age appropriate   Intellect appears to be of average intelligence   Insight intact   Judgement intact   Muscle Strength and  Gait normal muscle strength and normal muscle tone, normal gait and normal balance   Motor activity no abnormal movements   Language no difficulty naming common objects, no difficulty repeating a phrase, no difficulty writing a sentence   Fund of  Knowledge adequate knowledge of current events  adequate fund of knowledge regarding past history  adequate fund of knowledge regarding vocabulary        Laboratory Results: I have personally reviewed all pertinent laboratory/tests results    CBC:   Lab Results   Component Value Date    WBC 5.41 09/08/2024    RBC 4.87 09/08/2024    HGB 14.6 09/08/2024    HCT 42.8 09/08/2024    MCV 88 09/08/2024     09/08/2024    MCH 30.0 09/08/2024    MCHC 34.1 09/08/2024    RDW 12.4 09/08/2024    MPV 9.6 09/08/2024    NEUTROABS 3.41 09/08/2024     CMP:   Lab Results   Component Value Date    SODIUM 137 04/18/2025    K 4.3 04/18/2025     04/18/2025    CO2 30 04/18/2025    AGAP 3 (L) 04/18/2025    BUN 18 04/18/2025    CREATININE 1.00 04/18/2025    GLUC 67 05/04/2024    GLUF 111 (H) 04/18/2025    CALCIUM 9.8 04/18/2025    AST 26 04/18/2025    ALT 44 04/18/2025    ALKPHOS 53 04/18/2025    TP 7.4 04/18/2025    ALB 4.5 04/18/2025    TBILI 0.78 04/18/2025    EGFR 86 04/18/2025     Lipid Profile:   Lab Results   Component Value Date    CHOLESTEROL 183 04/18/2025    HDL 42 04/18/2025    TRIG 132 04/18/2025    LDLCALC 115 (H) 04/18/2025    NONHDLC 141 04/18/2025     Hemoglobin A1C:   Lab Results   Component Value Date    HGBA1C 5.5 05/05/2025     05/05/2025     Lithium:   Lab Results   Component Value Date    LITHIUM 0.73 04/18/2025       Suicide/Homicide Risk Assessment:    Risk of Harm to Self:  The following ratings are based on assessment at the time of the interview  Based on today's assessment, Raman presents the following risk of harm to self: low    Risk of Harm to Others:  The following ratings are based on assessment at the time of the interview  Based on today's assessment, Raman presents the following risk of harm to others: minimal    The following interventions are recommended: Continue medication management. No other intervention changes indicated at this time.    Psychotherapy Provided:     Individual  "psychotherapy provided: No    Treatment Plan:    Completed and signed during the session: Not applicable - Treatment Plan not due at this session.    Goals: Progress towards Treatment Plan goals - Yes, progressing, as evidenced by subjective findings in HPI/Subjective Section and in Assessment and Plan Section    Depression Follow-up Plan Completed: Yes    Note Share:    This note was not shared with the patient due to reasonable likelihood of causing patient harm    Administrative Statements   Administrative Statements   I have spent a total time of 30 minutes in caring for this patient on the day of the visit/encounter including Risks and benefits of tx options, Risk factor reductions, Counseling / Coordination of care, Documenting in the medical record, Reviewing/placing orders in the medical record (including tests, medications, and/or procedures), and Obtaining or reviewing history  .    Visit Time  Visit Start Time: 400  Visit Stop Time: 430  Total Visit Duration: 30 minutes    Portions of the record may have been created with voice recognition software. Occasional wrong word or \"sound a like\" substitutions may have occurred due to the inherent limitations of voice recognition software. Read the chart carefully and recognize, using context, where substitutions have occurred.    Jannet Kirkland PA-C 07/17/25       [1]   Past Medical History:  Diagnosis Date    Anxiety     Chest pain     after eating- seen in ER several x - believes secondary to hernia    CPAP (continuous positive airway pressure) dependence     Depression     H. pylori infection     Hiatal hernia     Hyperlipidemia     Polysubstance dependence (HCC) 09/16/2020    PONV (postoperative nausea and vomiting)     Sleep apnea     Suicidal thoughts     Wears glasses    [2]   Past Surgical History:  Procedure Laterality Date    COLONOSCOPY      INGUINAL HERNIA REPAIR Right     N[place] LAPS RPR PARAESPHGL HRNA INCL FUNDPLSTY W/MESH N/A 4/1/2021    " Procedure: REPAIR HERNIA PARAESOPHAGEAL LAPAROSCOPIC W ROBOTICS with TOUPET FUNDOPLICATION cruroplasty with mesh;  Surgeon: Alonzo Kaplan MD;  Location: [facility];  Service: General    SHOULDER SURGERY      left   [3] No Known Allergies  [4]   Family History  Problem Relation Name Age of Onset    No Known Problems Mother      No Known Problems Father      Eating disorder Sister

## 2025-07-21 DIAGNOSIS — U07.1 COVID-19 VIRUS INFECTION: ICD-10-CM

## 2025-07-21 RX ORDER — ALBUTEROL SULFATE 90 UG/1
INHALANT RESPIRATORY (INHALATION)
OUTPATIENT
Start: 2025-07-21

## 2025-07-28 ENCOUNTER — TELEPHONE (OUTPATIENT)
Age: 50
End: 2025-07-28

## 2025-07-31 DIAGNOSIS — F33.2 MAJOR DEPRESSIVE DISORDER, RECURRENT, SEVERE WITHOUT PSYCHOTIC FEATURES (HCC): ICD-10-CM

## 2025-07-31 DIAGNOSIS — F41.1 GENERALIZED ANXIETY DISORDER: ICD-10-CM

## 2025-07-31 RX ORDER — LITHIUM CARBONATE 450 MG
900 TABLET, EXTENDED RELEASE ORAL
Qty: 180 TABLET | Refills: 0 | Status: SHIPPED | OUTPATIENT
Start: 2025-07-31

## 2025-07-31 RX ORDER — DULOXETIN HYDROCHLORIDE 60 MG/1
60 CAPSULE, DELAYED RELEASE ORAL DAILY
Qty: 90 CAPSULE | Refills: 0 | Status: SHIPPED | OUTPATIENT
Start: 2025-07-31

## 2025-08-02 DIAGNOSIS — F41.1 GENERALIZED ANXIETY DISORDER: ICD-10-CM

## 2025-08-04 ENCOUNTER — APPOINTMENT (EMERGENCY)
Dept: RADIOLOGY | Facility: HOSPITAL | Age: 50
End: 2025-08-04
Payer: COMMERCIAL

## 2025-08-04 ENCOUNTER — HOSPITAL ENCOUNTER (EMERGENCY)
Facility: HOSPITAL | Age: 50
Discharge: HOME | End: 2025-08-04
Attending: EMERGENCY MEDICINE | Admitting: EMERGENCY MEDICINE
Payer: COMMERCIAL

## 2025-08-04 VITALS
BODY MASS INDEX: 30.13 KG/M2 | DIASTOLIC BLOOD PRESSURE: 72 MMHG | RESPIRATION RATE: 18 BRPM | OXYGEN SATURATION: 99 % | WEIGHT: 228.4 LBS | TEMPERATURE: 98 F | SYSTOLIC BLOOD PRESSURE: 126 MMHG | HEART RATE: 91 BPM

## 2025-08-04 DIAGNOSIS — M25.562 LEFT KNEE PAIN: Primary | ICD-10-CM

## 2025-08-04 PROCEDURE — 73562 X-RAY EXAM OF KNEE 3: CPT

## 2025-08-04 PROCEDURE — 96372 THER/PROPH/DIAG INJ SC/IM: CPT

## 2025-08-04 PROCEDURE — 99284 EMERGENCY DEPT VISIT MOD MDM: CPT | Performed by: EMERGENCY MEDICINE

## 2025-08-04 PROCEDURE — 99283 EMERGENCY DEPT VISIT LOW MDM: CPT

## 2025-08-04 RX ORDER — KETOROLAC TROMETHAMINE 30 MG/ML
30 INJECTION, SOLUTION INTRAMUSCULAR; INTRAVENOUS ONCE
Status: COMPLETED | OUTPATIENT
Start: 2025-08-04 | End: 2025-08-04

## 2025-08-04 RX ORDER — ACETAMINOPHEN 325 MG/1
650 TABLET ORAL ONCE
Status: COMPLETED | OUTPATIENT
Start: 2025-08-04 | End: 2025-08-04

## 2025-08-04 RX ORDER — BUSPIRONE HYDROCHLORIDE 15 MG/1
15 TABLET ORAL 2 TIMES DAILY
Qty: 60 TABLET | Refills: 2 | Status: SHIPPED | OUTPATIENT
Start: 2025-08-04

## 2025-08-04 RX ADMIN — KETOROLAC TROMETHAMINE 30 MG: 30 INJECTION, SOLUTION INTRAMUSCULAR at 18:54

## 2025-08-04 RX ADMIN — ACETAMINOPHEN 650 MG: 325 TABLET ORAL at 18:50

## 2025-08-07 ENCOUNTER — OFFICE VISIT (OUTPATIENT)
Age: 50
End: 2025-08-07
Payer: COMMERCIAL

## 2025-08-07 ENCOUNTER — APPOINTMENT (OUTPATIENT)
Age: 50
End: 2025-08-07
Attending: ORTHOPAEDIC SURGERY
Payer: COMMERCIAL

## 2025-08-07 DIAGNOSIS — Z01.89 ENCOUNTER FOR LOWER EXTREMITY COMPARISON IMAGING STUDY: ICD-10-CM

## 2025-08-07 DIAGNOSIS — M76.892 HAMSTRING TENDONITIS OF LEFT THIGH: Primary | ICD-10-CM

## 2025-08-07 PROBLEM — M62.89 HAMSTRING TIGHTNESS OF LEFT LOWER EXTREMITY: Status: ACTIVE | Noted: 2025-08-07

## 2025-08-07 PROCEDURE — 99204 OFFICE O/P NEW MOD 45 MIN: CPT | Performed by: ORTHOPAEDIC SURGERY

## 2025-08-14 ENCOUNTER — OFFICE VISIT (OUTPATIENT)
Dept: PSYCHIATRY | Facility: CLINIC | Age: 50
End: 2025-08-14
Payer: COMMERCIAL

## (undated) DEVICE — SUT ETHIBOND 0 SH/SH 36 IN X524H

## (undated) DEVICE — TROCAR: Brand: KII FIOS FIRST ENTRY

## (undated) DEVICE — STANDARD SURGICAL GOWN, L: Brand: CONVERTORS

## (undated) DEVICE — ALLENTOWN LAP CHOLE APP PACK: Brand: CARDINAL HEALTH

## (undated) DEVICE — INTENDED FOR TISSUE SEPARATION, AND OTHER PROCEDURES THAT REQUIRE A SHARP SURGICAL BLADE TO PUNCTURE OR CUT.: Brand: BARD-PARKER SAFETY BLADES SIZE 11, STERILE

## (undated) DEVICE — TRAY FOLEY 16FR URIMETER SURESTEP

## (undated) DEVICE — ADHESIVE SKIN HIGH VISCOSITY EXOFIN 1ML

## (undated) DEVICE — PMI DISPOSABLE PUNCTURE CLOSURE DEVICE / SUTURE GRASPER: Brand: PMI

## (undated) DEVICE — GLOVE SRG BIOGEL 7

## (undated) DEVICE — SEAL

## (undated) DEVICE — CANNULA SEAL

## (undated) DEVICE — SUT VICRYL 0 UR-6 27 IN J603H

## (undated) DEVICE — STRL PENROSE DRAIN 18" X 1/4": Brand: CARDINAL HEALTH

## (undated) DEVICE — LIGAMAX 5 MM ENDOSCOPIC MULTIPLE CLIP APPLIER: Brand: LIGAMAX

## (undated) DEVICE — SUT MONOCRYL 4-0 PS-2 27 IN Y426H

## (undated) DEVICE — GLOVE INDICATOR PI UNDERGLOVE SZ 6.5 BLUE

## (undated) DEVICE — GLOVE SRG BIOGEL ECLIPSE 7.5

## (undated) DEVICE — ARM DRAPE

## (undated) DEVICE — CHLORAPREP HI-LITE 26ML ORANGE

## (undated) DEVICE — REDUCER: Brand: ENDOWRIST

## (undated) DEVICE — [HIGH FLOW INSUFFLATOR,  DO NOT USE IF PACKAGE IS DAMAGED,  KEEP DRY,  KEEP AWAY FROM SUNLIGHT,  PROTECT FROM HEAT AND RADIOACTIVE SOURCES.]: Brand: PNEUMOSURE

## (undated) DEVICE — BLADELESS OBTURATOR: Brand: WECK VISTA

## (undated) DEVICE — ASTOUND STANDARD SURGICAL GOWN, XL: Brand: CONVERTORS

## (undated) DEVICE — NEEDLE HYPO 22G X 1-1/2 IN

## (undated) DEVICE — SCD SEQUENTIAL COMPRESSION COMFORT SLEEVE MEDIUM KNEE LENGTH: Brand: KENDALL SCD

## (undated) DEVICE — GLOVE INDICATOR PI UNDERGLOVE SZ 8 BLUE

## (undated) DEVICE — COLUMN DRAPE

## (undated) DEVICE — GLOVE SRG BIOGEL 6.5

## (undated) DEVICE — GLOVE INDICATOR PI UNDERGLOVE SZ 7 BLUE